# Patient Record
Sex: MALE | Race: WHITE | Employment: OTHER | URBAN - METROPOLITAN AREA
[De-identification: names, ages, dates, MRNs, and addresses within clinical notes are randomized per-mention and may not be internally consistent; named-entity substitution may affect disease eponyms.]

---

## 2017-02-02 ENCOUNTER — APPOINTMENT (OUTPATIENT)
Dept: CT IMAGING | Age: 82
DRG: 069 | End: 2017-02-02
Attending: EMERGENCY MEDICINE
Payer: MEDICARE

## 2017-02-02 ENCOUNTER — HOSPITAL ENCOUNTER (INPATIENT)
Age: 82
LOS: 2 days | Discharge: HOME | DRG: 069 | End: 2017-02-04
Attending: EMERGENCY MEDICINE | Admitting: INTERNAL MEDICINE
Payer: MEDICARE

## 2017-02-02 DIAGNOSIS — R53.1 WEAKNESS: ICD-10-CM

## 2017-02-02 DIAGNOSIS — R47.1 DYSARTHRIA: Primary | ICD-10-CM

## 2017-02-02 DIAGNOSIS — R41.0 CONFUSION: ICD-10-CM

## 2017-02-02 DIAGNOSIS — Z71.89 GOALS OF CARE, COUNSELING/DISCUSSION: ICD-10-CM

## 2017-02-02 LAB
ANION GAP BLD CALC-SCNC: 6 MMOL/L (ref 5–15)
APPEARANCE UR: CLEAR
APTT PPP: 27.4 SEC (ref 22.1–32.5)
BACTERIA URNS QL MICRO: NEGATIVE /HPF
BASOPHILS # BLD AUTO: 0 K/UL (ref 0–0.1)
BASOPHILS # BLD: 0 % (ref 0–1)
BILIRUB UR QL: NEGATIVE
BUN SERPL-MCNC: 19 MG/DL (ref 6–20)
BUN/CREAT SERPL: 11 (ref 12–20)
CALCIUM SERPL-MCNC: 9.4 MG/DL (ref 8.5–10.1)
CHLORIDE SERPL-SCNC: 104 MMOL/L (ref 97–108)
CK SERPL-CCNC: 69 U/L (ref 39–308)
CO2 SERPL-SCNC: 29 MMOL/L (ref 21–32)
COLOR UR: ABNORMAL
CREAT SERPL-MCNC: 1.75 MG/DL (ref 0.7–1.3)
EOSINOPHIL # BLD: 0.4 K/UL (ref 0–0.4)
EOSINOPHIL NFR BLD: 6 % (ref 0–7)
EPITH CASTS URNS QL MICRO: ABNORMAL /LPF
ERYTHROCYTE [DISTWIDTH] IN BLOOD BY AUTOMATED COUNT: 13.3 % (ref 11.5–14.5)
GLUCOSE BLD STRIP.AUTO-MCNC: 220 MG/DL (ref 65–100)
GLUCOSE SERPL-MCNC: 241 MG/DL (ref 65–100)
GLUCOSE UR STRIP.AUTO-MCNC: 250 MG/DL
HCT VFR BLD AUTO: 41.9 % (ref 36.6–50.3)
HGB BLD-MCNC: 14.4 G/DL (ref 12.1–17)
HGB UR QL STRIP: NEGATIVE
HYALINE CASTS URNS QL MICRO: ABNORMAL /LPF (ref 0–5)
INR PPP: 1.1 (ref 0.9–1.1)
KETONES UR QL STRIP.AUTO: NEGATIVE MG/DL
LEUKOCYTE ESTERASE UR QL STRIP.AUTO: NEGATIVE
LYMPHOCYTES # BLD AUTO: 22 % (ref 12–49)
LYMPHOCYTES # BLD: 1.2 K/UL (ref 0.8–3.5)
MCH RBC QN AUTO: 28.7 PG (ref 26–34)
MCHC RBC AUTO-ENTMCNC: 34.4 G/DL (ref 30–36.5)
MCV RBC AUTO: 83.5 FL (ref 80–99)
MONOCYTES # BLD: 0.4 K/UL (ref 0–1)
MONOCYTES NFR BLD AUTO: 7 % (ref 5–13)
NEUTS SEG # BLD: 3.7 K/UL (ref 1.8–8)
NEUTS SEG NFR BLD AUTO: 65 % (ref 32–75)
NITRITE UR QL STRIP.AUTO: NEGATIVE
PH UR STRIP: 6 [PH] (ref 5–8)
PLATELET # BLD AUTO: 142 K/UL (ref 150–400)
POTASSIUM SERPL-SCNC: 4.7 MMOL/L (ref 3.5–5.1)
PROT UR STRIP-MCNC: 30 MG/DL
PROTHROMBIN TIME: 10.9 SEC (ref 9–11.1)
RBC # BLD AUTO: 5.02 M/UL (ref 4.1–5.7)
RBC #/AREA URNS HPF: ABNORMAL /HPF (ref 0–5)
SERVICE CMNT-IMP: ABNORMAL
SODIUM SERPL-SCNC: 139 MMOL/L (ref 136–145)
SP GR UR REFRACTOMETRY: 1.02 (ref 1–1.03)
THERAPEUTIC RANGE,PTTT: NORMAL SECS (ref 58–77)
TROPONIN I SERPL-MCNC: <0.04 NG/ML
UA: UC IF INDICATED,UAUC: ABNORMAL
UROBILINOGEN UR QL STRIP.AUTO: 0.2 EU/DL (ref 0.2–1)
WBC # BLD AUTO: 5.7 K/UL (ref 4.1–11.1)
WBC URNS QL MICRO: ABNORMAL /HPF (ref 0–4)

## 2017-02-02 PROCEDURE — 93005 ELECTROCARDIOGRAM TRACING: CPT

## 2017-02-02 PROCEDURE — 70450 CT HEAD/BRAIN W/O DYE: CPT

## 2017-02-02 PROCEDURE — 65660000000 HC RM CCU STEPDOWN

## 2017-02-02 PROCEDURE — 93880 EXTRACRANIAL BILAT STUDY: CPT

## 2017-02-02 PROCEDURE — 36415 COLL VENOUS BLD VENIPUNCTURE: CPT | Performed by: EMERGENCY MEDICINE

## 2017-02-02 PROCEDURE — 81001 URINALYSIS AUTO W/SCOPE: CPT | Performed by: EMERGENCY MEDICINE

## 2017-02-02 PROCEDURE — 82962 GLUCOSE BLOOD TEST: CPT

## 2017-02-02 PROCEDURE — 82550 ASSAY OF CK (CPK): CPT | Performed by: EMERGENCY MEDICINE

## 2017-02-02 PROCEDURE — 85025 COMPLETE CBC W/AUTO DIFF WBC: CPT | Performed by: EMERGENCY MEDICINE

## 2017-02-02 PROCEDURE — 74011250636 HC RX REV CODE- 250/636: Performed by: EMERGENCY MEDICINE

## 2017-02-02 PROCEDURE — 80048 BASIC METABOLIC PNL TOTAL CA: CPT | Performed by: EMERGENCY MEDICINE

## 2017-02-02 PROCEDURE — 85730 THROMBOPLASTIN TIME PARTIAL: CPT | Performed by: EMERGENCY MEDICINE

## 2017-02-02 PROCEDURE — 83036 HEMOGLOBIN GLYCOSYLATED A1C: CPT | Performed by: INTERNAL MEDICINE

## 2017-02-02 PROCEDURE — 85610 PROTHROMBIN TIME: CPT | Performed by: EMERGENCY MEDICINE

## 2017-02-02 PROCEDURE — 84484 ASSAY OF TROPONIN QUANT: CPT | Performed by: EMERGENCY MEDICINE

## 2017-02-02 PROCEDURE — 99285 EMERGENCY DEPT VISIT HI MDM: CPT

## 2017-02-02 RX ORDER — SODIUM CHLORIDE 0.9 % (FLUSH) 0.9 %
5-10 SYRINGE (ML) INJECTION AS NEEDED
Status: DISCONTINUED | OUTPATIENT
Start: 2017-02-02 | End: 2017-02-04 | Stop reason: HOSPADM

## 2017-02-02 RX ORDER — FUROSEMIDE 40 MG/1
20 TABLET ORAL DAILY
COMMUNITY
End: 2017-02-20 | Stop reason: SDUPTHER

## 2017-02-02 RX ORDER — DEXTROSE 50 % IN WATER (D50W) INTRAVENOUS SYRINGE
12.5-25 AS NEEDED
Status: DISCONTINUED | OUTPATIENT
Start: 2017-02-02 | End: 2017-02-04 | Stop reason: HOSPADM

## 2017-02-02 RX ORDER — POLYETHYLENE GLYCOL 3350 17 G/17G
17 POWDER, FOR SOLUTION ORAL
COMMUNITY

## 2017-02-02 RX ORDER — GUAIFENESIN 100 MG/5ML
81 LIQUID (ML) ORAL DAILY
Status: DISCONTINUED | OUTPATIENT
Start: 2017-02-03 | End: 2017-02-03

## 2017-02-02 RX ORDER — ROSUVASTATIN CALCIUM 10 MG/1
10 TABLET, COATED ORAL
Status: DISCONTINUED | OUTPATIENT
Start: 2017-02-02 | End: 2017-02-02

## 2017-02-02 RX ORDER — INSULIN LISPRO 100 [IU]/ML
INJECTION, SOLUTION INTRAVENOUS; SUBCUTANEOUS
Status: DISCONTINUED | OUTPATIENT
Start: 2017-02-02 | End: 2017-02-04 | Stop reason: HOSPADM

## 2017-02-02 RX ORDER — PANTOPRAZOLE SODIUM 40 MG/1
40 TABLET, DELAYED RELEASE ORAL
Status: DISCONTINUED | OUTPATIENT
Start: 2017-02-03 | End: 2017-02-04 | Stop reason: HOSPADM

## 2017-02-02 RX ORDER — MAGNESIUM SULFATE 100 %
4 CRYSTALS MISCELLANEOUS AS NEEDED
Status: DISCONTINUED | OUTPATIENT
Start: 2017-02-02 | End: 2017-02-04 | Stop reason: HOSPADM

## 2017-02-02 RX ORDER — LANOLIN ALCOHOL/MO/W.PET/CERES
3 CREAM (GRAM) TOPICAL
COMMUNITY
End: 2019-02-27

## 2017-02-02 RX ORDER — SODIUM CHLORIDE 0.9 % (FLUSH) 0.9 %
5-10 SYRINGE (ML) INJECTION EVERY 8 HOURS
Status: DISCONTINUED | OUTPATIENT
Start: 2017-02-02 | End: 2017-02-04 | Stop reason: HOSPADM

## 2017-02-02 RX ORDER — MIRTAZAPINE 15 MG/1
7.5 TABLET, FILM COATED ORAL
COMMUNITY
End: 2019-02-27

## 2017-02-02 RX ORDER — ISOSORBIDE MONONITRATE 120 MG/1
60 TABLET, EXTENDED RELEASE ORAL
Status: ON HOLD | COMMUNITY
End: 2017-02-04

## 2017-02-02 RX ORDER — OMEPRAZOLE 20 MG/1
20 CAPSULE, DELAYED RELEASE ORAL
Status: DISCONTINUED | OUTPATIENT
Start: 2017-02-03 | End: 2017-02-02 | Stop reason: CLARIF

## 2017-02-02 RX ADMIN — Medication 10 ML: at 21:52

## 2017-02-02 RX ADMIN — SODIUM CHLORIDE 1000 ML: 900 INJECTION, SOLUTION INTRAVENOUS at 18:11

## 2017-02-02 NOTE — PROGRESS NOTES
[facility]: MED RECONCILIATION    Comments/Recommendations:    Verifies allergies   Reports compliance to prescribed regimen   Patient has a supportive spouse who keeps a detailed log of his wt, blood pressure, and blood sugar   Blood pressure  o 166/80 on average   Blood sugar   o  today which is higher than normal   Statin  o The patient is not taking a statin as he has been unable to tolerate this class of medications due to severe muscle aches. The patient was tried on multiple statins and none were tolerable. The patient has been taking [place] May herbal supplement daily since 2014 for his cholesterol.  Of note the spouse reports Coenzyme Q10 was not tried with statin therapy to attempt to help reduce myalgia   Nitroglycerin  o Counseled to check the expiration date and refill as needed  Medications added:     · Melatonin  · Miralax  · Mirtazapine    Medications removed:    · Aspirin stopped about 3 months ago  · Crestor 10 mg qhs  · Aspirin 81mg daily    Information obtained from: patient, spouse, [address] center called to confirm NPH insulin, medication list    Significant PMH/Disease States:   Patient Active Problem List   Diagnosis Code    Diabetes mellitus ([facility] E11.9    CAD (coronary artery disease) I25.10    HTN (hypertension) I10    CKD (chronic kidney disease) N18.9    Thrombocytopenia, unspecified (HCC) D69.6    Paroxysmal atrial fibrillation (HCC) I48.0    TIA (transient ischemic attack) G45.9    Leg weakness M62.81    CKD (chronic kidney disease) stage 3, GFR 30-59 ml/min N18.3    Left renal mass N28.89    LBBB (left bundle branch block) I44.7    Renal cell carcinoma (HCC) C64.9    Kidney cancer, primary, 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 ([facility] C64.9, C79.9    Renal cell cancer ([facility] C64.9     Past Medical History   Diagnosis Date    Adverse effect of anesthesia      BP unstable after GB suregry- in ICU 3 days    Arthritis     CAD (coronary artery disease) Angina 2011, PCI LAD with multilink stent 4.0x12mm;  also cath at [place]  -- no stents at that time    CKD (chronic kidney disease)     CVA [facility]cere[facility] vascular accident) [facility]      maybe in 11/15 after surgery    Diabetes mellitus ([facility]     Gallstone     GERD (gastroesophageal reflux disease)     HTN (hypertension)     Hypothyroidism     Ill-defined condition 2015     kidney stone    LBBB (left bundle branch block)      normal lexiscan MPI     SAMUEL (obstructive sleep apnea)      not using CPAP    PAF (paroxysmal atrial fibrillation) (HCC)      [id number] score = 7    Prostate cancer (HCC)      prostate    Renal cell cancer (HCC)      Stage 4 Renal Cell Cancer. Partial omentectomy 2015 with Dr. Malu Fink: omental mass-metastatic renal cell carcinoma    Renal mass, left      DAVINCI LEFT PARTIAL NEPHRECTOMY 13;     TIA (transient ischemic attack)      12/10/13     Chief Complaint for this Admission:   Chief Complaint   Patient presents with    Altered mental status    Aphagia     Allergies: Latex and Morphine    Prior to Admission Medications:     Prior to Admission Medications   Prescriptions Last Dose Informant Patient Reported? Taking? OTHER,NON-FORMULARY, 2017 at am Significant Other Yes Yes   Sig: Take 1 Cap by mouth daily.  May dietary supplement-red yeast rice, Crategi extract,etc.   apixaban (ELIQUIS) 2.5 mg tablet 2017 at am Significant Other Yes Yes   Sig: Take 2.5 mg by mouth two (2) times a day. desonide (TRIDESILON) 0.05 % cream  Significant Other Yes Yes   Sig: Apply  to affected area daily as needed for Skin Irritation. furosemide (LASIX) 40 mg tablet  Significant Other Yes Yes   Sig: Take 20 mg by mouth daily as needed (weight gain of 5 lbs or greater). insulin NPH (NOVOLIN N, HUMULIN N) 100 unit/mL injection 2017 at Unknown time Significant Other Yes Yes   Si Units by SubCUTAneous route daily.    isosorbide mononitrate ER (IMDUR) 120 mg CR tablet 2017 at Unknown time Significant Other Yes Yes   Sig: Take 60 mg by mouth every morning. losartan (COZAAR) 25 mg tablet 2017 at am Significant Other No Yes   Sig: Take 1 Tab by mouth daily. melatonin 3 mg tablet 2017 at Unknown time Significant Other Yes Yes   Sig: Take 3 mg by mouth nightly. mirtazapine (REMERON) 15 mg tablet 2017 at Unknown time Significant Other Yes Yes   Sig: Take 7.5 mg by mouth nightly. nitroglycerin (NITROSTAT) 0.4 mg SL tablet  Significant Other Yes Yes   Si.4 mg by SubLINGual route every five (5) minutes as needed. omeprazole (PRILOSEC) 20 mg capsule 2017 at am Significant Other Yes Yes   Sig: Take 20 mg by mouth Daily (before breakfast). polyethylene glycol (MIRALAX) 17 gram packet  Significant Other Yes Yes   Sig: Take 17 g by mouth daily as needed (constipation).       Facility-Administered Medications: None      Thank you,    Nidhi Cardoza, PharmD, BCPS

## 2017-02-02 NOTE — ED TRIAGE NOTES
Patient brought in for confusion around 11am, was not sure how to check his blood sugar, had been laying in bed, stated he did not feel well. Daughter called him around 2:45pm, and speech was not clear on the telephone.

## 2017-02-02 NOTE — PROGRESS NOTES
Spiritual Care Assessment/Progress Notes    Krish Finnegan [id number]  [id number]    1935  80 y.o.  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 [facility]) 11/02/2015    Renal cell carcinoma ([facility] 09/30/2015    Left renal mass 12/11/2013    LBBB (left bundle branch block) 12/11/2013    TIA (transient ischemic attack) 12/10/2013    Leg weakness 12/10/2013    CKD (chronic kidney disease) stage 3, GFR 30-59 ml/min 12/10/2013    Paroxysmal atrial fibrillation ([facility] 05/06/2013    Thrombocytopenia, unspecified ([facility] 07/14/2012    CAD (coronary artery disease)     HTN (hypertension)     CKD (chronic kidney disease)     Diabetes mellitus ([facility] 07/12/2012        Date: 2/2/2017       Level of Temple/Spiritual Activity:  []         Involved in mary tradition/spiritual practice    []         Not involved in mary tradition/spiritual practice  [x]         Spiritually oriented    []         Claims no spiritual orientation    []         seeking spiritual identity  []         Feels alienated from Religion practice/tradition  []         Feels angry about Religion practice/tradition  [x]         Spirituality/Religion tradition is a resource for coping at this time.   []         Not able to assess due to medical condition    Services Provided Today:  []         crisis intervention    []         reading Scriptures  [x]         spiritual assessment    []         prayer  [x]         empathic listening/emotional support  []         rites and rituals (cite in comments)  []         life review     []         Buddhist support  []         theological development   []         advocacy  []         ethical dialog     [x]         blessing  []         bereavement support    [x]         support to family  []         anticipatory grief support   []         help with AMD  []         spiritual guidance    []         meditation      Spiritual Care Needs  [x]         Emotional Support  []         Spiritual/Sabianist Care  []         Loss/Adjustment  []         Advocacy/Referral                /Ethics  []         No needs expressed at               this time  []         Other: (note in               comments)  [address]  []         Follow up visits with               pt/family  []         Provide materials  []         Schedule sacraments  []         Contact Community               Clergy  [x]         Follow up as needed  []         Other: (note in               comments)     Comments:  is responding to Code S in ER 16. Pt, wife and daughter are present at the time.  offered active listening allowing wife to process pt's medical concerns.  offered words of support and encouragement with pt by bedside. Pt and family expressed thanks for  support. Spiritual care will continue to follow as able and as needed. Rev.  [address] Mark Lux M.Div, M.S, [address] available at [phone])

## 2017-02-02 NOTE — H&P
Admission History and Physical      NAME:  Mehrdad Silveira. :   1935   MRN:  [id number]     PCP:  Not On File [facility]     Date/Time:  2017           Assessment/Plan:       Active Problems:    TIA (transient ischemic attack) / Dysarthria: LKN was 1445 today. Not a tPA candidate due to [address]. Admit to telemetry. CT Head without bleed. U/A pending. BG normal. Cannot do MRI due to metallic ear implant. Cannot do CTA due to CKD. Will get carotid dopplers, TTE. Neuro consult. Stopped ASA 81mg 3 mo ago, will re-start. Check lipid panel and hgba1c. Diabetes mellitus ([facility]): continue home NPH. SSI. FSBG. Check HgbA1c      CAD (coronary artery disease): Continue statin. Added ASA. Holding ARB or anti-hypertensives for now. HTN (hypertension): allow for permissive HTN for now given acute phase of possible CVA. Treat if SBP greater than 220      Paroxysmal atrial fibrillation ([facility]): appears to be in NSR right now. Has known LBBB. Rate controlled. Continue apixiban      CKD (chronic kidney disease) stage 3, GFR 30-59 ml/min: at baseline. Continue to monitor. Renally dose medication. 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 Has had slow cognitive decline as well. Palliative care consult for goals of care discussion. Subjective:   Barrier to HPI: Cognitive impairment. Hx obtained from wife and daughter and chart review. CHIEF COMPLAINT: Slurred Speech     HISTORY OF PRESENT ILLNESS:     Mr. Tommy Tapia is a 80 y.o.  male with a hx of TIA, PAF, HTN, Stage IV renal cell carcinoma who is admitted with slurred speech concerning for possible TIA. Mr. Tommy Tapia presented to the Emergency Department after sudden onset of slurred speech at 1445 that was noted by daughter.  Earlier that day, speech was normal, wife did not that patient stated \"I feel sick\" but was vague and seemed somewhat confused. Upon arrival to check on patient, daughter noticed worsening in speech. In ED, his dysarthria began to improve, tele-neuro was contacted and not a tPA candidate due to eliquis. We will admit for further management. Past Medical History   Diagnosis Date    Adverse effect of anesthesia      BP unstable after GB suregry- in ICU 3 days    Arthritis     CAD (coronary artery disease)      Angina Jan 2011, PCI LAD with multilink stent 4.0x12mm;  also cath at [place] 7/13 -- no stents at that time    CKD (chronic kidney disease)     CVA (cerebral vascular accident) ([address]      maybe in 11/15 after surgery    Diabetes mellitus ([address]     Gallstone     GERD (gastroesophageal reflux disease)     HTN (hypertension)     Hypothyroidism     Ill-defined condition 8/2015     kidney stone    LBBB (left bundle branch block)      normal lexiscan MPI 12/13    SAMUEL (obstructive sleep apnea)      not using CPAP    PAF (paroxysmal atrial fibrillation) (HCC)      [id number] score = 7    Prostate cancer ([address]      prostate    Renal cell cancer ([address]      Stage 4 Renal Cell Cancer.     Partial omentectomy 11/2/2015 with Dr. Gemini Kaba: omental mass-metastatic renal cell carcinoma    Renal mass, left      DAVINCI LEFT PARTIAL NEPHRECTOMY 12/19/13;     TIA (transient ischemic attack)      12/10/13        Past Surgical History   Procedure Laterality Date    Hx coronary stent placement  2011    Hx orthopaedic  1994     rotator cuff repair    Hx orthopaedic  1994     cervical disc removed    Hx tonsillectomy  1987    Hx heent  1989     stapedectomy    Hx prostatectomy  2000     surgery intervention    Pr cardiac surg procedure unlist  1/2011     stent to LAD    Hx heart catheterization  2011, 2013    Hx urological  2001     valvular implant prevent incontinence    Hx lithotripsy  8/2015    Hx urological  2003     insert gel to help with incontinence    Hx urological  12/2013 left partial nephrectomy    Hx urological  9/2015     CT guided biopsy of abdominal lymph nodes    Hx cholecystectomy  7/13/2012    Hx hernia repair  1968     left inguinal       Social History   Substance Use Topics    Smoking status: Former Smoker     Packs/day: 0.10     Years: 23.00     Quit date: 12/3/1975    Smokeless tobacco: Never Used    Alcohol use No        Family History   Problem Relation Age of Onset    Hypertension      Stroke Father     Cancer Father      prostate    Diabetes Sister      2 sisters    Hypertension Sister      2 sisters    Diabetes Brother     Stroke Brother     Diabetes Brother      all brothers    Stroke Brother     Kidney Disease Brother     Heart Attack Brother         Allergies   Allergen Reactions    Latex Rash, Swelling and Contact Dermatitis    Morphine Other (comments)     Hallucinations and Nausea        Prior to Admission medications    Medication Sig Start Date End Date Taking? Authorizing Provider   furosemide (LASIX) 40 mg tablet Take 20 mg as needed 2/17/16   Kaylin Boyd MD   losartan (COZAAR) 25 mg tablet Take 1 Tab by mouth daily. 2/17/16   Kaylin Boyd MD   rosuvastatin (CRESTOR) 10 mg tablet Take 1 Tab by mouth nightly. 2/17/16   Kaylin Boyd MD   isosorbide mononitrate ER (IMDUR) 60 mg CR tablet  10/23/15   Historical Provider   apixaban (ELIQUIS) 2.5 mg tablet Take 2.5 mg by mouth two (2) times a day. Historical Provider   OTHER,NON-FORMULARY, Take 1 Cap by mouth daily. [place] May dietary supplement-red yeast rice, Crategi extract,etc.    Historical Provider   insulin NPH (NOVOLIN N, HUMULIN N) 100 unit/mL injection 35 Units by SubCUTAneous route daily. Historical Provider   acetaminophen (TYLENOL EXTRA STRENGTH) 500 mg tablet Take 1,000 mg by mouth every eight (8) hours as needed for Pain. Historical Provider   omeprazole (PRILOSEC) 20 mg capsule Take 20 mg by mouth daily.     Historical Provider   desonide (TRIDESILON) 0.05 % cream Apply  to affected area daily as needed for Skin Irritation. Historical Provider   aspirin delayed-release 81 mg tablet Take 81 mg by mouth daily. Historical Provider   nitroglycerin (NITROSTAT) 0.4 mg SL tablet 0.4 mg by SubLINGual route every five (5) minutes as needed. Chance Ornelas MD         Review of Systems:  Unable to obtain due to current clinical condition. Objective:      VITALS:    Vital signs reviewed; most recent are: There were no vitals taken for this visit. SpO2 Readings from Last 6 Encounters:   03/24/16 97%   12/30/15 97%   11/24/15 97%   11/05/15 96%   10/26/15 97%   10/09/15 96%        No intake or output data in the 24 hours ending 02/02/17 9508         Exam:     Physical Exam:    Gen:  Elderly, frail, in no acute distress  HEENT:  Pink conjunctivae, PERRL, hearing intact to voice, moist mucous membranes  Neck:  Supple, without masses, thyroid non-tender  Resp:  No accessory muscle use, clear breath sounds without wheezes rales or rhonchi  Card:  No murmurs, normal S1, S2 without thrills, bruits or peripheral edema  Abd:  Soft, non-tender, non-distended, normoactive bowel sounds are present  Lymph:  No cervical adenopathy  Musc:  No cyanosis or clubbing  Skin:  No rashes or ulcers, skin turgor is good  Neuro:  Cranial nerves 3-12 are grossly intact,  strength is 5/5 bilaterally, dorsi / plantarflexion strength is 5/5 bilaterally, follows commands intermittently  Psych:  Alert with poor insight. Oriented to person only. Labs:    Recent Labs      02/02/17   1620   WBC  5.7   HGB  14.4   HCT  41.9   PLT  142*     Recent Labs      02/02/17   1620   NA  139   K  4.7   CL  104   CO2  29   GLU  241*   BUN  19   CREA  1.75*   CA  9.4     Lab Results   Component Value Date/Time    Glucose (POC) 220 02/02/2017 04:06 PM    Glucose (POC) 275 11/05/2015 11:34 AM     No results for input(s): PH, PCO2, PO2, HCO3, FIO2 in the last 72 hours.   Recent Labs      02/02/17   1620 INR  1.1       Medical records reviewed in preparation for this admission: Old medical records. Nursing notes.     Surrogate decision maker:  patient, spouse and daughter    Total time spent with patient: 79 [address] discussed with: Patient, Family, Nursing Staff and >50% of time spent in counseling and coordination of care    Discussed:  Care Plan    Prophylaxis:  Apixiban    Probable Disposition:  Home w/Family and HH PT, OT, RN           ___________________________________________________    Attending Physician: Jamil Horvath,

## 2017-02-02 NOTE — IP AVS SNAPSHOT
[address] 
[phone] Patient: Oziel Goss. MRN: [id number] EAV:1/61/8800 You are allergic to the following Allergen Reactions Latex Rash Swelling Contact Dermatitis Contrast Agent (Iodine) Other (comments) Patient has one kidney Morphine Other (comments) Hallucinations and Nausea Recent Documentation Height Weight BMI Smoking Status 1.753 m 89.1 kg 29 kg/m2 Former Smoker Emergency Contacts Name Discharge Info Relation Home Work Mobile Najma Hernandez  Spouse [3] [phone]    
 Najma BRUNNER  Spouse [3] [phone] About your hospitalization You were admitted on:  February 2, 2017 You last received care in the:  [facility] TELE 2 You were discharged on:  February 4, 2017 Unit phone number:  [phone] Why you were hospitalized Your primary diagnosis was:  Not on File Your diagnoses also included:  Diabetes Mellitus (Hcc), Ckd (Chronic Kidney Disease) Stage 3, Gfr 30-59 Ml/Min, Cad (Coronary Artery Disease), Htn (Hypertension), Kidney Cancer, Primary, With Metastasis From Kidney To Other Site (Hcc), Paroxysmal Atrial Fibrillation (Hcc), 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, Expressive Aphasia Providers Seen During Your Hospitalizations Provider Role Specialty Primary office phone Madiha Tineo MD Attending Provider Emergency Medicine [phone] Kriss Dean DO Attending Provider Internal Medicine [phone] Lay Rueda MD Attending Provider Internal Medicine [phone] Your Primary Care Physician (PCP) Primary Care Physician Office Phone Office Fax OTHER, PHYS ** None ** ** None ** Follow-up Information Follow up With Details Comments Contact Info  Rene Clark MD Schedule an appointment as soon as possible for a visit in 2 weeks Make Neurology Follow-up appt post TIA or CVA in 2-3 weeks with Dr. Aylin Saavedra [address] [facility] [address] 
[phone] [address] [id number] 
[phone] Follow up with your primary care doctor Gwen Hermosillo MD  can see Dr. Tere Jernigan or Dr. Aylin Saavedra for post TIA follow up [address] 
[phone] Chance Ornelas MD   Patient can only remember the practice name and not the physician Your Appointments Monday February 20, 2017  1:00 PM EST  
ESTABLISHED PATIENT with Beto Conde MD  
[facility] ([facility]) [id number] [place] [address]  
[phone] Current Discharge Medication List  
  
START taking these medications Dose & Instructions Dispensing Information Comments Morning Noon Evening Bedtime  
 aspirin 81 mg chewable tablet Your next dose is: Today, Tomorrow Other:  _________ Dose:  81 mg Take 1 Tab by mouth daily. Quantity:  30 Tab Refills:  0  
     
   
   
   
  
 co-enzyme Q-10 100 mg capsule Commonly known as:  CO Q-10 Your next dose is: Today, Tomorrow Other:  _________ Dose:  100 mg Take 1 Cap by mouth daily. Quantity:  30 Cap Refills:  0  
     
   
   
   
  
 pravastatin 20 mg tablet Commonly known as:  PRAVACHOL Your next dose is: Today, Tomorrow Other:  _________ Dose:  20 mg Take 1 Tab by mouth nightly. Quantity:  30 Tab Refills:  0 CONTINUE these medications which have CHANGED Dose & Instructions Dispensing Information Comments Morning Noon Evening Bedtime  
 insulin  unit/mL injection Commonly known as:  Kelvin [place] What changed:   
- how much to take - when to take this - additional instructions Your next dose is: Today, Tomorrow Other:  _________ Dose:  10 Units 10 Units by SubCUTAneous route Before breakfast and dinner. Note the decrease in dose from 35 units Quantity:  1 Vial  
Refills:  0  
     
   
   
   
  
 isosorbide mononitrate  mg CR tablet Commonly known as:  IMDUR What changed:  how much to take Your next dose is: Today, Tomorrow Other:  _________ Dose:  120 mg Take 1 Tab by mouth every morning. Quantity:  30 Tab Refills:  0 CONTINUE these medications which have NOT CHANGED Dose & Instructions Dispensing Information Comments Morning Noon Evening Bedtime  
 desonide 0.05 % cream  
Commonly known as:  Ean Cordia Your next dose is: Today, Tomorrow Other:  _________ Apply  to affected area daily as needed for Skin Irritation. Refills:  0  
     
   
   
   
  
 ELIQUIS 2.5 mg tablet Generic drug:  apixaban Your next dose is: Today, Tomorrow Other:  _________ Dose:  2.5 mg Take 2.5 mg by mouth two (2) times a day. Refills:  0  
     
   
   
   
  
 furosemide 40 mg tablet Commonly known as:  LASIX Your next dose is: Today, Tomorrow Other:  _________ Dose:  20 mg Take 20 mg by mouth daily as needed (weight gain of 5 lbs or greater). Refills:  0  
     
   
   
   
  
 losartan 25 mg tablet Commonly known as:  COZAAR Your next dose is: Today, Tomorrow Other:  _________ Dose:  25 mg Take 1 Tab by mouth daily. Quantity:  30 Tab Refills:  6  
     
   
   
   
  
 melatonin 3 mg tablet Your next dose is: Today, Tomorrow Other:  _________ Dose:  3 mg Take 3 mg by mouth nightly. Refills:  0 MIRALAX 17 gram packet Generic drug:  polyethylene glycol Your next dose is: Today, Tomorrow Other:  _________ Dose:  17 g Take 17 g by mouth daily as needed (constipation). Refills:  0  
     
   
   
   
  
 nitroglycerin 0.4 mg SL tablet Commonly known as:  NITROSTAT Your next dose is: Today, Tomorrow Other:  _________ Dose:  0.4 mg  
0.4 mg by SubLINGual route every five (5) minutes as needed. Refills:  0  
     
   
   
   
  
 OTHER(NON-FORMULARY) Your next dose is: Today, Tomorrow Other:  _________ Dose:  1 Cap Take 1 Cap by mouth daily. Zafar May dietary supplement-red yeast rice, Crategi extract,etc.  
 Refills:  0 PriLOSEC 20 mg capsule Generic drug:  omeprazole Your next dose is: Today, Tomorrow Other:  _________ Dose:  20 mg Take 20 mg by mouth Daily (before breakfast). Refills:  0 REMERON 15 mg tablet Generic drug:  mirtazapine Your next dose is: Today, Tomorrow Other:  _________ Dose:  7.5 mg Take 7.5 mg by mouth nightly. Refills:  0 Where to Get Your Medications Information on where to get these meds will be given to you by the nurse or doctor. ! Ask your nurse or doctor about these medications  
  aspirin 81 mg chewable tablet  
 co-enzyme Q-10 100 mg capsule  
 insulin  unit/mL injection  
 isosorbide mononitrate  mg CR tablet  
 pravastatin 20 mg tablet Discharge Instructions HOSPITALIST DISCHARGE INSTRUCTIONS 
NAME: Ramesh Chang. :  1935 MRN:  [id number] Date/Time:  2017 9:03 AM 
 
ADMIT DATE: 2017 DISCHARGE DATE: 2017 PRINCIPAL DISCHARGE DIAGNOSES: 
TIA (mini-stroke) MEDICATIONS: 
· It is important that you take the medication exactly as they are prescribed. Note the changes and additions to your medications. Be sure you understand these changes before you are discharged today. · Note the decrease in dose of insulin. · Note the increase in dose of isosorbide mononitrate · Keep your medication in the bottles provided by the pharmacist and keep a list of the medication names, dosages, and times to be taken in your wallet. · Do not take other medications without consulting your doctor. Pain Management: per above medications What to do at [facility] Recommended diet:  Cardiac Diet and Diabetic Diet Recommended activity: PT/OT Eval and Treat If you experience any of the following symptoms then please call your primary care physician or return to the emergency room if you cannot get hold of your doctor: 
 
Slurred speech, facial droop, weakness, numbness, falling, bleeding, or other severe concerning symptoms that brought you to the hospital in the first place Follow Up: Follow-up Information Follow up With Details Comments Contact Info Danilo Farmer MD Schedule an appointment as soon as possible for a visit in 2 weeks Make Neurology Follow-up appt post TIA or CVA in 2-3 weeks with Dr. Amna Fuentes [address] [address] 
[phone] [address] [address] 
[phone] Follow up with your primary care doctor Nelsy Early MD  can see Dr. Eddy Danielle or Dr. Amna Fuentes for post TIA follow up [address] [facility] [address] [place] [address] 
[phone] Information obtained by : 
I understand that if any problems occur once I am at home I am to contact my physician. I understand and acknowledge receipt of the instructions indicated above. Physician's or R.N.'s Signature                                                                  Date/Time Patient or Representative Signature                                                          Date/Time Discharge Orders None [employer] Announcement We are excited to announce that we are making your provider's discharge notes available to you in [employer]. You will see these notes when they are completed and signed by the physician that discharged you from your recent hospital stay. If you have any questions or concerns about any information you see in [employer], please call the Health Information Department where you were seen or reach out to your Primary Care Provider for more information about your plan of care. Introducing [facility]! Dear Cecy Cazares: 
Thank you for requesting a [employer] account. Our records indicate that you already have an active [employer] account. You can access your account anytime at [email or web address] Did you know that you can access your hospital and ER discharge instructions at any time in [employer]? You can also review all of your test results from your hospital stay or ER visit. Additional Information If you have questions, please visit the Frequently Asked Questions section of the [employer] website at [email or web address] Remember, [employer] is NOT to be used for urgent needs. For medical emergencies, dial 911. Now available from your iPhone and Android! General Information Please provide this summary of care documentation to your next provider. Patient Signature:  ____________________________________________________________ Date:  ____________________________________________________________  
  
Yany Kiran Provider Signature:  ____________________________________________________________ Date:  ____________________________________________________________

## 2017-02-02 NOTE — ED NOTES
Per Dr Colie Leyden, patient is taking eloquis and is not a TPA candidate. Primary RN updated on patient status.

## 2017-02-02 NOTE — IP AVS SNAPSHOT
Current Discharge Medication List  
  
Take these medications at their scheduled times Dose & Instructions Dispensing Information Comments Morning Noon Evening Bedtime  
 aspirin 81 mg chewable tablet Your next dose is: Today, Tomorrow Other:  ____________ Dose:  81 mg Take 1 Tab by mouth daily. Quantity:  30 Tab Refills:  0  
     
   
   
   
  
 co-enzyme Q-10 100 mg capsule Commonly known as:  CO Q-10 Your next dose is: Today, Tomorrow Other:  ____________ Dose:  100 mg Take 1 Cap by mouth daily. Quantity:  30 Cap Refills:  0  
     
   
   
   
  
 ELIQUIS 2.5 mg tablet Generic drug:  apixaban Your next dose is: Today, Tomorrow Other:  ____________ Dose:  2.5 mg Take 2.5 mg by mouth two (2) times a day. Refills:  0  
     
   
   
   
  
 insulin  unit/mL injection Commonly known as:  Melisa Opal Your next dose is: Today, Tomorrow Other:  ____________ Dose:  10 Units 10 Units by SubCUTAneous route Before breakfast and dinner. Note the decrease in dose from 35 units Quantity:  1 Vial  
Refills:  0  
     
   
   
   
  
 isosorbide mononitrate  mg CR tablet Commonly known as:  IMDUR Your next dose is: Today, Tomorrow Other:  ____________ Dose:  120 mg Take 1 Tab by mouth every morning. Quantity:  30 Tab Refills:  0  
     
   
   
   
  
 losartan 25 mg tablet Commonly known as:  COZAAR Your next dose is: Today, Tomorrow Other:  ____________ Dose:  25 mg Take 1 Tab by mouth daily. Quantity:  30 Tab Refills:  6  
     
   
   
   
  
 melatonin 3 mg tablet Your next dose is: Today, Tomorrow Other:  ____________ Dose:  3 mg Take 3 mg by mouth nightly. Refills:  0  
     
   
   
   
  
 OTHER(NON-FORMULARY) Your next dose is: Today, Tomorrow Other:  ____________ Dose:  1 Cap Take 1 Cap by mouth daily. Zafar May dietary supplement-red yeast rice, Crategi extract,etc.  
 Refills:  0  
     
   
   
   
  
 pravastatin 20 mg tablet Commonly known as:  PRAVACHOL Your next dose is: Today, Tomorrow Other:  ____________ Dose:  20 mg Take 1 Tab by mouth nightly. Quantity:  30 Tab Refills:  0 PriLOSEC 20 mg capsule Generic drug:  omeprazole Your next dose is: Today, Tomorrow Other:  ____________ Dose:  20 mg Take 20 mg by mouth Daily (before breakfast). Refills:  0 REMERON 15 mg tablet Generic drug:  mirtazapine Your next dose is: Today, Tomorrow Other:  ____________ Dose:  7.5 mg Take 7.5 mg by mouth nightly. Refills:  0 Take these medications as needed Dose & Instructions Dispensing Information Comments Morning Noon Evening Bedtime  
 desonide 0.05 % cream  
Commonly known as:  Le[place] Your next dose is: Today, Tomorrow Other:  ____________ Apply  to affected area daily as needed for Skin Irritation. Refills:  0  
     
   
   
   
  
 furosemide 40 mg tablet Commonly known as:  LASIX Your next dose is: Today, Tomorrow Other:  ____________ Dose:  20 mg Take 20 mg by mouth daily as needed (weight gain of 5 lbs or greater). Refills:  0 MIRALAX 17 gram packet Generic drug:  polyethylene glycol Your next dose is: Today, Tomorrow Other:  ____________ Dose:  17 g Take 17 g by mouth daily as needed (constipation). Refills:  0  
     
   
   
   
  
 nitroglycerin 0.4 mg SL tablet Commonly known as:  NITROSTAT Your next dose is: Today, Tomorrow Other:  ____________  Dose:  0.4 mg  
 0.4 mg by SubLINGual route every five (5) minutes as needed. Refills:  0 Where to Get Your Medications Information about where to get these medications is not yet available ! Ask your nurse or doctor about these medications  
  aspirin 81 mg chewable tablet  
 co-enzyme Q-10 100 mg capsule  
 insulin  unit/mL injection  
 isosorbide mononitrate  mg CR tablet  
 pravastatin 20 mg tablet

## 2017-02-02 NOTE — ED PROVIDER NOTES
HPI Comments: 80 y.o. male with past medical history significant for CVA, DM, HTN, TIA, PAF, CKD, renal cell cancer, SAMUEL, and prostate cancer who presents for evaluation of AMS. Earlier this morning (11:00), wife noticed that the patient had marked incoherence in speech (expressive aphasia), along with increased confusion. Wife is unsure of time of onset -- \"I couldn't tell if he had it earlier, since he didn't say enough for me to think anything of it. \"  Wife states that patient also seemed \"weaker and slower\" than usual, \"dragging his feet more. \"  Patient had also complained of HA. Patient chronically exhibits confusion, weakness, and aphasia at baseline due to h/o Alzheimer's dementia and stroke. Wife states that patient has \"never been like this. \"  Per wife, patient was at baseline yesterday. At this time, patient continues to \"not feel very well,\" but is unable to \"pintpoint why. \"  Patient complains of feeling \"queasy. \"  No focal weakness to extremities. There are no other acute medical concerns at this time. Social hx: +  PCP: Not On File Bsh[facility]    Full history, physical exam, and ROS unable to be obtained due to:  dementia. Note written by Ana Luisa Dangelo, as dictated by Carroll Paige MD 4:11 PM    The history is provided by a relative, the spouse and the patient.         Past Medical History:   Diagnosis Date    Adverse effect of anesthesia      BP unstable after GB suregry- in ICU 3 days    Arthritis     CAD (coronary artery disease)      Angina Jan 2011, PCI LAD with multilink stent 4.0x12mm;  also cath at [place] 7/13 -- no stents at that time    CKD (chronic kidney disease)     CVA [facility]cere[facility] vascular accident) ([facility]      maybe in 11/15 after surgery    Diabetes mellitus ([facility]     Gallstone     GERD (gastroesophageal reflux disease)     HTN (hypertension)     Hypothyroidism     Ill-defined condition 8/2015     kidney stone    LBBB (left bundle branch block)      normal lexiscan MPI 12/13    SAMUEL (obstructive sleep apnea)      not using CPAP    PAF (paroxysmal atrial fibrillation) (HCC)      AREOT2Pwle score = 7    Prostate cancer (HCC)      prostate    Renal cell cancer (HCC)      Stage 4 Renal Cell Cancer. Partial omentectomy 11/2/2015 with Dr. Kyle Azar: omental mass-metastatic renal cell carcinoma    Renal mass, left      DAVINCI LEFT PARTIAL NEPHRECTOMY 12/19/13;     TIA (transient ischemic attack)      12/10/13       Past Surgical History:   Procedure Laterality Date    Hx coronary stent placement  2011    Hx orthopaedic  1994     rotator cuff repair    Hx orthopaedic  1994     cervical disc removed    Hx tonsillectomy  1987    Hx heent  1989     stapedectomy    Hx prostatectomy  2000     surgery intervention    Pr cardiac surg procedure unlist  1/2011     stent to LAD    Hx heart catheterization  2011, 2013    Hx urological  2001     valvular implant prevent incontinence    Hx lithotripsy  8/2015    Hx urological  2003     insert gel to help with incontinence    Hx urological  12/2013     left partial nephrectomy    Hx urological  9/2015     CT guided biopsy of abdominal lymph nodes    Hx cholecystectomy  7/13/2012    Hx hernia repair  1968     left inguinal         Family History:   Problem Relation Age of Onset    Hypertension      Stroke Father     Cancer Father      prostate    Diabetes Sister      2 sisters    Hypertension Sister      2 sisters    Diabetes Brother     Stroke Brother     Diabetes Brother      all brothers    Stroke Brother     Kidney Disease Brother     Heart Attack Brother        Social History     Social History    Marital status:      Spouse name: N/A    Number of children: N/A    Years of education: N/A     Occupational History    Not on file.      Social History Main Topics    Smoking status: Former Smoker     Packs/day: 0.10     Years: 23.00     Quit date: 12/3/1975    Smokeless tobacco: Never Used   Ramsey Pedro Alcohol use No    Drug use: No    Sexual activity: No     Other Topics Concern    Not on file     Social History Narrative         ALLERGIES: Latex and Morphine    Review of Systems   Unable to perform ROS: Dementia       There were no vitals filed for this visit. Physical Exam   Constitutional: He is oriented to person, place, and time. He appears well-developed and well-nourished. No distress. HENT:   Head: Normocephalic and atraumatic. Right Ear: External ear normal.   Left Ear: External ear normal.   Eyes: EOM are normal. Pupils are equal, round, and reactive to light. Neck: Normal range of motion. Neck supple. No JVD present. No tracheal deviation present. Cardiovascular: Normal rate, regular rhythm and normal heart sounds. Exam reveals no gallop and no friction rub. No murmur heard. Pulmonary/Chest: Effort normal and breath sounds normal. No stridor. No respiratory distress. He has no wheezes. He has no rales. Abdominal: Soft. Bowel sounds are normal. He exhibits no distension. There is no tenderness. There is no rebound and no guarding. Musculoskeletal: Normal range of motion. He exhibits no edema or tenderness. Neurological: He is alert and oriented to person, place, and time. No focal neurological deficits. Oriented to person, place, and time. 5/5 biceps and triceps strength. 5/5 plantar and dorsiflexion. Normal sensation in all 4 extremities. Skin: Skin is warm and dry. No rash noted. He is not diaphoretic. No erythema. Nursing note and vitals reviewed. Note written by An aLuisa Sarabia, as dictated by Candis Pryor MD 4:12 PM    MDM  Number of Diagnoses or Management Options  Diagnosis management comments: 80-year-old white male presents to the emergency department with confusion. Patient has history of dementia. His mental status has been worse over the past 4 or 5 hours. Differential diagnosis includes delirium, dementia, UTI, pneumonia, stroke.  We'll check basic blood work. Patient is on blood thinners so as not a TPA candidate. We'll reassess when testing his back. Patient and family agree with this plan. Neurologic exam is currently nonfocal.       Amount and/or Complexity of Data Reviewed  Clinical lab tests: ordered and reviewed  Tests in the radiology section of CPT®: ordered and reviewed  Tests in the medicine section of CPT®: ordered and reviewed  Discussion of test results with the performing providers: yes  Decide to obtain previous medical records or to obtain history from someone other than the patient: yes  Obtain history from someone other than the patient: yes  Review and summarize past medical records: yes  Discuss the patient with other providers: yes  Independent visualization of images, tracings, or specimens: yes    Risk of Complications, Morbidity, and/or Mortality  Presenting problems: high  Diagnostic procedures: high  Management options: high    Patient Progress  Patient progress: improved    ED Course       Procedures    CONSULT NOTE:  4:20 PM Carroll Paige MD spoke with Dr. Chris Henriquez, Consult for Teleneurology. Discussed available diagnostic tests and clinical findings. Given that the patient is on anticoagulants and exhibits no focal neurological deficits, he is not a tPA candidate. 5:16 PM  CT head shows no acute process. Blood work is WNL. Will discuss with Hospitalist for admission due to confusoin and speech difficulty. CONSULT NOTE:  5:21 PM Carroll Paige MD spoke with Dr. Christopher Sanchez, Consult for Hospitalist.  Discussed available diagnostic tests and clinical findings. He will see the patient.     EKG: Unclear rhythm, HR 63, LAD, LBBB, no ST elevations or depressions  Carroll Paige MD

## 2017-02-03 ENCOUNTER — APPOINTMENT (OUTPATIENT)
Dept: CT IMAGING | Age: 82
DRG: 069 | End: 2017-02-03
Attending: NURSE PRACTITIONER
Payer: MEDICARE

## 2017-02-03 PROBLEM — R47.01 EXPRESSIVE APHASIA: Status: ACTIVE | Noted: 2017-02-03

## 2017-02-03 PROBLEM — Z96.21 COCHLEAR IMPLANT IN PLACE: Status: ACTIVE | Noted: 2017-02-03

## 2017-02-03 LAB
ANION GAP BLD CALC-SCNC: 9 MMOL/L (ref 5–15)
ATRIAL RATE: 60 BPM
BUN SERPL-MCNC: 14 MG/DL (ref 6–20)
BUN/CREAT SERPL: 9 (ref 12–20)
CALCIUM SERPL-MCNC: 9.4 MG/DL (ref 8.5–10.1)
CALCULATED R AXIS, ECG10: -39 DEGREES
CALCULATED T AXIS, ECG11: 109 DEGREES
CHLORIDE SERPL-SCNC: 104 MMOL/L (ref 97–108)
CHOLEST SERPL-MCNC: 170 MG/DL
CO2 SERPL-SCNC: 27 MMOL/L (ref 21–32)
CREAT SERPL-MCNC: 1.56 MG/DL (ref 0.7–1.3)
DIAGNOSIS, 93000: NORMAL
EST. AVERAGE GLUCOSE BLD GHB EST-MCNC: 148 MG/DL
EST. AVERAGE GLUCOSE BLD GHB EST-MCNC: 166 MG/DL
GLUCOSE BLD STRIP.AUTO-MCNC: 137 MG/DL (ref 65–100)
GLUCOSE BLD STRIP.AUTO-MCNC: 138 MG/DL (ref 65–100)
GLUCOSE BLD STRIP.AUTO-MCNC: 190 MG/DL (ref 65–100)
GLUCOSE BLD STRIP.AUTO-MCNC: 96 MG/DL (ref 65–100)
GLUCOSE SERPL-MCNC: 145 MG/DL (ref 65–100)
HBA1C MFR BLD: 6.8 % (ref 4.2–6.3)
HBA1C MFR BLD: 7.4 % (ref 4.2–6.3)
HBV SURFACE AG SER QL: <0.1 INDEX
HBV SURFACE AG SER QL: NEGATIVE
HCV AB SERPL QL IA: NONREACTIVE
HCV COMMENT,HCGAC: NORMAL
HDLC SERPL-MCNC: 41 MG/DL
HDLC SERPL: 4.1 {RATIO} (ref 0–5)
HIV1 P24 AG SERPL QL IA: NONREACTIVE
HIV1+2 AB SERPL QL IA: NONREACTIVE
LDLC SERPL CALC-MCNC: 99 MG/DL (ref 0–100)
LIPID PROFILE,FLP: ABNORMAL
POTASSIUM SERPL-SCNC: 3.6 MMOL/L (ref 3.5–5.1)
Q-T INTERVAL, ECG07: 446 MS
QRS DURATION, ECG06: 154 MS
QTC CALCULATION (BEZET), ECG08: 456 MS
SERVICE CMNT-IMP: ABNORMAL
SERVICE CMNT-IMP: NORMAL
SODIUM SERPL-SCNC: 140 MMOL/L (ref 136–145)
TRIGL SERPL-MCNC: 150 MG/DL (ref ?–150)
VENTRICULAR RATE, ECG03: 63 BPM
VLDLC SERPL CALC-MCNC: 30 MG/DL

## 2017-02-03 PROCEDURE — 83036 HEMOGLOBIN GLYCOSYLATED A1C: CPT | Performed by: INTERNAL MEDICINE

## 2017-02-03 PROCEDURE — 74011250637 HC RX REV CODE- 250/637: Performed by: INTERNAL MEDICINE

## 2017-02-03 PROCEDURE — 36415 COLL VENOUS BLD VENIPUNCTURE: CPT | Performed by: INTERNAL MEDICINE

## 2017-02-03 PROCEDURE — 70496 CT ANGIOGRAPHY HEAD: CPT

## 2017-02-03 PROCEDURE — 74011250637 HC RX REV CODE- 250/637: Performed by: NURSE PRACTITIONER

## 2017-02-03 PROCEDURE — 97116 GAIT TRAINING THERAPY: CPT

## 2017-02-03 PROCEDURE — 74011250636 HC RX REV CODE- 250/636: Performed by: NURSE PRACTITIONER

## 2017-02-03 PROCEDURE — 97162 PT EVAL MOD COMPLEX 30 MIN: CPT

## 2017-02-03 PROCEDURE — 76450000000

## 2017-02-03 PROCEDURE — 74011636320 HC RX REV CODE- 636/320: Performed by: RADIOLOGY

## 2017-02-03 PROCEDURE — 65660000000 HC RM CCU STEPDOWN

## 2017-02-03 PROCEDURE — 80048 BASIC METABOLIC PNL TOTAL CA: CPT | Performed by: NURSE PRACTITIONER

## 2017-02-03 PROCEDURE — 92610 EVALUATE SWALLOWING FUNCTION: CPT

## 2017-02-03 PROCEDURE — 97530 THERAPEUTIC ACTIVITIES: CPT

## 2017-02-03 PROCEDURE — 80061 LIPID PANEL: CPT | Performed by: INTERNAL MEDICINE

## 2017-02-03 PROCEDURE — 93306 TTE W/DOPPLER COMPLETE: CPT

## 2017-02-03 PROCEDURE — 82962 GLUCOSE BLOOD TEST: CPT

## 2017-02-03 RX ORDER — FACIAL-BODY WIPES
10 EACH TOPICAL DAILY PRN
Status: DISCONTINUED | OUTPATIENT
Start: 2017-02-03 | End: 2017-02-04 | Stop reason: HOSPADM

## 2017-02-03 RX ORDER — PRAVASTATIN SODIUM 20 MG/1
40 TABLET ORAL
Status: DISCONTINUED | OUTPATIENT
Start: 2017-02-03 | End: 2017-02-03

## 2017-02-03 RX ORDER — POLYETHYLENE GLYCOL 3350 17 G/17G
17 POWDER, FOR SOLUTION ORAL DAILY
Status: DISCONTINUED | OUTPATIENT
Start: 2017-02-03 | End: 2017-02-04 | Stop reason: HOSPADM

## 2017-02-03 RX ORDER — GUAIFENESIN 100 MG/5ML
81 LIQUID (ML) ORAL DAILY
Status: DISCONTINUED | OUTPATIENT
Start: 2017-02-04 | End: 2017-02-04 | Stop reason: HOSPADM

## 2017-02-03 RX ORDER — UBIDECARENONE 50 MG
100 CAPSULE ORAL DAILY
Status: DISCONTINUED | OUTPATIENT
Start: 2017-02-03 | End: 2017-02-04 | Stop reason: HOSPADM

## 2017-02-03 RX ORDER — LOSARTAN POTASSIUM 25 MG/1
25 TABLET ORAL DAILY
Status: DISCONTINUED | OUTPATIENT
Start: 2017-02-03 | End: 2017-02-03

## 2017-02-03 RX ORDER — PRAVASTATIN SODIUM 20 MG/1
20 TABLET ORAL
Status: DISCONTINUED | OUTPATIENT
Start: 2017-02-03 | End: 2017-02-03

## 2017-02-03 RX ORDER — ISOSORBIDE MONONITRATE 60 MG/1
60 TABLET, EXTENDED RELEASE ORAL
Status: DISCONTINUED | OUTPATIENT
Start: 2017-02-03 | End: 2017-02-04

## 2017-02-03 RX ADMIN — Medication 10 ML: at 21:36

## 2017-02-03 RX ADMIN — POLYETHYLENE GLYCOL 3350 17 G: 17 POWDER, FOR SOLUTION ORAL at 20:35

## 2017-02-03 RX ADMIN — ASPIRIN 81 MG CHEWABLE TABLET 81 MG: 81 TABLET CHEWABLE at 10:12

## 2017-02-03 RX ADMIN — IOPAMIDOL 95 ML: 755 INJECTION, SOLUTION INTRAVENOUS at 14:34

## 2017-02-03 RX ADMIN — APIXABAN 2.5 MG: 2.5 TABLET, FILM COATED ORAL at 17:35

## 2017-02-03 RX ADMIN — Medication 100 MG: at 10:12

## 2017-02-03 RX ADMIN — LOSARTAN POTASSIUM 25 MG: 25 TABLET, FILM COATED ORAL at 16:00

## 2017-02-03 RX ADMIN — APIXABAN 2.5 MG: 2.5 TABLET, FILM COATED ORAL at 10:12

## 2017-02-03 RX ADMIN — ISOSORBIDE MONONITRATE 60 MG: 60 TABLET, EXTENDED RELEASE ORAL at 10:11

## 2017-02-03 RX ADMIN — SODIUM CHLORIDE 500 ML: 900 INJECTION, SOLUTION INTRAVENOUS at 12:00

## 2017-02-03 RX ADMIN — PANTOPRAZOLE SODIUM 40 MG: 40 TABLET, DELAYED RELEASE ORAL at 10:12

## 2017-02-03 RX ADMIN — Medication 5 ML: at 14:00

## 2017-02-03 NOTE — PROCEDURES
Genoveva Evans  *** FINAL REPORT ***    Name: Emilia Loera  MRN: [id number]  : 1935  HIS Order #: [id number]  [id number] [facility] SpecifiedBy Visit #: [id number]  Date: 2017    TYPE OF TEST: Cerebrovascular Duplex    REASON FOR TEST  Transient ischemic attacks    Right Carotid:-             Proximal               Mid                 Distal  cm/s  Systolic  Diastolic  Systolic  Diastolic  Systolic  Diastolic  CCA:     95.0       9.0                            91.4      10.7  Bulb:  ECA:    127.3       7.1  ICA:     48.1      11.1       58.1      11.5       55.5      10.1  ICA/CCA:  0.5       1.0    ICA Stenosis: <50%    Right Vertebral:-  Finding: Antegrade  Sys:       37.4  Deepali:        8.3    Right Subclavian:    Left Carotid:-            Proximal                Mid                 Distal  cm/s  Systolic  Diastolic  Systolic  Diastolic  Systolic  Diastolic  CCA:    390.9      13.0                            87.5      11.1  Bulb:  ECA:     87.5       0.0  ICA:     36.7       7.5       35.9       8.4       70.9      15.1  ICA/CCA:  0.4       0.7    ICA Stenosis: <50%    Left Vertebral:-  Finding: Antegrade  Sys:       47.2  Deepali:        8.8    Left Subclavian:    INTERPRETATION/FINDINGS  PROCEDURE:  Evaluation of the extracranial cerebrovascular arteries  with ultrasound (B-mode imaging, pulsed Doppler, color Doppler). Includes the common carotid, internal carotid, external carotid, and  vertebral arteries. FINDINGS: In timal thickening was noted in the bilateral distal CCA. Heterogeneous plaque was seen in the left proximal CCA, Bulb and  proximal ICA with no hemodynamic changes. IMPRESSION: Findings are consistent with 0-49% stenosis of the right  internal carotid and 0-49% stenosis of the left internal carotid. Vertebrals are patent with antegrade flow. ADDITIONAL COMMENTS    I have personally reviewed the data relevant to the interpretation of  this  study. TECHNOLOGIST: Sherren Broker. Joni  Signed: 02/02/2017 10:42 PM    PHYSICIAN: Magnolia Anderson.  Antoine Medina MD  Signed: 02/03/2017 09:22 AM

## 2017-02-03 NOTE — ROUTINE PROCESS
TRANSFER - OUT REPORT:    Verbal report given to balbir on [address] [facility].  being transferred to [phone] for routine progression of care       Report consisted of patients Situation, Background, Assessment and   Recommendations(SBAR). Information from the following report(s) SBAR, ED Summary, [facility] and Recent Results was reviewed with the receiving nurse. Opportunity for questions and clarification was provided. Notified balbir on report that patient failed dysphasia screen and is now NPO and that he can not have an MRI due to metal ear implant.

## 2017-02-03 NOTE — CONSULTS
NAME:  Oziel Goss. :  1935  MRN:  [id number]  Date:  2/3/2017   PCP:    Not On File Bsi      Neurology:    · Consult received, chart reviewed. Patient to be seen this AM or early afternoon.     _______________  Katina Chamorro

## 2017-02-03 NOTE — PROGRESS NOTES
Aristeo Tanner [facility]  [address] [facility], [address] [place] [address]  [phone]      Medical Progress Note      NAME: Ramesh Chang. :  1935  MRM:  [id number]    Date/Time: 2/3/2017          Subjective:     Chief Complaint:  F/u suspected CVA    Chart/notes/labs/studies reviewed, patient examined at bedside. Feels better. Close to baseline per family. No further speech problems. No weakness or numbness    Denies CP, SOB, abd pain, n/v/d. Objective:       Vitals:          Last 24hrs VS reviewed since prior progress note. Most recent are:    Visit Vitals    /90 (BP 1 Location: Left arm, BP Patient Position: At rest)    Pulse (!) 57    Temp 96.9 °F (36.1 °C)    Resp 18    SpO2 96%     SpO2 Readings from Last 6 Encounters:   17 96%   16 97%   12/30/15 97%   11/24/15 97%   11/05/15 96%   10/26/15 97%        No intake or output data in the 24 hours ending 17 0930       Exam:     Physical Exam:    Gen:  Elderly, chronically ill-appearing. NAD  HEENT:  Sclerae nonicteric, hearing intact to voice, mucous membranes moist  Neck:  Supple, without masses. Resp:  No accessory muscle use, CTAB without wheezes, rales, or rhonchi  Card: RRR, without m/r/g. No LE edema. Abd:  +bowel sounds, soft, NTTP, nondistended. .   Neuro: Face symmetric, tongue midline, speech fluent, follows commands appropriately. CNs 3-12 in tact.  No focal deficits  Psych:  Alert, oriented to self and hospital. Limited insight     Medications Reviewed: (see below)    Lab Data Reviewed: (see below)    ______________________________________________________________________    Medications:     Current Facility-Administered Medications   Medication Dose Route Frequency    co-enzyme Q-10 (CO Q-10) capsule 100 mg  100 mg Oral DAILY    sodium chloride (NS) flush 5-10 mL  5-10 mL IntraVENous Q8H    sodium chloride (NS) flush 5-10 mL  5-10 mL IntraVENous PRN    aspirin chewable tablet 81 mg  81 mg Oral DAILY    glucose chewable tablet 16 g  4 Tab Oral PRN    dextrose (D50W) injection syrg 12.5-25 g  12.5-25 g IntraVENous PRN    glucagon (GLUCAGEN) injection 1 mg  1 mg IntraMUSCular PRN    insulin lispro (HUMALOG) injection   SubCUTAneous QID WITH MEALS    apixaban (ELIQUIS) tablet 2.5 mg  2.5 mg Oral BID    insulin NPH (NOVOLIN N, HUMULIN N) injection 35 Units  35 Units SubCUTAneous DAILY    pantoprazole (PROTONIX) tablet 40 mg  40 mg Oral ACB            Lab Review:     Recent Labs      02/02/17   1620   WBC  5.7   HGB  14.4   HCT  41.9   PLT  142*     Recent Labs      02/02/17   1620   NA  139   K  4.7   CL  104   CO2  29   GLU  241*   BUN  19   CREA  1.75*   CA  9.4   INR  1.1     No components found for: GLPOC  No results for input(s): PH, PCO2, PO2, HCO3, FIO2 in the last 72 hours. Recent Labs      02/02/17   1620   INR  1.1     Lab Results   Component Value Date/Time    Specimen Description: CLEAN [facility] 12/19/2013 11:52 AM    Specimen Description: NARES 07/13/2012 04:45 PM     Lab Results   Component Value Date/Time    Culture result: NO GROWTH 1 DAY 12/19/2013 11:52 AM    Culture result:  07/13/2012 04:45 PM     MRSA NOT PRESENT      Screening of patient nares for MRSA is for surveillance purposes and, if positive, to facilitate isolation considerations in high risk settings. It is not intended for automatic decolonization interventions per se as regimens are not sufficiently effective to warrant routine use. Assessment / Plan:     81 yo WM w/ hx of HTN, DM, pAFIB, CKD3, stage IV RCC presenting with dysarthria, confusion, admitted for TIA / CVA workup    TIA (transient ischemic attack) / Dysarthria: had trouble with word-finding, nonsensical speech. Had acute confusion which is not resolved  -- unfortunately no MRI due to metallic ear implant  -- agree with resumption of ASA and continuing of Eliquis.  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 A1c 6.8%  -- can hold NPH this morning (on it daily this morning) as his BG is <100, and currently NPO pending speech evaluation. Wife had expressed concerns about dysphagia, frequent choking episodes  -- SSI per protocol    CAD (coronary artery disease):   -- cont ASA  -- add statin  -- no BB due to bradycardia    HTN (hypertension): had allowed permissive HTN yesterday  -- resume ISMN   -- resume losartan later, with holding parameters     Paroxysmal atrial fibrillation ([facility]): appears to be in NSR right now. Has known LBBB.  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: Has decided to not seek treatment in this case  -- code status discussed, DNR    Dementia, likely vascular type / Debility: with cognitive decline  -- at risk for delirium in hospital, monitor, supportive care  -- discharge planning underway      Total time spent with patient:  35 minutes                  Care Plan discussed with: Patient, Nursing Staff and >50% of time spent in counseling and coordination of care    Discussed:  Code Status, Care Plan and D/C Planning    Prophylaxis:  Eliquis    Disposition:  TBD           ___________________________________________________    Attending Physician: Eun Chambers MD

## 2017-02-03 NOTE — PROGRESS NOTES
Problem: Mobility Impaired (Adult and Pediatric)  Goal: *Acute Goals and Plan of Care (Insert Text)  Physical Therapy Goals  Initiated 2/3/2017  1. Patient will move from supine to sit and sit to supine in bed with supervision/set-up within 7 day(s). 2. Patient will transfer from bed to chair and chair to bed with supervision/set-up using the least restrictive device within 7 day(s). 3. Patient will perform sit to stand with contact guard assist within 7 day(s). 4. Patient will ambulate with contact guard assist for 50 feet with the least restrictive device within 7 day(s). 5. Patient will improve Rader Balance score by 7 points within 7 days. PHYSICAL THERAPY EVALUATION- NEURO POPULATION     Patient: Coleen Doss (80 y.o. male)  Date: 2/3/2017  Primary Diagnosis: TIA (transient ischemic attack)        Precautions:   WBAT, Fall (swallow- currently NPO)      ASSESSMENT :  Based on the objective data described below, the patient presents with decreased sitting and standing balance, strength, ROM, and coordination following admission for TIA. Background history obtained from family due to patient with confusion. Per wife, patient has been with 2 TIA type episodes in the past 5 months (1 in October, 1 in November) where he fell and was confused and one where he \"slide\" of the toilet but remained down for 3 hours. Currently presentation he was more confused, unable to figure our how to dress himself, wife had to assist with dressing and noticed slurred speech and inability to stand. Patient has CVA in the past in 2013, wife reports not requiring an assistive device but at times left lateral lean. In the past 2-3 weeks he has been experiencing increased swallowing difficulty and decreased balance and ambulation requiring a rollator for mobility.   He tolerated today's session fair, he has elevated blood pressure due to NPO status and not having AM medications, activity were limited to in room only due to elevated pressure- see below. Today he is min-mod A for bed mobility and transfers, once in standing his ambulation presentation is with short shuffled steps, increased anterior displacement of RW, and left lateral lean. Patient has a left lateral lean in sitting that is more pronounced with dynamic activity, verbal cuing for correction and he is able to follow the command but unable to self-initiate. Patient CAMARENA at 7/56 placing him at a HIGH fall risk. He would require 24 hour hands on physical assistance with return to home due to his balance, cognitive and high fall risk status. Wife reports home health in the past but he \"does not fully participate\". Instructed wife for long term options she should look into caregiver assistance to assist if he is going home, she has back problems and s/p surgeries and will not be able to provide too much lifting assistance. If they are unable to arrange 24 hour hands on assistance he require SNF. Philippe Tesfaye Patient will benefit from skilled intervention to address the above impairments.   Patients rehabilitation potential is considered to be Good  Factors which may influence rehabilitation potential include:   [ ]           None noted  [X]           Mental ability/status  [X]           Medical condition  [ ]           Home/family situation and support systems  [ ]           Safety awareness  [ ]           Pain tolerance/management  [ ]           Other:        PLAN :  Recommendations and Planned Interventions:  [X]             Bed Mobility Training             [X]      Neuromuscular Re-Education  [X]             Transfer Training                   [ ]      Orthotic/Prosthetic Training  [X]             Gait Training                         [ ]      Modalities  [X]             Therapeutic Exercises           [ ]      Edema Management/Control  [X]             Therapeutic Activities            [X]      Patient and Family Training/Education  [ ]             Other (comment):  Frequency/Duration: Patient will be followed by physical therapy 5 times a week to address goals. Discharge Recommendations: Home Health only if 24 hour hands on physical assistance is able to be provided by Megan Dumont  Further Equipment Recommendations for Discharge: owns all needed DME       SUBJECTIVE:   Patient stated hi.      OBJECTIVE DATA SUMMARY:   HISTORY:    Past Medical History   Diagnosis Date    Adverse effect of anesthesia         BP unstable after GB suregry- in ICU 3 days    Arthritis      CAD (coronary artery disease)         Angina Jan 2011, PCI LAD with multilink stent 4.0x12mm;  also cath at [place] 7/13 -- no stents at that time    CKD (chronic kidney disease)      Cochlear implant in place 2/3/2017    CVA (cerebral vascular accident) ([facility] [address]         maybe in 11/15 after surgery    Diabetes mellitus ([address]      Gallstone      GERD (gastroesophageal reflux disease)      HTN (hypertension)      Hypothyroidism      Ill-defined condition 8/2015       kidney stone    LBBB (left bundle branch block)         normal lexiscan MPI 12/13    SAMUEL (obstructive sleep apnea)         not using CPAP    PAF (paroxysmal atrial fibrillation) (HCC)         [id number] score = 7    Prostate cancer ([address]         prostate    Renal cell cancer ([address]         Stage 4 Renal Cell Cancer.     Partial omentectomy 11/2/2015 with Dr. Benson Smoker: omental mass-metastatic renal cell carcinoma    Renal mass, left         DAVINCI LEFT PARTIAL NEPHRECTOMY 12/19/13;     TIA (transient ischemic attack)         12/10/13     Past Surgical History   Procedure Laterality Date    Hx coronary stent placement   2011    Hx orthopaedic   1994       rotator cuff repair    Hx orthopaedic   1994       cervical disc removed    Hx tonsillectomy   1987    Hx heent   1989       stapedectomy    Hx prostatectomy   2000       surgery intervention    Pr cardiac surg procedure unlist   1/2011 stent to LAD    Hx heart catheterization   2011, 2013    Hx urological   2001       valvular implant prevent incontinence    Hx lithotripsy   8/2015    Hx urological   2003       insert gel to help with incontinence    Hx urological   12/2013       left partial nephrectomy    Hx urological   9/2015       CT guided biopsy of abdominal lymph nodes    Hx cholecystectomy   7/13/2012    Hx hernia repair   1968       left inguinal     Prior Level of Function/Home Situation: see above  Personal factors and/or comorbidities impacting plan of care:      Home Situation  Home Environment: Private residence  # Steps to Enter: 0  Wheelchair Ramp: Yes  One/Two Story Residence: One story  Living Alone: No  Support Systems: Spouse/Significant Other/Partner  Patient Expects to be Discharged to[de-identified] Rehabilitation facility  Current DME Used/Available at Home: [employer]     EXAMINATION/PRESENTATION/DECISION MAKING:      Vitals:              Blood pressure  Heart rate           Supine              157/93              64  Post activity      210/94              58  With recovery    198/89              57  * nursing notified of above vitals     Critical Behavior:  Neurologic State: Alert, Confused  Orientation Level: Oriented to person  Cognition: Decreased command following, Memory loss, Impulsive, Impaired decision making     Skin:  All exposed intact  Strength:    Strength: Generally decreased, functional        RLE Strength  R Hip Flexion: 3  R Knee Flexion: 4-  R Knee Extension: 3-  R Ankle Dorsiflexion: 3  R Ankle Plantar Flexion: 3        LLE Strength  L Hip Flexion: 3  L Knee Flexion: 4-  L Knee Extension: 3-  L Ankle Dorsiflexion: 3+  L Ankle Plantar Flexion: 3+  Coordination:  Coordination: Generally decreased, functional  Tone & Sensation:   Tone: Normal              Sensation: Intact     RLE Sensation  Light Touch: No apparent deficit         Range Of Motion:  AROM: Generally decreased, functional LLE AROM  L Knee Flexion:  (hamstring tightness limites full extension with LAQ)  PROM: Generally decreased, functional  RLE AROM  R Knee Extension:  (hamstring tightness with LAQ)        Functional Mobility:  Bed Mobility:  Rolling: Contact guard assistance  Supine to Sit: Moderate assistance     Scooting: Minimum assistance  Transfers:  Sit to Stand: Moderate assistance  Stand to Sit: Moderate assistance        Bed to Chair: Minimum assistance              Balance:   Sitting: Impaired  Sitting - Static: Fair (occasional)  Sitting - Dynamic: Fair (occasional) (left lateral with dynamic)  Standing: Impaired  Standing - Static: Constant support  Standing - Dynamic : Poor  Ambulation/Gait Training:  Distance (ft): 10 Feet (ft)  Assistive Device: Walker, rolling;Gait belt  Ambulation - Level of Assistance: Minimal assistance     Gait Description (WDL): Exceptions to WDL  Gait Abnormalities: Decreased step clearance; Step to gait              Speed/Sabrina: Slow;Shuffled                                  Functional Measure  Rader Balance Test:      Sitting to Standin  Standing Unsupported: 0  Sitting with Back Unsupported: 3 (left lateral lean, but able to verbal cue for correction)  Standing to Sittin  Transfers: 0  Standing Unsupported with Eyes Closed: 0  Standing Unsupported with Feet Together: 0  Reach Forward with Outstretched Arm: 0   Object: 0  Turn to Look Over Shoulders: 0  Turn 360 Degrees: 0  Alternate Foot on Step/Stool: 0  Standing Unsupported One Foot in Front: 0  Stand on One Le  Total: 3             56=Maximum possible score;   0-20=High fall risk  21-40=Moderate fall risk   41-56=Low fall risk      Rader Balance Test and G-code impairment scale:  Percentage of Impairment CH     0%    CI     1-19% CJ     20-39% CK     40-59% CL     60-79% CM     80-99% CN      100%   Rader   Score 0-56 56 45-55 34-44 23-33 12-22 1-11 0         G codes:   In compliance with CMSs Claims Based Outcome Reporting, the following G-code set was chosen for this patient based on their primary functional limitation being treated: The outcome measure chosen to determine the severity of the functional limitation was the CAMARENA with a score of 3/56 which was correlated with the impairment scale. · Mobility - Walking and Moving Around:               - CURRENT STATUS:    CM - 80%-99% impaired, limited or restricted               - GOAL STATUS:           CL - 60%-79% impaired, limited or restricted               - D/C STATUS:                       ---------------To be determined---------------       Physical Therapy Evaluation Charge Determination   History Examination Presentation Decision-Making   HIGH Complexity :3+ comorbidities / personal factors will impact the outcome/ POC  MEDIUM Complexity : 3 Standardized tests and measures addressing body structure, function, activity limitation and / or participation in recreation  MEDIUM Complexity : Evolving with changing characteristics  Other outcome measures CAMARENA  HIGH       Based on the above components, the patient evaluation is determined to be of the following complexity level: MEDIUM     Therapeutic Exercises:      Pain:  Pain Scale 1: Numeric (0 - 10)  Pain Intensity 1: 0              Activity Tolerance:   Fair- vitals stable, confusion present   Please refer to the flowsheet for vital signs taken during this treatment. After treatment:   [X]     Patient left in no apparent distress sitting up in chair  [ ]     Patient left in no apparent distress in bed  [X]     Call bell left within reach  [X]     Nursing notified- request room closer to nursing station due to confusion and swallowing difficulty  [X]     Caregiver present  [X]     Chair alarm activated      COMMUNICATION/EDUCATION:   The patients plan of care was discussed with: Registered Nurse.   Patient was educated regarding His deficit(s) of balance, swallow and cognition as this relates to His diagnosis of TIA. He demonstrated Guarded understanding as evidenced by cognitive deficits  Patient was educated on all signs and symptoms of CVA including BE FAST and to call for EMS if symptoms present. Wife able to verbalized 50% of sings and symptoms- re-educated on and the need to call 911. .  [X]  Fall prevention education was provided and the patient/caregiver indicated understanding. [X]  Patient/family have participated as able in goal setting and plan of care. [X]  Patient/family agree to work toward stated goals and plan of care. [ ]  Patient understands intent and goals of therapy, but is neutral about his/her participation. [ ]  Patient is unable to participate in goal setting and plan of care.      Thank you for this referral.  Ernestina Davis, PT, DPT   Time Calculation: 36 mins

## 2017-02-03 NOTE — PROGRESS NOTES
Problem: Falls - Risk of  Goal: *Absence of falls  Outcome: Progressing Towards Goal  Bed wheels locked, bed in low position. Bed alarm on. Side rails x 3. Call bell and possessions within reach. Asked patient to call for assistance. Patient verbalized understanding. Will continue to monitor. 2027  TRANSFER - IN REPORT:    Verbal report received from Dina Aguirre RN(name) on [address] [facility].  being received from ED(unit) for routine progression of care      Report consisted of patients Situation, Background, Assessment and   Recommendations(SBAR). Information from the following report(s) SBAR, Kardex, ED Summary, Intake/Output, Recent Results and Cardiac Rhythm . was reviewed with the receiving nurse. Opportunity for questions and clarification was provided. Assessment completed upon patients arrival to unit and care assumed. Primary Nurse Manohar Terry and Dina Aguirre RN performed a dual skin assessment on this patient No impairment noted  Rogers score is 22    2152  Carotid study done. Patient tolerated well. Will continue to monitor. [address] call to Dr. Anila Fernandez. Patient has failed his swallow assessment, but medications are PO. New orders received:  NPO; Hold all PO medications; Consult to Speech. Will continue to monitor. 0321  Patient rounding. Sleeping. 5172  Bedside and Verbal shift change report given to Weston Donohue RN (oncoming nurse) by Maximino Essex, RN (offgoing nurse). Report included the following information SBAR, Kardex, Intake/Output, Recent Results and Cardiac Rhythm A fib. PATIENT IS UNABLE TO HAVE AN MRI DUE TO A METAL HEARING IMPLANT IN HIS HEAD per wife.

## 2017-02-03 NOTE — DIABETES MGMT
DTC Consult Note     POC Glucose last 24hrs:     Lab Results   Component Value Date/Time     (H) 02/03/2017 12:12 PM     (H) 02/02/2017 04:20 PM    GLUCPOC 138 (H) 02/03/2017 11:25 AM    GLUCPOC 96 02/03/2017 07:49 AM    GLUCPOC 220 (H) 02/02/2017 04:06 PM        Recommendations/ Comments: NPH 35 units dose not administered this morning. He takes it once a day. He has not yet required Humalog for coverage at this time. Discussed with Dr. Jackie Aguila MD. Dose reduced to 10 units. Will continue to follow. [facility]  []    Assessment of home management  []    Meal planning  []    Meter / Monitoring  []    New Diagnosis  []    Insulin education  []    Insulin pump notification  []    Medication recommendations  [x]    Hospital glucose management  []    Alyssa Neth  []    Outpatient Education - Information forwarded to [employer] who will follow-up with pt 1 week after discharge     Hospital (inpatient) medications:  1. Correction Scale: Lispro (Humalog) Normal Sensitivity scale to cover for glucose > 139 mg/dL before meals and for glucose >199 at bedtime      2. NPH 35 units daily    Assessment / Plan:     Chart reviewed and initial evaluation complete on [address] [facility]. Gustavo Garcia. is a 80year old  male with a past medical history relevant for  DM type 2, [facility] 2/2/2017. Per past medical records home medications are  1. NPH 35 units daily              A1C:   Lab Results   Component Value Date/Time    Hemoglobin A1c 7.4 02/03/2017 05:17 AM    Hemoglobin A1c 6.8 02/02/2017 04:20 PM         Thank you.     Melissa Steven, Certified Diabetes Educator    [phone]

## 2017-02-03 NOTE — PROGRESS NOTES
Nursing: if pt is discharged over the weekend, please fax AVS to [address]. Thank you.   Chuck Becerra LCSW

## 2017-02-03 NOTE — PROGRESS NOTES
Problem: Dysphagia (Adult)  Goal: *Acute Goals and Plan of Care (Insert Text)  Swallowing goals initiated 2-3-17:  1) Tolerate mech soft, thins with supervision without coughing by 2-6-17  [address] EVALUATION  Patient: Ramsey Beckford (80 y.o. male)  Date: 2/3/2017  Primary Diagnosis: TIA (transient ischemic attack)        Precautions: aspiration         ASSESSMENT :  Based on the objective data described below, the patient presents with mild-moderate oral dysphagia with impaired oral stripping, transit and clearance. He tended to take large, impulsive bites and sips. Coughing when he mixes drinks and food in mouth. Poor awareness of deficits, distractible. Aspiration risk. .     Patient will benefit from skilled intervention to address the above impairments. Patients rehabilitation potential is considered to be Fair  Factors which may influence rehabilitation potential include:   [ ]            None noted  [ ]            Mental ability/status  [ ]            Medical condition  [ ]            Home/family situation and support systems  [ ]            Safety awareness  [ ]            Pain tolerance/management  [ ]            Other:        PLAN :  Recommendations and Planned Interventions:  mech soft, thins. Supervise at meals. DO NOT DRINK WITH FOOD IN MOUTH  MEDS WIT PUREES. CHECK MOUTH FOR ORAL RESIDUE. Frequency/Duration: Patient will be followed by speech-language pathology 3 times a week to address goals. Discharge Recommendations: Home Health       SUBJECTIVE:   Patient stated what?  My daughter is a teacher.-talking with food in mouth and spitting wihtout awareness      OBJECTIVE:       Past Medical History   Diagnosis Date    Adverse effect of anesthesia         BP unstable after GB suregry- in ICU 3 days    Arthritis      CAD (coronary artery disease)         Angina Jan 2011, PCI LAD with multilink stent 4.0x12mm;  also cath at [place] 7/13 -- no stents at that time  CKD (chronic kidney disease)      Cochlear implant in place 2/3/2017    CVA (cerebral vascular accident) ([facility] [address]         maybe in 11/15 after surgery    Diabetes mellitus ([facility] [address]      Gallstone      GERD (gastroesophageal reflux disease)      HTN (hypertension)      Hypothyroidism      Ill-defined condition 8/2015       kidney stone    LBBB (left bundle branch block)         normal lexiscan MPI 12/13    SAMUEL (obstructive sleep apnea)         not using CPAP    PAF (paroxysmal atrial fibrillation) (HCC)         [id number] score = 7    Prostate cancer (HCC)         prostate    Renal cell cancer (HCC)         Stage 4 Renal Cell Cancer.     Partial omentectomy 11/2/2015 with Dr. Darline Villegas: omental mass-metastatic renal cell carcinoma    Renal mass, left         DAVINCI LEFT PARTIAL NEPHRECTOMY 12/19/13;     TIA (transient ischemic attack)         12/10/13     Past Surgical History   Procedure Laterality Date    Hx coronary stent placement   2011    Hx orthopaedic   1994       rotator cuff repair    Hx orthopaedic   1994       cervical disc removed    Hx tonsillectomy   1987    Hx heent   1989       stapedectomy    Hx prostatectomy   2000       surgery intervention    Pr cardiac surg procedure unlist   1/2011       stent to LAD    Hx heart catheterization   2011, 2013    Hx urological   2001       valvular implant prevent incontinence    Hx lithotripsy   8/2015    Hx urological   2003       insert gel to help with incontinence    Hx urological   12/2013       left partial nephrectomy    Hx urological   9/2015       CT guided biopsy of abdominal lymph nodes    Hx cholecystectomy   7/13/2012    Hx hernia repair   1968       left inguinal     Prior Level of Function/Home Situation:   Home Situation  Home Environment: Private residence  # Steps to Enter: 0  Wheelchair Ramp: Yes  One/Two Story Residence: One story  Living Alone: No  Support Systems: Spouse/Significant Other/Partner  Patient Expects to be Discharged to[de-identified] Rehabilitation facility  Current DME Used/Available at Home: Braden Canavan, rollator, Grab bars  Diet prior to admission: regular, thins  Current Diet:  NPO b/c he failed the STAND   Cognitive and Communication Status:  Neurologic State: Alert, Confused  Orientation Level: Oriented to person  Cognition: Decreased command following, Memory loss, Impulsive, Impaired decision making  Perception: Appears intact        Oral Assessment:  Oral Assessment  Labial: No impairment  Dentition: Natural  Oral Hygiene: teeth mildly dry and sticky  Lingual: No impairment  Mandible: No impairment  P.O. Trials:  Patient Position: upright in bed  Vocal quality prior to P.O.: No impairment (very minimal dysarthris, which appeared to be more rate and volume related)  Consistency Presented: Thin liquid; Solid;Puree;Pill/Tablet (with RN)  How Presented: Self-fed/presented;Cup/gulp;Cup/sip;Spoon;Straw;Successive swallows   ORAL PHASE:   Bolus Acceptance:  (impulsive large sips and bites)  Bolus Formation/Control: Impaired (impaired oral stripping, oral clearance)  Type of Impairment: Mastication (inadvertent spitting out of food)  Propulsion: Delayed (# of seconds); Discoordination  Oral Residue: 10-50% of bolus (caked on and around teeth; family repots this is premoribd )   PHARYNGEAL PHASE:   Initiation of Swallow: Delayed (# of seconds) (mild, inconsistent)  Laryngeal Elevation: Functional  Aspiration Signs/Symptoms:  (coughing noted only when he coughed after taking a large sip of watewr with food in the mouth)  Pharyngeal Phase Characteristics: Altered vocal quality  Effective Modifications:  (DO NOT DRINK with food in the mouth)        SPEECH:   Family reports some aphasia with jargon yesterday, but none noted today. Mild dysarthria, mostly with low volume and fast rate that impacted speech intelligilbity.             NOMS:   The NOMS functional outcome measure was used to quantify this patient's level of swallowing impairment. Based on the NOMS, the patient was determined to be at level 5 for swallow function      G Codes: In compliance with CMSs Claims Based Outcome Reporting, the following G-code set was chosen for this patient based the use of the NOMS functional outcome to quantify this patient's level of swallowing impairment. Using the NOMS, the patient was determined to be at level 5 for swallow function which correlates with the CJ= 20-39% level of severity. Based on the objective assessment provided within this note, the current, goal, and discharge g-codes are as follows:     Swallow  Swallowing:   Swallow Current Status CJ= 20-39%   Swallow Goal Status CJ= 20-39%         NOMS Swallowing Levels:  Level 1 (CN): NPO  Level 2 (CM): NPO but takes consistency in therapy  Level 3 (CL): Takes less than 50% of nutrition p.o. and continues with nonoral feedings; and/or safe with mod cues; and/or max diet restriction  Level 4 (CK): Safe swallow but needs mod cues; and/or mod diet restriction; and/or still requires some nonoral feeding/supplements  Level 5 (CJ): Safe swallow with min diet restriction; and/or needs min cues  Level 6 (CI): Independent with p.o.; rare cues; usually self cues; may need to avoid some foods or needs extra time  Level 7 ([address] [place] [address]): Independent for all p.o.  MATTHIEU. (2003). National Outcomes Measurement System (NOMS): Adult Speech-Language Pathology User's Guide.            Pain:  Pain Scale 1: Numeric (0 - 10)  Pain Intensity 1: 0     After treatment:   [ ]            Patient left in no apparent distress sitting up in chair  [ ]            Patient left in no apparent distress in bed  [ ]            Call bell left within reach  [ ]            Nursing notified  [ ]            Caregiver present  [ ]            Bed alarm activated      COMMUNICATION/EDUCATION:   The patients plan of care including recommendations, planned interventions, and recommended diet changes were discussed with: Physical Therapist and Registered Nurse. Patient was educated regarding His deficit(s) of dysphagia  as this relates to His diagnosis of TIA/CVA. He demonstrated Guarded understanding as evidenced by dementia. Family present and understood. .  [ ]            Posted safety precautions in patient's room. [X]           /family have participated as able in goal setting and plan of care. [X]           family agree to work toward stated goals and plan of care. [ ]            Patient understands intent and goals of therapy, but is neutral about his/her participation. [X]            Patient is unable to participate in goal setting and plan of care.      Thank you for this referral.  Amy Matson, SLP  Time Calculation: 20 mins

## 2017-02-03 NOTE — PROGRESS NOTES
5 - Verified with Rozella Fothergill, NP via phone re: ordered MRI and MRA. NP notified that per wife and daughter who are at bedside, pt has metal ear implants. NP acknowledged and canx'd both ordered. 3367 - Spoke with Dr. Gudelia Babcock in person re: holding Humulin 35 units scheduled this AM. Pt is NPO pending speech tx consult and BS of 96 this AM. Per MD, hold Humulin. 1400 - Stroke Education provided to patient, spouse/SO and relative(s) and the following topics were discussed    1. Patients personal risk factors for stroke are hypertension, atrial fibrillation, smoking, diabetes mellitus and prior stroke    2. Warning signs of Stroke:        * Sudden numbness or weakness of the face, arm or leg, especially on one side of          The body            * Sudden confusion, trouble speaking or understanding        * Sudden trouble seeing in one or both eyes        * Sudden trouble walking, dizziness, loss of balance or coordination        * Sudden severe headache with no known cause      3. Importance of activation Emergency Medical Services ( 9-1-1 ) immediately if experience any warning signs of stroke. 4. Be sure and schedule a follow-up appointment with your primary care doctor or any specialists as instructed. 5. You must take medicine every day to treat your risk factors for stroke. Be sure to take your medicines exactly as your doctor tells you: no more, no less. Know what your medicines are for , what they do. Anti-thrombotics /anticoagulants can help prevent strokes. You are taking the following medicine(s)  Eliquis     6. Smoking and second-hand smoke greatly increase your risk of stroke, cardiovascular disease and death. Smoking history smoked for 23 years and quit in 1975 but per daughter and wife, pt still smokes cigars occasionally. 7. Information provided was [facility] Stroke Education Binder, Stroke Handouts or Verbal Education    8.  Documentation of teaching completed in Patient Education Activity and on Care Plan with teaching response noted?   yes

## 2017-02-03 NOTE — CONSULTS
[facility]  [address] [facility] Rate  [phone]     Inpatient Neurology Consult  Becky Jang, [facility]    Name:   Poly Whaley. Medical record #: [id number]  Admission Date: 2/2/2017  Consult Date:  02/03/17    Referring Provider: Dr. Charlene Quinteros  Chief Complaint:  Dysarthria  Source of Hx:  Chart, pt did not explain HPI, wife and daughter provided HPI      Attending Addendum:   Reviewed NP Acevedo's consult as well as admission H&P  Discussed with patient's wife and daughter at bedside  Hx of CAD, CVA, PAF on Eliquis, DMT2, HTN, RCC, Hypothyroidism, CKD, Alzheimer's    Wife says sometime yesterday late morning/ early afternoon, patient seemed to have increased confusion, couldn't figure out how to use his blood sugar monitor. Speech didn't sound normal to her. Called daughter who spoke with patient and daughter says that patient's speech was definitely different than prior, had some normal speech mixed with slurred speech. He also told them he just wasn't feeling well. Neither of them noticed any facial droop or any focal muscle weakness. Symptoms have since cleared up and he's back to baseline. CT head done yesterday at time of admission: at least moderate chronic small vessel ischemic changes, no intracranial hemorrhage, no obvious areas of acute or subacute stroke. Carotid Dopplers: no significant stenosis on either side. CTA Head/ Neck (done, result pending). To my view, no significant stenosis of extracranial circulation, possible focal area of narrowing versus artifact in left MCA branch. Was instructed to stop taking ASA about 3 months ago by one of his physicians. Had statin intolerance in the past.    On exam, awake, alert but slightly confused (hard of hearing and slightly confused at baseline per family). EOMI, face symmetric, facial sensation intact, no dysarthria, normal fluency, comprehension reduced (due to hearing and dementia). 5/5 strength in all exts. Sensory exam: intact LT in all exts. DTRs: 1+ biceps, patellars, achilles. Toe: up-going on right, neutral on left. Gait; not tested    Impression:    Agree with diagnosis of TIA. Possible area of left MCA focal narrowing versus artifact (await radiology report). Continue Eliquis for hx of pAF and stroke risk reduction. No additional benefit of ASA on top of anti-coagulation from a stroke risk standpoint, but if needed for cardiac indications then I don't have objection to that. LDL is 99, above goal of < 70 but pt has statin in tolerance per family. Will d/c the pravastatin that was added during this admission. Advised wife/ daughter to discuss with PCP changes to other meds/ diet that will help him get LDL to goal.  Patient back to baseline, anticipate he can be discharged home soon (possibly tomorrow). PT suggested that patient may benefit from caregiver assistance at home due to her having back problems and surgeries and she cannot provide much lifting assistance that he seems to need. Wife reports that pt had 2 months of home PT but refused to continue doing those activities after PT was finished. Please ask Case Management to help look into patient's discharge needs (as I'm sure they're already doing)    Will s/o. Call back if needed. Trung Herron MD     _____________________________________________________________________    HISTORY OF PRESENT ILLNESS:   Subjective  Demetrius Catchlorraine. is a 80 y.o. male with PMH of CAD, CVA-- bilat BG infarcts, PAF on Eliquis, DMT2, HTN, renal cell carcinoma, hypothyroidism and CKD. The Neurology Service is asked to evaluate for potential TIA, after admission for dysarthria. Tried to obtain information from patient but he had a difficult time answering any questions and thinking about what event yesterday to explain it.   Daughter and wife describe symptoms of slurred speech with difficulty saying actual words or saying understandable words that started Thursday around [phone] when daughter was on phone with patient. When daughter came to patients home, pt was generally weak and confused with surroundings. He also was unable to figure out how to put pants on correctly or shirt on properly per daughter--difficulty processing. At times his speech would come out with few slurred words then unintelligible words that could not be understood, but the patient's speech is baseline this morning. Wife states patient's face/mouth appears normal this morning but daughter states he appears to have small facial droop. Past Medical History   Diagnosis Date    Adverse effect of anesthesia      BP unstable after GB suregry- in ICU 3 days    Arthritis     CAD (coronary artery disease)      Angina Jan 2011, PCI LAD with multilink stent 4.0x12mm;  also cath at [place] 7/13 -- no stents at that time    CKD (chronic kidney disease)     Cochlear implant in place 2/3/2017    CVA (cerebral vascular accident) ([address]      maybe in 11/15 after surgery    Diabetes mellitus ([address]     Gallstone     GERD (gastroesophageal reflux disease)     HTN (hypertension)     Hypothyroidism     Ill-defined condition 8/2015     kidney stone    LBBB (left bundle branch block)      normal lexiscan MPI 12/13    SAMUEL (obstructive sleep apnea)      not using CPAP    PAF (paroxysmal atrial fibrillation) (HCC)      [id number] score = 7    Prostate cancer ([address]      prostate    Renal cell cancer ([address]      Stage 4 Renal Cell Cancer.     Partial omentectomy 11/2/2015 with Dr. Malu Fink: omental mass-metastatic renal cell carcinoma    Renal mass, left      DAVINCI LEFT PARTIAL NEPHRECTOMY 12/19/13;     TIA (transient ischemic attack)      12/10/13     Past Surgical History   Procedure Laterality Date    Hx coronary stent placement  2011    Hx orthopaedic  1994     rotator cuff repair    Hx orthopaedic  1994     cervical disc removed    Hx tonsillectomy  1987    Hx heent  1989 stapedectomy    Hx prostatectomy  2000     surgery intervention    Pr cardiac surg procedure unlist  1/2011     stent to LAD    Hx heart catheterization  2011, 2013    Hx urological  2001     valvular implant prevent incontinence    Hx lithotripsy  8/2015    Hx urological  2003     insert gel to help with incontinence    Hx urological  12/2013     left partial nephrectomy    Hx urological  9/2015     CT guided biopsy of abdominal lymph nodes    Hx cholecystectomy  7/13/2012    Hx hernia repair  1968     left inguinal     Family History   Problem Relation Age of Onset    Stroke Father     Cancer Father      prostate    Diabetes Sister      2 sisters    Hypertension Sister      2 sisters    Diabetes Brother     Stroke Brother     Diabetes Brother      all brothers    Stroke Brother     Kidney Disease Brother     Heart Attack Brother     Hypertension Other      Social History     Social History    Marital status:      Spouse name: N/A    Number of children: N/A    Years of education: N/A     Occupational History    Not on file. Social History Main Topics    Smoking status: Former Smoker     Packs/day: 0.10     Years: 23.00     Quit date: 12/3/1975    Smokeless tobacco: Never Used    Alcohol use No    Drug use: No    Sexual activity: No     Other Topics Concern    Not on file     Social History Narrative       Objective  Allergies: Allergies   Allergen Reactions    Latex Rash, Swelling and Contact Dermatitis    Contrast Agent [Iodine] Other (comments)     Patient has one kidney    Morphine Other (comments)     Hallucinations and Nausea     Outpatient Meds  No current facility-administered medications on file prior to encounter. Current Outpatient Prescriptions on File Prior to Encounter   Medication Sig Dispense Refill    losartan (COZAAR) 25 mg tablet Take 1 Tab by mouth daily. 30 Tab 6    apixaban (ELIQUIS) 2.5 mg tablet Take 2.5 mg by mouth two (2) times a day.  OTHER,NON-FORMULARY, Take 1 Cap by mouth daily. Zafar May dietary supplement-red yeast rice, Crategi extract,etc.      insulin NPH (NOVOLIN N, HUMULIN N) 100 unit/mL injection 35 Units by SubCUTAneous route daily.  omeprazole (PRILOSEC) 20 mg capsule Take 20 mg by mouth Daily (before breakfast).  desonide (TRIDESILON) 0.05 % cream Apply  to affected area daily as needed for Skin Irritation.  nitroglycerin (NITROSTAT) 0.4 mg SL tablet 0.4 mg by SubLINGual route every five (5) minutes as needed.            Inpatient Meds    Current Facility-Administered Medications:     co-enzyme Q-10 (CO Q-10) capsule 100 mg, 100 mg, Oral, DAILY, Evy Ragsdale NP, 100 mg at 02/03/17 1012    isosorbide mononitrate ER (IMDUR) tablet 60 mg, 60 mg, Oral, 7am, Rebecca Abad MD, 60 mg at 02/03/17 1011    losartan (COZAAR) tablet 25 mg, 25 mg, Oral, DAILY, Rebecca Abad MD    sodium chloride 0.9 % bolus infusion 500 mL, 500 mL, IntraVENous, ONCE, Evy Ragsdale NP    pravastatin (PRAVACHOL) tablet 20 mg, 20 mg, Oral, QHS, Evy Ragsdale NP    sodium chloride (NS) flush 5-10 mL, 5-10 mL, IntraVENous, Q8H, Norman Sanchez V, DO, 10 mL at 02/02/17 2152    sodium chloride (NS) flush 5-10 mL, 5-10 mL, IntraVENous, PRN, Adiel Pauline, DO    aspirin chewable tablet 81 mg, 81 mg, Oral, DAILY, Norman Sanches Jr V, DO, 81 mg at 02/03/17 1012    glucose chewable tablet 16 g, 4 Tab, Oral, PRN, Norman Sanches Jr V, DO    dextrose (D50W) injection syrg 12.5-25 g, 12.5-25 g, IntraVENous, PRN, Inetta Karlstad, DO    glucagon [facility]) injection 1 mg, 1 mg, IntraMUSCular, PRN, Norman Sanches Jr V, DO    insulin lispro (HUMALOG) injection, , SubCUTAneous, QID WITH MEALS, Norman Sanchez V, DO, Stopped at 02/02/17 2200    apixaban (ELIQUIS) tablet 2.5 mg, 2.5 mg, Oral, BID, Norman SO DO, 2.5 mg at 02/03/17 1012    insulin NPH (NOVOLIN N, HUMULIN N) injection 35 Units, 35 Units, SubCUTAneous, DAILY, Joyce Kovacs Manas Jones DO, Stopped at 02/03/17 0900    pantoprazole (PROTONIX) tablet 40 mg, 40 mg, Oral, ACB, Campos SO DO, 40 mg at 02/03/17 1012    PHYSICAL EXAM  Patient Vitals for the past 12 hrs:   Temp Pulse Resp BP SpO2   02/03/17 1204 98.4 °F (36.9 °C) 65 18 152/83 94 %   02/03/17 0900 - (!) 54 - 188/80 -   02/03/17 0812 96.9 °F (36.1 °C) (!) 57 18 194/90 96 %   02/03/17 0700 - (!) 58 - - -   02/03/17 0411 97.9 °F (36.6 °C) 61 18 157/73 94 %   02/03/17 0030 98.2 °F (36.8 °C) 61 18 161/78 94 %      General: Elderly obese WM in NAD, providing minimal history    Psych: Affect is calm, cooperative, pleasant   Neck: supple, nontender,  No bruit   Heart: regular rate and rhythm   Lungs: clear BBS   Extremities: no LE edema   Skin: no rashes      Neurological Examination:    Mental Status:  Alert, oriented x 2, poor insight and judgement    Commands:  following    Language:     Comprehension: reduced     Dysarthria: none    Speech:   No aphasia     Cranial Nerves:            I: smell   Not tested    II: visual acuity    deferred    II: visual fields   Full to confrontation    II: pupils   Equal, round, reactive to light    II: optic disc   Not examined    III,VII:   no ptosis of either eyelid    III,IV,VI: extraocular muscles    Full EOM but difficulty focusing, no nystagmus, no intranuclear opthalmoplegia    V: mastication   symmetrical    V: facial sensation:    Equal V1, V2 and V3 bilaterally with LT    VII: facial muscle function     Symmetric, no facial droop    VIII: hearing   [place] bilaterally    IX: soft palate elevation    Uvula midline, elevates symetrically    XI: trapezius strength    5/5    XI: sternocleidomastoid strength   5/5    XI: neck flexion strength    5/5     XII: tongue    Protrudes midline, no fasciculations or atrophy      Strength/Motor   Drift:  R arm w/pronation      Bulk:  appears symmetric            Tone:  normal     Deltoid Biceps Triceps Wrist Extension Finger Abduction   L 5 5 5 5 5 R 4 4 4 4 4      Hip Flexion Hip Extension Knee Flexion Knee Extension Ankle Dorsiflexion Ankle Plantarflexion   L 5 5 5 5 5 5   R 5 5 5 5 5 5      Reflexes:    BR Brachial Patellar Achilles Babinski Startle Glabellar   L 1/4 1/4 2/4 NT  downgoing NT NT   R 1/4 1/4 2/4 NT downgoing NT NT      Sensory: intact on proximal & distal extremity w/ LT, pressure, temp, and proprioception bilaterally   Coordination: no resting tremors, no intention tremors   FNF: Intact bilaterally    Heel to shin:  Intact on R and Dysmetria noted on left    Gait:  deferred     *MICHAEL:  Unable to assess due to reduced comprehension, agitation or reduced LOC. Labs Reviewed  Recent Results (from the past 12 hour(s))   LIPID PANEL    Collection Time: 02/03/17  5:17 AM   Result Value Ref Range    LIPID PROFILE          Cholesterol, total 170 <200 MG/DL    Triglyceride 150 (H) <150 MG/DL    HDL Cholesterol 41 MG/DL    LDL, calculated 99 0 - 100 MG/DL    VLDL, calculated 30 MG/DL    CHOL/HDL Ratio 4.1 0 - 5.0     HEMOGLOBIN A1C WITH EAG    Collection Time: 02/03/17  5:17 AM   Result Value Ref Range    Hemoglobin A1c 7.4 (H) 4.2 - 6.3 %    Est. average glucose 166 mg/dL   POST EXPOSURE PROFILE    Collection Time: 02/03/17  6:15 AM   Result Value Ref Range    p24 Antigen NONREACTIVE NR      HIV-1,2 Ab NONREACTIVE NR      Hep C  virus Ab Interp. NONREACTIVE NR      Hep C  virus Ab comment Method used is Siemens Advia Centaur      Hepatitis B surface Ag <0.10 Index    Hep B surface Ag Interp.  NEGATIVE  NEG     GLUCOSE, POC    Collection Time: 02/03/17  7:49 AM   Result Value Ref Range    Glucose (POC) 96 65 - 100 mg/dL    Performed by [address] [place] [address], POC    Collection Time: 02/03/17 11:25 AM   Result Value Ref Range    Glucose (POC) 138 (H) 65 - 100 mg/dL    Performed by Erlinda Hale      Imaging  Reviewed:   CT Results (recent):    Results from Hospital Encounter encounter on 02/02/17   CT CODE NEURO HEAD WO CONTRAST   Narrative EXAM: CT CODE NEURO HEAD WO CONTRAST    INDICATION: Confusion this morning. Slurred speech this afternoon. Hyperglycemia. COMPARISON: CT head on 12/10/2013    TECHNIQUE: Noncontrast head CT. Coronal and sagittal reformats. CT dose  reduction was achieved through the use of a standardized protocol tailored for  this examination and automatic exposure control for dose modulation. FINDINGS: The ventricles and sulci are age-appropriate without hydrocephalus. There is no mass effect or midline shift. There is no intracranial hemorrhage or  extra-axial fluid collection. Chronic microvascular ischemic disease is increased 3 years ago, particularly in  the bilateral basal ganglia. No CT evidence of acute infarct. The calvarium is intact. The visualized paranasal sinuses and mastoid air cells  are clear. Impression IMPRESSION:     Increased microvascular ischemic disease. No CT evidence of acute infarct, mass  effect, or intracranial hemorrhage. MRI Results (recent):  No results found for this or any previous visit.    _____________________________________________________________________    Review of Systems: 10 point ROS was performed. Pertinent positives listed in HPI. Denies: balance difficulties, angina, palpitations, paresthesias, vision loss, fever, chills, falls, diplopia, back pain, neck pain, prior episodes of vertigo, hallucinations, new medications or dosage changes. _____________________________________________________________________  Impression  80 y.o. male with acute onset of dysarthria at 12 yesterday. Exam findings of elderly WM with R arm drift and R facial droop and some difficulty comprehending/following directions. Imaging reviewed: CT head without acute findings. Notable Labs include elevated Creatinine yesterday and elevated LDL today.      Feel there are moderate risk factors for acute cerebrovascular event to occur and he continues to have R arm drift with mild difficulty following directions other than being [place]. Unable to perform MRI with cochlear implant either. If no CVA is noted on CTA, can repeat CT head in 2-3 days to determine if CVA has occurred. Refer to plan below. Assessment:  1. TIA vs. CVA  2. Dysarthria- resolved  3. Expressive aphasia- resolved  4. A-fib, paroxysmal-- on Eliquis  5. HTN  6. DMT2  7. Hyperlipidemia - no statin PTA  8. CVA hx-- bilateral BG infarcts  9. TIA hx    Plan  Testing :  Pending < TTE and CTA>  · Medications now and post discharge:   Pravachol, ASA and Eliquis  · Check BMP as add on now to see if CTA can be performed  · Neurovascular checks and fall precautions  · Permissive high blood pressure x 24hr post CVA or TIA: GOAL:  BP < 220/120  Post CVA 24hr BP goal= < 140/90 or defined by IM/FP/Cardiology (hx DM/geriatric etc)  · ST / OT / PT consults 12 hours of TPA/CVA or TIA.:  Do not feel rehab is needed post discharge  · Case management consult:  discharge planning   · Daily stroke education by nursing documented in chart please. Educated on BEFAST and when to call 911. · Risk factor discussion: medication changes per Plan, individual TIA or CVA risk factors noted in Assessment and secondary risk factor reduction on OP basis with PCP  Will have OP Neurology appt in Clinic within 4w post discharge   ·   Continue great care by collaborating care team and nursing staff. ·  Testing results discussed with patient and family and any questions were answered. My collaborating care team physician may have further recommendations.     On DVT Prophylaxis yes no   Continue SCD's while inpatient x      Care Plan discussed with:  Patient x   Family- daughter Danae Hernandez & wife Corey Peña x   RN x   Care Manager    Consultant/Specialist:     Patient will be discussed with Dr. 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) ICD-10-CM: C64.9, C79.9  ICD-9-CM: 189.0, 199.1  11/2/2015 Yes        CKD (chronic kidney disease) stage 3, GFR 30-59 ml/min (Chronic) ICD-10-CM: N18.3  ICD-9-CM: 585.3  12/10/2013 Yes        Paroxysmal atrial fibrillation ([facility] ICD-10-CM: I48.0  ICD-9-CM: 427.31  5/6/2013 Yes        CAD (coronary artery disease) ICD-10-CM: I25.10  ICD-9-CM: 414.00  Unknown Yes    Overview Signed 7/13/2012  9:24 AM by Daisha Ramos MD     Angina Jan 2011, PCI unknown vessel at [address], no MI             HTN (hypertension) ICD-10-CM: I10  ICD-9-CM: 401.9  Unknown Yes        Diabetes mellitus ([facility] (Chronic) ICD-10-CM: E11.9  ICD-9-CM: 250.00  7/12/2012 Yes              *Thank you for allowing [facility], to participate in the care of your patient.    ================================================    ADDENDUM--> Collaborating Care Team Physician:

## 2017-02-03 NOTE — PROGRESS NOTES
Palliative Medicine Consult  [phone]    Patient Name: Ran Manley. YOB: 1935      Date of Initial Consult: 2-2-17  Reason for Consult: care decisions   Requesting Physician: Alfred Cantu  Primary Care Physician: Chance Ornelas MD          Summary:   Ran Chirinos is a 80y.o. year old with a past history of LBBB, SAMUEL, prostate cancer, CVA,  GERD, HTN, PAF, renal cancer carcinoma with mets, DM, CAD, cochlear implant, CKDwho was admitted on 2/2/2017 from home with a diagnosis of dysarthria, TIA, weakness. Current medical issues leading to Palliative Medicine involvement include: slurred speech, TIA, vague complaints of feeling sick, weakness. Palliative Diagnoses:   1. Weakness  2. Dysarthria  3. TIA  4. Goals of care   5. Confusion, improved       Plan:   1. Family meeting: spouse and daughter at bedside, pt sitting up in chair, we discussed goals of care and his code status. Daughter was tearful, spouse  And pt clear about GOC and DNR. Pt has routinely refused any type of therapies for long term. 2. Discussed: GOC, durable DNR needs to be completed, pt and spouse willing to complete  3. Decisions made: continue supportive care and treatments  4. Pain/symptom management: pt denies pain or nausea, no shortness of breath  5. Coordination of care: Palliative IDT, primary nurse, case management  6. Psychosocial and emotional aspects of care: provided emotional support,         active listening and non-abandonment  7.  Disposition:  Home       Goals of Care:          TREATMENT PREFERENCES:   Code Status: DNR    Advance Care Planning:  Advance Care Planning 11/2/2015   Patient's Healthcare Decision Maker is: Legal Next of Kin   Confirm Advance Directive Yes, not on file       Other:    The palliative care team has discussed with patient / health care proxy about goals of care / treatment preferences for patient.  [====Goals of Care====]           Resuscitation Status: DNR   Durable DNR Status: need to complete  Initial Consult Note routed to PCP? [x] Yes   [] No    [] No PCP listed          Thank you for including Palliative Medicine in this patient's care. Josefina Tello NP           HISTORY:     History obtained from: chart, physician, nursing       CHIEF COMPLAINT: Slurred Speech      HISTORY OF PRESENT ILLNESS:   Mr. Tommy Tapia is a 80 y.o.  male with a hx of TIA, PAF, HTN, Stage IV renal cell carcinoma who is admitted with slurred speech concerning for possible TIA. Mr. Tommy Tapia presented to the Emergency Department after sudden onset of slurred speech that was noted by daughter. The patient is: [x] Verbal / [] Nonverbal / [] Unresponsive                FUNCTIONAL ASSESSMENT:     Palliative Performance Scale (PPS):  PPS: 60         PSYCHOSOCIAL/SPIRITUAL SCREENING:     Advance Care Planning:  Advance Care Planning 11/2/2015   Patient's Healthcare Decision Maker is: Legal Next of Kin   Confirm Advance Directive Yes, not on file        Any spiritual / Baptist concerns:  [] Yes /  [x] No    Caregiver Burnout:  [] Yes /  [x] No /  [] No Caregiver Present      Anticipatory grief assessment:   [x] Normal  / [] Maladaptive       ESAS Anxiety: Anxiety: 0    ESAS Depression: Depression: 0                 REVIEW OF SYSTEMS:     The following systems were [x] reviewed / [] unable to be reviewed  Systems: constitutional, ears/nose/mouth/throat, respiratory, gastrointestinal, musculoskeletal, neurologic, psychiatric, endocrine. Positive findings noted below.   Modified ESAS Completed by: provider   Fatigue: 2 Drowsiness: 0   Depression: 0 Pain: 0   Anxiety: 0 Nausea: 0   Anorexia: 0 Dyspnea: 0     Constipation: No                    Functional Assessment Staging (FAST) for dementia:  na        Functional Status  ECOG Status : Limited self-care           Confusion Assessment Method Diagnostic Algorithm (CAM):    CAM Score: 0    [++++ Clinical Pain Assessment++++]  Location (including laterality): denies  Quality:   Severity:  Acuity (acute/chronic): Tolerable (yes/no):  Etiology, if known:  Impact (functional, psychological): Other:  [++++ Clinical Pain Assessment++++]    Clinical Pain Assessment (nonverbal scale for severity on nonverbal patients):   [++++ Clinical Pain Assessment++++]  [++++Pain Severity++++]: Pain: 0  [++++Pain Character++++]:   [++++Pain Duration++++]:   [++++Pain Effect++++]:   [++++Pain Factors++++]:   [++++Pain Frequency++++]:   [++++Pain Location++++]:   [++++ Clinical Pain Assessment++++]       PHYSICAL EXAM:     Wt Readings from Last 3 Encounters:   03/24/16 192 lb 4.8 oz (87.2 kg)   02/17/16 200 lb (90.7 kg)   12/30/15 196 lb 3.2 oz (89 kg)     Blood pressure (!) 188/8, pulse (!) 54, temperature 96.9 °F (36.1 °C), resp. rate 18, SpO2 96 %.   Pain:  Pain Scale 1: Numeric (0 - 10)  Pain Intensity 1: 0                          Last bowel movement: prior to admission    Constitutional: pt is awake alert  Eyes: pupils equal, anicteric  ENMT: no nasal discharge, moist mucous membranes  Cardiovascular: regular rhythm, distal pulses intact  Respiratory: breathing not labored, symmetric  Gastrointestinal: soft non-tender, +bowel sounds  Musculoskeletal: no deformity, no tenderness to palpation  Skin: warm, dry  Neurologic:pt is able to follow commands, pt is moving all extremities  Psychiatric: full affect         HISTORY:     Active Problems:    Diabetes mellitus ([facility] [address] (7/12/2012)      CAD (coronary artery disease) ()      Overview: Angina Jan 2011, PCI unknown vessel at [place], no MI      HTN (hypertension) ()      Paroxysmal atrial fibrillation ([facility] [address] (5/6/2013)      CKD (chronic kidney disease) stage 3, GFR 30-59 ml/min (12/10/2013)      Kidney cancer, primary, 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 [facility]) (11/2/2015)      Cochlear implant in place (2/3/2017)      Overview: -- Cannot have MRI      Past Medical History   Diagnosis Date    Adverse effect of anesthesia      BP unstable after GB suregry- in ICU 3 days    Arthritis     CAD (coronary artery disease)      Angina Jan 2011, PCI LAD with multilink stent 4.0x12mm;  also cath at [place] 7/13 -- no stents at that time    CKD (chronic kidney disease)     Cochlear implant in place 2/3/2017    CVA (cerebral vascular accident) ([address]      maybe in 11/15 after surgery    Diabetes mellitus ([address]     Gallstone     GERD (gastroesophageal reflux disease)     HTN (hypertension)     Hypothyroidism     Ill-defined condition 8/2015     kidney stone    LBBB (left bundle branch block)      normal lexiscan MPI 12/13    SAMUEL (obstructive sleep apnea)      not using CPAP    PAF (paroxysmal atrial fibrillation) (HCC)      [id number] score = 7    Prostate cancer ([address]      prostate    Renal cell cancer ([address]      Stage 4 Renal Cell Cancer.     Partial omentectomy 11/2/2015 with Dr. Theodora Velázquez: omental mass-metastatic renal cell carcinoma    Renal mass, left      DAVINCI LEFT PARTIAL NEPHRECTOMY 12/19/13;     TIA (transient ischemic attack)      12/10/13      Past Surgical History   Procedure Laterality Date    Hx coronary stent placement  2011    Hx orthopaedic  1994     rotator cuff repair    Hx orthopaedic  1994     cervical disc removed    Hx tonsillectomy  1987    Hx heent  1989     stapedectomy    Hx prostatectomy  2000     surgery intervention    Pr cardiac surg procedure unlist  1/2011     stent to LAD    Hx heart catheterization  2011, 2013    Hx urological  2001     valvular implant prevent incontinence    Hx lithotripsy  8/2015    Hx urological  2003     insert gel to help with incontinence    Hx urological  12/2013     left partial nephrectomy    Hx urological  9/2015     CT guided biopsy of abdominal lymph nodes    Hx cholecystectomy  7/13/2012    Hx hernia repair  1968     left inguinal      Family History   Problem Relation Age of Onset    Stroke Father     Cancer Father      prostate    Diabetes Sister      2 sisters    Hypertension Sister      2 sisters    Diabetes Brother     Stroke Brother     Diabetes Brother      all brothers    Stroke Brother     Kidney Disease Brother     Heart Attack Brother     Hypertension Other       Social History   Substance Use Topics    Smoking status: Former Smoker     Packs/day: 0.10     Years: 23.00     Quit date: 12/3/1975    Smokeless tobacco: Never Used    Alcohol use No     Allergies   Allergen Reactions    Latex Rash, Swelling and Contact Dermatitis    Contrast Agent [Iodine] Other (comments)     Patient has one kidney    Morphine Other (comments)     Hallucinations and Nausea      Current Facility-Administered Medications   Medication Dose Route Frequency    co-enzyme Q-10 (CO Q-10) capsule 100 mg  100 mg Oral DAILY    pravastatin (PRAVACHOL) tablet 40 mg  40 mg Oral QHS    isosorbide mononitrate ER (IMDUR) tablet 60 mg  60 mg Oral 7am    losartan (COZAAR) tablet 25 mg  25 mg Oral DAILY    sodium chloride (NS) flush 5-10 mL  5-10 mL IntraVENous Q8H    sodium chloride (NS) flush 5-10 mL  5-10 mL IntraVENous PRN    aspirin chewable tablet 81 mg  81 mg Oral DAILY    glucose chewable tablet 16 g  4 Tab Oral PRN    dextrose (D50W) injection syrg 12.5-25 g  12.5-25 g IntraVENous PRN    glucagon (GLUCAGEN) injection 1 mg  1 mg IntraMUSCular PRN    insulin lispro (HUMALOG) injection   SubCUTAneous QID WITH MEALS    apixaban (ELIQUIS) tablet 2.5 mg  2.5 mg Oral BID    insulin NPH (NOVOLIN N, HUMULIN N) injection 35 Units  35 Units SubCUTAneous DAILY    pantoprazole (PROTONIX) tablet 40 mg  40 mg Oral ACB          LAB AND IMAGING FINDINGS:     Lab Results   Component Value Date/Time    WBC 5.7 02/02/2017 04:20 PM    HGB 14.4 02/02/2017 04:20 PM    PLATELET 460 78/09/1478 04:20 PM     Lab Results   Component Value Date/Time    Sodium 139 02/02/2017 04:20 PM    Potassium 4.7 02/02/2017 04:20 PM    Chloride 104 02/02/2017 04:20 PM    CO2 29 02/02/2017 04:20 PM BUN 19 02/02/2017 04:20 PM    Creatinine 1.75 02/02/2017 04:20 PM    Calcium 9.4 02/02/2017 04:20 PM    Magnesium 2.0 12/11/2013 05:00 AM    Phosphorus 2.6 12/11/2013 05:00 AM      Lab Results   Component Value Date/Time    AST (SGOT) 13 10/26/2015 11:16 AM    Alk. phosphatase 88 10/26/2015 11:16 AM    Protein, total 6.5 10/26/2015 11:16 AM    Albumin 3.7 10/26/2015 11:16 AM    Globulin 2.8 10/26/2015 11:16 AM     Lab Results   Component Value Date/Time    INR 1.1 02/02/2017 04:20 PM    Prothrombin time 10.9 02/02/2017 04:20 PM    aPTT 27.4 02/02/2017 04:20 PM      No results found for: IRON, FE, TIBC, IBCT, PSAT, FERR   No results found for: PH, PCO2, PO2  No components found for: [place]   Lab Results   Component Value Date/Time     07/14/2012 03:00 AM    CK - MB 2.8 07/14/2012 03:00 AM                Total time: 55  Counseling / coordination time: 45  > 50% counseling / coordination?: y      Prolonged service was provided for  []30 min   []75 min in face to face time in the presence of the patient. Time Start:   Time End:   Note: this can only be billed with 45419 (initial) or 44580 (follow up). If multiple start / stop times, list each separately.

## 2017-02-03 NOTE — ROUTINE PROCESS
TRANSFER - OUT REPORT:    Verbal report given to balbir on [id number] [facility].  being transferred to [id number] for routine progression of care       Report consisted of patients Situation, Background, Assessment and   Recommendations(SBAR). Information from the following report(s) SBAR, ED Summary, [facility] and Recent Results was reviewed with the receiving nurse. Opportunity for questions and clarification was provided.

## 2017-02-03 NOTE — PROGRESS NOTES
Pt in room with his wife, America, and daughter, Quita Vilchis. Pt resides with his wife in a one story house with a ramp. His daughter lives approximately 20 minutes away. Pt recently had HH with [facility] but did not \"like doing the exercises. \"  Discussed PT's recommendation for SNF vs HH with 24 hour physical assistance. Both Mrs. Hernandez and pt's daughter are in agreement that pt would absolutely not do well in a SNF. I gave Mrs. Hernandez a list of private duty aides. Discussed need for someone to be with pt 24 hours to be able to physically assist pt as his wife is unable to do so. Referral sent to [facility]. I also sent a referral to [facility]. Family is working on devising in-home coverage for a safe discharge plan. Alice Crowell LCSW    Care Management Interventions  PCP Verified by CM:  Yes (Dr. Aissatou Cano)  Transition of Care Consult (CM Consult): Discharge Planning, [address]: No  Reason Outside [place]:  (Pt's choice)  Discharge Durable Medical Equipment: No  Physical Therapy Consult: Yes  Occupational Therapy Consult: Yes  Speech Therapy Consult: Yes  Current Support Network: Lives with Spouse  Plan discussed with Pt/Family/Caregiver: Yes  Freedom of Choice Offered: Yes ([facility])  Discharge Location  Discharge Placement: Home with home health

## 2017-02-03 NOTE — PROGRESS NOTES
Occupational Therapy Note:  Orders acknowledged, chart reviewed, and spoke with nursing and Anuj Castellanos, neurology NP. Attempted 2x this AM to see patient for OT evaluation however on both attempts patient is being seen/evaluated by staff members; Currently speaking with case management. Will continue to follow. 13:43 Attempted back this PM and patient is having testing done at bedside. Will continue to follow. 14:24 Attempted back again this PM and patient is preparing for transport off the floor to CT. Will continue to follow.       Shirley Hayden, OTR/L

## 2017-02-04 VITALS
OXYGEN SATURATION: 98 % | HEART RATE: 65 BPM | RESPIRATION RATE: 16 BRPM | WEIGHT: 196.4 LBS | SYSTOLIC BLOOD PRESSURE: 142 MMHG | BODY MASS INDEX: 29.09 KG/M2 | TEMPERATURE: 97.8 F | HEIGHT: 69 IN | DIASTOLIC BLOOD PRESSURE: 76 MMHG

## 2017-02-04 LAB
ANION GAP BLD CALC-SCNC: 9 MMOL/L (ref 5–15)
BASOPHILS # BLD AUTO: 0 K/UL (ref 0–0.1)
BASOPHILS # BLD: 1 % (ref 0–1)
BUN SERPL-MCNC: 12 MG/DL (ref 6–20)
BUN/CREAT SERPL: 8 (ref 12–20)
CALCIUM SERPL-MCNC: 9.5 MG/DL (ref 8.5–10.1)
CHLORIDE SERPL-SCNC: 104 MMOL/L (ref 97–108)
CO2 SERPL-SCNC: 25 MMOL/L (ref 21–32)
CREAT SERPL-MCNC: 1.57 MG/DL (ref 0.7–1.3)
EOSINOPHIL # BLD: 0.4 K/UL (ref 0–0.4)
EOSINOPHIL NFR BLD: 10 % (ref 0–7)
ERYTHROCYTE [DISTWIDTH] IN BLOOD BY AUTOMATED COUNT: 13.4 % (ref 11.5–14.5)
GLUCOSE BLD STRIP.AUTO-MCNC: 258 MG/DL (ref 65–100)
GLUCOSE SERPL-MCNC: 143 MG/DL (ref 65–100)
HCT VFR BLD AUTO: 43.7 % (ref 36.6–50.3)
HGB BLD-MCNC: 14.7 G/DL (ref 12.1–17)
LYMPHOCYTES # BLD AUTO: 27 % (ref 12–49)
LYMPHOCYTES # BLD: 1.2 K/UL (ref 0.8–3.5)
MAGNESIUM SERPL-MCNC: 1.8 MG/DL (ref 1.6–2.4)
MCH RBC QN AUTO: 28.1 PG (ref 26–34)
MCHC RBC AUTO-ENTMCNC: 33.6 G/DL (ref 30–36.5)
MCV RBC AUTO: 83.6 FL (ref 80–99)
MONOCYTES # BLD: 0.3 K/UL (ref 0–1)
MONOCYTES NFR BLD AUTO: 7 % (ref 5–13)
NEUTS SEG # BLD: 2.4 K/UL (ref 1.8–8)
NEUTS SEG NFR BLD AUTO: 55 % (ref 32–75)
PHOSPHATE SERPL-MCNC: 2.9 MG/DL (ref 2.6–4.7)
PLATELET # BLD AUTO: 141 K/UL (ref 150–400)
POTASSIUM SERPL-SCNC: 3.9 MMOL/L (ref 3.5–5.1)
RBC # BLD AUTO: 5.23 M/UL (ref 4.1–5.7)
SERVICE CMNT-IMP: ABNORMAL
SODIUM SERPL-SCNC: 138 MMOL/L (ref 136–145)
WBC # BLD AUTO: 4.4 K/UL (ref 4.1–11.1)

## 2017-02-04 PROCEDURE — 97535 SELF CARE MNGMENT TRAINING: CPT | Performed by: OCCUPATIONAL THERAPIST

## 2017-02-04 PROCEDURE — 80048 BASIC METABOLIC PNL TOTAL CA: CPT | Performed by: INTERNAL MEDICINE

## 2017-02-04 PROCEDURE — 36415 COLL VENOUS BLD VENIPUNCTURE: CPT | Performed by: INTERNAL MEDICINE

## 2017-02-04 PROCEDURE — 74011250637 HC RX REV CODE- 250/637: Performed by: PSYCHIATRY & NEUROLOGY

## 2017-02-04 PROCEDURE — 74011636637 HC RX REV CODE- 636/637: Performed by: INTERNAL MEDICINE

## 2017-02-04 PROCEDURE — 74011250637 HC RX REV CODE- 250/637: Performed by: NURSE PRACTITIONER

## 2017-02-04 PROCEDURE — 85025 COMPLETE CBC W/AUTO DIFF WBC: CPT | Performed by: INTERNAL MEDICINE

## 2017-02-04 PROCEDURE — 74011250637 HC RX REV CODE- 250/637: Performed by: INTERNAL MEDICINE

## 2017-02-04 PROCEDURE — 97165 OT EVAL LOW COMPLEX 30 MIN: CPT | Performed by: OCCUPATIONAL THERAPIST

## 2017-02-04 PROCEDURE — 83735 ASSAY OF MAGNESIUM: CPT | Performed by: INTERNAL MEDICINE

## 2017-02-04 PROCEDURE — 84100 ASSAY OF PHOSPHORUS: CPT | Performed by: INTERNAL MEDICINE

## 2017-02-04 PROCEDURE — 82962 GLUCOSE BLOOD TEST: CPT

## 2017-02-04 RX ORDER — GUAIFENESIN 100 MG/5ML
81 LIQUID (ML) ORAL DAILY
Qty: 30 TAB | Refills: 0 | Status: SHIPPED | OUTPATIENT
Start: 2017-02-04 | End: 2018-09-09

## 2017-02-04 RX ORDER — ISOSORBIDE MONONITRATE 60 MG/1
60 TABLET, EXTENDED RELEASE ORAL ONCE
Status: COMPLETED | OUTPATIENT
Start: 2017-02-04 | End: 2017-02-04

## 2017-02-04 RX ORDER — CHOLECALCIFEROL (VITAMIN D3) 125 MCG
100 CAPSULE ORAL DAILY
Qty: 30 CAP | Refills: 0 | Status: SHIPPED | OUTPATIENT
Start: 2017-02-04 | End: 2018-09-09

## 2017-02-04 RX ORDER — PRAVASTATIN SODIUM 20 MG/1
20 TABLET ORAL
Qty: 30 TAB | Refills: 0 | Status: SHIPPED | OUTPATIENT
Start: 2017-02-04 | End: 2018-09-09

## 2017-02-04 RX ORDER — LOSARTAN POTASSIUM 25 MG/1
25 TABLET ORAL DAILY
Status: DISCONTINUED | OUTPATIENT
Start: 2017-02-04 | End: 2017-02-04 | Stop reason: HOSPADM

## 2017-02-04 RX ORDER — ISOSORBIDE MONONITRATE 60 MG/1
120 TABLET, EXTENDED RELEASE ORAL DAILY
Status: DISCONTINUED | OUTPATIENT
Start: 2017-02-05 | End: 2017-02-04 | Stop reason: HOSPADM

## 2017-02-04 RX ORDER — ISOSORBIDE MONONITRATE 120 MG/1
120 TABLET, EXTENDED RELEASE ORAL
Qty: 30 TAB | Refills: 0 | Status: SHIPPED | OUTPATIENT
Start: 2017-02-04

## 2017-02-04 RX ADMIN — ISOSORBIDE MONONITRATE 60 MG: 60 TABLET, EXTENDED RELEASE ORAL at 08:48

## 2017-02-04 RX ADMIN — Medication 10 ML: at 05:38

## 2017-02-04 RX ADMIN — APIXABAN 2.5 MG: 2.5 TABLET, FILM COATED ORAL at 08:48

## 2017-02-04 RX ADMIN — HUMAN INSULIN 10 UNITS: 100 INJECTION, SUSPENSION SUBCUTANEOUS at 08:50

## 2017-02-04 RX ADMIN — BISACODYL 10 MG: 10 SUPPOSITORY RECTAL at 05:39

## 2017-02-04 RX ADMIN — Medication 100 MG: at 08:48

## 2017-02-04 RX ADMIN — ASPIRIN 81 MG CHEWABLE TABLET 81 MG: 81 TABLET CHEWABLE at 08:48

## 2017-02-04 RX ADMIN — LOSARTAN POTASSIUM 25 MG: 25 TABLET, FILM COATED ORAL at 08:48

## 2017-02-04 RX ADMIN — PANTOPRAZOLE SODIUM 40 MG: 40 TABLET, DELAYED RELEASE ORAL at 08:30

## 2017-02-04 RX ADMIN — ISOSORBIDE MONONITRATE 60 MG: 60 TABLET, EXTENDED RELEASE ORAL at 06:38

## 2017-02-04 NOTE — PROGRESS NOTES
1900: Bedside and Verbal shift change report given to Mago Coughlin (oncoming nurse) by Edison Radford (offgoing nurse). Report included the following information SBAR, Kardex, ED Summary, Procedure Summary, Intake/Output, MAR, Accordion, Recent Results, Med Rec Status and Cardiac Rhythm SR.     2007: Pt/family stated patient has stomach pain, pt feels like he needs to have a bowel movement, requested something to help him. P/C to Dr. Kanchan Nieto, new orders obtained. 2229: Pt HR decreased to 46-47. Went in to assess patient, he was sleeping. I woke him up and his HR increased back to baseline of 58. Pt asymptomatic and denies pain and/ shortness of breath. P/c to Dr. Kanchan Nieto, informed him. Will continue to monitor.

## 2017-02-04 NOTE — PROGRESS NOTES
Problem: Self Care Deficits Care Plan (Adult)  Goal: *Acute Goals and Plan of Care (Insert Text)  Occupational Therapy Goals  Initiated 2/4/2017  1. Patient will perform grooming with modified independence within 7 day(s). 2. Patient will perform upper body dressing with independence within 7 day(s). 3. Patient will perform lower body dressing with modified independence within 7 day(s). 4. Patient will perform toilet transfers with supervision/set-up within 7 day(s). 5. Patient will perform all aspects of toileting with supervision/set-up within 7 day(s). OCCUPATIONAL THERAPY EVALUATION  Patient: Demetrius Sen (80 y.o. male)  Date: 2/4/2017  Primary Diagnosis: TIA (transient ischemic attack)        Precautions:   Fall      ASSESSMENT :  Based on the objective data described below, the patient presents with improved confusion and increased ability to perform basic ADL's, improved balance and this date able to maintain midline seated and standing. LE ADL's with min assist, ADL mobility with min assist using rolling walker, patient's wife present throughout and educated on level of assist for discharge home as that is the plan this date if blood pressure continues to decrease. Recommend home health OT if agreeable. Patient will benefit from skilled intervention to address the above impairments.   Patients rehabilitation potential is considered to be Fair  Factors which may influence rehabilitation potential include:   [ ]             None noted  [X]             Mental ability/status  [ ]             Medical condition  [ ]             Home/family situation and support systems  [ ]             Safety awareness  [ ]             Pain tolerance/management  [ ]             Other:        PLAN :  Recommendations and Planned Interventions:  [X]               Self Care Training                  [X]        Therapeutic Activities  [X]               Functional Mobility Training    [ ]        Cognitive Retraining  [X] Therapeutic Exercises           [X]        Endurance Activities  [X]               Balance Training                   [ ]        Neuromuscular Re-Education  [ ]               Visual/Perceptual Training     [X]   Home Safety Training  [X]               Patient Education                 [X]        Family Training/Education  [ ]               Other (comment):     Frequency/Duration: Patient will be followed by occupational therapy 5 times a week to address goals. Discharge Recommendations: Home Health  Further Equipment Recommendations for Discharge: has all       SUBJECTIVE:   Patient stated I feel better today.       OBJECTIVE DATA SUMMARY:   HISTORY:   Past Medical History   Diagnosis Date    Adverse effect of anesthesia         BP unstable after GB suregry- in ICU 3 days    Arthritis      CAD (coronary artery disease)         Angina Jan 2011, PCI LAD with multilink stent 4.0x12mm;  also cath at [place] 7/13 -- no stents at that time    CKD (chronic kidney disease)      Cochlear implant in place 2/3/2017    CVA (cerebral vascular accident) ([address]         maybe in 11/15 after surgery    Diabetes mellitus ([address]      Expressive aphasia 2/3/2017    Gallstone      GERD (gastroesophageal reflux disease)      HTN (hypertension)      Hypothyroidism      Ill-defined condition 8/2015       kidney stone    LBBB (left bundle branch block)         normal lexiscan MPI 12/13    SAMUEL (obstructive sleep apnea)         not using CPAP    PAF (paroxysmal atrial fibrillation) (HCC)         [id number] score = 7    Prostate cancer ([address]         prostate    Renal cell cancer ([address]         Stage 4 Renal Cell Cancer.     Partial omentectomy 11/2/2015 with Dr. Diane Maria: omental mass-metastatic renal cell carcinoma    Renal mass, left         DAVINCI LEFT PARTIAL NEPHRECTOMY 12/19/13;     TIA (transient ischemic attack)         12/10/13     Past Surgical History   Procedure Laterality Date    Hx coronary stent placement   2011    Hx orthopaedic   1994       rotator cuff repair    Hx orthopaedic   1994       cervical disc removed    Hx tonsillectomy   1987    Hx heent   1989       stapedectomy    Hx prostatectomy   2000       surgery intervention    Pr cardiac surg procedure unlist   1/2011       stent to LAD    Hx heart catheterization   2011, 2013    Hx urological   2001       valvular implant prevent incontinence    Hx lithotripsy   8/2015    Hx urological   2003       insert gel to help with incontinence    Hx urological   12/2013       left partial nephrectomy    Hx urological   9/2015       CT guided biopsy of abdominal lymph nodes    Hx cholecystectomy   7/13/2012    Hx hernia repair   1968       left inguinal        Prior Level of Function/Home Situation: lives with his wife, was dressing and bathing self, has all adaptive equipment, using rollator at baseline last 2-3 weeks  Expanded or extensive additional review of patient history:      Home Situation  Home Environment: Private residence  # Steps to Enter: 0  Wheelchair Ramp: Yes  One/Two Story Residence: One story  Living Alone: No  Support Systems: Child(alee), Spouse/Significant Other/Partner  Patient Expects to be Discharged to[de-identified] Private residence  Current DME Used/Available at Home: Tub transfer bench, Grab bars, Raised toilet seat, CPAP, Cane, straight, Walker, rollator, Walker, rolling  Tub or Shower Type: Tub/Shower combination  [ ]  Right hand dominant   [ ]  Left hand dominant     EXAMINATION OF PERFORMANCE DEFICITS:  Cognitive/Behavioral Status:  Neurologic State: Alert     Cognition: Follows commands; Appropriate for age attention/concentration;Memory loss (hard of hearing limitation also)  Perception: Appears intact  Perseveration: No perseveration noted  Safety/Judgement: Awareness of environment; Fall prevention;Home safety;Decreased awareness of need for assistance;Decreased insight into deficits  Skin: intact  Edema: none noted  Vision/Perceptual:                                   Range of Motion:  AROM: Generally decreased, functional  PROM: Generally decreased, functional                    Strength:  Strength: Generally decreased, functional              Coordination:  Coordination: Generally decreased, functional       Gross Motor Skills-Upper: Left Intact; Right Intact  Tone & Sensation:  Tone: Normal  Sensation: Intact                       Balance:  Sitting: Impaired  Sitting - Static: Good (unsupported)  Sitting - Dynamic: Fair (occasional)  Standing: Impaired; Without support  Standing - Static: Constant support;Good  Standing - Dynamic : Fair     Functional Mobility and Transfers for ADLs:  Bed Mobility:  Supine to Sit: Minimum assistance     Transfers:  Sit to Stand: Contact guard assistance; Adaptive equipment;Assist x1;Additional time  Stand to Sit: Adaptive equipment; Additional time;Assist x1;Minimum assistance (decreased eccentric control)  Bed to Chair: Minimum assistance; Adaptive equipment; Additional time;Assist x1  Toilet Transfer : Minimum assistance; Adaptive equipment; Additional time;Assist x1     ADL Assessment:  Feeding: Setup     Oral Facial Hygiene/Grooming: Contact guard assistance;Setup     Bathing: Moderate assistance     Upper Body Dressing: Setup     Lower Body Dressing: Minimum assistance; Additional time;Assist x1     Toileting: Minimum assistance; Adaptive equipment; Additional time;Assist x1                 ADL Intervention and task modifications:   educated patient and his wife on role of OT, level of assist required at home and need for hands on assist with all mobility, issued gait belt for his wife to use and demonstrated understanding of safe use. Cognitive Retraining  Safety/Judgement: Awareness of environment; Fall prevention;Home safety;Decreased awareness of need for assistance;Decreased insight into deficits        Functional Measure:  Barthel Index:      Bathin  Bladder: 0  Bowels: 10  Groomin  Dressin  Feeding: 10  Mobility: 0  Stairs: 0  Toilet Use: 5  Transfer (Bed to Chair and Back): 10  Total: 45         Barthel and G-code impairment scale:  Percentage of impairment CH  0% CI  1-19% CJ  20-39% CK  40-59% CL  60-79% CM  80-99% CN  100%   Barthel Score 0-100 100 99-80 79-60 59-40 20-39 1-19    0   Barthel Score 0-20 20 17-19 13-16 9-12 5-8 1-4 0      The Barthel ADL Index: Guidelines  1. The index should be used as a record of what a patient does, not as a record of what a patient could do. 2. The main aim is to establish degree of independence from any help, physical or verbal, however minor and for whatever reason. 3. The need for supervision renders the patient not independent. 4. A patient's performance should be established using the best available evidence. Asking the patient, friends/relatives and nurses are the usual sources, but direct observation and common sense are also important. However direct testing is not needed. 5. Usually the patient's performance over the preceding 24-48 hours is important, but occasionally longer periods will be relevant. 6. Middle categories imply that the patient supplies over 50 per cent of the effort. 7. Use of aids to be independent is allowed. Anne Nieves., Barthel, DDALTON. (6069). Functional evaluation: the Barthel Index. [facility] (14)2. RONALD Flores, Lianet Delgado.Olegario., Seema, [address] [place] [address] (). Measuring the change indisability after inpatient rehabilitation; comparison of the responsiveness of the Barthel Index and Functional [place] Measure. Journal of Neurology, Neurosurgery, and Psychiatry, 66(4), 830-168. Praveen Dieog, N.J.A, Juice Cabral  WMahadJ.EMELY, & Avni Crane MCOLTON. (2004.) Assessment of post-stroke quality of life in cost-effectiveness studies: The usefulness of the Barthel Index and the EuroQoL-5D. Quality of Life Research, 13, 332-06            G codes:   In compliance with CMSs Claims Based Outcome Reporting, the following G-code set was chosen for this patient based on their primary functional limitation being treated: The outcome measure chosen to determine the severity of the functional limitation was the Barthel Index  with a score of 45/100 which was correlated with the impairment scale. · Self Care:               - CURRENT STATUS:    CK - 40%-59% impaired, limited or restricted               - GOAL STATUS:           CJ - 20%-39% impaired, limited or restricted               - D/C STATUS:                       ---------------To be determined---------------      Occupational Therapy Evaluation Charge Determination   History Examination Decision-Making   LOW Complexity : Brief history review  LOW Complexity : 1-3 performance deficits relating to physical, cognitive , or psychosocial skils that result in activity limitations and / or participation restrictions  LOW Complexity : No comorbidities that affect functional and no verbal or physical assistance needed to complete eval tasks       Based on the above components, the patient evaluation is determined to be of the following complexity level: LOW   Pain:  Pain Scale 1: Numeric (0 - 10)  Pain Intensity 1: 0      no complaint        Activity Tolerance:   Good   Please refer to the flowsheet for vital signs taken during this treatment. After treatment:   [X] Patient left in no apparent distress sitting up in chair  [ ] Patient left in no apparent distress in bed  [X] Call bell left within reach  [X] Nursing notified  [ ] Caregiver present  [X] Bed alarm activated      COMMUNICATION/EDUCATION:   The patients plan of care was discussed with: Registered Nurse.  [X] Home safety education was provided and the patient/caregiver indicated understanding. [X] Patient/family have participated as able in goal setting and plan of care. [ ] Patient/family agree to work toward stated goals and plan of care.   [ ] Patient understands intent and goals of therapy, but is neutral about his/her participation. [ ] Patient is unable to participate in goal setting and plan of care. This patients plan of care is appropriate for delegation to SIL[facility].      Thank you for this referral.  Yola Rodriguez OTR/L  Time Calculation: 26 mins

## 2017-02-04 NOTE — PROGRESS NOTES
Bedside and Verbal shift change report given to Debbi Soler RN (oncoming nurse) by Warren Rao RN (offgoing nurse). Report included the following information SBAR, Kardex, MAR, Recent Results and Cardiac Rhythm SB;1st degree AVB. Restarting BP meds this am and imdur increased. BP down from 180's to 150's. Discharge orders in. Discharge instructions reviewed with wife daughter and patient. IV discontinued. Tele monitor removed. Discharge medications handout given and side effects discussed. Discharge Medication List as of 2/4/2017 12:11 PM      START taking these medications    Details   aspirin 81 mg chewable tablet Take 1 Tab by mouth daily. , Print, Disp-30 Tab, R-0      pravastatin (PRAVACHOL) 20 mg tablet Take 1 Tab by mouth nightly. , Print, Disp-30 Tab, R-0      coenzyme Q-10 (CO Q-10) 100 mg capsule Take 1 Cap by mouth daily. , Print, Disp-30 Cap, R-0         CONTINUE these medications which have CHANGED    Details   insulin NPH (NOVOLIN N, HUMULIN N) 100 unit/mL injection 10 Units by SubCUTAneous route Before breakfast and dinner. Note the decrease in dose from 35 units, No Print, Disp-1 Vial, R-0      isosorbide mononitrate ER (IMDUR) 120 mg CR tablet Take 1 Tab by mouth every morning., Print, Disp-30 Tab, R-0         CONTINUE these medications which have NOT CHANGED    Details   furosemide (LASIX) 40 mg tablet Take 20 mg by mouth daily as needed (weight gain of 5 lbs or greater). , Historical Med      melatonin 3 mg tablet Take 3 mg by mouth nightly., Historical Med      polyethylene glycol (MIRALAX) 17 gram packet Take 17 g by mouth daily as needed (constipation). , Historical Med      mirtazapine (REMERON) 15 mg tablet Take 7.5 mg by mouth nightly., Historical Med      losartan (COZAAR) 25 mg tablet Take 1 Tab by mouth daily. , No Print, Disp-30 Tab, R-6      apixaban (ELIQUIS) 2.5 mg tablet Take 2.5 mg by mouth two (2) times a day., Historical Med      OTHER,NON-FORMULARY, Take 1 Cap by mouth daily. Zafar May dietary supplement-red yeast rice, Crategi extract,etc., Historical Med      omeprazole (PRILOSEC) 20 mg capsule Take 20 mg by mouth Daily (before breakfast). , Historical Med      desonide (TRIDESILON) 0.05 % cream Apply  to affected area daily as needed for Skin Irritation. , Historical Med      nitroglycerin (NITROSTAT) 0.4 mg SL tablet 0.4 mg by SubLINGual route every five (5) minutes as needed.  , Historical Med

## 2017-02-04 NOTE — DISCHARGE INSTRUCTIONS
HOSPITALIST DISCHARGE INSTRUCTIONS  NAME: David Mitchell. :  1935   MRN:  [id number]     Date/Time:  2017 9:03 AM    ADMIT DATE: 2017     DISCHARGE DATE: 2017     PRINCIPAL DISCHARGE DIAGNOSES:  TIA (mini-stroke)    MEDICATIONS:  · It is important that you take the medication exactly as they are prescribed. Note the changes and additions to your medications. Be sure you understand these changes before you are discharged today. · Note the decrease in dose of insulin. · Note the increase in dose of isosorbide mononitrate  · Keep your medication in the bottles provided by the pharmacist and keep a list of the medication names, dosages, and times to be taken in your wallet. · Do not take other medications without consulting your doctor. Pain Management: per above medications    What to do at Home    Recommended diet:  Cardiac Diet and Diabetic Diet    Recommended activity: PT/OT Eval and Treat    If you experience any of the following symptoms then please call your primary care physician or return to the emergency room if you cannot get hold of your doctor:    Slurred speech, facial droop, weakness, numbness, falling, bleeding, or other severe concerning symptoms that brought you to the hospital in the first place    Follow Up:   Follow-up Information     Follow up With Details Comments [address] [facility] MD Mirna Schedule an appointment as soon as possible for a visit in 2 weeks Make Neurology Follow-up appt post TIA or CVA in 2-3 weeks with Dr. Méndez [facility] [address] [address]  [phone]      [address]  [phone]    Follow up with your primary care doctor       Juan A Pope MD  can see Dr. Woody Horvath or Dr. Britta Donnelly for post TIA follow up   [facility] [address] [facility]  [phone]              Information obtained by :  I understand that if any problems occur once I am at home I am to contact my physician. I understand and acknowledge receipt of the instructions indicated above.                                                                                                                                            Physician's or R.N.'s Signature                                                                  Date/Time                                                                                                                                              Patient or Representative Signature                                                          Date/Time

## 2017-02-04 NOTE — DISCHARGE SUMMARY
Physician Discharge Summary     Patient ID:  Thaddeus Morales  [id number]  80 y.o.  1935    Admit date: 2/2/2017    Discharge date: 2/4/2017    Admission Diagnoses: TIA (transient ischemic attack)    Principal Discharge Diagnoses:    TIA    OTHER PROBLEMS ADDRESSEDS  Principal Diagnosis <principal problem not specified>                                            Active Problems:    Diabetes mellitus ([facility] (7/12/2012)      CAD (coronary artery disease) ()      Overview: Angina Jan 2011, PCI unknown vessel at [place], no MI      HTN (hypertension) ()      Paroxysmal atrial fibrillation ([facility] (5/6/2013)      CKD (chronic kidney disease) stage 3, GFR 30-59 ml/min (12/10/2013)      Kidney cancer, primary, 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 [facility]) (11/2/2015)      Cochlear implant in place (2/3/2017)      Overview: -- Cannot have MRI      Expressive aphasia (2/3/2017)       Patient Active Problem List   Diagnosis Code    Diabetes mellitus ([facility] E11.9    CAD (coronary artery disease) I25.10    HTN (hypertension) I10    CKD (chronic kidney disease) N18.9    Thrombocytopenia, unspecified (HCC) D69.6    Paroxysmal atrial fibrillation (HCC) I48.0    Leg weakness M62.81    CKD (chronic kidney disease) stage 3, GFR 30-59 ml/min N18.3    Left renal mass N28.89    LBBB (left bundle branch block) I44.7    Renal cell carcinoma (HCC) C64.9    Kidney cancer, primary, 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 Sharri Mcardle is a 81 yo WM w/ hx of HTN, DM, pAFIB, CKD3, stage IV RCC presenting with dysarthria, confusion, admitted for TIA / CVA workup     TIA (transient ischemic attack) / Dysarthria: had trouble with word-finding, nonsensical speech. Had acute confusion which is now resolved. Unfortunately no MRI due to metallic ear implant.  CTA H&N was ordered by neurology, with prelim report showing no evidence of acute thrombosis or significant intracranial stenosis. Agree with resumption of ASA and continuing Eliquis. Family understands increased risk of bleeding with ASA and Eliquis and agree to proceed. Tried to add statin for risk reduction but pt does not tolerate statin per family. D/C'd by neurology. Considering trying again with pravastatin, with addition of Co-Q10. I will provide Rxs at family's request. Evaluated by PT / OT, speech. Pt and family desire strongly to go home. From risk of development of agitation/delirium, agree that pt would do better at home. Strongly recommend 24-hour supervision at home, given high risk of falls. Family notes that they have this arranged.      Diabetes mellitus ([address]): type 2, with CKD3, controlled. A1c 6.8%. Perhaps too tightly controlled. Held NPH yesterday given NPO, and decreased dose to 10u from 35u. Written instructions provided     CAD (coronary artery disease): cont ASA, no statin as above. No BB due to bradycardia     HTN (hypertension): had allowed permissive HTN on admission. Per wife SBP usually 160. Probably a bit higher than desired. Increased ISMN, resumed losartan today. Wife diligent about recording BPs at home. Okay to d/c home later if BP improves.      Paroxysmal atrial fibrillation ([address]): appears to be in NSR right now. Has known LBBB. Rate controlled. Cont Eliquis     CKD (chronic kidney disease) stage 3, GFR 30-59 ml/min: at baseline. Received IV contrast with CTA. 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 Code status discussed, DNR     Dementia, likely vascular type / Debility: with cognitive decline. At risk for delirium in hospital, monitor, supportive care. Encourage follow up with neurology and perhaps neuropsych     Pt discharged in improved and stable condition.      Procedures performed: none    Imaging studies: CT head: Increased microvascular ischemic disease. No CT evidence of acute infarct, mass effect, or intracranial hemorrhage    CTA as above  TTE showed no intracardiac shunt. Carotid dopplers showed no sig stenosis      PCP: Chance Ornelas, MD    Consults: Neurology    Discharge Exam:  Patient Vitals for the past 12 hrs:   Temp Pulse Resp BP SpO2   02/04/17 0750 97.9 °F (36.6 °C) 67 16 180/85 98 %   02/04/17 0657 - 63 - - -   02/04/17 0638 - 64 - - -   02/04/17 0537 - (!) 55 - 196/85 -   02/04/17 0438 97.8 °F (36.6 °C) (!) 54 16 (!) 202/88 96 %   02/03/17 2358 - (!) 56 - - -   02/03/17 2304 97.6 °F (36.4 °C) 62 16 190/81 96 %       Gen: Elderly, chronically ill-appearing. NAD  HEENT: Sclerae nonicteric, hearing intact to voice, mucous membranes moist  Neck: Supple, without masses. Resp: No accessory muscle use, CTAB without wheezes, rales, or rhonchi  Card: irreg rhythm, rate 60s, without m/r/g. No LE edema. Abd: +bowel sounds, soft, NTTP, nondistended. .   Neuro: Face symmetric, tongue midline, speech fluent, follows commands appropriately. CNs 3-12 in tact. No focal deficits  Psych: Alert, oriented to self and hospital. Limited insight        Disposition: home    Patient Instructions:   Current Discharge Medication List      START taking these medications    Details   aspirin 81 mg chewable tablet Take 1 Tab by mouth daily. Qty: 30 Tab, Refills: 0      pravastatin (PRAVACHOL) 20 mg tablet Take 1 Tab by mouth nightly. Qty: 30 Tab, Refills: 0      coenzyme Q-10 (CO Q-10) 100 mg capsule Take 1 Cap by mouth daily. Qty: 30 Cap, Refills: 0         CONTINUE these medications which have CHANGED    Details   insulin NPH (NOVOLIN N, HUMULIN N) 100 unit/mL injection 10 Units by SubCUTAneous route Before breakfast and dinner. Note the decrease in dose from 35 units  Qty: 1 Vial, Refills: 0      isosorbide mononitrate ER (IMDUR) 120 mg CR tablet Take 1 Tab by mouth every morning.   Qty: 30 Tab, Refills: 0 CONTINUE these medications which have NOT CHANGED    Details   furosemide (LASIX) 40 mg tablet Take 20 mg by mouth daily as needed (weight gain of 5 lbs or greater). melatonin 3 mg tablet Take 3 mg by mouth nightly. polyethylene glycol (MIRALAX) 17 gram packet Take 17 g by mouth daily as needed (constipation). mirtazapine (REMERON) 15 mg tablet Take 7.5 mg by mouth nightly. losartan (COZAAR) 25 mg tablet Take 1 Tab by mouth daily. Qty: 30 Tab, Refills: 6      apixaban (ELIQUIS) 2.5 mg tablet Take 2.5 mg by mouth two (2) times a day. OTHER,NON-FORMULARY, Take 1 Cap by mouth daily. Zafar May dietary supplement-red yeast rice, Crategi extract,etc.      omeprazole (PRILOSEC) 20 mg capsule Take 20 mg by mouth Daily (before breakfast). desonide (TRIDESILON) 0.05 % cream Apply  to affected area daily as needed for Skin Irritation. nitroglycerin (NITROSTAT) 0.4 mg SL tablet 0.4 mg by SubLINGual route every five (5) minutes as needed. Activity: See discharge instructions  Diet: See discharge instructions  Wound Care: See discharge instructions    Follow-up Information     Follow up With Details Comments [address] [facility] MD [place] Schedule an appointment as soon as possible for a visit in 2 weeks Make Neurology Follow-up appt post TIA or CVA in 2-3 weeks with Dr. Gely Jimenez [address] [place] [address] [address] [id number]  [phone]      [address] [place] [address]  [phone]    Follow up with your primary care doctor       Trisha Denton MD  can see Dr. Saintclair Lemme or Dr. Gely Jimenez for post TIA follow up [address] [place] [address]  [phone]            I spent 35 minutes on this discharge.     Signed:  Jeffry Cervantes MD  2/4/2017  9:09 AM

## 2017-02-20 ENCOUNTER — OFFICE VISIT (OUTPATIENT)
Dept: CARDIOLOGY CLINIC | Age: 82
End: 2017-02-20

## 2017-02-20 VITALS
WEIGHT: 202 LBS | DIASTOLIC BLOOD PRESSURE: 80 MMHG | HEIGHT: 69 IN | HEART RATE: 62 BPM | SYSTOLIC BLOOD PRESSURE: 124 MMHG | BODY MASS INDEX: 29.92 KG/M2

## 2017-02-20 DIAGNOSIS — I48.0 PAROXYSMAL ATRIAL FIBRILLATION (HCC): ICD-10-CM

## 2017-02-20 DIAGNOSIS — N18.9 CKD (CHRONIC KIDNEY DISEASE), UNSPECIFIED STAGE: ICD-10-CM

## 2017-02-20 DIAGNOSIS — I10 ESSENTIAL HYPERTENSION: ICD-10-CM

## 2017-02-20 DIAGNOSIS — I25.10 CORONARY ARTERY DISEASE INVOLVING NATIVE CORONARY ARTERY OF NATIVE HEART WITHOUT ANGINA PECTORIS: Primary | ICD-10-CM

## 2017-02-20 RX ORDER — FUROSEMIDE 40 MG/1
20 TABLET ORAL AS NEEDED
Qty: 30 TAB | Refills: 0
Start: 2017-02-20 | End: 2018-09-09

## 2017-02-20 NOTE — PROGRESS NOTES
Rosalio Albright MD. [facility]              Patient: Venecia Luna. : 1935      Today's Date: 2017            HISTORY OF PRESENT ILLNESS:     History of Present Illness:  Mr. Fredrick Maciel is here for follow-up. He was in the hospital a couple of weeks ago with a TIA. He is doing OK since DC. Is at baseline. Denies any complaints. Gets around with a cane. He had PT but is not interested in keeping up with it at home. No CP or SOB. PAST MEDICAL HISTORY:     Past Medical History   Diagnosis Date    Adverse effect of anesthesia      BP unstable after GB suregry- in ICU 3 days    Arthritis     CAD (coronary artery disease)      Angina 2011, PCI LAD with multilink stent 4.0x12mm;  also cath at [place]  -- no stents at that time    CKD (chronic kidney disease)     Cochlear implant in place 2/3/2017    CVA (cerebral vascular accident) ([address]      maybe in 11/15 after surgery    Diabetes mellitus ([address]     Expressive aphasia 2/3/2017    Gallstone     GERD (gastroesophageal reflux disease)     HTN (hypertension)     Hypothyroidism     Ill-defined condition 2015     kidney stone    LBBB (left bundle branch block)      normal lexiscan MPI     SAMUEL (obstructive sleep apnea)      not using CPAP    PAF (paroxysmal atrial fibrillation) (HCC)      [id number] score = 7    Prostate cancer ([address]      prostate    Renal cell cancer ([address]      Stage 4 Renal Cell Cancer.     Partial omentectomy 2015 with Dr. Malu Fink: omental mass-metastatic renal cell carcinoma    Renal mass, left      DAVINCI LEFT PARTIAL NEPHRECTOMY 13;     TIA (transient ischemic attack)      12/10/13 and        Past Surgical History   Procedure Laterality Date    Hx coronary stent placement      Hx orthopaedic       rotator cuff repair    Hx orthopaedic       cervical disc removed    Hx tonsillectomy      Hx heent       stapedectomy    Hx prostatectomy   surgery intervention    Pr cardiac surg procedure unlist  1/2011     stent to LAD    Hx heart catheterization  2011, 2013    Hx urological  2001     valvular implant prevent incontinence    Hx lithotripsy  8/2015    Hx urological  2003     insert gel to help with incontinence    Hx urological  12/2013     left partial nephrectomy    Hx urological  9/2015     CT guided biopsy of abdominal lymph nodes    Hx cholecystectomy  7/13/2012    Hx hernia repair  1968     left inguinal           MEDICATIONS:     Current Outpatient Prescriptions   Medication Sig Dispense Refill    furosemide (LASIX) 40 mg tablet Take 0.5 Tabs by mouth as needed (weight gain of 5 lbs or greater). 30 Tab 0    insulin NPH (NOVOLIN N, HUMULIN N) 100 unit/mL injection 10 Units by SubCUTAneous route Before breakfast and dinner. Note the decrease in dose from 35 units 1 Vial 0    isosorbide mononitrate ER (IMDUR) 120 mg CR tablet Take 1 Tab by mouth every morning. 30 Tab 0    pravastatin (PRAVACHOL) 20 mg tablet Take 1 Tab by mouth nightly. (Patient taking differently: Take 10 mg by mouth nightly.) 30 Tab 0    melatonin 3 mg tablet Take 3 mg by mouth nightly.  polyethylene glycol (MIRALAX) 17 gram packet Take 17 g by mouth daily as needed (constipation).  mirtazapine (REMERON) 15 mg tablet Take 7.5 mg by mouth nightly.  losartan (COZAAR) 25 mg tablet Take 1 Tab by mouth daily. 30 Tab 6    apixaban (ELIQUIS) 2.5 mg tablet Take 2.5 mg by mouth two (2) times a day.  OTHER,NON-FORMULARY, Take 1 Cap by mouth daily. Zafar May dietary supplement-red yeast rice, Crategi extract,etc.      omeprazole (PRILOSEC) 20 mg capsule Take 20 mg by mouth Daily (before breakfast).  desonide (TRIDESILON) 0.05 % cream Apply  to affected area daily as needed for Skin Irritation.  nitroglycerin (NITROSTAT) 0.4 mg SL tablet 0.4 mg by SubLINGual route every five (5) minutes as needed.         aspirin 81 mg chewable tablet Take 1 Tab by mouth daily. 30 Tab 0    coenzyme Q-10 (CO Q-10) 100 mg capsule Take 1 Cap by mouth daily. 30 Cap 0       Allergies   Allergen Reactions    Latex Rash, Swelling and Contact Dermatitis    Contrast Agent [Iodine] Other (comments)     Patient has one kidney    Morphine Other (comments)     Hallucinations and Nausea             SOCIAL HISTORY:     Social History   Substance Use Topics    Smoking status: Former Smoker     Packs/day: 0.10     Years: 23.00     Quit date: 12/3/1975    Smokeless tobacco: Never Used    Alcohol use No           FAMILY HISTORY:     Family History   Problem Relation Age of Onset    Stroke Father     Cancer Father      prostate    Diabetes Sister      2 sisters    Hypertension Sister      2 sisters    Diabetes Brother     Stroke Brother     Diabetes Brother      all brothers    Stroke Brother     Kidney Disease Brother     Heart Attack Brother     Hypertension Other                REVIEW OF SYSTEMS:       Review of Systems:    Constitutional: Negative for fever, chills    HEENT: Negative for vision changes. + difficulty hearing    Respiratory: Negative for cough    Cardiovascular: Negative for orthopnea, syncope, and PND.    Gastrointestinal: Negative for abdominal pain, diarrhea, or melena    Genitourinary: Negative for dysuria    Musculoskeletal: Negative for myalgias. + arthritis    Skin: Negative for rash    Heme: No problems bleeding.    Neurological: + right sided weakness             PHYSICAL EXAM:       Physical Exam:   Visit Vitals    /80    Pulse 62    Ht 5' 9\" (1.753 m)    Wt 202 lb (91.6 kg)    BMI 29.83 kg/m2      Patient appears generally well, mood and affect are appropriate and pleasant.    Normocephalic, atraumatic.  Sclera anicteric. Hearing intact. Neck Exam: supple, no bruits   Lung Exam: few coarse sounds at bases bilat    Cardiac Exam: RRR rhythm with no murmur - distant    Abdomen: Non-tender, normal bowel sounds.    Extremities: No edema.  MAW  Vascular - 2+ dorsalis pedis pulses    Psych - appropriate affect                   LABS / OTHER STUDIES:       Lab Results   Component Value Date/Time    Sodium 138 02/04/2017 04:43 AM    Potassium 3.9 02/04/2017 04:43 AM    Chloride 104 02/04/2017 04:43 AM    CO2 25 02/04/2017 04:43 AM    Anion gap 9 02/04/2017 04:43 AM    Glucose 143 02/04/2017 04:43 AM    BUN 12 02/04/2017 04:43 AM    Creatinine 1.57 02/04/2017 04:43 AM    BUN/Creatinine ratio 8 02/04/2017 04:43 AM    GFR est AA 52 02/04/2017 04:43 AM    GFR est non-AA 43 02/04/2017 04:43 AM    Calcium 9.5 02/04/2017 04:43 AM    Bilirubin, total 0.5 10/26/2015 11:16 AM    AST (SGOT) 13 10/26/2015 11:16 AM    Alk.  phosphatase 88 10/26/2015 11:16 AM    Protein, total 6.5 10/26/2015 11:16 AM    Albumin 3.7 10/26/2015 11:16 AM    Globulin 2.8 10/26/2015 11:16 AM    A-G Ratio 1.3 10/26/2015 11:16 AM    ALT (SGPT) 21 10/26/2015 11:16 AM       Lab Results   Component Value Date/Time    WBC 4.4 02/04/2017 04:43 AM    HGB 14.7 02/04/2017 04:43 AM    HCT 43.7 02/04/2017 04:43 AM    PLATELET 220 43/55/5889 04:43 AM    MCV 83.6 02/04/2017 04:43 AM     Lab Results   Component Value Date/Time    Cholesterol, total 170 02/03/2017 05:17 AM    HDL Cholesterol 41 02/03/2017 05:17 AM    LDL, calculated 99 02/03/2017 05:17 AM    VLDL, calculated 30 02/03/2017 05:17 AM    Triglyceride 150 02/03/2017 05:17 AM    CHOL/HDL Ratio 4.1 02/03/2017 05:17 AM              CARDIAC DIAGNOSTICS:       Cardiac Evaluation Includes:    Echo July 2012 - LVH, EF 55%, mild LAE    Cath 7/13 - diffuse disease with patent stent and 100% distal stenosis (fills apically with L-L collaterals, LCX diffuse disease, OM2 80% stenosis, RCA 50-60%, mPDA 100% (fills with collaterals) ---> felt best to be medically managed  Echo 12/10/13 - LVEF 60%, mild LAE    Lexiscan MPI 12/12/13 - no ischemia, LVEF 67% (+ diaphragmatic attenuation)    Carotid duplex 12/13 - mild dz bilat  Echo 7/27/15 - TDS, LVEF 55%, RV normal, normal atrial sizes, no sig valve disease  Echo 2/3/17 -LVEF 55%. There was possible mild hypokinesis of the mid anteroseptal and apical septal wall(s). Negative bubble study      EKG 12/8/14 - NSR, 1st degree AVB, LBBB  EKG 8/12/15 - NSR, 1st degree AVB, LBBB            ASSESSMENT AND PLAN:       Assessment and Plan:    1) PAF (and history of TIA)  - Although Mr. Azra Dave is at risk of falls, he also has a very high risk of recurrent TIA/CVA ([id number] score = 7)    - Given his risk factors, I think the benefits of anticoagulation outweigh its risks  - Continue Eliquis (2.5 mg BID)  - He has taken measures to lower fall risk (uses a cane) (just 3 fall past 12 months)      2) Orthostatic complaints    - Mr. Azra Dave has orthostatic complaints of dizziness at times  - In past - his wife prefers that med changes be made by his [facility] cardiologist  - wife follows orthostatic vitals at home and BP has looked OK     3) CAD  - 3 vessel CAD which was felt best suited for medical management after last cath in 7/13  - Cont ASA, and Imdur  - He apparently could not tolerate a BB in past due to a junctional rhythm  - could not tolerate statin due to myalgia in past (although trying low dose pravastatin now)   - Cont ASA 81 along with Eliquis (due to prior stent placement)   - he sees cardiology at the [facility] hospital as well     4) Dyslipidemia    - He has not been able to tolerate several statins (muscle complaints, including crestor)  - On 2/17, pravachol added (along with CoQ10) at [facility] after he was admitted with a TIA. If he has more myalgia, then can stop the statin.      5) Renal Cell Cancer  - per urology      6) See me back in 1 year. Patient expressed understanding of the plan - questions were answered.         Levy Barker MD, [place] [id number] [facility], [address]  [address]  [address]  [facility] [place], [address] [place] [address]  Ph: [phone]   Ph [phone]

## 2017-03-02 ENCOUNTER — OFFICE VISIT (OUTPATIENT)
Dept: NEUROLOGY | Age: 82
End: 2017-03-02

## 2017-03-02 VITALS
DIASTOLIC BLOOD PRESSURE: 68 MMHG | HEIGHT: 69 IN | BODY MASS INDEX: 29.92 KG/M2 | RESPIRATION RATE: 20 BRPM | SYSTOLIC BLOOD PRESSURE: 150 MMHG | WEIGHT: 202 LBS

## 2017-03-02 DIAGNOSIS — G45.9 TRANSIENT CEREBRAL ISCHEMIA, UNSPECIFIED TYPE: Primary | ICD-10-CM

## 2017-03-02 NOTE — PATIENT INSTRUCTIONS
[facility]   Statement to Patients  April 1, 2014      In an effort to ensure the large volume of patient prescription refills is processed in the most efficient and expeditious manner, we are asking our patients to assist us by calling your Pharmacy for all prescription refills, this will include also your  Mail Order Pharmacy. The pharmacy will contact our office electronically to continue the refill process. Please do not wait until the last minute to call your pharmacy. We need at least 48 hours (2days) to fill prescriptions. We also encourage you to call your pharmacy before going to  your prescription to make sure it is ready. With regard to controlled substance prescription refill requests (narcotic refills) that need to be picked up at our office, we ask your cooperation by providing us with at least 72 hours (3days) notice that you will need a refill. We will not refill narcotic prescription refill requests after 4:00pm on any weekday, Monday through Thursday, or after 2:00pm on Fridays, or on the weekends. We encourage everyone to explore another way of getting your prescription refill request processed using Paymo, our patient web portal through our electronic medical record system. Paymo is an efficient and effective way to communicate your medication request directly to the office and  downloadable as an wilfrido on your smart phone . Paymo also features a review functionality that allows you to view your medication list as well as leave messages for your physician. Are you ready to get connected? If so please review the attatched instructions or speak to any of our staff to get you set up right away! Thank you so much for your cooperation. Should you have any questions please contact our Practice Administrator. The Physicians and Staff,  [facility] [id number] [address]  What is a living will?   A living will is a legal form you use to write down the kind of care you want at the end of your life. It is used by the health professionals who will treat you if you aren't able to decide for yourself. If you put your wishes in writing, your loved ones and others will know what kind of care you want. They won't need to guess. This can ease your mind and be helpful to others. A living will is not the same as an estate or property will. An estate will explains what you want to happen with your money and property after you die. Is a living will a legal document? A living will is a legal document. Each state has its own laws about living cle[place]. If you move to another state, make sure that your living will is legal in the state where you now live. Or you might use a universal form that has been approved by many states. This kind of form can sometimes be completed and stored online. Your electronic copy will then be available wherever you have a connection to the Internet. In most cases, doctors will respect your wishes even if you have a form from a different state. · You don't need an  to complete a living will. But legal advice can be helpful if your state's laws are unclear, your health history is complicated, or your family can't agree on what should be in your living will. · You can change your living will at any time. Some people find that their wishes about end-of-life care change as their health changes. · In addition to making a living will, think about completing a medical power of  form. This form lets you name the person you want to make end-of-life treatment decisions for you (your \"health care agent\") if you're not able to. Many hospitals and nursing homes will give you the forms you need to complete a living will and a medical power of . · Your living will is used only if you can't make or communicate decisions for yourself anymore.  If you become able to make decisions again, you can accept or refuse any treatment, no matter what you wrote in your living will. · Your state may offer an online registry. This is a place where you can store your living will online so the doctors and nurses who need to treat you can find it right away. What should you think about when creating a living will? Talk about your end-of-life wishes with your family members and your doctor. Let them know what you want. That way the people making decisions for you won't be surprised by your choices. Think about these questions as you make your living will:  · Do you know enough about life support methods that might be used? If not, talk to your doctor so you know what might be done if you can't breathe on your own, your heart stops, or you're unable to swallow. · What things would you still want to be able to do after you receive life-support methods? Would you want to be able to walk? To speak? To eat on your own? To live without the help of machines? · If you have a choice, where do you want to be cared for? In your home? At a hospital or nursing home? · Do you want certain Bahai practices performed if you become very ill? · If you have a choice at the end of your life, where would you prefer to die? At home? In a hospital or nursing home? Somewhere else? · Would you prefer to be buried or cremated? · Do you want your organs to be donated after you die? What should you do with your living will? · Make sure that your family members and your health care agent have copies of your living will. · Give your doctor a copy of your living will to keep in your medical record. If you have more than one doctor, make sure that each one has a copy. · You may want to put a copy of your living will where it can be easily found. Where can you learn more? Go to http://inez-bart.info/. Enter P396 in the search box to learn more about \"Learning About Living Chaparrita Gill. \"  Current as of: February 24, 2016  Content Version: 11.1  © 7467-0167 [employer]. Care instructions adapted under license by [employer] (which disclaims liability or warranty for this information). If you have questions about a medical condition or this instruction, always ask your healthcare professional. [facility] any warranty or liability for your use of this information. Advance Directives: Care Instructions  Your Care Instructions  An advance directive is a legal way to state your wishes at the end of your life. It tells your family and your doctor what to do if you can no longer say what you want. There are two main types of advance directives. You can change them any time that your wishes change. · A living will tells your family and your doctor your wishes about life support and other treatment. · A medical power of  lets you name a person to make treatment decisions for you when you can't speak for yourself. This person is called a health care agent. If you do not have an advance directive, decisions about your medical care may be made by a doctor or a  who doesn't know you. It may help to think of an advance directive as a gift to the people who care for you. If you have one, they won't have to make tough decisions by themselves. Follow-up care is a key part of your treatment and safety. Be sure to make and go to all appointments, and call your doctor if you are having problems. It's also a good idea to know your test results and keep a list of the medicines you take. How can you care for yourself at home? · Discuss your wishes with your loved ones and your doctor. This way, there are no surprises. · Many states have a unique form. Or you might use a universal form that has been approved by many states. This kind of form can sometimes be completed and stored online.  Your electronic copy will then be available wherever you have a connection to the Internet. In most cases, doctors will respect your wishes even if you have a form from a different state. · You don't need a  to do an advance directive. But you may want to get legal advice. · Think about these questions when you prepare an advance directive:  ¨ Who do you want to make decisions about your medical care if you are not able to? Many people choose a family member, close friend, or doctor. ¨ Do you know enough about life support methods that might be used? If not, talk to your doctor so you understand. ¨ What are you most afraid of that might happen? You might be afraid of having pain, losing your independence, or being kept alive by machines. ¨ Where would you prefer to die? Choices include your home, a hospital, or a nursing home. ¨ Would you like to have information about hospice care to support you and your family? ¨ Do you want to donate organs when you die? ¨ Do you want certain Mormonism practices performed before you die? If so, put your wishes in the advance directive. · Read your advance directive every year, and make changes as needed. When should you call for help? Be sure to contact your doctor if you have any questions. Where can you learn more? Go to http://inez-bart.info/. Enter R264 in the search box to learn more about \"Advance Directives: Care Instructions. \"  Current as of: February 24, 2016  Content Version: 11.1  © 5716-5981 [employer]. Care instructions adapted under license by [employer] (which disclaims liability or warranty for this information). If you have questions about a medical condition or this instruction, always ask your healthcare professional. [address] any warranty or liability for your use of this information.

## 2017-03-02 NOTE — PROGRESS NOTES
Poly Whaley. is a 80 y.o. male who presents with the following  Chief Complaint   Patient presents with    Aphasia       HPI Patient comes in for a follow up for TIA. He had some aphasia and was taken to the [address] [place] [address] ER. He since then has been doing well. Speech is better. Was getting PT and OT and stopped after about 2 weeks. Did not do anything after they left. He is feeling like things are getting better. Has noticed no trouble with his speech. He is very weak in his legs. Does not really walk. Does not really like to exercise or be active per wife. Taking Eliquis. Was on Pravastatin but PCP took off due to cholesterol low. Feeling better. Allergies   Allergen Reactions    Latex Rash, Swelling and Contact Dermatitis    Contrast Agent [Iodine] Other (comments)     Patient has one kidney    Morphine Other (comments)     Hallucinations and Nausea       Current Outpatient Prescriptions   Medication Sig    furosemide (LASIX) 40 mg tablet Take 0.5 Tabs by mouth as needed (weight gain of 5 lbs or greater).  insulin NPH (NOVOLIN N, HUMULIN N) 100 unit/mL injection 10 Units by SubCUTAneous route Before breakfast and dinner. Note the decrease in dose from 35 units    isosorbide mononitrate ER (IMDUR) 120 mg CR tablet Take 1 Tab by mouth every morning.  pravastatin (PRAVACHOL) 20 mg tablet Take 1 Tab by mouth nightly. (Patient taking differently: Take 10 mg by mouth nightly.)    melatonin 3 mg tablet Take 3 mg by mouth nightly.  polyethylene glycol (MIRALAX) 17 gram packet Take 17 g by mouth daily as needed (constipation).  mirtazapine (REMERON) 15 mg tablet Take 7.5 mg by mouth nightly.  losartan (COZAAR) 25 mg tablet Take 1 Tab by mouth daily.  apixaban (ELIQUIS) 2.5 mg tablet Take 2.5 mg by mouth two (2) times a day.  OTHER,NON-FORMULARY, Take 1 Cap by mouth daily.  Zafar May dietary supplement-red yeast rice, Crategi extract,etc.    omeprazole (PRILOSEC) 20 mg capsule Take 20 mg by mouth Daily (before breakfast).  nitroglycerin (NITROSTAT) 0.4 mg SL tablet 0.4 mg by SubLINGual route every five (5) minutes as needed.  aspirin 81 mg chewable tablet Take 1 Tab by mouth daily.  coenzyme Q-10 (CO Q-10) 100 mg capsule Take 1 Cap by mouth daily. No current facility-administered medications for this visit. History   Smoking Status    Former Smoker    Packs/day: 0.10    Years: 23.00    Quit date: 12/3/1975   Smokeless Tobacco    Never Used       Past Medical History:   Diagnosis Date    Adverse effect of anesthesia     BP unstable after GB suregry- in ICU 3 days    Arthritis     CAD (coronary artery disease)     Angina Jan 2011, PCI LAD with multilink stent 4.0x12mm;  also cath at [place] 7/13 -- no stents at that time    CKD (chronic kidney disease)     Cochlear implant in place 2/3/2017    CVA (cerebral vascular accident) ([address]     maybe in 11/15 after surgery    Diabetes mellitus ([address]     Expressive aphasia 2/3/2017    Gallstone     GERD (gastroesophageal reflux disease)     HTN (hypertension)     Hypothyroidism     Ill-defined condition 8/2015    kidney stone    LBBB (left bundle branch block)     normal lexiscan MPI 12/13    SAMUEL (obstructive sleep apnea)     not using CPAP    PAF (paroxysmal atrial fibrillation) (HCC)     OWAKW1Ofht score = 7    Prostate cancer ([address]     prostate    Renal cell cancer ([address]     Stage 4 Renal Cell Cancer.     Partial omentectomy 11/2/2015 with Dr. Tacho Hampton: omental mass-metastatic renal cell carcinoma    Renal mass, left     DAVINCI LEFT PARTIAL NEPHRECTOMY 12/19/13;     TIA (transient ischemic attack)     12/10/13 and 2/17       Past Surgical History:   Procedure Laterality Date    CARDIAC SURG PROCEDURE UNLIST  1/2011    stent to LAD    HX CHOLECYSTECTOMY  7/13/2012    HX CORONARY STENT PLACEMENT  2011   [facility] HEART CATHETERIZATION  2011, 2013    [address]    stapedectomy    [address]    left inguinal  HX LITHOTRIPSY  8/2015    HX ORTHOPAEDIC  1994    rotator cuff repair    HX ORTHOPAEDIC  1994    cervical disc removed    HX PROSTATECTOMY  2000    surgery intervention    HX TONSILLECTOMY  1987    HX UROLOGICAL  2001    valvular implant prevent incontinence    HX UROLOGICAL  2003    insert gel to help with incontinence    HX UROLOGICAL  12/2013    left partial nephrectomy    HX UROLOGICAL  9/2015    CT guided biopsy of abdominal lymph nodes       Family History   Problem Relation Age of Onset    Stroke Father     Cancer Father      prostate    Diabetes Sister      2 sisters    Hypertension Sister      2 sisters    Diabetes Brother     Stroke Brother     Diabetes Brother      all brothers    Stroke Brother     Kidney Disease Brother     Heart Attack Brother     Hypertension Other        Social History     Social History    Marital status:      Spouse name: N/A    Number of children: N/A    Years of education: N/A     Social History Main Topics    Smoking status: Former Smoker     Packs/day: 0.10     Years: 23.00     Quit date: 12/3/1975    Smokeless tobacco: Never Used    Alcohol use No    Drug use: No    Sexual activity: No     Other Topics Concern    Not on file     Social History Narrative       Review of Systems   HENT: Negative for hearing loss and tinnitus. Eyes: Negative for blurred vision, double vision and photophobia. Cardiovascular: Negative for chest pain and palpitations. Gastrointestinal: Negative for nausea and vomiting. Musculoskeletal: Negative for falls. Neurological: Positive for weakness. Negative for dizziness, tingling, tremors, speech change, loss of consciousness and headaches. Remainder of comprehensive review is negative.      Physical Exam :    Visit Vitals    /68    Resp 20    Ht 5' 9\" (1.753 m)    Wt 91.6 kg (202 lb)    BMI 29.83 kg/m2             Results for orders placed or performed during the hospital encounter of 02/02/17   CBC WITH AUTOMATED DIFF   Result Value Ref Range    WBC 5.7 4.1 - 11.1 K/uL    RBC 5.02 4.10 - 5.70 M/uL    HGB 14.4 12.1 - 17.0 g/dL    HCT 41.9 36.6 - 50.3 %    MCV 83.5 80.0 - 99.0 FL    MCH 28.7 26.0 - 34.0 PG    MCHC 34.4 30.0 - 36.5 g/dL    RDW 13.3 11.5 - 14.5 %    PLATELET 617 (L) 479 - 400 K/uL    NEUTROPHILS 65 32 - 75 %    LYMPHOCYTES 22 12 - 49 %    MONOCYTES 7 5 - 13 %    EOSINOPHILS 6 0 - 7 %    BASOPHILS 0 0 - 1 %    ABS. NEUTROPHILS 3.7 1.8 - 8.0 K/UL    ABS. LYMPHOCYTES 1.2 0.8 - 3.5 K/UL    ABS. MONOCYTES 0.4 0.0 - 1.0 K/UL    ABS. EOSINOPHILS 0.4 0.0 - 0.4 K/UL    ABS.  BASOPHILS 0.0 0.0 - 0.1 K/UL   METABOLIC PANEL, BASIC   Result Value Ref Range    Sodium 139 136 - 145 mmol/L    Potassium 4.7 3.5 - 5.1 mmol/L    Chloride 104 97 - 108 mmol/L    CO2 29 21 - 32 mmol/L    Anion gap 6 5 - 15 mmol/L    Glucose 241 (H) 65 - 100 mg/dL    BUN 19 6 - 20 MG/DL    Creatinine 1.75 (H) 0.70 - 1.30 MG/DL    BUN/Creatinine ratio 11 (L) 12 - 20      GFR est AA 46 (L) >60 ml/min/1.73m2    GFR est non-AA 38 (L) >60 ml/min/1.73m2    Calcium 9.4 8.5 - 10.1 MG/DL   PROTHROMBIN TIME + INR   Result Value Ref Range    INR 1.1 0.9 - 1.1      Prothrombin time 10.9 9.0 - 11.1 sec   PTT   Result Value Ref Range    aPTT 27.4 22.1 - 32.5 sec    aPTT, therapeutic range     58.0 - 77.0 SECS   CK W/ REFLX CKMB   Result Value Ref Range    CK 69 39 - 308 U/L   TROPONIN I   Result Value Ref Range    Troponin-I, Qt. <0.04 <0.05 ng/mL   URINALYSIS W/ REFLEX CULTURE   Result Value Ref Range    Color YELLOW/STRAW      Appearance CLEAR CLEAR      Specific gravity 1.017 1.003 - 1.030      pH (UA) 6.0 5.0 - 8.0      Protein 30 (A) NEG mg/dL    Glucose 250 (A) NEG mg/dL    Ketone NEGATIVE  NEG mg/dL    Bilirubin NEGATIVE  NEG      Blood NEGATIVE  NEG      Urobilinogen 0.2 0.2 - 1.0 EU/dL    Nitrites NEGATIVE  NEG      Leukocyte Esterase NEGATIVE  NEG      WBC 0-4 0 - 4 /hpf    RBC 0-5 0 - 5 /hpf    Epithelial cells FEW FEW /lpf Bacteria NEGATIVE  NEG /hpf    UA:UC IF INDICATED CULTURE NOT INDICATED BY UA RESULT CNI      Hyaline cast 0-2 0 - 5 /lpf   HEMOGLOBIN A1C WITH EAG   Result Value Ref Range    Hemoglobin A1c 6.8 (H) 4.2 - 6.3 %    Est. average glucose 148 mg/dL   LIPID PANEL   Result Value Ref Range    LIPID PROFILE          Cholesterol, total 170 <200 MG/DL    Triglyceride 150 (H) <150 MG/DL    HDL Cholesterol 41 MG/DL    LDL, calculated 99 0 - 100 MG/DL    VLDL, calculated 30 MG/DL    CHOL/HDL Ratio 4.1 0 - 5.0     HEMOGLOBIN A1C WITH EAG   Result Value Ref Range    Hemoglobin A1c 7.4 (H) 4.2 - 6.3 %    Est. average glucose 166 mg/dL   POST EXPOSURE PROFILE   Result Value Ref Range    p24 Antigen NONREACTIVE NR      HIV-1,2 Ab NONREACTIVE NR      Hep C  virus Ab Interp. NONREACTIVE NR      Hep C  virus Ab comment Method used is Siemens Advia Centaur      Hepatitis B surface Ag <0.10 Index    Hep B surface Ag Interp.  NEGATIVE  NEG     METABOLIC PANEL, BASIC   Result Value Ref Range    Sodium 140 136 - 145 mmol/L    Potassium 3.6 3.5 - 5.1 mmol/L    Chloride 104 97 - 108 mmol/L    CO2 27 21 - 32 mmol/L    Anion gap 9 5 - 15 mmol/L    Glucose 145 (H) 65 - 100 mg/dL    BUN 14 6 - 20 MG/DL    Creatinine 1.56 (H) 0.70 - 1.30 MG/DL    BUN/Creatinine ratio 9 (L) 12 - 20      GFR est AA 52 (L) >60 ml/min/1.73m2    GFR est non-AA 43 (L) >60 ml/min/1.73m2    Calcium 9.4 8.5 - 13.6 MG/DL   METABOLIC PANEL, BASIC   Result Value Ref Range    Sodium 138 136 - 145 mmol/L    Potassium 3.9 3.5 - 5.1 mmol/L    Chloride 104 97 - 108 mmol/L    CO2 25 21 - 32 mmol/L    Anion gap 9 5 - 15 mmol/L    Glucose 143 (H) 65 - 100 mg/dL    BUN 12 6 - 20 MG/DL    Creatinine 1.57 (H) 0.70 - 1.30 MG/DL    BUN/Creatinine ratio 8 (L) 12 - 20      GFR est AA 52 (L) >60 ml/min/1.73m2    GFR est non-AA 43 (L) >60 ml/min/1.73m2    Calcium 9.5 8.5 - 10.1 MG/DL   MAGNESIUM   Result Value Ref Range    Magnesium 1.8 1.6 - 2.4 mg/dL   PHOSPHORUS   Result Value Ref Range Phosphorus 2.9 2.6 - 4.7 MG/DL   CBC WITH AUTOMATED DIFF   Result Value Ref Range    WBC 4.4 4.1 - 11.1 K/uL    RBC 5.23 4. 10 - 5.70 M/uL    HGB 14.7 12.1 - 17.0 g/dL    HCT 43.7 36.6 - 50.3 %    MCV 83.6 80.0 - 99.0 FL    MCH 28.1 26.0 - 34.0 PG    MCHC 33.6 30.0 - 36.5 g/dL    RDW 13.4 11.5 - 14.5 %    PLATELET 366 (L) 113 - 400 K/uL    NEUTROPHILS 55 32 - 75 %    LYMPHOCYTES 27 12 - 49 %    MONOCYTES 7 5 - 13 %    EOSINOPHILS 10 (H) 0 - 7 %    BASOPHILS 1 0 - 1 %    ABS. NEUTROPHILS 2.4 1.8 - 8.0 K/UL    ABS. LYMPHOCYTES 1.2 0.8 - 3.5 K/UL    ABS. MONOCYTES 0.3 0.0 - 1.0 K/UL    ABS. EOSINOPHILS 0.4 0.0 - 0.4 K/UL    ABS. BASOPHILS 0.0 0.0 - 0.1 K/UL   GLUCOSE, POC   Result Value Ref Range    Glucose (POC) 220 (H) 65 - 100 mg/dL    Performed by SIERRA VALE (PCT)    GLUCOSE, POC   Result Value Ref Range    Glucose (POC) 96 65 - 100 mg/dL    Performed by [address] [place] [address], POC   Result Value Ref Range    Glucose (POC) 138 (H) 65 - 100 mg/dL    Performed by Mary Kay Looney    GLUCOSE, POC   Result Value Ref Range    Glucose (POC) 137 (H) 65 - 100 mg/dL    Performed by Dipti Laws    GLUCOSE, POC   Result Value Ref Range    Glucose (POC) 190 (H) 65 - 100 mg/dL    Performed by WOOD PATTERSON    GLUCOSE, POC   Result Value Ref Range    Glucose (POC) 258 (H) 65 - 100 mg/dL    Performed by Cynthia Rey (PCT)    EKG, 12 LEAD, INITIAL   Result Value Ref Range    Ventricular Rate 63 BPM    Atrial Rate 60 BPM    QRS Duration 154 ms    Q-T Interval 446 ms    QTC Calculation (Bezet) 456 ms    Calculated R Axis -39 degrees    Calculated T Axis 109 degrees    Diagnosis       Possible Atrial fibrillation with a controlled ventricular response  Possible Normal sinus rhythm with Mobitz I (Wenckebach) block or PAC.   Left axis deviation  Left bundle branch block  Abnormal ECG  Confirmed by Everlena Horn, M.D., Bg Abbasi ([id number]) on 2/3/2017 3:35:02 PM         No orders of the defined types were placed in this encounter. No diagnosis found. Follow-up Disposition:  Return if symptoms worsen or fail to improve. TIA resolved. Doing well. Continue Eliquis. Stopped Pravastatin and explained we would want them on this with a goal of LDL below 70 and they understands but will still stay off.talked to PCP and had leg issues so he will not use. Is on a chinese herbal supplement. Continue to stay active. Understands the s/s of a stroke and when to pattie 911.        Hellen Rasmussen NP        This note will not be viewable in [address]

## 2017-03-02 NOTE — MR AVS SNAPSHOT
Visit Information Date & Time Provider Department Dept. Phone Encounter #  
 3/2/2017  2:00 PM Radha Roland NP [address] [facility] [phone] [id number] Follow-up Instructions Return if symptoms worsen or fail to improve. Your Appointments 2/12/2018  1:00 PM  
ESTABLISHED PATIENT with Denise Coy MD  
[facility] ([address]) Appt Note: 1 yr fu appt sll  
 [address] [facility] [id number] [place] [address] [address] [address] [phone]  
  
   
 [address] [place] [address] [address] [address] Upcoming Health Maintenance Date Due  
 EYE EXAM RETINAL OR DILATED Q1 4/29/1945 DTaP/Tdap/Td series (1 - Tdap) 4/29/1956 ZOSTER VACCINE AGE 60> 4/29/1995 GLAUCOMA SCREENING Q2Y 4/29/2000 MEDICARE YEARLY EXAM 4/29/2000 INFLUENZA AGE 9 TO ADULT 8/1/2016 FOOT EXAM Q1 9/30/2016 MICROALBUMIN Q1 10/20/2016 HEMOGLOBIN A1C Q6M 8/3/2017 LIPID PANEL Q1 2/3/2018 Pneumococcal 65+ High/Highest Risk (2 of 2 - PPSV23) 9/10/2018 Allergies as of 3/2/2017  Review Complete On: 3/2/2017 By: Jessica Benz LPN Severity Noted Reaction Type Reactions Latex  07/13/2012    Rash, Swelling, Contact Dermatitis Contrast Agent [Iodine] High 02/02/2017   Intolerance Other (comments) Patient has one kidney Morphine  07/30/2012    Other (comments) Hallucinations and Nausea Current Immunizations  Reviewed on 12/10/2013 Name Date Influenza Vaccine 10/10/2013 Pneumococcal Vaccine (Unspecified Type) 9/10/2013 Not reviewed this visit Vitals BP  
  
  
  
  
  
 150/68 Vitals History BMI and BSA Data Body Mass Index Body Surface Area  
 29.83 kg/m 2 2.11 m 2 Preferred Pharmacy Pharmacy Name Phone [id number] [id number] [address] [employer] [phone] Your Updated Medication List  
  
   
 This list is accurate as of: 3/2/17  2:12 PM.  Always use your most recent med list.  
  
  
  
  
 aspirin 81 mg chewable tablet Take 1 Tab by mouth daily. co-enzyme Q-10 100 mg capsule Commonly known as:  CO Q-10 Take 1 Cap by mouth daily. ELIQUIS 2.5 mg tablet Generic drug:  apixaban Take 2.5 mg by mouth two (2) times a day. furosemide 40 mg tablet Commonly known as:  LASIX Take 0.5 Tabs by mouth as needed (weight gain of 5 lbs or greater). insulin  unit/mL injection Commonly known as:  NOVOLIN N, HUMULIN N  
10 Units by SubCUTAneous route Before breakfast and dinner. Note the decrease in dose from 35 units  
  
 isosorbide mononitrate  mg CR tablet Commonly known as:  IMDUR Take 1 Tab by mouth every morning. losartan 25 mg tablet Commonly known as:  COZAAR Take 1 Tab by mouth daily. melatonin 3 mg tablet Take 3 mg by mouth nightly. MIRALAX 17 gram packet Generic drug:  polyethylene glycol Take 17 g by mouth daily as needed (constipation). nitroglycerin 0.4 mg SL tablet Commonly known as:  NITROSTAT  
0.4 mg by SubLINGual route every five (5) minutes as needed. OTHER(NON-FORMULARY) Take 1 Cap by mouth daily. Zafar May dietary supplement-red yeast rice, Crategi extract,etc.  
  
 pravastatin 20 mg tablet Commonly known as:  PRAVACHOL Take 1 Tab by mouth nightly. PriLOSEC 20 mg capsule Generic drug:  omeprazole Take 20 mg by mouth Daily (before breakfast). REMERON 15 mg tablet Generic drug:  mirtazapine Take 7.5 mg by mouth nightly. Follow-up Instructions Return if symptoms worsen or fail to improve. Patient Instructions PRESCRIPTION REFILL POLICY [facility] Statement to Patients April 1, 2014 In an effort to ensure the large volume of patient prescription refills is processed in the most efficient and expeditious manner, we are asking our patients to assist us by calling your Pharmacy for all prescription refills, this will include also your  Mail Order Pharmacy. The pharmacy will contact our office electronically to continue the refill process. Please do not wait until the last minute to call your pharmacy. We need at least 48 hours (2days) to fill prescriptions. We also encourage you to call your pharmacy before going to  your prescription to make sure it is ready. With regard to controlled substance prescription refill requests (narcotic refills) that need to be picked up at our office, we ask your cooperation by providing us with at least 72 hours (3days) notice that you will need a refill. We will not refill narcotic prescription refill requests after 4:00pm on any weekday, Monday through Thursday, or after 2:00pm on Fridays, or on the weekends. We encourage everyone to explore another way of getting your prescription refill request processed using [employer], our patient web portal through our electronic medical record system. [employer] is an efficient and effective way to communicate your medication request directly to the office and  downloadable as an wilfrido on your smart phone . [employer] also features a review functionality that allows you to view your medication list as well as leave messages for your physician. Are you ready to get connected? If so please review the attatched instructions or speak to any of our staff to get you set up right away! Thank you so much for your cooperation. Should you have any questions please contact our Practice Administrator. The Physicians and Staff,  [facility] [address] What is a living will? A living will is a legal form you use to write down the kind of care you want at the end of your life.  It is used by the health professionals who will treat you if you aren't able to decide for yourself. If you put your wishes in writing, your loved ones and others will know what kind of care you want. They won't need to guess. This can ease your mind and be helpful to others. A living will is not the same as an estate or property will. An estate will explains what you want to happen with your money and property after you die. Is a living will a legal document? A living will is a legal document. Each state has its own laws about living cle[place]. If you move to another state, make sure that your living will is legal in the state where you now live. Or you might use a universal form that has been approved by many states. This kind of form can sometimes be completed and stored online. Your electronic copy will then be available wherever you have a connection to the Internet. In most cases, doctors will respect your wishes even if you have a form from a different state. · You don't need an  to complete a living will. But legal advice can be helpful if your state's laws are unclear, your health history is complicated, or your family can't agree on what should be in your living will. · You can change your living will at any time. Some people find that their wishes about end-of-life care change as their health changes. · In addition to making a living will, think about completing a medical power of  form. This form lets you name the person you want to make end-of-life treatment decisions for you (your \"health care agent\") if you're not able to. Many hospitals and nursing homes will give you the forms you need to complete a living will and a medical power of . · Your living will is used only if you can't make or communicate decisions for yourself anymore. If you become able to make decisions again, you can accept or refuse any treatment, no matter what you wrote in your living will. · Your state may offer an online registry. This is a place where you can store your living will online so the doctors and nurses who need to treat you can find it right away. What should you think about when creating a living will? Talk about your end-of-life wishes with your family members and your doctor. Let them know what you want. That way the people making decisions for you won't be surprised by your choices. Think about these questions as you make your living will: · Do you know enough about life support methods that might be used? If not, talk to your doctor so you know what might be done if you can't breathe on your own, your heart stops, or you're unable to swallow. · What things would you still want to be able to do after you receive life-support methods? Would you want to be able to walk? To speak? To eat on your own? To live without the help of machines? · If you have a choice, where do you want to be cared for? In your home? At a hospital or nursing home? · Do you want certain Sabianist practices performed if you become very ill? · If you have a choice at the end of your life, where would you prefer to die? At home? In a hospital or nursing home? Somewhere else? · Would you prefer to be buried or cremated? · Do you want your organs to be donated after you die? What should you do with your living will? · Make sure that your family members and your health care agent have copies of your living will. · Give your doctor a copy of your living will to keep in your medical record. If you have more than one doctor, make sure that each one has a copy. · You may want to put a copy of your living will where it can be easily found. Where can you learn more? Go to http://inez-bart.info/. Enter U954 in the search box to learn more about \"Learning About Living Stefany [place]. \" Current as of: February 24, 2016 Content Version: 11.1 © 2822-7023 [employer]. Care instructions adapted under license by [employer] (which disclaims liability or warranty for this information). If you have questions about a medical condition or this instruction, always ask your healthcare professional. [address] any warranty or liability for your use of this information. Advance Directives: Care Instructions Your Care Instructions An advance directive is a legal way to state your wishes at the end of your life. It tells your family and your doctor what to do if you can no longer say what you want. There are two main types of advance directives. You can change them any time that your wishes change. · A living will tells your family and your doctor your wishes about life support and other treatment. · A medical power of  lets you name a person to make treatment decisions for you when you can't speak for yourself. This person is called a health care agent. If you do not have an advance directive, decisions about your medical care may be made by a doctor or a  who doesn't know you. It may help to think of an advance directive as a gift to the people who care for you. If you have one, they won't have to make tough decisions by themselves. Follow-up care is a key part of your treatment and safety. Be sure to make and go to all appointments, and call your doctor if you are having problems. It's also a good idea to know your test results and keep a list of the medicines you take. How can you care for yourself at home? · Discuss your wishes with your loved ones and your doctor. This way, there are no surprises. · Many states have a unique form. Or you might use a universal form that has been approved by many states. This kind of form can sometimes be completed and stored online. Your electronic copy will then be available wherever you have a connection to the Internet.  In most cases, doctors will respect your wishes even if you have a form from a different state. · You don't need a  to do an advance directive. But you may want to get legal advice. · Think about these questions when you prepare an advance directive: ¨ Who do you want to make decisions about your medical care if you are not able to? Many people choose a family member, close friend, or doctor. ¨ Do you know enough about life support methods that might be used? If not, talk to your doctor so you understand. ¨ What are you most afraid of that might happen? You might be afraid of having pain, losing your independence, or being kept alive by machines. ¨ Where would you prefer to die? Choices include your home, a hospital, or a nursing home. ¨ Would you like to have information about hospice care to support you and your family? ¨ Do you want to donate organs when you die? ¨ Do you want certain Buddhism practices performed before you die? If so, put your wishes in the advance directive. · Read your advance directive every year, and make changes as needed. When should you call for help? Be sure to contact your doctor if you have any questions. Where can you learn more? Go to http://inez-bart.info/. Enter R264 in the search box to learn more about \"Advance Directives: Care Instructions. \" Current as of: February 24, 2016 Content Version: 11.1 © 6674-5525 [employer]. Care instructions adapted under license by [employer] (which disclaims liability or warranty for this information). If you have questions about a medical condition or this instruction, always ask your healthcare professional. [place] [address] any warranty or liability for your use of this information. Introducing [facility] & HE[facility]! Dear Nick Perry: 
Thank you for requesting a [employer] account. Our records indicate that you already have an active [employer] account.   You can access your account anytime at [email or web address]. [email or web address] Did you know that you can access your hospital and ER discharge instructions at any time in [employer].? You can also review all of your test results from your hospital stay or ER visit. Additional Information If you have questions, please visit the Frequently Asked Questions section of the [employer] website at [email or web address] Remember, [employer]. is NOT to be used for urgent needs. For medical emergencies, dial 911. Now available from your iPhone and Android! Please provide this summary of care documentation to your next provider. Your primary care clinician is listed as Phys Other. If you have any questions after today's visit, please call [phone].

## 2017-07-16 ENCOUNTER — APPOINTMENT (OUTPATIENT)
Dept: GENERAL RADIOLOGY | Age: 82
End: 2017-07-16
Attending: EMERGENCY MEDICINE
Payer: MEDICARE

## 2017-07-16 ENCOUNTER — HOSPITAL ENCOUNTER (EMERGENCY)
Age: 82
Discharge: HOME | End: 2017-07-16
Attending: EMERGENCY MEDICINE
Payer: MEDICARE

## 2017-07-16 VITALS
DIASTOLIC BLOOD PRESSURE: 92 MMHG | HEIGHT: 68 IN | SYSTOLIC BLOOD PRESSURE: 162 MMHG | OXYGEN SATURATION: 94 % | TEMPERATURE: 98.1 F | HEART RATE: 91 BPM | WEIGHT: 195 LBS | RESPIRATION RATE: 17 BRPM | BODY MASS INDEX: 29.55 KG/M2

## 2017-07-16 DIAGNOSIS — M25.562 ACUTE PAIN OF BOTH KNEES: Primary | ICD-10-CM

## 2017-07-16 DIAGNOSIS — M25.561 ACUTE PAIN OF BOTH KNEES: Primary | ICD-10-CM

## 2017-07-16 DIAGNOSIS — S80.219A KNEE ABRASION, UNSPECIFIED LATERALITY, INITIAL ENCOUNTER: ICD-10-CM

## 2017-07-16 PROCEDURE — 73562 X-RAY EXAM OF KNEE 3: CPT

## 2017-07-16 PROCEDURE — 99285 EMERGENCY DEPT VISIT HI MDM: CPT

## 2017-07-16 PROCEDURE — 74011250637 HC RX REV CODE- 250/637: Performed by: EMERGENCY MEDICINE

## 2017-07-16 RX ADMIN — BACITRACIN, NEOMYCIN, POLYMYXIN B 1 PACKET: 400; 3.5; 5 OINTMENT TOPICAL at 20:38

## 2017-07-16 NOTE — ED TRIAGE NOTES
Pt. States he was setting up a groundhog trap on the ground this evening and had trouble getting up. Bilateral knee abrasions noted from being in the gravel. Pt. Denies any other c/o at his time.

## 2017-07-16 NOTE — ED PROVIDER NOTES
HPI Comments: 80 y.o. male with past medical history significant for CVA, Aphasia, PAF, CKD, DM, HTN who presents from home via EMS for evaluation s/p GLF. Pt states he was out in his drive way attempted to set a groundhog trap when he bend over, lost balance and fell to the ground. Pt states he was on the ground, in gravel, for approximately 30 minutes due to being unable to get up. He c/o b/l knee pain to wound sites. Pt denies any other known injury or sites of pain. There are no other acute medical concerns at this time. Full history, physical exam, and ROS unable to be obtained due to: Poor historian. Social hx: Lives with wife. PCP: Chance Ornelas MD    Note written by Ana Luisa Veliz, as dictated by Angel Herrmann, DO 7:49 PM    The history is provided by the patient. No  was used. Past Medical History:   Diagnosis Date    Adverse effect of anesthesia     BP unstable after GB suregry- in ICU 3 days    Arthritis     CAD (coronary artery disease)     Angina Jan 2011, PCI LAD with multilink stent 4.0x12mm;  also cath at [facility] 7/13 -- no stents at that time    CKD (chronic kidney disease)     Cochlear implant in place 2/3/2017    CVA (cerebral vascular accident) ([address]     maybe in 11/15 after surgery    Diabetes mellitus ([address]     Expressive aphasia 2/3/2017    Gallstone     GERD (gastroesophageal reflux disease)     HTN (hypertension)     Hypothyroidism     Ill-defined condition 8/2015    kidney stone    LBBB (left bundle branch block)     normal lexiscan MPI 12/13    SAMUEL (obstructive sleep apnea)     not using CPAP    PAF (paroxysmal atrial fibrillation) (HCC)     [id number] score = 7    Prostate cancer ([address]     prostate    Renal cell cancer ([address]     Stage 4 Renal Cell Cancer.     Partial omentectomy 11/2/2015 with Dr. Tati Mayo: omental mass-metastatic renal cell carcinoma    Renal mass, left     DAVINCI LEFT PARTIAL NEPHRECTOMY 12/19/13;     TIA (transient ischemic attack)     12/10/13 and 2/17       Past Surgical History:   Procedure Laterality Date    CARDIAC SURG PROCEDURE UNLIST  1/2011    stent to LAD    HX CHOLECYSTECTOMY  7/13/2012    HX CORONARY STENT PLACEMENT  2011   [facility] HEART CATHETERIZATION  2011, 2013    HX [address]    stapedectomy    [facility]    left inguinal    HX LITHOTRIPSY  8/2015    HX ORTHOPAEDIC  1994    rotator cuff repair    HX ORTHOPAEDIC  1994    cervical disc removed    HX PROSTATECTOMY  2000    surgery intervention    HX TONSILLECTOMY  1987    HX UROLOGICAL  2001    valvular implant prevent incontinence    HX UROLOGICAL  2003    insert gel to help with incontinence    HX UROLOGICAL  12/2013    left partial nephrectomy    HX UROLOGICAL  9/2015    CT guided biopsy of abdominal lymph nodes         Family History:   Problem Relation Age of Onset    Stroke Father     Cancer Father      prostate    Diabetes Sister      2 sisters    Hypertension Sister      2 sisters    Diabetes Brother     Stroke Brother     Diabetes Brother      all brothers    Stroke Brother     Kidney Disease Brother     Heart Attack Brother     Hypertension Other        Social History     Social History    Marital status:      Spouse name: N/A    Number of children: N/A    Years of education: N/A     Occupational History    Not on file. Social History Main Topics    Smoking status: Former Smoker     Packs/day: 0.10     Years: 23.00     Quit date: 12/3/1975    Smokeless tobacco: Never Used    Alcohol use No    Drug use: No    Sexual activity: No     Other Topics Concern    Not on file     Social History Narrative     ALLERGIES: Latex; Contrast agent [iodine]; and Morphine    Review of Systems   Reason unable to perform ROS: Poor historian.        Vitals:    07/16/17 1953   BP: (!) 175/109   Pulse: 91   Resp: 17   Temp: 98.1 °F (36.7 °C)   SpO2: 95%   Weight: 88.5 kg (195 lb)   Height: 5' 8\" (1.727 m) Physical Exam   Constitutional: He appears well-developed and well-nourished. No distress. Pleasantly demented in no acute distress   HENT:   Head: Normocephalic and atraumatic. Right Ear: External ear normal.   Left Ear: External ear normal.   Nose: Nose normal.   Mouth/Throat: Oropharynx is clear and moist. No oropharyngeal exudate. Eyes: Conjunctivae and EOM are normal. Pupils are equal, round, and reactive to light. Right eye exhibits no discharge. Left eye exhibits no discharge. No scleral icterus. Neck: Normal range of motion. Neck supple. No JVD present. No tracheal deviation present. Cardiovascular: Normal rate, regular rhythm, normal heart sounds and intact distal pulses. Exam reveals no gallop and no friction rub. No murmur heard. Pulmonary/Chest: Effort normal and breath sounds normal. No stridor. No respiratory distress. He has no decreased breath sounds. He has no wheezes. He has no rhonchi. He has no rales. He exhibits no tenderness. Abdominal: Soft. Bowel sounds are normal. He exhibits no distension. There is no tenderness. There is no rebound and no guarding. Musculoskeletal: Normal range of motion. He exhibits edema (trace bilateral edema) and tenderness. Right knee: Tenderness found. Left knee: Tenderness found. Neurological: He is alert. He has normal strength and normal reflexes. No cranial nerve deficit or sensory deficit. He exhibits normal muscle tone. Coordination normal. GCS eye subscore is 4. GCS verbal subscore is 5. GCS motor subscore is 6. Skin: Skin is warm and dry. Abrasion noted. No rash noted. He is not diaphoretic. No pallor. Indention abrasion wounds to anterior-medial aspect of knees b/l   Psychiatric: He has a normal mood and affect. His behavior is normal. Judgment and thought content normal.   Nursing note and vitals reviewed.    Note written by Ana Luisa Pugh, as dictated by Kasi Benedict DO 7:56 PM    [facility]  Number of Diagnoses or Management Options  Acute pain of both knees:   Knee abrasion, unspecified laterality, initial encounter:      Amount and/or Complexity of Data Reviewed  Tests in the radiology section of CPT®: ordered and reviewed    Risk of Complications, Morbidity, and/or Mortality  Presenting problems: moderate  Diagnostic procedures: low  Management options: low    Patient Progress  Patient progress: stable    ED Course     PROGRESS NOTE:  9:00 PM  Spoke with wife, she states that the [place] keeps patients shots up to date. 9:09 PM  Pt ambulated will. He will be discharged. 9:12 PM  Patient's results have been reviewed with them. Patient and/or family have verbally conveyed their understanding and agreement of the patient's signs, symptoms, diagnosis, treatment and prognosis and additionally agree to follow up as recommended or return to the Emergency Room should their condition change prior to follow-up. Discharge instructions have also been provided to the patient with some educational information regarding their diagnosis as well a list of reasons why they would want to return to the ER prior to their follow-up appointment should their condition change. Procedures  Chief Complaint   Patient presents with    Knee Pain    Knee Injury       12:31 AM  The patients presenting problems have been discussed, and they are in agreement with the care plan formulated and outlined with them. I have encouraged them to ask questions as they arise throughout their visit. MEDICATIONS GIVEN:  Medications   neomycin-bacitracnZn-polymyxnB (NEOSPORIN) ointment 1 Packet (1 Packet Topical Given 7/16/17 2038)       LABS REVIEWED:  No results found for this or any previous visit (from the past 24 hour(s)).     VITAL SIGNS:  Patient Vitals for the past 12 hrs:   Temp Pulse Resp BP SpO2   07/16/17 2100 - - - (!) 162/92 94 %   07/16/17 2045 - - - 159/72 94 %   07/16/17 2030 - - - 174/64 96 %   07/16/17 2015 - - - 181/84 95 %   07/16/17 2000 - - - 164/82 94 %   07/16/17 1953 98.1 °F (36.7 °C) 91 17 (!) 175/109 95 %       RADIOLOGY RESULTS:  The following have been ordered and reviewed:  XR KNEE RT 3 V   Final Result      XR KNEE LT 3 V   Final Result        Study Result      EXAM:  XR KNEE LT 3 V, XR KNEE RT 3 V     INDICATION: Bilateral knee pain after setting a trap yesterday. Bilateral knee  abrasions.     COMPARISON: None.     FINDINGS:      Three views of the right knee demonstrate no fracture or other acute osseous or  articular abnormality. There is no effusion.     Three views of the left knee demonstrate no fracture or other acute osseous or  articular abnormality. There is no effusion.     IMPRESSION  IMPRESSION:  No acute abnormality. Study Result      EXAM:  XR KNEE LT 3 V, XR KNEE RT 3 V     INDICATION: Bilateral knee pain after setting a trap yesterday. Bilateral knee  abrasions.     COMPARISON: None.     FINDINGS:      Three views of the right knee demonstrate no fracture or other acute osseous or  articular abnormality. There is no effusion.     Three views of the left knee demonstrate no fracture or other acute osseous or  articular abnormality. There is no effusion.     IMPRESSION  IMPRESSION:  No acute abnormality. PROGRESS NOTES:  Discussed results and plan with patient. Patient will be discharged home with PCP followup. Patient instructed to return to the emergency room for any worsening symptoms or any other concerns. DIAGNOSIS:    1. Acute pain of both knees    2.  Knee abrasion, unspecified laterality, initial encounter        PLAN:  Follow-up Information     Follow up With Details Comments Contact Info    Chance Ornelas MD In 1 week  Patient can only remember the practice name and not the physician      [facility] EMERGENCY DEPT  If symptoms worsen [address] [facility]  [phone]        Discharge Medication List as of 7/16/2017  9:04 PM      CONTINUE these medications which have NOT CHANGED    Details   furosemide (LASIX) 40 mg tablet Take 0.5 Tabs by mouth as needed (weight gain of 5 lbs or greater). , No Print, Disp-30 Tab, R-0      insulin NPH (NOVOLIN N, HUMULIN N) 100 unit/mL injection 10 Units by SubCUTAneous route Before breakfast and dinner. Note the decrease in dose from 35 units, No Print, Disp-1 Vial, R-0      isosorbide mononitrate ER (IMDUR) 120 mg CR tablet Take 1 Tab by mouth every morning., Print, Disp-30 Tab, R-0      aspirin 81 mg chewable tablet Take 1 Tab by mouth daily. , Print, Disp-30 Tab, R-0      pravastatin (PRAVACHOL) 20 mg tablet Take 1 Tab by mouth nightly. , Print, Disp-30 Tab, R-0      coenzyme Q-10 (CO Q-10) 100 mg capsule Take 1 Cap by mouth daily. , Print, Disp-30 Cap, R-0      melatonin 3 mg tablet Take 3 mg by mouth nightly., Historical Med      polyethylene glycol (MIRALAX) 17 gram packet Take 17 g by mouth daily as needed (constipation). , Historical Med      mirtazapine (REMERON) 15 mg tablet Take 7.5 mg by mouth nightly., Historical Med      losartan (COZAAR) 25 mg tablet Take 1 Tab by mouth daily. , No Print, Disp-30 Tab, R-6      apixaban (ELIQUIS) 2.5 mg tablet Take 2.5 mg by mouth two (2) times a day., Historical Med      OTHER,NON-FORMULARY, Take 1 Cap by mouth daily. Zafar May dietary supplement-red yeast rice, Crategi extract,etc., Historical Med      omeprazole (PRILOSEC) 20 mg capsule Take 20 mg by mouth Daily (before breakfast). , Historical Med      nitroglycerin (NITROSTAT) 0.4 mg SL tablet 0.4 mg by SubLINGual route every five (5) minutes as needed.  , Historical Med               ED COURSE: The patients hospital course has been uncomplicated.

## 2017-07-17 NOTE — ED NOTES
Pt's bilateral knee abrasions cleansed with shurclens, abx ointment applied. 4x4 gauze in place and wrapped with gauze ayleen.

## 2017-07-17 NOTE — DISCHARGE INSTRUCTIONS
We hope that we have addressed all of your medical concerns. The examination and treatment you received in the Emergency Department were for an emergent problem and were not intended as complete care. It is important that you follow up with your healthcare provider(s) for ongoing care. If your symptoms worsen or do not improve as expected, and you are unable to reach your usual health care provider(s), you should return to the Emergency Department. Today's healthcare is undergoing tremendous change, and patient satisfaction surveys are one of the many tools to assess the quality of medical care. You may receive a survey from the [employer] organization regarding your experience in the Emergency Department. I hope that your experience has been completely positive, particularly the medical care that I provided. As such, please participate in the survey; anything less than excellent does not meet my expectations or intentions. [facility] and [facility] participate in nationally recognized quality of care measures. If your blood pressure is greater than 120/80, as reported below, we urge that you seek medical care to address the potential of high blood pressure, commonly known as hypertension. Hypertension can be hereditary or can be caused by certain medical conditions, pain, stress, or \"white coat syndrome. \"       Please make an appointment with your health care provider(s) for follow up of your Emergency Department visit. VITALS:   Patient Vitals for the past 8 hrs:   Temp Pulse Resp BP SpO2   07/16/17 1953 98.1 °F (36.7 °C) 91 17 (!) 175/109 95 %          Thank you for allowing us to provide you with medical care today. We realize that you have many choices for your emergency care needs. Please choose us in the future for any continued health care needs. Areli Zurita, [address] [place] [address] [address] Office: [phone]            No results found for this or any previous visit (from the past 24 hour(s)). Xr Knee Lt 3 V    Result Date: 7/16/2017  EXAM:  XR KNEE LT 3 V, XR KNEE RT 3 V INDICATION: Bilateral knee pain after setting a trap yesterday. Bilateral knee abrasions. COMPARISON: None. FINDINGS: Three views of the right knee demonstrate no fracture or other acute osseous or articular abnormality. There is no effusion. Three views of the left knee demonstrate no fracture or other acute osseous or articular abnormality. There is no effusion. IMPRESSION:  No acute abnormality. Xr Knee Rt 3 V    Result Date: 7/16/2017  EXAM:  XR KNEE LT 3 V, XR KNEE RT 3 V INDICATION: Bilateral knee pain after setting a trap yesterday. Bilateral knee abrasions. COMPARISON: None. FINDINGS: Three views of the right knee demonstrate no fracture or other acute osseous or articular abnormality. There is no effusion. Three views of the left knee demonstrate no fracture or other acute osseous or articular abnormality. There is no effusion. IMPRESSION:  No acute abnormality. Knee Pain or Injury: Care Instructions  Your Care Instructions    Injuries are a common cause of knee problems. Sudden (acute) injuries may be caused by a direct blow to the knee. They can also be caused by abnormal twisting, bending, or falling on the knee. Pain, bruising, or swelling may be severe, and may start within minutes of the injury. Overuse is another cause of knee pain. Other causes are climbing stairs, kneeling, and other activities that use the knee. Everyday wear and tear, especially as you get older, also can cause knee pain. Rest, along with home treatment, often relieves pain and allows your knee to heal. If you have a serious knee injury, you may need tests and treatment. Follow-up care is a key part of your treatment and safety.  Be sure to make and go to all appointments, and call your doctor if you are having problems. It's also a good idea to know your test results and keep a list of the medicines you take. How can you care for yourself at home? · Be safe with medicines. Read and follow all instructions on the label. ¨ If the doctor gave you a prescription medicine for pain, take it as prescribed. ¨ If you are not taking a prescription pain medicine, ask your doctor if you can take an over-the-counter medicine. · Rest and protect your knee. Take a break from any activity that may cause pain. · Put ice or a cold pack on your knee for 10 to 20 minutes at a time. Put a thin cloth between the ice and your skin. · Prop up a sore knee on a pillow when you ice it or anytime you sit or lie down for the next 3 days. Try to keep it above the level of your heart. This will help reduce swelling. · If your knee is not swollen, you can put moist heat, a heating pad, or a warm cloth on your knee. · If your doctor recommends an elastic bandage, sleeve, or other type of support for your knee, wear it as directed. · Follow your doctor's instructions about how much weight you can put on your leg. Use a cane, crutches, or a walker as instructed. · Follow your doctor's instructions about activity during your healing process. If you can do mild exercise, slowly increase your activity. · Reach and stay at a healthy weight. Extra weight can strain the joints, especially the knees and hips, and make the pain worse. Losing even a few pounds may help. When should you call for help? Call 911 anytime you think you may need emergency care. For example, call if:  · You have symptoms of a blood clot in your lung (called a pulmonary embolism). These may include:  ¨ Sudden chest pain. ¨ Trouble breathing. ¨ Coughing up blood. Call your doctor now or seek immediate medical care if:  · You have severe or increasing pain. · Your leg or foot turns cold or changes color. · You cannot stand or put weight on your knee.   · Your knee looks twisted or bent out of shape. · You cannot move your knee. · You have signs of infection, such as:  ¨ Increased pain, swelling, warmth, or redness. ¨ Red streaks leading from the knee. ¨ Pus draining from a place on your knee. ¨ A fever. · You have signs of a blood clot in your leg (called a deep vein thrombosis), such as:  ¨ Pain in your calf, back of the knee, thigh, or groin. ¨ Redness and swelling in your leg or groin. Watch closely for changes in your health, and be sure to contact your doctor if:  · You have tingling, weakness, or numbness in your knee. · You have any new symptoms, such as swelling. · You have bruises from a knee injury that last longer than 2 weeks. · You do not get better as expected. Where can you learn more? Go to http://inez-bart.info/. Enter K195 in the search box to learn more about \"Knee Pain or Injury: Care Instructions. \"  Current as of: March 20, 2017  Content Version: 11.3  © 4681-2487 [employer]. Care instructions adapted under license by [employer] (which disclaims liability or warranty for this information). If you have questions about a medical condition or this instruction, always ask your healthcare professional. [place] [address] any warranty or liability for your use of this information. Scrapes (Abrasions): Care Instructions  Your Care Instructions  Scrapes (abrasions) are wounds where your skin has been rubbed or torn off. Most scrapes do not go deep into the skin, but some may remove several layers of skin. Scrapes usually don't bleed much, but they may ooze pinkish fluid. Scrapes on the head or face may appear worse than they are. They may bleed a lot because of the good blood supply to this area. Most scrapes heal well and may not need a bandage. They usually heal within 3 to 7 days.  A large, deep scrape may take 1 to 2 weeks or longer to heal. A scab may form on some scrapes. Follow-up care is a key part of your treatment and safety. Be sure to make and go to all appointments, and call your doctor if you are having problems. It's also a good idea to know your test results and keep a list of the medicines you take. How can you care for yourself at home? · If your doctor told you how to care for your wound, follow your doctor's instructions. If you did not get instructions, follow this general advice:  ¨ Wash the scrape with clean water 2 times a day. Don't use hydrogen peroxide or alcohol, which can slow healing. ¨ You may cover the scrape with a thin layer of petroleum jelly, such as Vaseline, and a nonstick bandage. ¨ Apply more petroleum jelly and replace the bandage as needed. · Prop up the injured area on a pillow anytime you sit or lie down during the next 3 days. Try to keep it above the level of your heart. This will help reduce swelling. · Be safe with medicines. Take pain medicines exactly as directed. ¨ If the doctor gave you a prescription medicine for pain, take it as prescribed. ¨ If you are not taking a prescription pain medicine, ask your doctor if you can take an over-the-counter medicine. When should you call for help? Call your doctor now or seek immediate medical care if:  · You have signs of infection, such as:  ¨ Increased pain, swelling, warmth, or redness around the scrape. ¨ Red streaks leading from the scrape. ¨ Pus draining from the scrape. ¨ A fever. · The scrape starts to bleed, and blood soaks through the bandage. Oozing small amounts of blood is normal.  Watch closely for changes in your health, and be sure to contact your doctor if the scrape is not getting better each day. Where can you learn more? Go to http://inez-bart.info/. Enter A374 in the search box to learn more about \"Scrapes (Abrasions): Care Instructions. \"  Current as of: March 20, 2017  Content Version: 11.3  © 4447-5545 [employer]. Care instructions adapted under license by [employer] (which disclaims liability or warranty for this information). If you have questions about a medical condition or this instruction, always ask your healthcare professional. [address] any warranty or liability for your use of this information.

## 2017-08-25 ENCOUNTER — APPOINTMENT (OUTPATIENT)
Dept: GENERAL RADIOLOGY | Age: 82
End: 2017-08-25
Attending: EMERGENCY MEDICINE
Payer: MEDICARE

## 2017-08-25 ENCOUNTER — HOSPITAL ENCOUNTER (EMERGENCY)
Age: 82
Discharge: HOME | End: 2017-08-26
Attending: EMERGENCY MEDICINE
Payer: MEDICARE

## 2017-08-25 DIAGNOSIS — R31.9 URINARY TRACT INFECTION WITH HEMATURIA, SITE UNSPECIFIED: Primary | ICD-10-CM

## 2017-08-25 DIAGNOSIS — G93.41 METABOLIC ENCEPHALOPATHY: ICD-10-CM

## 2017-08-25 DIAGNOSIS — N39.0 URINARY TRACT INFECTION WITH HEMATURIA, SITE UNSPECIFIED: Primary | ICD-10-CM

## 2017-08-25 LAB
ALBUMIN SERPL-MCNC: 3.4 G/DL (ref 3.5–5)
ALBUMIN/GLOB SERPL: 1 {RATIO} (ref 1.1–2.2)
ALP SERPL-CCNC: 167 U/L (ref 45–117)
ALT SERPL-CCNC: 184 U/L (ref 12–78)
ANION GAP SERPL CALC-SCNC: 6 MMOL/L (ref 5–15)
AST SERPL-CCNC: 136 U/L (ref 15–37)
BASOPHILS # BLD: 0 K/UL (ref 0–0.1)
BASOPHILS NFR BLD: 0 % (ref 0–1)
BILIRUB SERPL-MCNC: 0.8 MG/DL (ref 0.2–1)
BUN SERPL-MCNC: 14 MG/DL (ref 6–20)
BUN/CREAT SERPL: 8 (ref 12–20)
CALCIUM SERPL-MCNC: 9.8 MG/DL (ref 8.5–10.1)
CHLORIDE SERPL-SCNC: 103 MMOL/L (ref 97–108)
CO2 SERPL-SCNC: 29 MMOL/L (ref 21–32)
CREAT SERPL-MCNC: 1.74 MG/DL (ref 0.7–1.3)
DIFFERENTIAL METHOD BLD: ABNORMAL
EOSINOPHIL # BLD: 0.2 K/UL (ref 0–0.4)
EOSINOPHIL NFR BLD: 2 % (ref 0–7)
ERYTHROCYTE [DISTWIDTH] IN BLOOD BY AUTOMATED COUNT: 13.4 % (ref 11.5–14.5)
GLOBULIN SER CALC-MCNC: 3.4 G/DL (ref 2–4)
GLUCOSE SERPL-MCNC: 180 MG/DL (ref 65–100)
HCT VFR BLD AUTO: 41.9 % (ref 36.6–50.3)
HGB BLD-MCNC: 14.1 G/DL (ref 12.1–17)
LACTATE SERPL-SCNC: 1.3 MMOL/L (ref 0.4–2)
LYMPHOCYTES # BLD: 0.5 K/UL (ref 0.8–3.5)
LYMPHOCYTES NFR BLD: 6 % (ref 12–49)
MCH RBC QN AUTO: 28.6 PG (ref 26–34)
MCHC RBC AUTO-ENTMCNC: 33.7 G/DL (ref 30–36.5)
MCV RBC AUTO: 85 FL (ref 80–99)
MONOCYTES # BLD: 0.7 K/UL (ref 0–1)
MONOCYTES NFR BLD: 8 % (ref 5–13)
NEUTS SEG # BLD: 7.3 K/UL (ref 1.8–8)
NEUTS SEG NFR BLD: 84 % (ref 32–75)
PLATELET # BLD AUTO: 133 K/UL (ref 150–400)
POTASSIUM SERPL-SCNC: 4.1 MMOL/L (ref 3.5–5.1)
PROT SERPL-MCNC: 6.8 G/DL (ref 6.4–8.2)
RBC # BLD AUTO: 4.93 M/UL (ref 4.1–5.7)
RBC MORPH BLD: ABNORMAL
SODIUM SERPL-SCNC: 138 MMOL/L (ref 136–145)
WBC # BLD AUTO: 8.7 K/UL (ref 4.1–11.1)

## 2017-08-25 PROCEDURE — 83605 ASSAY OF LACTIC ACID: CPT | Performed by: EMERGENCY MEDICINE

## 2017-08-25 PROCEDURE — 74011250636 HC RX REV CODE- 250/636: Performed by: EMERGENCY MEDICINE

## 2017-08-25 PROCEDURE — 93005 ELECTROCARDIOGRAM TRACING: CPT

## 2017-08-25 PROCEDURE — 74011250637 HC RX REV CODE- 250/637: Performed by: EMERGENCY MEDICINE

## 2017-08-25 PROCEDURE — 87086 URINE CULTURE/COLONY COUNT: CPT | Performed by: EMERGENCY MEDICINE

## 2017-08-25 PROCEDURE — 36415 COLL VENOUS BLD VENIPUNCTURE: CPT | Performed by: EMERGENCY MEDICINE

## 2017-08-25 PROCEDURE — 87077 CULTURE AEROBIC IDENTIFY: CPT | Performed by: EMERGENCY MEDICINE

## 2017-08-25 PROCEDURE — 87186 SC STD MICRODIL/AGAR DIL: CPT | Performed by: EMERGENCY MEDICINE

## 2017-08-25 PROCEDURE — 85025 COMPLETE CBC W/AUTO DIFF WBC: CPT | Performed by: EMERGENCY MEDICINE

## 2017-08-25 PROCEDURE — 87040 BLOOD CULTURE FOR BACTERIA: CPT | Performed by: EMERGENCY MEDICINE

## 2017-08-25 PROCEDURE — 99285 EMERGENCY DEPT VISIT HI MDM: CPT

## 2017-08-25 PROCEDURE — 96361 HYDRATE IV INFUSION ADD-ON: CPT

## 2017-08-25 PROCEDURE — 81001 URINALYSIS AUTO W/SCOPE: CPT | Performed by: EMERGENCY MEDICINE

## 2017-08-25 PROCEDURE — 96365 THER/PROPH/DIAG IV INF INIT: CPT

## 2017-08-25 PROCEDURE — 80053 COMPREHEN METABOLIC PANEL: CPT | Performed by: EMERGENCY MEDICINE

## 2017-08-25 PROCEDURE — 71010 XR CHEST PORT: CPT

## 2017-08-25 RX ORDER — ACETAMINOPHEN 500 MG
1000 TABLET ORAL
Status: COMPLETED | OUTPATIENT
Start: 2017-08-25 | End: 2017-08-25

## 2017-08-25 RX ADMIN — ACETAMINOPHEN 1000 MG: 500 TABLET ORAL at 22:48

## 2017-08-25 RX ADMIN — SODIUM CHLORIDE 1000 ML: 900 INJECTION, SOLUTION INTRAVENOUS at 22:22

## 2017-08-26 VITALS
BODY MASS INDEX: 29.55 KG/M2 | HEIGHT: 68 IN | TEMPERATURE: 100.3 F | OXYGEN SATURATION: 92 % | RESPIRATION RATE: 20 BRPM | WEIGHT: 195 LBS | SYSTOLIC BLOOD PRESSURE: 129 MMHG | DIASTOLIC BLOOD PRESSURE: 61 MMHG | HEART RATE: 88 BPM

## 2017-08-26 LAB
APPEARANCE UR: ABNORMAL
BACTERIA URNS QL MICRO: ABNORMAL /HPF
BILIRUB UR QL: NEGATIVE
COLOR UR: ABNORMAL
EPITH CASTS URNS QL MICRO: ABNORMAL /LPF
GLUCOSE UR STRIP.AUTO-MCNC: NEGATIVE MG/DL
HGB UR QL STRIP: ABNORMAL
HYALINE CASTS URNS QL MICRO: ABNORMAL /LPF (ref 0–5)
KETONES UR QL STRIP.AUTO: NEGATIVE MG/DL
LEUKOCYTE ESTERASE UR QL STRIP.AUTO: ABNORMAL
NITRITE UR QL STRIP.AUTO: NEGATIVE
PH UR STRIP: 7 [PH] (ref 5–8)
PROT UR STRIP-MCNC: ABNORMAL MG/DL
RBC #/AREA URNS HPF: ABNORMAL /HPF (ref 0–5)
SP GR UR REFRACTOMETRY: 1.01 (ref 1–1.03)
UA: UC IF INDICATED,UAUC: ABNORMAL
UROBILINOGEN UR QL STRIP.AUTO: 1 EU/DL (ref 0.2–1)
WBC URNS QL MICRO: >100 /HPF (ref 0–4)

## 2017-08-26 PROCEDURE — 74011250636 HC RX REV CODE- 250/636: Performed by: EMERGENCY MEDICINE

## 2017-08-26 PROCEDURE — 74011000258 HC RX REV CODE- 258: Performed by: EMERGENCY MEDICINE

## 2017-08-26 RX ORDER — CEFDINIR 300 MG/1
300 CAPSULE ORAL 2 TIMES DAILY
Qty: 14 CAP | Refills: 0 | Status: SHIPPED | OUTPATIENT
Start: 2017-08-26 | End: 2017-08-26

## 2017-08-26 RX ORDER — CEFDINIR 300 MG/1
300 CAPSULE ORAL 2 TIMES DAILY
Qty: 14 CAP | Refills: 0 | Status: SHIPPED | OUTPATIENT
Start: 2017-08-26 | End: 2017-09-02

## 2017-08-26 RX ADMIN — CEFTRIAXONE SODIUM 1 G: 1 INJECTION, POWDER, FOR SOLUTION INTRAMUSCULAR; INTRAVENOUS at 00:17

## 2017-08-26 NOTE — ED NOTES
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 Pt says he cannot urinate at this time. Pt is receiving a fluid bolus at this time, will attempt urination again later.

## 2017-08-26 NOTE — ED PROVIDER NOTES
HPI Comments: 81yo M with pmh sig for htn, dm, paf, tia, cad, ckd who p/w altered mental status. Per patient's wife, has dementia at baseline but was acting more confused today. History limited because patient has dementia and wife is not available at this time. Pt is oriented to self but expresses no complaints at this time. Patient is a 80 y.o. male presenting with altered mental status. The history is provided by the patient and the spouse. The history is limited by the condition of the patient. Altered mental status           Past Medical History:   Diagnosis Date    Adverse effect of anesthesia     BP unstable after GB suregry- in ICU 3 days    Arthritis     CAD (coronary artery disease)     Angina Jan 2011, PCI LAD with multilink stent 4.0x12mm;  also cath at [place] 7/13 -- no stents at that time    CKD (chronic kidney disease)     Cochlear implant in place 2/3/2017    CVA (cerebral vascular accident) ([address]     maybe in 11/15 after surgery    Diabetes mellitus ([address]     Expressive aphasia 2/3/2017    Gallstone     GERD (gastroesophageal reflux disease)     HTN (hypertension)     Hypothyroidism     Ill-defined condition 8/2015    kidney stone    LBBB (left bundle branch block)     normal lexiscan MPI 12/13    SAMUEL (obstructive sleep apnea)     not using CPAP    PAF (paroxysmal atrial fibrillation) (HCC)     [id number] score = 7    Prostate cancer ([address]     prostate    Renal cell cancer ([address]     Stage 4 Renal Cell Cancer.     Partial omentectomy 11/2/2015 with Dr. ENGEL [facility]: omental mass-metastatic renal cell carcinoma    Renal mass, left     DAVINCI LEFT PARTIAL NEPHRECTOMY 12/19/13;     TIA (transient ischemic attack)     12/10/13 and 2/17       Past Surgical History:   Procedure Laterality Date    CARDIAC SURG PROCEDURE UNLIST  1/2011    stent to LAD    HX CHOLECYSTECTOMY  7/13/2012    HX CORONARY STENT PLACEMENT  2011   [facility] HEART CATHETERIZATION  2011, 2013   [address] staped[address]    [address]    left inguinal    HX LITHOTRIPSY  8/2015    HX ORTHOPAEDIC  1994    rotator cuff repair    HX ORTHOPAEDIC  1994    cervical disc removed    HX PROSTATECTOMY  2000    surgery intervention    HX TONSILLECTOMY  1987    HX UROLOGICAL  2001    valvular implant prevent incontinence    HX UROLOGICAL  2003    insert gel to help with incontinence    HX UROLOGICAL  12/2013    left partial nephrectomy    HX UROLOGICAL  9/2015    CT guided biopsy of abdominal lymph nodes         Family History:   Problem Relation Age of Onset    Stroke Father     Cancer Father      prostate    Diabetes Sister      2 sisters    Hypertension Sister      2 sisters    Diabetes Brother     Stroke Brother     Diabetes Brother      all brothers    Stroke Brother     Kidney Disease Brother     Heart Attack Brother     Hypertension Other        Social History     Social History    Marital status:      Spouse name: N/A    Number of children: N/A    Years of education: N/A     Occupational History    Not on file. Social History Main Topics    Smoking status: Former Smoker     Packs/day: 0.10     Years: 23.00     Quit date: 12/3/1975    Smokeless tobacco: Never Used    Alcohol use No    Drug use: No    Sexual activity: No     Other Topics Concern    Not on file     Social History Narrative         ALLERGIES: Latex; Contrast agent [iodine]; and Morphine    Review of Systems   Constitutional: Negative for diaphoresis and fever. HENT: Negative for facial swelling. Eyes: Negative for visual disturbance. Respiratory: Negative for cough. Cardiovascular: Negative for chest pain. Gastrointestinal: Negative for abdominal pain. Genitourinary: Negative for dysuria. Musculoskeletal: Negative for joint swelling. Skin: Negative for rash. Neurological: Negative for headaches. Hematological: Negative for adenopathy.    Psychiatric/Behavioral: Negative for suicidal ideas.       Vitals:    08/25/17 2156   BP: 145/67   Pulse: 88   Resp: 20   Temp: 100.3 °F (37.9 °C)   SpO2: 96%   Weight: 88.5 kg (195 lb)   Height: 5' 8\" (1.727 m)            Physical Exam   Constitutional: He is oriented to person, place, and time. He appears well-developed. No distress. obese   HENT:   Head: Normocephalic and atraumatic. Mouth/Throat: Oropharynx is clear and moist.   Eyes: Pupils are equal, round, and reactive to light. Neck: Normal range of motion. Neck supple. Cardiovascular: Normal rate, regular rhythm, normal heart sounds and intact distal pulses. Pulmonary/Chest: Effort normal and breath sounds normal. No respiratory distress. Abdominal: Soft. Bowel sounds are normal. He exhibits no distension. There is no tenderness. Musculoskeletal: Normal range of motion. He exhibits no edema. Neurological: He is alert and oriented to person, place, and time. Skin: Skin is warm and dry. Nursing note and vitals reviewed. MDM  Number of Diagnoses or Management Options  Diagnosis management comments: A:  79yo M with increased confusion similar to previous UTI's. Other possible etiologies include dehydration, electrolyte abnormalities, other infection, CVA, ICH. Pt is on eliquis for paf. P:  ecg  cxr  Labs  Blood cultures/lactate  UA  Head CT  IVF    ED Course       Procedures    ED EKG interpretation:  Rhythm: normal sinus rhythm. Rate (approx.): 89. Axis: left. ST segment:  No concerning ST elevations or depressions. LBBB. Unchanged from prior. This EKG was interpreted by Severo Israel, MD,ED Provider. Labs unremarkable except for mildly elevated LFT's.  UA shows UTI    12:08 AM  Pt's wife now here. She describes increased confusion and difficulty walking throughout day today. Pt remains awake and alert with no complaints. VS stable. UA consistent with UTI. Will give ceftriaxone in ED and ambulate patient. Stable to go home if can ambulate.       12:57 AM  Ambulated steadily with nurse. Wife comfortable taking patient home.

## 2017-08-26 NOTE — DISCHARGE INSTRUCTIONS
Urinary Tract Infections in Men: Care Instructions  Your Care Instructions    A urinary tract infection, or UTI, is a general term for an infection anywhere between the kidneys and the tip of the penis. UTIs can also be a result of a prostate problem. Most cause pain or burning when you urinate. Most UTIs are caused by bacteria and can be cured with antibiotics. It is important to complete your treatment so that the infection does not get worse. Follow-up care is a key part of your treatment and safety. Be sure to make and go to all appointments, and call your doctor if you are having problems. It's also a good idea to know your test results and keep a list of the medicines you take. How can you care for yourself at home? · Take your antibiotics as prescribed. Do not stop taking them just because you feel better. You need to take the full course of antibiotics. · Take your medicines exactly as prescribed. Your doctor may have prescribed a medicine, such as phenazopyridine (Pyridium), to help relieve pain when you urinate. This turns your urine orange. You may stop taking it when your symptoms get better. But be sure to take all of your antibiotics, which treat the infection. · Drink extra water for the next day or two. This will help make the urine less concentrated and help wash out the bacteria causing the infection. (If you have kidney, heart, or liver disease and have to limit your fluids, talk with your doctor before you increase your fluid intake.)  · Avoid drinks that are carbonated or have caffeine. They can irritate the bladder. · Urinate often. Try to empty your bladder each time. · To relieve pain, take a hot bath or lay a heating pad (set on low) over your lower belly or genital area. Never go to sleep with a heating pad in place. To help prevent UTIs  · Drink plenty of fluids, enough so that your urine is light yellow or clear like water.  If you have kidney, heart, or liver disease and have to limit fluids, talk with your doctor before you increase the amount of fluids you drink. · Urinate when you have the urge. Do not hold your urine for a long time. Urinate before you go to sleep. · Keep your penis clean. Catheter care  If you have a drainage tube (catheter) in place, the following steps will help you care for it. · Always wash your hands before and after touching your catheter. · Check the area around the urethra for inflammation or signs of infection. Signs of infection include irritated, swollen, red, or tender skin, or pus around the catheter. · Clean the area around the catheter with soap and water two times a day. Dry with a clean towel afterward. · Do not apply powder or lotion to the skin around the catheter. To empty the urine collection bag  · Wash your hands with soap and water. · Without touching the drain spout, remove the spout from its sleeve at the bottom of the collection bag. Open the valve on the spout. · Let the urine flow out of the bag and into the toilet or a container. Do not let the tubing or drain spout touch anything. · After you empty the bag, clean the end of the drain spout with tissue and water. Close the valve and put the drain spout back into its sleeve at the bottom of the collection bag. · Wash your hands with soap and water. When should you call for help? Call your doctor now or seek immediate medical care if:  · Symptoms such as a fever, chills, nausea, or vomiting get worse or happen for the first time. · You have new pain in your back just below your rib cage. This is called flank pain. · There is new blood or pus in your urine. · You are not able to take or keep down your antibiotics. Watch closely for changes in your health, and be sure to contact your doctor if:  · You are not getting better after taking an antibiotic for 2 days. · Your symptoms go away but then come back. Where can you learn more?   Go to http://inez-bart.info/. Enter H925 in the search box to learn more about \"Urinary Tract Infections in Men: Care Instructions. \"  Current as of: November 28, 2016  Content Version: 11.3  © 9524-4893 [employer]. Care instructions adapted under license by [employer] (which disclaims liability or warranty for this information). If you have questions about a medical condition or this instruction, always ask your healthcare professional. [facility] [address] any warranty or liability for your use of this information. We hope that we have addressed all of your medical concerns. The examination and treatment you received in the Emergency Department were for an emergent problem and were not intended as complete care. It is important that you follow up with your healthcare provider(s) for ongoing care. If your symptoms worsen or do not improve as expected, and you are unable to reach your usual health care provider(s), you should return to the Emergency Department. Today's healthcare is undergoing tremendous change, and patient satisfaction surveys are one of the many tools to assess the quality of medical care. You may receive a survey from the [employer] regarding your experience in the Emergency Department. I hope that your experience has been completely positive, particularly the medical care that I provided. As such, please participate in the survey; anything less than excellent does not meet my expectations or intentions. [address] [facility] and [address] [place] [address] participate in nationally recognized quality of care measures. If your blood pressure is greater than 120/80, as reported below, we urge that you seek medical care to address the potential of high blood pressure, commonly known as hypertension.    Hypertension can be hereditary or can be caused by certain medical conditions, pain, stress, or \"white coat syndrome. \"       Please make an appointment with your health care provider(s) for follow up of your Emergency Department visit. VITALS:   Patient Vitals for the past 8 hrs:   Temp Pulse Resp BP SpO2   08/25/17 2330 - - - 139/72 94 %   08/25/17 2300 - - - 167/65 93 %   08/25/17 2230 - - - 160/73 -   08/25/17 2200 - - - 163/64 93 %   08/25/17 2156 100.3 °F (37.9 °C) 88 20 145/67 96 %          Thank you for allowing us to provide you with medical care today. We realize that you have many choices for your emergency care needs. Please choose us in the future for any continued health care needs. Daisy Foley MD    [facility], [facility].   Office: [phone]            Recent Results (from the past 24 hour(s))   LACTIC ACID    Collection Time: 08/25/17 10:05 PM   Result Value Ref Range    Lactic acid 1.3 0.4 - 2.0 MMOL/L   CBC WITH AUTOMATED DIFF    Collection Time: 08/25/17 10:05 PM   Result Value Ref Range    WBC 8.7 4.1 - 11.1 K/uL    RBC 4.93 4.10 - 5.70 M/uL    HGB 14.1 12.1 - 17.0 g/dL    HCT 41.9 36.6 - 50.3 %    MCV 85.0 80.0 - 99.0 FL    MCH 28.6 26.0 - 34.0 PG    MCHC 33.7 30.0 - 36.5 g/dL    RDW 13.4 11.5 - 14.5 %    PLATELET 977 (L) 603 - 400 K/uL    NEUTROPHILS 84 (H) 32 - 75 %    LYMPHOCYTES 6 (L) 12 - 49 %    MONOCYTES 8 5 - 13 %    EOSINOPHILS 2 0 - 7 %    BASOPHILS 0 0 - 1 %    ABS. NEUTROPHILS 7.3 1.8 - 8.0 K/UL    ABS. LYMPHOCYTES 0.5 (L) 0.8 - 3.5 K/UL    ABS. MONOCYTES 0.7 0.0 - 1.0 K/UL    ABS. EOSINOPHILS 0.2 0.0 - 0.4 K/UL    ABS.  BASOPHILS 0.0 0.0 - 0.1 K/UL    DF SMEAR SCANNED      RBC COMMENTS NORMOCYTIC, NORMOCHROMIC     METABOLIC PANEL, COMPREHENSIVE    Collection Time: 08/25/17 10:05 PM   Result Value Ref Range    Sodium 138 136 - 145 mmol/L    Potassium 4.1 3.5 - 5.1 mmol/L    Chloride 103 97 - 108 mmol/L    CO2 29 21 - 32 mmol/L    Anion gap 6 5 - 15 mmol/L    Glucose 180 (H) 65 - 100 mg/dL    BUN 14 6 - 20 MG/DL Creatinine 1.74 (H) 0.70 - 1.30 MG/DL    BUN/Creatinine ratio 8 (L) 12 - 20      GFR est AA 46 (L) >60 ml/min/1.73m2    GFR est non-AA 38 (L) >60 ml/min/1.73m2    Calcium 9.8 8.5 - 10.1 MG/DL    Bilirubin, total 0.8 0.2 - 1.0 MG/DL    ALT (SGPT) 184 (H) 12 - 78 U/L    AST (SGOT) 136 (H) 15 - 37 U/L    Alk. phosphatase 167 (H) 45 - 117 U/L    Protein, total 6.8 6.4 - 8.2 g/dL    Albumin 3.4 (L) 3.5 - 5.0 g/dL    Globulin 3.4 2.0 - 4.0 g/dL    A-G Ratio 1.0 (L) 1.1 - 2.2     EKG, 12 LEAD, INITIAL    Collection Time: 08/25/17 10:08 PM   Result Value Ref Range    Ventricular Rate 89 BPM    Atrial Rate 89 BPM    P-R Interval 248 ms    QRS Duration 148 ms    Q-T Interval 400 ms    QTC Calculation (Bezet) 486 ms    Calculated P Axis 56 degrees    Calculated R Axis -47 degrees    Calculated T Axis 110 degrees    Diagnosis       Sinus rhythm with 1st degree AV block  Left axis deviation  Left bundle branch block  Abnormal ECG  When compared with ECG of 02-FEB-2017 18:16,  [place] interval has increased     URINALYSIS W/ REFLEX CULTURE    Collection Time: 08/25/17 11:05 PM   Result Value Ref Range    Color YELLOW/STRAW      Appearance CLOUDY (A) CLEAR      Specific gravity 1.013 1.003 - 1.030      pH (UA) 7.0 5.0 - 8.0      Protein TRACE (A) NEG mg/dL    Glucose NEGATIVE  NEG mg/dL    Ketone NEGATIVE  NEG mg/dL    Bilirubin NEGATIVE  NEG      Blood TRACE (A) NEG      Urobilinogen 1.0 0.2 - 1.0 EU/dL    Nitrites NEGATIVE  NEG      Leukocyte Esterase MODERATE (A) NEG      WBC >100 (H) 0 - 4 /hpf    RBC 5-10 0 - 5 /hpf    Epithelial cells FEW FEW /lpf    Bacteria 3+ (A) NEG /hpf    UA:UC IF INDICATED PENDING     Hyaline cast 0-2 0 - 5 /lpf       Xr Chest Port    Result Date: 8/25/2017  INDICATION: . Altered Mental Status COMPARISON: Previous chest xray, 10/26/2015 and 1/21/2015. LIMITATIONS: Portable technique. Jennifer Peaks FINDINGS: Single frontal view of the chest. . Lines/tubes/surgical: None. Heart/mediastinum: Unremarkable.  Lungs/pleura: Low lung volumes. Chronic appearing lung change. Left, lateral base opacity. No visualized pleural effusion or pneumothorax. Additional Comments: None. Chrissie Dubon IMPRESSION: 1. Chronic appearing lung changes. The opacity in the lateral, left base may represent scarring/atelectasis as opposed to acute process.

## 2017-08-26 NOTE — ED NOTES
RN ambulated pt with walker. I have reviewed discharge instructions. Paperwork given by provider and reviewed with patient; opportunity for questions given. Pt confirmed understanding of discharge instructions and of need for follow up with primary care provider. Pt is taken in wheelchair by RN from ED . Pt left with all personal belongings. Pt family/friends are present. Pt states he/she is not driving. Pt is medically stable and is in no current distress. Chief complaint was addressed and improved.

## 2017-08-26 NOTE — ED TRIAGE NOTES
Pt comes from home by EMS. Has dementia at baseline. Pt has altered mental status today. Family suspects UTI.

## 2017-08-28 LAB
BACTERIA SPEC CULT: ABNORMAL
CC UR VC: ABNORMAL
SERVICE CMNT-IMP: ABNORMAL

## 2017-08-29 LAB
ATRIAL RATE: 89 BPM
CALCULATED P AXIS, ECG09: 56 DEGREES
CALCULATED R AXIS, ECG10: -47 DEGREES
CALCULATED T AXIS, ECG11: 110 DEGREES
DIAGNOSIS, 93000: NORMAL
P-R INTERVAL, ECG05: 248 MS
Q-T INTERVAL, ECG07: 400 MS
QRS DURATION, ECG06: 148 MS
QTC CALCULATION (BEZET), ECG08: 486 MS
VENTRICULAR RATE, ECG03: 89 BPM

## 2017-08-30 LAB
BACTERIA SPEC CULT: NORMAL
SERVICE CMNT-IMP: NORMAL

## 2018-09-09 ENCOUNTER — HOSPITAL ENCOUNTER (INPATIENT)
Age: 83
LOS: 4 days | Discharge: NURSING, REHAB OR LONG-TERM CARE | DRG: 689 | End: 2018-09-13
Attending: EMERGENCY MEDICINE | Admitting: INTERNAL MEDICINE
Payer: MEDICARE

## 2018-09-09 ENCOUNTER — APPOINTMENT (OUTPATIENT)
Dept: GENERAL RADIOLOGY | Age: 83
DRG: 689 | End: 2018-09-09
Attending: PHYSICIAN ASSISTANT
Payer: MEDICARE

## 2018-09-09 ENCOUNTER — APPOINTMENT (OUTPATIENT)
Dept: CT IMAGING | Age: 83
DRG: 689 | End: 2018-09-09
Attending: PHYSICIAN ASSISTANT
Payer: MEDICARE

## 2018-09-09 DIAGNOSIS — N39.0 ACUTE UTI: Primary | ICD-10-CM

## 2018-09-09 DIAGNOSIS — N28.89 RENAL MASS, LEFT: ICD-10-CM

## 2018-09-09 DIAGNOSIS — R50.9 ACUTE FEBRILE ILLNESS: ICD-10-CM

## 2018-09-09 DIAGNOSIS — G93.40 ENCEPHALOPATHY ACUTE: ICD-10-CM

## 2018-09-09 DIAGNOSIS — G47.33 OSA (OBSTRUCTIVE SLEEP APNEA): ICD-10-CM

## 2018-09-09 DIAGNOSIS — I48.0 PAF (PAROXYSMAL ATRIAL FIBRILLATION) (HCC): ICD-10-CM

## 2018-09-09 PROBLEM — R53.81 DEBILITATED PATIENT: Status: ACTIVE | Noted: 2018-09-09

## 2018-09-09 PROBLEM — R47.01 EXPRESSIVE APHASIA: Status: RESOLVED | Noted: 2017-02-03 | Resolved: 2018-09-09

## 2018-09-09 PROBLEM — R53.83 FATIGUE: Status: ACTIVE | Noted: 2018-09-09

## 2018-09-09 LAB
ALBUMIN SERPL-MCNC: 2.6 G/DL (ref 3.5–5)
ALBUMIN/GLOB SERPL: 0.8 {RATIO} (ref 1.1–2.2)
ALP SERPL-CCNC: 246 U/L (ref 45–117)
ALT SERPL-CCNC: 89 U/L (ref 12–78)
ANION GAP SERPL CALC-SCNC: 7 MMOL/L (ref 5–15)
APPEARANCE UR: CLEAR
AST SERPL-CCNC: 79 U/L (ref 15–37)
BACTERIA URNS QL MICRO: ABNORMAL /HPF
BASOPHILS # BLD: 0 K/UL (ref 0–0.1)
BASOPHILS NFR BLD: 0 % (ref 0–1)
BILIRUB SERPL-MCNC: 3.9 MG/DL (ref 0.2–1)
BILIRUB UR QL CFM: POSITIVE
BNP SERPL-MCNC: 1171 PG/ML (ref 0–450)
BUN SERPL-MCNC: 19 MG/DL (ref 6–20)
BUN/CREAT SERPL: 10 (ref 12–20)
CALCIUM SERPL-MCNC: 10.2 MG/DL (ref 8.5–10.1)
CHLORIDE SERPL-SCNC: 101 MMOL/L (ref 97–108)
CO2 SERPL-SCNC: 23 MMOL/L (ref 21–32)
COLOR UR: ABNORMAL
CREAT SERPL-MCNC: 1.86 MG/DL (ref 0.7–1.3)
DIFFERENTIAL METHOD BLD: ABNORMAL
EOSINOPHIL # BLD: 0 K/UL (ref 0–0.4)
EOSINOPHIL NFR BLD: 0 % (ref 0–7)
EPITH CASTS URNS QL MICRO: ABNORMAL /LPF
ERYTHROCYTE [DISTWIDTH] IN BLOOD BY AUTOMATED COUNT: 12.8 % (ref 11.5–14.5)
GLOBULIN SER CALC-MCNC: 3.3 G/DL (ref 2–4)
GLUCOSE BLD STRIP.AUTO-MCNC: 191 MG/DL (ref 65–100)
GLUCOSE SERPL-MCNC: 212 MG/DL (ref 65–100)
GLUCOSE UR STRIP.AUTO-MCNC: NEGATIVE MG/DL
HCT VFR BLD AUTO: 40.6 % (ref 36.6–50.3)
HGB BLD-MCNC: 13.1 G/DL (ref 12.1–17)
HGB UR QL STRIP: ABNORMAL
HYALINE CASTS URNS QL MICRO: ABNORMAL /LPF (ref 0–5)
IMM GRANULOCYTES # BLD: 0.1 K/UL (ref 0–0.04)
IMM GRANULOCYTES NFR BLD AUTO: 1 % (ref 0–0.5)
KETONES UR QL STRIP.AUTO: NEGATIVE MG/DL
LACTATE SERPL-SCNC: 1.4 MMOL/L (ref 0.4–2)
LEUKOCYTE ESTERASE UR QL STRIP.AUTO: ABNORMAL
LYMPHOCYTES # BLD: 0.4 K/UL (ref 0.8–3.5)
LYMPHOCYTES NFR BLD: 4 % (ref 12–49)
MAGNESIUM SERPL-MCNC: 1.5 MG/DL (ref 1.6–2.4)
MCH RBC QN AUTO: 28.7 PG (ref 26–34)
MCHC RBC AUTO-ENTMCNC: 32.3 G/DL (ref 30–36.5)
MCV RBC AUTO: 88.8 FL (ref 80–99)
MONOCYTES # BLD: 0.8 K/UL (ref 0–1)
MONOCYTES NFR BLD: 7 % (ref 5–13)
NEUTS SEG # BLD: 9.5 K/UL (ref 1.8–8)
NEUTS SEG NFR BLD: 88 % (ref 32–75)
NITRITE UR QL STRIP.AUTO: POSITIVE
NRBC # BLD: 0 K/UL (ref 0–0.01)
NRBC BLD-RTO: 0 PER 100 WBC
PH UR STRIP: 6 [PH] (ref 5–8)
PLATELET # BLD AUTO: 152 K/UL (ref 150–400)
PMV BLD AUTO: 9.6 FL (ref 8.9–12.9)
POTASSIUM SERPL-SCNC: 4.5 MMOL/L (ref 3.5–5.1)
PROT SERPL-MCNC: 5.9 G/DL (ref 6.4–8.2)
PROT UR STRIP-MCNC: NEGATIVE MG/DL
RBC # BLD AUTO: 4.57 M/UL (ref 4.1–5.7)
RBC #/AREA URNS HPF: ABNORMAL /HPF (ref 0–5)
RBC MORPH BLD: ABNORMAL
SERVICE CMNT-IMP: ABNORMAL
SODIUM SERPL-SCNC: 131 MMOL/L (ref 136–145)
SP GR UR REFRACTOMETRY: 1.01 (ref 1–1.03)
TROPONIN I SERPL-MCNC: <0.05 NG/ML
UR CULT HOLD, URHOLD: NORMAL
UROBILINOGEN UR QL STRIP.AUTO: 1 EU/DL (ref 0.2–1)
WBC # BLD AUTO: 10.8 K/UL (ref 4.1–11.1)
WBC URNS QL MICRO: ABNORMAL /HPF (ref 0–4)

## 2018-09-09 PROCEDURE — 74011250636 HC RX REV CODE- 250/636: Performed by: PHYSICIAN ASSISTANT

## 2018-09-09 PROCEDURE — 74011000250 HC RX REV CODE- 250: Performed by: PHYSICIAN ASSISTANT

## 2018-09-09 PROCEDURE — 87186 SC STD MICRODIL/AGAR DIL: CPT | Performed by: PHYSICIAN ASSISTANT

## 2018-09-09 PROCEDURE — 96365 THER/PROPH/DIAG IV INF INIT: CPT

## 2018-09-09 PROCEDURE — 77030010545

## 2018-09-09 PROCEDURE — 93005 ELECTROCARDIOGRAM TRACING: CPT

## 2018-09-09 PROCEDURE — 87040 BLOOD CULTURE FOR BACTERIA: CPT | Performed by: PHYSICIAN ASSISTANT

## 2018-09-09 PROCEDURE — 87077 CULTURE AEROBIC IDENTIFY: CPT | Performed by: INTERNAL MEDICINE

## 2018-09-09 PROCEDURE — 96375 TX/PRO/DX INJ NEW DRUG ADDON: CPT

## 2018-09-09 PROCEDURE — 99285 EMERGENCY DEPT VISIT HI MDM: CPT

## 2018-09-09 PROCEDURE — 84484 ASSAY OF TROPONIN QUANT: CPT | Performed by: PHYSICIAN ASSISTANT

## 2018-09-09 PROCEDURE — 74011250637 HC RX REV CODE- 250/637: Performed by: PHYSICIAN ASSISTANT

## 2018-09-09 PROCEDURE — 82962 GLUCOSE BLOOD TEST: CPT

## 2018-09-09 PROCEDURE — 71045 X-RAY EXAM CHEST 1 VIEW: CPT

## 2018-09-09 PROCEDURE — 74011250636 HC RX REV CODE- 250/636: Performed by: INTERNAL MEDICINE

## 2018-09-09 PROCEDURE — 36415 COLL VENOUS BLD VENIPUNCTURE: CPT | Performed by: PHYSICIAN ASSISTANT

## 2018-09-09 PROCEDURE — 87186 SC STD MICRODIL/AGAR DIL: CPT | Performed by: INTERNAL MEDICINE

## 2018-09-09 PROCEDURE — 85025 COMPLETE CBC W/AUTO DIFF WBC: CPT | Performed by: PHYSICIAN ASSISTANT

## 2018-09-09 PROCEDURE — 74176 CT ABD & PELVIS W/O CONTRAST: CPT

## 2018-09-09 PROCEDURE — 83605 ASSAY OF LACTIC ACID: CPT | Performed by: PHYSICIAN ASSISTANT

## 2018-09-09 PROCEDURE — 96361 HYDRATE IV INFUSION ADD-ON: CPT

## 2018-09-09 PROCEDURE — 87077 CULTURE AEROBIC IDENTIFY: CPT | Performed by: PHYSICIAN ASSISTANT

## 2018-09-09 PROCEDURE — 81001 URINALYSIS AUTO W/SCOPE: CPT | Performed by: PHYSICIAN ASSISTANT

## 2018-09-09 PROCEDURE — 80053 COMPREHEN METABOLIC PANEL: CPT | Performed by: PHYSICIAN ASSISTANT

## 2018-09-09 PROCEDURE — 87086 URINE CULTURE/COLONY COUNT: CPT | Performed by: INTERNAL MEDICINE

## 2018-09-09 PROCEDURE — 65270000029 HC RM PRIVATE

## 2018-09-09 PROCEDURE — 83735 ASSAY OF MAGNESIUM: CPT | Performed by: PHYSICIAN ASSISTANT

## 2018-09-09 PROCEDURE — 83880 ASSAY OF NATRIURETIC PEPTIDE: CPT | Performed by: PHYSICIAN ASSISTANT

## 2018-09-09 RX ORDER — DESONIDE 0.5 MG/G
CREAM TOPICAL
COMMUNITY
End: 2018-11-27

## 2018-09-09 RX ORDER — ACETAMINOPHEN 650 MG/1
975 SUPPOSITORY RECTAL
Status: DISCONTINUED | OUTPATIENT
Start: 2018-09-09 | End: 2018-09-09

## 2018-09-09 RX ORDER — SODIUM CHLORIDE 0.9 % (FLUSH) 0.9 %
5-10 SYRINGE (ML) INJECTION AS NEEDED
Status: DISCONTINUED | OUTPATIENT
Start: 2018-09-09 | End: 2018-09-13 | Stop reason: HOSPADM

## 2018-09-09 RX ORDER — ACETAMINOPHEN 325 MG/1
975 TABLET ORAL ONCE
Status: COMPLETED | OUTPATIENT
Start: 2018-09-09 | End: 2018-09-09

## 2018-09-09 RX ORDER — DOCUSATE SODIUM 100 MG/1
100 CAPSULE, LIQUID FILLED ORAL
COMMUNITY

## 2018-09-09 RX ORDER — NYSTATIN 100000 [USP'U]/G
POWDER TOPICAL
COMMUNITY

## 2018-09-09 RX ORDER — ACETAMINOPHEN 325 MG/1
650 TABLET ORAL
Status: DISCONTINUED | OUTPATIENT
Start: 2018-09-09 | End: 2018-09-13 | Stop reason: HOSPADM

## 2018-09-09 RX ORDER — AMLODIPINE BESYLATE 10 MG/1
5 TABLET ORAL DAILY
COMMUNITY

## 2018-09-09 RX ORDER — DIPHENHYDRAMINE HCL 25 MG
25 CAPSULE ORAL
Status: DISCONTINUED | OUTPATIENT
Start: 2018-09-09 | End: 2018-09-13 | Stop reason: HOSPADM

## 2018-09-09 RX ORDER — PRAVASTATIN SODIUM 20 MG/1
10 TABLET ORAL
Status: CANCELLED | OUTPATIENT
Start: 2018-09-09

## 2018-09-09 RX ORDER — ASPIRIN 81 MG/1
81 TABLET ORAL
COMMUNITY

## 2018-09-09 RX ORDER — SENNOSIDES 8.6 MG/1
1 TABLET ORAL
COMMUNITY

## 2018-09-09 RX ORDER — MAGNESIUM SULFATE 1 G/100ML
1 INJECTION INTRAVENOUS
Status: COMPLETED | OUTPATIENT
Start: 2018-09-09 | End: 2018-09-09

## 2018-09-09 RX ORDER — MAGNESIUM SULFATE 100 %
4 CRYSTALS MISCELLANEOUS AS NEEDED
Status: DISCONTINUED | OUTPATIENT
Start: 2018-09-09 | End: 2018-09-13 | Stop reason: HOSPADM

## 2018-09-09 RX ORDER — DEXTROSE 50 % IN WATER (D50W) INTRAVENOUS SYRINGE
25-50 AS NEEDED
Status: DISCONTINUED | OUTPATIENT
Start: 2018-09-09 | End: 2018-09-13 | Stop reason: HOSPADM

## 2018-09-09 RX ORDER — LOSARTAN POTASSIUM 25 MG/1
25 TABLET ORAL
COMMUNITY

## 2018-09-09 RX ORDER — SODIUM CHLORIDE 0.9 % (FLUSH) 0.9 %
5-10 SYRINGE (ML) INJECTION EVERY 8 HOURS
Status: DISCONTINUED | OUTPATIENT
Start: 2018-09-09 | End: 2018-09-13 | Stop reason: HOSPADM

## 2018-09-09 RX ORDER — MAGNESIUM SULFATE 1 G/100ML
1 INJECTION INTRAVENOUS
Status: COMPLETED | OUTPATIENT
Start: 2018-09-09 | End: 2018-09-10

## 2018-09-09 RX ORDER — ONDANSETRON 2 MG/ML
4 INJECTION INTRAMUSCULAR; INTRAVENOUS
Status: DISCONTINUED | OUTPATIENT
Start: 2018-09-09 | End: 2018-09-13 | Stop reason: HOSPADM

## 2018-09-09 RX ORDER — MELATONIN
2000
COMMUNITY
End: 2018-11-27

## 2018-09-09 RX ORDER — INSULIN LISPRO 100 [IU]/ML
INJECTION, SOLUTION INTRAVENOUS; SUBCUTANEOUS
Status: DISCONTINUED | OUTPATIENT
Start: 2018-09-10 | End: 2018-09-13 | Stop reason: HOSPADM

## 2018-09-09 RX ORDER — FUROSEMIDE 40 MG/1
20 TABLET ORAL
COMMUNITY
End: 2018-11-14

## 2018-09-09 RX ORDER — ONDANSETRON 2 MG/ML
4 INJECTION INTRAMUSCULAR; INTRAVENOUS
Status: COMPLETED | OUTPATIENT
Start: 2018-09-09 | End: 2018-09-09

## 2018-09-09 RX ADMIN — MAGNESIUM SULFATE HEPTAHYDRATE 1 G: 1 INJECTION, SOLUTION INTRAVENOUS at 23:20

## 2018-09-09 RX ADMIN — SODIUM CHLORIDE 1000 ML: 900 INJECTION, SOLUTION INTRAVENOUS at 18:26

## 2018-09-09 RX ADMIN — Medication 10 ML: at 23:25

## 2018-09-09 RX ADMIN — ACETAMINOPHEN 975 MG: 325 TABLET ORAL at 19:12

## 2018-09-09 RX ADMIN — MAGNESIUM SULFATE HEPTAHYDRATE 1 G: 1 INJECTION, SOLUTION INTRAVENOUS at 19:27

## 2018-09-09 RX ADMIN — ONDANSETRON 4 MG: 2 INJECTION INTRAMUSCULAR; INTRAVENOUS at 18:56

## 2018-09-09 RX ADMIN — WATER 1 G: 1 INJECTION INTRAMUSCULAR; INTRAVENOUS; SUBCUTANEOUS at 23:20

## 2018-09-09 RX ADMIN — SODIUM CHLORIDE 448 ML: 900 INJECTION, SOLUTION INTRAVENOUS at 19:07

## 2018-09-09 NOTE — IP AVS SNAPSHOT
Summary of Care Report The Summary of Care report has been created to help improve care coordination. Users with access to [employer] or [address] (Web-based application) may access additional patient information including the Discharge Summary. If you are not currently a [address] user and need more information, please call the number listed below in the [place] [address] section and ask to be connected with Medical Records. Facility Information Name Address Phone [address] [place] [phone] Patient Information Patient Name Sex MADIHA Reinoso. ([id number]) Male 1935 Discharge Information Admitting Provider Service Area Unit Charon Ormond, MD / [address] [place]  [phone] Discharge Provider Discharge Date/Time Discharge Disposition Destination (none) 2018 (Pending) SNF (none) Patient Language Language ENGLISH [13] Hospital Problems as of 2018  Reviewed: 2018  9:39 PM by Charon Ormond, MD  
  
  
  
 Class Noted - Resolved Last Modified POA Active Problems CAD (coronary artery disease)  Unknown - Present 2018 by Charon Ormond, MD Yes Entered by Que Villegas MD  
  Overview Signed 2012  9:24 AM by Que Villegas MD  
   Angina 2011, PCI unknown vessel at [place], no MI 
  
  
  HTN (hypertension)  Unknown - Present 2018 by Charon Ormond, MD Yes Entered by Que Villegas MD  
  Paroxysmal atrial fibrillation ([facility] [address]  2013 - Present 2018 by Charon Ormond, MD Yes Entered by Que Villegas MD  
  CKD (chronic kidney disease) stage 3, GFR 30-59 ml/min (Chronic)  12/10/2013 - Present 2018 by Charon Ormond, MD Yes   Entered by Katherine Bejarano MD  
 Kidney cancer, primary, 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 [facility])  11/2/2015 - Present 9/9/2018 by Emily Yates MD Yes Entered by Mary Rojas MD  
  Renal cell cancer [facility])  Unknown - Present 9/9/2018 by Emily Yates MD Yes Entered by Bess Rasmussen MD  
  Overview Signed 2/17/2016 11:50 AM by Bess Rasmussen MD  
   Stage 4 Renal Cell Cancer. Partial omentectomy 11/2/2015 with Dr. Ted Sosa: omental mass-metastatic renal cell carcinoma Cochlear implant in place  2/3/2017 - Present 9/9/2018 by Emily Yates MD Yes Entered by Lanelle Lennox Overview Signed 2/3/2017  9:25 AM by Lanelle Lennox  
   -- Cannot have MRI * (Principal)UTI (urinary tract infection)  9/9/2018 - Present 9/9/2018 by Emily Yates MD Yes Entered by Emily Yates MD  
  Arthritis  Unknown - Present 9/9/2018 by Emily Yates MD Yes Entered by Emily Yates MD  
  Dementia  Unknown - Present 9/9/2018 by Emily Yates MD Yes Entered by Emily Yates MD  
  DM type 2 causing renal disease [facility])  Unknown - Present 9/9/2018 by Emily Yates MD Yes Entered by Emily Yates MD  
  DM type 2 causing vascular disease [facility])  Unknown - Present 9/9/2018 by Emily Yates MD Yes Entered by Emily Yates MD  
  GERD (gastroesophageal reflux disease)  Unknown - Present 9/9/2018 by Emily Yates MD Yes Entered by Emily Yates MD  
  Hypothyroidism  Unknown - Present 9/9/2018 by Emily Yates MD Yes Entered by Emily Yates MD  
  SAMUEL (obstructive sleep apnea)  Unknown - Present 9/9/2018 by Emily Yates MD Yes Entered by Emily Yates MD  
  Overview Signed 9/9/2018  9:34 PM by Emily Yates MD  
   not using CPAP Prostate cancer [facility])  Unknown - Present 9/9/2018 by Emily Yates MD Yes   Entered by Emily Yates MD  
 Acute encephalopathy  9/9/2018 - Present 9/9/2018 by Aylin Mckeon MD Yes Entered by Aylin Mckeon MD  
  Fatigue  9/9/2018 - Present 9/9/2018 by Aylin Mckeon MD Yes Entered by Aylin Mckeon MD  
  Debilitated patient  9/9/2018 - Present 9/11/2018 by Ed Calvo MD Yes Entered by Aylin Mckeon MD  
  
Non-Hospital Problems as of 9/13/2018  Reviewed: 9/9/2018  9:39 PM by Aylin Mckeon MD  
 None You are allergic to the following Allergen Reactions Latex Rash Swelling Contact Dermatitis Morphine Other (comments) Hallucinations and Nausea Current Discharge Medication List  
  
START taking these medications Dose & Instructions Dispensing Information Comments  
 levoFLOXacin 750 mg tablet Commonly known as:  Ferguson Alva Start taking on:  9/15/2018 Notes to Patient:  Begin on 9/15/18. Dose:  750 mg Take 1 Tab by mouth every fourty-eight (48) hours for 5 doses. Quantity:  5 Tab Refills:  0 CONTINUE these medications which have CHANGED Dose & Instructions Dispensing Information Comments  
 insulin  unit/mL injection Commonly known as:  HumuLIN N NPH U-100 Insulin What changed:   
- how much to take - when to take this Dose:  10 Units 10 Units by SubCUTAneous route Before breakfast and dinner for 30 days. Quantity:  1 Vial  
Refills:  0 CONTINUE these medications which have NOT CHANGED Dose & Instructions Dispensing Information Comments  
 amLODIPine 10 mg tablet Commonly known as:  Dayan Aliya Dose:  5 mg Take 5 mg by mouth daily. Refills:  0  
   
 aspirin delayed-release 81 mg tablet Dose:  81 mg Take 81 mg by mouth nightly. Refills:  0  
   
 desonide 0.05 % cream  
Commonly known as:  Alis Quiñonez Apply  to affected area daily as needed for Skin Irritation. Refills:  0  
   
 docusate sodium 100 mg capsule Commonly known as:  Ky Roman  Dose:  200 mg  
 Take 200 mg by mouth daily. Refills:  0  
   
 ELIQUIS 2.5 mg tablet Generic drug:  apixaban Dose:  2.5 mg Take 2.5 mg by mouth two (2) times a day. Refills:  0  
   
 furosemide 40 mg tablet Commonly known as:  LASIX Dose:  20 mg Take 20 mg by mouth daily as needed (AS NEEDED for weight gain of 5 lbs or greater). Refills:  0  
   
 isosorbide mononitrate  mg CR tablet Commonly known as:  IMDUR Dose:  120 mg Take 1 Tab by mouth every morning. Quantity:  30 Tab Refills:  0  
   
 losartan 25 mg tablet Commonly known as:  COZAAR Dose:  25 mg Take 25 mg by mouth nightly. Refills:  0  
   
 melatonin 3 mg tablet Dose:  3 mg Take 3 mg by mouth nightly. Refills:  0 MIRALAX 17 gram packet Generic drug:  polyethylene glycol Dose:  8.5 g Take 8.5 g by mouth daily. Refills:  0  
   
 nitroglycerin 0.4 mg SL tablet Commonly known as:  NITROSTAT Dose:  0.4 mg  
0.4 mg by SubLINGual route every five (5) minutes as needed. Refills:  0  
   
 nystatin powder Commonly known as:  MYCOSTATIN Apply  to affected area daily. To groin after showering and drying Refills:  0  
   
 OTHER(NON-FORMULARY) Dose:  1 Cap Take 1 Cap by mouth nightly. Zafar May dietary supplement Refills:  0 PriLOSEC 20 mg capsule Generic drug:  omeprazole Dose:  20 mg Take 20 mg by mouth Daily (before breakfast). Refills:  0 REMERON 15 mg tablet Generic drug:  mirtazapine Dose:  7.5 mg Take 7.5 mg by mouth nightly. Refills:  0 Senna 8.6 mg tablet Generic drug:  senna Dose:  1 Tab Take 1 Tab by mouth daily as needed for Constipation. Refills:  0  
   
 VITAMIN D3 1,000 unit tablet Generic drug:  cholecalciferol Dose:  2000 Units Take 2,000 Units by mouth nightly. Refills:  0 Current Immunizations Name Date Influenza Vaccine 10/10/2013 Pneumococcal Vaccine (Unspecified Type) 9/10/2013 Follow-up Information Follow up With Details Comments Contact [address] Schedule an appointment as soon as possible for a visit to schedule a follow up review [place] [address] 
[phone] Chance Ornelas MD   Patient can only remember the practice name and not the physician [place] [address]   [address] [place] [address] [address] [place],[address] [address] [phone] Discharge Instructions HOSPITALIST DISCHARGE INSTRUCTIONS 
 
NAME:             Edi Miner. :  1935 MRN:  [id number] Date:     2018 ADMIT DATE: 2018 DISCHARGE DATE: 2018 PRINCIPAL ADMITTING DIAGNOSIS: 
UTI (urinary tract infection) DISCHARGE DIAGNOSES: 
Principal Problem: 
  UTI (urinary tract infection) (2018) CAD (coronary artery disease) HTN (hypertension) Paroxysmal atrial fibrillation ([address] (2013) CKD (chronic kidney disease) stage 3, GFR 30-59 ml/min (12/10/2013) Kidney cancer, primary, 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 [facility]) (2015) Renal cell cancer [facility]): Overview: Stage 4 Renal Cell Cancer. Partial omentectomy 2015 with Dr. Rl Wilson: omental mass-metastatic renal cell carcinoma Cochlear implant in place (2/3/2017): Overview: -- Cannot have MRI Arthritis Dementia DM type 2 causing renal disease ([address] DM type 2 causing vascular disease ([address] GERD (gastroesophageal reflux disease) Hypothyroidism SAMUEL (obstructive sleep apnea): Overview: not using CPAP Prostate cancer ([address] Fatigue (2018) Debilitated patient (2018) MEDICATIONS: 
 
· It is important that medications are taken exactly as they are prescribed on the discharge medication instructions and keep them your  in the bottles provided by the pharmacist.  
 · Keep a list of the medication names, dosages, and times to be taken at all times. · Do not take other medications without consulting your doctor. · Start taking the levofloxacin 9/14 Recommended diet:  Cardiac Diet and Diabetic Diet Recommended activity: Activity as tolerated Post discharge care: 
 
Notify follow up health care provider or return to the emergency department if you cannot get hold of your doctor if you feel worse or experience symptoms similar to those that brought you to hospital 
 
Follow-up Information Follow up With Details Comments Contact [address] Schedule an appointment as soon as possible for a visit to schedule a follow up review [place] [address] 
[phone] Information obtained by : 
I understand that if any problems occur once I am at home I am to contact my physician and I understand and acknowledge receipt of the instructions indicated above. Physician's or R.N.'s Signature                                                                  Date/Time Patient or Representative Signature                                                          Date/Time Chart Review Routing History Recipient Method Report Sent By Brain Bennie Acosta MD  
Phone: [phone] In Basket IP Auto Routed Hernandez Manning MD [[id number]] 7/17/2012 11:02 AM 07/17/2012 Nita Sher MD  
Fax: [phone] Phone: [phone] Fax Note Review Tavo Turcios MD [[id number]] 3/26/2014 10:38 PM 03/26/2014 Nita Sher MD  
Fax: [phone] Phone: [phone] Fax Note Review Marcin Tinajero MD [[id number]] 4/28/2014  2:10 PM 04/28/2014 Gopi Reagan MD  
Fax: [phone] Phone: [phone] Fax Note Review Marcin Tinajero MD [[id number]] 8/12/2015  9:28 PM 08/12/2015 Dr. Bishop Crump Fax: [phone] Fax Note Review Marcin Tinajero MD [[id number]] 8/12/2015  9:28 PM 08/12/2015 Gopi Reagan MD  
Fax: [phone] Phone: [phone] Fax Note Review Maximilian Kearns MD [[id number]] 9/30/2015  5:21 PM 09/30/2015 Evan Espinal LPN Phone: [phone] In Basket Note Review Maximilian Kearns MD [[id number]] 9/30/2015  5:21 PM 09/30/2015 Gopi Reagan MD  
Fax: [phone] Phone: [phone] Fax Note Review Maximilian Kearns MD [[id number]] 10/9/2015  6:33 PM 10/09/2015 Gopi Reagan MD  
Fax: [phone] Phone: [phone] Fax Note Review Marcin Tinajero MD [[id number]] 10/27/2015 10:17 PM 10/27/2015 Gopi Reagan MD  
Fax: [phone] Phone: [phone] Fax Note Review Maximilian Kearns MD [[id number]] 11/24/2015  5:13 PM 11/24/2015 Gopi Reagan MD  
Fax: [phone] Phone: [phone] Fax Note Review Maximilian Kearns MD [[id number]] 12/30/2015  5:16 PM 12/30/2015 Gopi Reagan MD  
Fax: [phone] Phone: [phone] Fax Note Review Maximilian Kearns MD [[id number]] 3/24/2016  3:57 PM 03/24/2016 Gopi Reagan MD  
Fax: [phone] Phone: [phone] Fax Note Review Maximilian Kearns MD [[id number]] 6/17/2016 10:29 AM 06/17/2016

## 2018-09-09 NOTE — ED TRIAGE NOTES
Pt arrives from home for confusion, weakness, and left sided back pain. Symptoms started this morning. Pt has decreased urine output & febrile at home PTA.

## 2018-09-09 NOTE — ED PROVIDER NOTES
HPI Comments: 80 y.o. male with past medical history significant for HTN, DM, Hypothyroidism, SAMUEL, PAF, TIA, CVA,  LBBB, CAD, CKD, Prostate CA, Arthritis, and GERD who presents from home via EMS with chief complaint of AMS. Pt's daughter reports pt's wife woke pt up at 0930 this morning for his meds, and pt appeared more fatigued, \"falling back into bed\". Pt's daughter reports that when she went to see pt at 1300 this afternoon, he was still in bed and when he attempted to get up, he \"fell back again\". Pt's daughter reports that pt was complaining of generalized myalgia and nausea. Pt's daughter reports pt's BGL was 189 at home, and that pt had fever of 100.8. Pt's daughter and wife gave pt insulin and fluids PTA. Pt's wife also notes pt has had cough. Wife notes decreased po intake. Pt's daughter denies vomiting. He has a hx outi in the past with a hx of renal cell ca. There are no other acute medical concerns at this time. Full history, physical exam, and ROS unable to be obtained due to:  Mental status change. Social hx: Former smoker (Quit:12/3/1975; 0.1 pcks/day for 23 years); No EtOH use Note written by Ana Luisa Hoover, as dictated by Becca Gonzalez PA-C 6:00 PM 
 
 
 
The history is provided by the spouse and a relative (daughter). Past Medical History:  
Diagnosis Date  Arthritis  CAD (coronary artery disease) Angina Jan 2011, PCI LAD with multilink stent 4.0x12mm;  also cath at [place] 7/13 -- no stents at that time  CKD (chronic kidney disease), stage III  Cochlear implant in place 2/3/2017  Dementia  DM type 2 causing renal disease ([facility] [address]  DM type 2 causing vascular disease ([address]  GERD (gastroesophageal reflux disease)  HTN (hypertension)  Hypothyroidism  SAMUEL (obstructive sleep apnea)   
 not using CPAP  
 PAF (paroxysmal atrial fibrillation) ([address] [id number] score = 7  
 Prostate cancer ([address]  Renal cell cancer ([address] Stage 4 Renal Cell Cancer. Partial omentectomy 11/2/2015 with Dr. Murray Esters: omental mass-metastatic renal cell carcinoma  Renal mass, left [address] LEFT PARTIAL NEPHRECTOMY 12/19/13;   
 
 
Past Surgical History:  
Procedure Laterality Date  CARDIAC SURG PROCEDURE UNLIST  1/2011  
 stent to LAD  HX CHOLECYSTECTOMY  7/13/2012  HX CORONARY STENT PLACEMENT  2011 [address]  2011, 2013 [address]  
 stapedectomy  HX HERNIA REPAIR  1968  
 left inguinal  
 HX LITHOTRIPSY  8/2015  HX ORTHOPAEDIC  1994  
 rotator cuff repair  HX ORTHOPAEDIC  1994  
 cervical disc removed  HX PROSTATECTOMY  2000  
 surgery intervention [address] [employer]  HX UROLOGICAL  2001  
 valvular implant prevent incontinence  HX UROLOGICAL  2003  
 insert gel to help with incontinence  HX UROLOGICAL  12/2013  
 left partial nephrectomy  HX UROLOGICAL  9/2015 CT guided biopsy of abdominal lymph nodes Family History:  
Problem Relation Age of Onset  Stroke Father  Cancer Father   
  prostate  Diabetes Sister 2 sisters  Hypertension Sister 2 sisters  Diabetes Brother  Stroke Brother  Diabetes Brother   
  all brothers  Stroke Brother  Kidney Disease Brother  Heart Attack Brother  Hypertension Other Social History Social History  Marital status:  Spouse name: N/A  
 Number of children: N/A  
 Years of education: N/A Occupational History  Not on file. Social History Main Topics  Smoking status: Former Smoker Packs/day: 0.10 Years: 23.00 Quit date: 12/3/1975  Smokeless tobacco: Never Used  Alcohol use No  
 Drug use: No  
 Sexual activity: No  
 
Other Topics Concern  Not on file Social History Narrative ALLERGIES: Latex and Morphine Review of Systems Unable to perform ROS: Mental status change Patient Vitals for the past 12 hrs: Temp Pulse Resp BP SpO2  
09/09/18 2229 97.8 °F (36.6 °C) 79 20 152/62 95 % 09/09/18 2200 - 75 19 125/56 94 % 09/09/18 2145 - 74 22 119/54 94 % 09/09/18 2130 - 71 18 116/51 93 % 09/09/18 2115 - 73 19 122/50 94 % 09/09/18 2045 - 78 18 112/57 95 % 09/09/18 2030 - - - 120/60 -  
09/09/18 2015 - 73 20 116/56 95 % 09/09/18 2000 - 74 18 130/55 97 % 09/09/18 1930 - 71 17 129/65 99 % 09/09/18 1927 - 72 - 137/60 -  
09/09/18 1900 - 73 17 130/58 99 % 09/09/18 1845 - 71 20 114/57 98 % 09/09/18 1832 100.4 °F (38 °C) - - - -  
09/09/18 1830 - 72 19 113/58 95 % 09/09/18 1815 - 74 19 121/59 95 % 09/09/18 1800 - 78 23 117/61 95 % 09/09/18 1757 98.2 °F (36.8 °C) 80 14 116/69 98 % Physical Exam  
Constitutional: He appears well-developed and well-nourished. No distress. Ill appearing male HENT:  
Head: Normocephalic and atraumatic. Right Ear: External ear normal.  
Left Ear: External ear normal.  
Nose: Nose normal.  
Face flushed, mm dry Eyes: EOM are normal. Pupils are equal, round, and reactive to light. Neck: Neck supple. Cardiovascular: Normal rate, regular rhythm, normal heart sounds and intact distal pulses. Exam reveals no gallop and no friction rub. No murmur heard. Pulmonary/Chest: Effort normal and breath sounds normal. No stridor. No respiratory distress. He has no wheezes. He has no rales. He exhibits no tenderness. Abdominal: Soft. Bowel sounds are normal. He exhibits no distension and no mass. There is tenderness. There is no rebound and no guarding. Mild diffuse abd ttp without rebounding or guarding. No CVAT Musculoskeletal: Normal range of motion. He exhibits no edema, tenderness or deformity. Neurological: No cranial nerve deficit. Coordination normal.  
Awake, oriented to person and place, not time Skin: No rash noted. No erythema. No pallor. Psychiatric: He has a normal mood and affect.  His behavior is normal.  
 Nursing note and vitals reviewed. MDM Number of Diagnoses or Management Options Acute febrile illness:  
Acute UTI:  
Encephalopathy acute:  
  
Amount and/or Complexity of Data Reviewed Clinical lab tests: ordered and reviewed Tests in the radiology section of CPT®: ordered and reviewed Tests in the medicine section of CPT®: reviewed and ordered Obtain history from someone other than the patient: yes (Daughter and wife) Review and summarize past medical records: yes Independent visualization of images, tracings, or specimens: yes Patient Progress Patient progress: stable ED Course Procedures 6:09 PM 
Discussed pt, sx, hx and current findings with Caitlyn Jackson MD. She is in agreement with plan and will see pt. Will get sepsis labs. [place]. FRANKIE Rand 
 
ED EKG interpretation: 6:23 PM 
Rhythm: normal sinus rhythm and 1st degree block. LBBB; and regular . Rate (approx.): 74; Axis: left axis deviation; P wave: normal; QRS interval: normal ; ST/T wave: normal; Other findings: abnormal ekg. This EKG was interpreted by Caitlyn Jackson MD,ED Provider. 7:55 PM  
 updated pt's family on current findings. Awaiting ct 
[place]. FRANKIE Rand 
 
;9:29 PM 
[place]. FRANKIE Rand spoke with Dr. Roberto Hodge, Consult for Hospitalist. Discussed available diagnostic tests and clinical findings. He is in agreement with care plans as outlined. He will admit MALDONADO Longo 
 
LABS COMPLETED AND REVIEWED: 
Recent Results (from the past 12 hour(s)) LACTIC ACID Collection Time: 09/09/18  6:16 PM  
Result Value Ref Range Lactic acid 1.4 0.4 - 2.0 MMOL/L  
METABOLIC PANEL, COMPREHENSIVE Collection Time: 09/09/18  6:16 PM  
Result Value Ref Range Sodium 131 (L) 136 - 145 mmol/L Potassium 4.5 3.5 - 5.1 mmol/L Chloride 101 97 - 108 mmol/L  
 CO2 23 21 - 32 mmol/L Anion gap 7 5 - 15 mmol/L Glucose 212 (H) 65 - 100 mg/dL  BUN 19 6 - 20 MG/DL  
 Creatinine 1.86 (H) 0.70 - 1.30 MG/DL  
 BUN/Creatinine ratio 10 (L) 12 - 20 GFR est AA 42 (L) >60 ml/min/1.73m2 GFR est non-AA 35 (L) >60 ml/min/1.73m2 Calcium 10.2 (H) 8.5 - 10.1 MG/DL Bilirubin, total 3.9 (H) 0.2 - 1.0 MG/DL  
 ALT (SGPT) 89 (H) 12 - 78 U/L  
 AST (SGOT) 79 (H) 15 - 37 U/L Alk. phosphatase 246 (H) 45 - 117 U/L Protein, total 5.9 (L) 6.4 - 8.2 g/dL Albumin 2.6 (L) 3.5 - 5.0 g/dL Globulin 3.3 2.0 - 4.0 g/dL A-G Ratio 0.8 (L) 1.1 - 2.2    
CBC WITH AUTOMATED DIFF Collection Time: 09/09/18  6:16 PM  
Result Value Ref Range WBC 10.8 4.1 - 11.1 K/uL  
 RBC 4.57 4.10 - 5.70 M/uL  
 HGB 13.1 12.1 - 17.0 g/dL HCT 40.6 36.6 - 50.3 % MCV 88.8 80.0 - 99.0 FL  
 MCH 28.7 26.0 - 34.0 PG  
 MCHC 32.3 30.0 - 36.5 g/dL  
 RDW 12.8 11.5 - 14.5 % PLATELET 361 080 - 866 K/uL MPV 9.6 8.9 - 12.9 FL  
 NRBC 0.0 0  WBC ABSOLUTE NRBC 0.00 0.00 - 0.01 K/uL NEUTROPHILS 88 (H) 32 - 75 % LYMPHOCYTES 4 (L) 12 - 49 % MONOCYTES 7 5 - 13 % EOSINOPHILS 0 0 - 7 % BASOPHILS 0 0 - 1 % IMMATURE GRANULOCYTES 1 (H) 0.0 - 0.5 % ABS. NEUTROPHILS 9.5 (H) 1.8 - 8.0 K/UL  
 ABS. LYMPHOCYTES 0.4 (L) 0.8 - 3.5 K/UL  
 ABS. MONOCYTES 0.8 0.0 - 1.0 K/UL  
 ABS. EOSINOPHILS 0.0 0.0 - 0.4 K/UL  
 ABS. BASOPHILS 0.0 0.0 - 0.1 K/UL  
 ABS. IMM. GRANS. 0.1 (H) 0.00 - 0.04 K/UL  
 DF SMEAR SCANNED    
 RBC COMMENTS NORMOCYTIC, NORMOCHROMIC    
TROPONIN I Collection Time: 09/09/18  6:16 PM  
Result Value Ref Range Troponin-I, Qt. <0.05 <0.05 ng/mL MAGNESIUM Collection Time: 09/09/18  6:16 PM  
Result Value Ref Range Magnesium 1.5 (L) 1.6 - 2.4 mg/dL NT-PRO BNP Collection Time: 09/09/18  6:16 PM  
Result Value Ref Range NT pro-BNP 1171 (H) 0 - 450 PG/ML  
EKG, 12 LEAD, INITIAL Collection Time: 09/09/18  6:23 PM  
Result Value Ref Range Ventricular Rate 74 BPM  
 Atrial Rate 74 BPM  
 P-R Interval 264 ms QRS Duration 152 ms Q-T Interval 442 ms QTC Calculation (Bezet) 490 ms Calculated P Axis 36 degrees Calculated R Axis -38 degrees Calculated T Axis 110 degrees Diagnosis Sinus rhythm with 1st degree AV block Left axis deviation Left bundle branch block Abnormal ECG When compared with ECG of 25-AUG-2017 22:08, No significant change was found GLUCOSE, POC Collection Time: 09/09/18  7:06 PM  
Result Value Ref Range Glucose (POC) 191 (H) 65 - 100 mg/dL Performed by Lulla Session W/MICROSCOPIC Collection Time: 09/09/18  8:25 PM  
Result Value Ref Range Color DARK YELLOW Appearance CLEAR CLEAR Specific gravity 1.010 1.003 - 1.030    
 pH (UA) 6.0 5.0 - 8.0 Protein NEGATIVE  NEG mg/dL Glucose NEGATIVE  NEG mg/dL Ketone NEGATIVE  NEG mg/dL Blood TRACE (A) NEG Urobilinogen 1.0 0.2 - 1.0 EU/dL Nitrites POSITIVE (A) NEG Leukocyte Esterase MODERATE (A) NEG    
 WBC 20-50 0 - 4 /hpf  
 RBC 0-5 0 - 5 /hpf Epithelial cells FEW FEW /lpf Bacteria 4+ (A) NEG /hpf Hyaline cast 0-2 0 - 5 /lpf URINE CULTURE HOLD SAMPLE Collection Time: 09/09/18  8:25 PM  
Result Value Ref Range Urine culture hold URINE ON HOLD IN MICROBIOLOGY DEPT FOR 3 DAYS. IF UNPRESERVED URINE IS SUBMITTED, IT CANNOT BE USED FOR ADDITIONAL TESTING AFTER 24 HRS, RECOLLECTION WILL BE REQUIRED. BILIRUBIN, CONFIRM Collection Time: 09/09/18  8:25 PM  
Result Value Ref Range Bilirubin UA, confirm POSITIVE (A) NEG    
 
 
IMAGING COMPLETED AND REVIEWED: 
The following have been ordered and reviewed: 
 
Ct Abd Pelv Wo Cont Result Date: 9/9/2018 EXAM:  CT ABD PELV WO CONT INDICATION: fever, nausea, elevated lft, nausea COMPARISON: Chest x-ray same day and August 6, 2015 CONTRAST:  None. TECHNIQUE: Thin axial images were obtained through the abdomen and pelvis. Coronal and sagittal reconstructions were generated.  Oral contrast was not administered. CT dose reduction was achieved through use of a standardized protocol tailored for this examination and automatic exposure control for dose modulation. The absence of intravenous contrast material reduces the sensitivity for evaluation of the solid parenchymal organs of the abdomen. FINDINGS: LUNG BASES: Interstitial changes are noted at the periphery at the lower lung bases. There is no honeycombing. INCIDENTALLY IMAGED HEART AND MEDIASTINUM: There are coronary artery calcifications. Heart size is normal LIVER: No mass or biliary dilatation. GALLBLADDER: Surgically absent SPLEEN: No mass. PANCREAS: No mass or ductal dilatation. ADRENALS: Unremarkable. KIDNEYS/URETERS: No hydronephrosis. Low-density lesion left kidney is indeterminate and measures 4.2 cm. It may represent a cyst. No hydronephrosis. Postsurgical changes are noted in the inferior margin of the left kidney STOMACH: Unremarkable. SMALL BOWEL: No dilatation or wall thickening. COLON: No dilatation or wall thickening. APPENDIX: Unremarkable. PERITONEUM: No ascites or pneumoperitoneum. Previously noted peritoneal implant lateral to the descending colon is not visualized. There is punctate calcification in this region RETROPERITONEUM: Aorta is atherosclerotic. No pathologic adenopathy REPRODUCTIVE ORGANS: Surgically absent. A reservoir from a penile implant is in position in the left abdomen URINARY BLADDER: No mass or calculus. BONES: No destructive bone lesion. ADDITIONAL COMMENTS: N/A IMPRESSION: No acute abnormality Xr Chest [facility] Result Date: 9/9/2018 INDICATION:  meets SIRS criteria Exam: Portable chest 1827. Comparison: 8/25/2017. Findings: Cardiomediastinal silhouette is within normal limits. Pulmonary vasculature is not engorged. Interstitial changes at the lung bases persist. Lung lines are decreased but no new airspace disease, pneumothorax or effusion Impression: 1. No change, no acute disease MEDICATIONS GIVEN: 
Medications  
sodium chloride (NS) flush 5-10 mL (not administered)  
cefTRIAXone (ROCEPHIN) 1 g in 0.9% sodium chloride (MBP/ADV) 50 mL (not administered)  
apixaban (ELIQUIS) tablet 2.5 mg (not administered) aspirin chewable tablet 81 mg (not administered)  
insulin NPH (NOVOLIN N, HUMULIN N) injection 10 Units (not administered)  
isosorbide mononitrate ER (IMDUR) tablet 120 mg (not administered)  
losartan (COZAAR) tablet 25 mg (not administered)  
mirtazapine (REMERON) tablet 7.5 mg (0 mg Oral Held 9/10/18 0103) omeprazole (PRILOSEC) capsule 20 mg (not administered)  
sodium chloride (NS) flush 5-10 mL (10 mL IntraVENous Given 9/9/18 2325)  
sodium chloride (NS) flush 5-10 mL (not administered) glucose chewable tablet 16 g (not administered) dextrose (D50W) injection syrg 12.5-25 g (not administered) glucagon (GLUCAGEN) injection 1 mg (not administered)  
acetaminophen (TYLENOL) tablet 650 mg (not administered) diphenhydrAMINE (BENADRYL) capsule 25 mg (not administered)  
ondansetron (ZOFRAN) injection 4 mg (not administered)  
insulin lispro (HUMALOG) injection (not administered) amLODIPine (NORVASC) tablet 5 mg (not administered)  
sodium chloride 0.9 % bolus infusion 1,000 mL (0 mL IntraVENous IV Completed 9/9/18 2151) Followed by  
sodium chloride 0.9 % bolus infusion 1,000 mL (0 mL IntraVENous IV Completed 9/9/18 2151) Followed by  
sodium chloride 0.9 % bolus infusion 448 mL (0 mL IntraVENous IV Completed 9/9/18 2151) ondansetron [facility]) injection 4 mg (4 mg IntraVENous Given 9/9/18 1856)  
acetaminophen (TYLENOL) tablet 975 mg (975 mg Oral Given 9/9/18 1912)  
magnesium sulfate 1 g/100 ml IVPB (premix or compounded) (0 g IntraVENous IV Completed 9/9/18 2105) cefTRIAXone (ROCEPHIN) 1 g in sterile water (preservative free) 10 mL IV syringe (1 g IntraVENous Given 9/9/18 7756)  
magnesium sulfate 1 g/100 ml IVPB (premix or compounded) (1 g IntraVENous New Bag 9/10/18 0029) CLINICAL IMPRESSION: 
1. Acute UTI 2. Acute febrile illness 3. Encephalopathy acute 4. SAMUEL (obstructive sleep apnea) 5. PAF (paroxysmal atrial fibrillation) ([facility] [address] 6. Renal mass, left Plan 1. Admission per Hospitalist 
 
 
9:29 PM 
The patient is being admitted to the hospital.  The results of their tests and reasons for their admission have been discussed with them and/or available family. The patient/family has conveyed agreement and understanding for the need to be admitted and for their admission diagnosis. Consultation has been made with the inpatient physician specialist for hospitalization.

## 2018-09-09 NOTE — IP AVS SNAPSHOT
[address] [facility] 
 
 
 [address] 
[phone] Patient: Toby Overton. MRN: [id number] [id number] About your hospitalization You were admitted on:  September 9, 2018 You last received care in the:  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 
[phone] Chance Ornelas MD   Patient can only remember the practice name and not the physician [place] [address]   [address] [address] [phone] Discharge Orders None A check dulce indicates which time of day the medication should be taken. My Medications START taking these medications Instructions Each Dose to Equal  
 Morning Noon Evening Bedtime  
 levoFLOXacin 750 mg tablet Commonly known as:  Gloria Rater Start taking on:  9/15/2018 Notes to Patient:  Begin on 9/15/18. Take 1 Tab by mouth every fourty-eight (48) hours for 5 doses.   
 750 mg  
    
   
   
   
  
  
 CHANGE how you take these medications Instructions Each Dose to Equal  
 Morning Noon Evening Bedtime  
 insulin  unit/mL injection Commonly known as:  HumuLIN N NPH U-100 Insulin What changed:   
- how much to take - when to take this 10 Units by SubCUTAneous route Before breakfast and dinner for 30 days. 10 Units CONTINUE taking these medications Instructions Each Dose to Equal  
 Morning Noon Evening Bedtime  
 amLODIPine 10 mg tablet Commonly known as:  Arlyss Ksenia Take 5 mg by mouth daily. 5 mg  
    
  
   
   
   
  
 aspirin delayed-release 81 mg tablet Take 81 mg by mouth nightly. 81 mg  
    
   
   
   
  
  
 desonide 0.05 % cream  
Commonly known as:  Corby Mulch Apply  to affected area daily as needed for Skin Irritation. docusate sodium 100 mg capsule Commonly known as:  Vermell Nones Take 200 mg by mouth daily. 200 mg  
    
  
   
   
   
  
 ELIQUIS 2.5 mg tablet Generic drug:  apixaban Take 2.5 mg by mouth two (2) times a day. 2.5 mg  
    
  
   
   
  
   
  
 furosemide 40 mg tablet Commonly known as:  LASIX Take 20 mg by mouth daily as needed (AS NEEDED for weight gain of 5 lbs or greater). 20 mg  
    
   
   
   
  
 isosorbide mononitrate  mg CR tablet Commonly known as:  IMDUR Take 1 Tab by mouth every morning. 120 mg  
    
  
   
   
   
  
 losartan 25 mg tablet Commonly known as:  COZAAR Take 25 mg by mouth nightly. 25 mg  
    
   
   
   
  
  
 melatonin 3 mg tablet Take 3 mg by mouth nightly. 3 mg MIRALAX 17 gram packet Generic drug:  polyethylene glycol Take 8.5 g by mouth daily. 8.5 g  
    
  
   
   
   
  
 nitroglycerin 0.4 mg SL tablet Commonly known as:  NITROSTAT  
   
 0.4 mg by SubLINGual route every five (5) minutes as needed.   
 0.4 mg  
    
   
 nystatin powder Commonly known as:  MYCOSTATIN Apply  to affected area daily. To groin after showering and drying OTHER(NON-FORMULARY) Take 1 Cap by mouth nightly. Zafar May dietary supplement 1 Cap PriLOSEC 20 mg capsule Generic drug:  omeprazole Take 20 mg by mouth Daily (before breakfast). 20 mg  
    
  
   
   
   
  
 REMERON 15 mg tablet Generic drug:  mirtazapine Take 7.5 mg by mouth nightly. 7.5 mg Senna 8.6 mg tablet Generic drug:  senna Take 1 Tab by mouth daily as needed for Constipation. 1 Tab VITAMIN D3 1,000 unit tablet Generic drug:  cholecalciferol Take 2,000 Units by mouth nightly. 2000 Units Where to Get Your Medications Information on where to get these meds will be given to you by the nurse or doctor. ! Ask your nurse or doctor about these medications  
  insulin  unit/mL injection  
 levoFLOXacin 750 mg tablet Discharge Instructions HOSPITALIST DISCHARGE INSTRUCTIONS 
 
NAME:             Ran Manley. :  1935 MRN:  [id number] Date:     2018 ADMIT DATE: 2018 DISCHARGE DATE: 2018 PRINCIPAL ADMITTING DIAGNOSIS: 
UTI (urinary tract infection) DISCHARGE DIAGNOSES: 
Principal Problem: 
  UTI (urinary tract infection) (2018) CAD (coronary artery disease) HTN (hypertension) Paroxysmal atrial fibrillation ([facility] [address] (2013) CKD (chronic kidney disease) stage 3, GFR 30-59 ml/min (12/10/2013) Kidney cancer, primary, 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 [facility]) (2015) Renal cell cancer [facility]): Overview: Stage 4 Renal Cell Cancer. Partial omentectomy 2015 with Dr. Tisha Viera: omental mass-metastatic renal cell carcinoma Cochlear implant in place (2/3/2017): Overview: -- Cannot have MRI Arthritis Dementia DM type 2 causing renal disease ([facility] DM type 2 causing vascular disease ([facility] GERD (gastroesophageal reflux disease) Hypothyroidism SAMUEL (obstructive sleep apnea): Overview: not using CPAP Prostate cancer ([facility] Fatigue (9/9/2018) Debilitated patient (9/9/2018) MEDICATIONS: 
 
· It is important that medications are taken exactly as they are prescribed on the discharge medication instructions and keep them your  in the bottles provided by the pharmacist.  
· Keep a list of the medication names, dosages, and times to be taken at all times. · Do not take other medications without consulting your doctor. · Start taking the levofloxacin 9/14 Recommended diet:  Cardiac Diet and Diabetic Diet Recommended activity: Activity as tolerated Post discharge care: 
 
Notify follow up health care provider or return to the emergency department if you cannot get hold of your doctor if you feel worse or experience symptoms similar to those that brought you to hospital 
 
Follow-up Information Follow up With Details Comments Contact [address] Schedule an appointment as soon as possible for a visit to schedule a follow up review [address] [place] [address] 
[phone] Information obtained by : 
I understand that if any problems occur once I am at home I am to contact my physician and I understand and acknowledge receipt of the instructions indicated above. Physician's or R.N.'s Signature                                                                  Date/Time Patient or Representative Signature                                                          Date/Time 
 
ACO Transitions of Care Introducing [facility] offers a voluntary care coordination program to provide high quality service and care to [facility] fee-for-service beneficiaries. Susan Doshi was designed to help you enhance your health and well-being through the following services: ? Transitions of Care  support for individuals who are transitioning from one care setting to another (example: Hospital to home). ? Chronic and Complex Care Coordination  support for individuals and caregivers of those with serious or chronic illnesses or with more than one chronic (ongoing) condition and those who take a number of different medications. If you meet specific medical criteria, a [address] [place] [address] Rd may call you directly to coordinate your care with your primary care physician and your other care providers. For questions about the [facility] programs, please, contact your physicians office. For general questions or additional information about Accountable Care Organizations: 
Please visit www.medicare.gov/acos. html or call 1-800-MEDICARE (2-689.733.7489) TTY users should call 4-487.137.8948. [employer] Announcement We are excited to announce that we are making your provider's discharge notes available to you in [employer]. You will see these notes when they are completed and signed by the physician that discharged you from your recent hospital stay. If you have any questions or concerns about any information you see in [employer], please call the Health Information Department where you were seen or reach out to your Primary Care Provider for more information about your plan of care. Introducing [facility]! Dear Virginia Anderson: Thank you for requesting a [employer] account. Our records indicate that you already have an active [employer] account. You can access your account anytime at [email or web address] Did you know that you can access your hospital and ER discharge instructions at any time in [employer]? You can also review all of your test results from your hospital stay or ER visit. Additional Information If you have questions, please visit the Frequently Asked Questions section of the [employer] website at [email or web address] Remember, [employer] is NOT to be used for urgent needs. For medical emergencies, dial 911. Now available from your iPhone and Android! Introducing William Brand As a Sasha Willams patient, I wanted to make you aware of our electronic visit tool called William [facility]. Sasha Willams 24/7 allows you to connect within minutes with a medical provider 24 hours a day, seven days a week via a mobile device or tablet or logging into a secure website from your computer. You can access William Brand from anywhere in the [place]. A virtual visit might be right for you when you have a simple condition and feel like you just dont want to get out of bed, or cant get away from work for an appointment, when your regular Sasha Willams provider is not available (evenings, weekends or holidays), or when youre out of town and need minor care. Electronic visits cost only $49 and if the Sasha Willams 24/7 provider determines a prescription is needed to treat your condition, one can be electronically transmitted to a nearby pharmacy*. Please take a moment to enroll today if you have not already done so. The enrollment process is free and takes just a few minutes. To enroll, please download the Sasha Willams 24/7 wilfrido to your tablet or phone, or visit [email or web address] to enroll on your computer. And, as an [address] [place] [address] patient with a [employer] account, the results of your visits will be scanned into your electronic medical record and your primary care provider will be able to view the scanned results. We urge you to continue to see your regular [facility] provider for your ongoing medical care. And while your primary care provider may not be the one available when you seek a William Noriegafin virtual visit, the peace of mind you get from getting a real diagnosis real time can be priceless. For more information on William LoopItnickfin, view our Frequently Asked Questions (FAQs) at [email or web address]. Sincerely, 
 
Gold Hansen MD 
Chief Medical Officer Jd Nails *:  certain medications cannot be prescribed via CRV Unresulted Labs-Please follow up with your PCP about these lab tests Order Current Status CULTURE, BLOOD Preliminary result CULTURE, BLOOD Preliminary result CULTURE, BLOOD Preliminary result CULTURE, URINE Preliminary result Providers Seen During Your Hospitalization Provider Specialty Primary office phone Lois Neil MD Emergency Medicine [phone] Itzel Wood MD Internal Medicine [phone] Placido Lesch, MD Internal Medicine [phone] Your Primary Care Physician (PCP) Primary Care Physician Office Phone Office Fax OTHER, PHYS ** None ** ** None ** You are allergic to the following Allergen Reactions Latex Rash Swelling Contact Dermatitis Morphine Other (comments) Hallucinations and Nausea Recent Documentation Height Weight BMI Smoking Status 1.702 m 81.6 kg 28.19 kg/m2 Former Smoker Emergency Contacts Name Discharge Info Relation Home Work Mobile Najma Hernandez DISCHARGE CAREGIVER [3] Spouse [3] [phone] Patient Belongings The following personal items are in your possession at time of discharge: 
  Dental Appliances: None         Home Medications: None   Jewelry: Ring  Clothing: None    Other Valuables: None Please provide this summary of care documentation to your next provider. Signatures-by signing, you are acknowledging that this After Visit Summary has been reviewed with you and you have received a copy. Patient Signature:  ____________________________________________________________ Date:  ____________________________________________________________  
  
Tez Sport Provider Signature:  ____________________________________________________________ Date:  ____________________________________________________________

## 2018-09-10 LAB
ANION GAP SERPL CALC-SCNC: 10 MMOL/L (ref 5–15)
ATRIAL RATE: 74 BPM
BUN SERPL-MCNC: 17 MG/DL (ref 6–20)
BUN/CREAT SERPL: 10 (ref 12–20)
CALCIUM SERPL-MCNC: 10.3 MG/DL (ref 8.5–10.1)
CALCULATED P AXIS, ECG09: 36 DEGREES
CALCULATED R AXIS, ECG10: -38 DEGREES
CALCULATED T AXIS, ECG11: 110 DEGREES
CHLORIDE SERPL-SCNC: 105 MMOL/L (ref 97–108)
CO2 SERPL-SCNC: 23 MMOL/L (ref 21–32)
CREAT SERPL-MCNC: 1.73 MG/DL (ref 0.7–1.3)
DIAGNOSIS, 93000: NORMAL
ERYTHROCYTE [DISTWIDTH] IN BLOOD BY AUTOMATED COUNT: 13.1 % (ref 11.5–14.5)
EST. AVERAGE GLUCOSE BLD GHB EST-MCNC: 151 MG/DL
GLUCOSE BLD STRIP.AUTO-MCNC: 116 MG/DL (ref 65–100)
GLUCOSE BLD STRIP.AUTO-MCNC: 128 MG/DL (ref 65–100)
GLUCOSE BLD STRIP.AUTO-MCNC: 137 MG/DL (ref 65–100)
GLUCOSE BLD STRIP.AUTO-MCNC: 158 MG/DL (ref 65–100)
GLUCOSE SERPL-MCNC: 145 MG/DL (ref 65–100)
HBA1C MFR BLD: 6.9 % (ref 4.2–6.3)
HCT VFR BLD AUTO: 40 % (ref 36.6–50.3)
HGB BLD-MCNC: 12.6 G/DL (ref 12.1–17)
MAGNESIUM SERPL-MCNC: 2.4 MG/DL (ref 1.6–2.4)
MCH RBC QN AUTO: 28.1 PG (ref 26–34)
MCHC RBC AUTO-ENTMCNC: 31.5 G/DL (ref 30–36.5)
MCV RBC AUTO: 89.3 FL (ref 80–99)
NRBC # BLD: 0 K/UL (ref 0–0.01)
NRBC BLD-RTO: 0 PER 100 WBC
P-R INTERVAL, ECG05: 264 MS
PLATELET # BLD AUTO: 103 K/UL (ref 150–400)
PMV BLD AUTO: 10 FL (ref 8.9–12.9)
POTASSIUM SERPL-SCNC: 4.4 MMOL/L (ref 3.5–5.1)
Q-T INTERVAL, ECG07: 442 MS
QRS DURATION, ECG06: 152 MS
QTC CALCULATION (BEZET), ECG08: 490 MS
RBC # BLD AUTO: 4.48 M/UL (ref 4.1–5.7)
SERVICE CMNT-IMP: ABNORMAL
SODIUM SERPL-SCNC: 138 MMOL/L (ref 136–145)
VENTRICULAR RATE, ECG03: 74 BPM
WBC # BLD AUTO: 6.3 K/UL (ref 4.1–11.1)

## 2018-09-10 PROCEDURE — 97165 OT EVAL LOW COMPLEX 30 MIN: CPT

## 2018-09-10 PROCEDURE — 74011000258 HC RX REV CODE- 258: Performed by: INTERNAL MEDICINE

## 2018-09-10 PROCEDURE — C1758 CATHETER, URETERAL: HCPCS

## 2018-09-10 PROCEDURE — 74011250637 HC RX REV CODE- 250/637: Performed by: INTERNAL MEDICINE

## 2018-09-10 PROCEDURE — 80048 BASIC METABOLIC PNL TOTAL CA: CPT | Performed by: INTERNAL MEDICINE

## 2018-09-10 PROCEDURE — 97116 GAIT TRAINING THERAPY: CPT

## 2018-09-10 PROCEDURE — 85027 COMPLETE CBC AUTOMATED: CPT | Performed by: INTERNAL MEDICINE

## 2018-09-10 PROCEDURE — 97162 PT EVAL MOD COMPLEX 30 MIN: CPT

## 2018-09-10 PROCEDURE — 97535 SELF CARE MNGMENT TRAINING: CPT

## 2018-09-10 PROCEDURE — 74011636637 HC RX REV CODE- 636/637: Performed by: INTERNAL MEDICINE

## 2018-09-10 PROCEDURE — 83036 HEMOGLOBIN GLYCOSYLATED A1C: CPT | Performed by: INTERNAL MEDICINE

## 2018-09-10 PROCEDURE — 36415 COLL VENOUS BLD VENIPUNCTURE: CPT | Performed by: INTERNAL MEDICINE

## 2018-09-10 PROCEDURE — 83735 ASSAY OF MAGNESIUM: CPT | Performed by: INTERNAL MEDICINE

## 2018-09-10 PROCEDURE — 65270000029 HC RM PRIVATE

## 2018-09-10 PROCEDURE — 82962 GLUCOSE BLOOD TEST: CPT

## 2018-09-10 PROCEDURE — 74011250636 HC RX REV CODE- 250/636: Performed by: INTERNAL MEDICINE

## 2018-09-10 RX ORDER — MIRTAZAPINE 15 MG/1
7.5 TABLET, FILM COATED ORAL
Status: DISCONTINUED | OUTPATIENT
Start: 2018-09-10 | End: 2018-09-13 | Stop reason: HOSPADM

## 2018-09-10 RX ORDER — LOSARTAN POTASSIUM 50 MG/1
25 TABLET ORAL
Status: DISCONTINUED | OUTPATIENT
Start: 2018-09-10 | End: 2018-09-13 | Stop reason: HOSPADM

## 2018-09-10 RX ORDER — ISOSORBIDE MONONITRATE 60 MG/1
120 TABLET, EXTENDED RELEASE ORAL
Status: DISCONTINUED | OUTPATIENT
Start: 2018-09-10 | End: 2018-09-13 | Stop reason: HOSPADM

## 2018-09-10 RX ORDER — OMEPRAZOLE 20 MG/1
20 CAPSULE, DELAYED RELEASE ORAL
Status: DISCONTINUED | OUTPATIENT
Start: 2018-09-10 | End: 2018-09-13 | Stop reason: HOSPADM

## 2018-09-10 RX ORDER — AMLODIPINE BESYLATE 5 MG/1
5 TABLET ORAL DAILY
Status: DISCONTINUED | OUTPATIENT
Start: 2018-09-10 | End: 2018-09-13 | Stop reason: HOSPADM

## 2018-09-10 RX ORDER — GUAIFENESIN 100 MG/5ML
81 LIQUID (ML) ORAL DAILY
Status: DISCONTINUED | OUTPATIENT
Start: 2018-09-10 | End: 2018-09-13 | Stop reason: HOSPADM

## 2018-09-10 RX ADMIN — ASPIRIN 81 MG 81 MG: 81 TABLET ORAL at 09:11

## 2018-09-10 RX ADMIN — INSULIN LISPRO 2 UNITS: 100 INJECTION, SOLUTION INTRAVENOUS; SUBCUTANEOUS at 12:49

## 2018-09-10 RX ADMIN — Medication 10 ML: at 14:00

## 2018-09-10 RX ADMIN — APIXABAN 2.5 MG: 2.5 TABLET, FILM COATED ORAL at 17:35

## 2018-09-10 RX ADMIN — ISOSORBIDE MONONITRATE 120 MG: 60 TABLET ORAL at 06:18

## 2018-09-10 RX ADMIN — Medication 25 MG: at 21:00

## 2018-09-10 RX ADMIN — INSULIN HUMAN 10 UNITS: 100 INJECTION, SUSPENSION SUBCUTANEOUS at 17:35

## 2018-09-10 RX ADMIN — APIXABAN 2.5 MG: 2.5 TABLET, FILM COATED ORAL at 09:11

## 2018-09-10 RX ADMIN — Medication 10 ML: at 22:00

## 2018-09-10 RX ADMIN — Medication 10 ML: at 06:18

## 2018-09-10 RX ADMIN — AMLODIPINE BESYLATE 5 MG: 5 TABLET ORAL at 09:11

## 2018-09-10 RX ADMIN — CEFTRIAXONE SODIUM 1 G: 1 INJECTION, POWDER, FOR SOLUTION INTRAMUSCULAR; INTRAVENOUS at 22:00

## 2018-09-10 RX ADMIN — MIRTAZAPINE 7.5 MG: 15 TABLET, FILM COATED ORAL at 22:00

## 2018-09-10 RX ADMIN — OMEPRAZOLE 20 MG: 20 CAPSULE, DELAYED RELEASE ORAL at 06:18

## 2018-09-10 RX ADMIN — INSULIN HUMAN 10 UNITS: 100 INJECTION, SUSPENSION SUBCUTANEOUS at 09:11

## 2018-09-10 RX ADMIN — MAGNESIUM SULFATE HEPTAHYDRATE 1 G: 1 INJECTION, SOLUTION INTRAVENOUS at 00:29

## 2018-09-10 NOTE — PROGRESS NOTES
9/10/2018 Reason for Admission:   UTI RRAT Score:     46 Resources/supports as identified by patient/family:    
             
Top Challenges facing patient (as identified by patient/family and CM): Pt with dementia Finances/Medication cost?     Pt has RX drug coverage thru [facility]? Wife transports Support system or lack thereof? Supportive family Living arrangements? Pt resides with his wife Self-care/ADLs/Cognition? Pt is ambulatory and indepn with ADLs Current Advanced Directive/Advance Care Plan:  Pt is DNR Plan for utilizing home health:    No 
                   
Likelihood of readmission:  High/red Transition of Care Plan:  Return home with his wife 9/10/2018   CARE MANAGEMENT NOTE:  CM is following pt for initial discharge planning. EMR reviewed. CM met with pt (dementia) and his wife [facility] (doesn't use a cell phone) at bedside to obtain hx for this needs assessment. Reportedly, pt resides with his wife in a one story home with four front steps and a ramp to the back entrance. PTA, pt was ambulatory with a cane, or rolling walker and at times he walks without an assistive device. Pt is indepn with ADLs but does not drive. He is a  having served in the [employer]. Pt obtains his medications from the [facility].  He does not have home health nor does he want any homecare. DME in the home includes a cane, rolling walker, transport chair, and shower chair. [facility] [address] [place] [address] Drive is to return home with his wife and he does not want any home health. [place]

## 2018-09-10 NOTE — DIABETES MGMT
DTC Consult/Progress Note Recommendations/ Comments: BG currently WNL. Consult received for assessment of home management, spoke with pt nurse and not appropriate for education at this time, pmhx of dementia and no family currently present. DTC to follow-up when family available. Addendum @ 1500: Met with pt and wife for consult. Wife reported that she manages pt DM however pt gives his own insulin injections. He gives his injections in his abdomen and rotates site. Reviewed current A1c value and pt and wife voiced no DM needs at this time. Education materials left on pt bedside table with DTC contact info for any future needs. Current hospital DM medication:  
-Humalog normal sensitivity correction 
-NPH 10 units bid Chart reviewed [place] [address] [facility]. Dipak Shearer Patient is a 80 y.o. male with known history of Type 2 Diabetes on NPH 28 units daily at home. A1c:  
Lab Results Component Value Date/Time Hemoglobin A1c 6.9 (H) 09/10/2018 03:38 AM  
 Hemoglobin A1c 7.4 (H) 02/03/2017 05:17 AM  
 
 
Recent Glucose Results:  
Lab Results Component Value Date/Time  (H) 09/10/2018 03:38 AM  
  (H) 09/09/2018 06:16 PM  
 GLUCPOC 158 (H) 09/10/2018 11:21 AM  
 GLUCPOC 116 (H) 09/10/2018 07:11 AM  
 GLUCPOC 191 (H) 09/09/2018 07:06 PM  
  
 
Lab Results Component Value Date/Time Creatinine 1.73 (H) 09/10/2018 03:38 AM  
 
Estimated Creatinine Clearance: 33.1 mL/min (based on Cr of 1.73). Active Orders Diet DIET CARDIAC Regular; Consistent Carb 1800kcal  
  
 
PO intake: No data found. Will continue to follow as needed. Thank you Madhu Arriaga RD, CDE [facility] Office: [phone] Pager: [phone]

## 2018-09-10 NOTE — PROGRESS NOTES
Bedside and Verbal shift change report given to Jodi Livingston rn  (oncoming nurse) by Arielle Randle  (offgoing nurse). Report included the following information SBAR and Kardex.

## 2018-09-10 NOTE — PROGRESS NOTES
Problem: Mobility Impaired (Adult and Pediatric) Goal: *Acute Goals and Plan of Care (Insert Text) Physical Therapy Goals Initiated 9/10/2018 1. Patient will move from supine to sit and sit to supine , scoot up and down and roll side to side in bed with independence within 7 day(s). 2.  Patient will transfer from bed to chair and chair to bed with supervision/set-up using the least restrictive device within 7 day(s). 3.  Patient will perform sit to stand with supervision/set-up within 7 day(s). 4.  Patient will ambulate with supervision/set-up for 200 feet with the least restrictive device within 7 day(s). 5.  Patient will ascend/descend 3 stairs with 1 handrail(s) with supervision/set-up within 7 day(s). physical Therapy EVALUATION Patient: Cortez Sanders (80 y.o. male) Date: 9/10/2018 Primary Diagnosis: UTI (urinary tract infection) Precautions:   Fall ASSESSMENT : 
Based on the objective data described below, the patient presents with decreased indepednence and tolerance to functional mobility due to generalized weakness, gait and balance dysfunction, impaired cognition/safety awareness s/p admission for UTI. Patient an unreliable historian given his dementia, oriented to place and self only but states he lives with his wife in a one level home with 3 CONTRERAS. He reports using RW or rollator for gait PTA, denies falls but does note sometimes losing his balance. Today he required CGA for transfers and gait with RW x 120' with no overt LOB but fluctuating rose, intermittent anterior advancement/leaning with RW, and starting/stopping often. Will benefit from skilled PT in the acute setting to improve endurance and safety with gait but should be safe for d/c home with wife/family assist 24/7 supervision pending continued progress. Patient will benefit from skilled intervention to address the above impairments. Patients rehabilitation potential is considered to be Excellent Factors which may influence rehabilitation potential include:  
[]         None noted 
[x]         Mental ability/status []         Medical condition 
[]         Home/family situation and support systems 
[x]         Safety awareness 
[]         Pain tolerance/management 
[]         Other: PLAN : 
Recommendations and Planned Interventions: 
[x]           Bed Mobility Training             [x]    Neuromuscular Re-Education 
[x]           Transfer Training                   []    Orthotic/Prosthetic Training 
[x]           Gait Training                         []    Modalities [x]           Therapeutic Exercises           []    Edema Management/Control 
[x]           Therapeutic Activities            [x]    Patient and Family Training/Education 
[]           Other (comment): Frequency/Duration: Patient will be followed by physical therapy  5 times a week to address goals. Discharge Recommendations: Home Health Further Equipment Recommendations for Discharge: None SUBJECTIVE:  
Patient stated They've got to find something for my pee, that thing doesn't work (condom catheter).  OBJECTIVE DATA SUMMARY:  
HISTORY:   
Past Medical History:  
Diagnosis Date  Arthritis  CAD (coronary artery disease) Angina Jan 2011, PCI LAD with multilink stent 4.0x12mm;  also cath at [place] 7/13 -- no stents at that time  CKD (chronic kidney disease), stage III  Cochlear implant in place 2/3/2017  Dementia  DM type 2 causing renal disease ([address]  DM type 2 causing vascular disease ([facility] [address]  GERD (gastroesophageal reflux disease)  HTN (hypertension)  Hypothyroidism  SAMUEL (obstructive sleep apnea)   
 not using CPAP  
 PAF (paroxysmal atrial fibrillation) ([facility] [address] [facility] [id number] score = 7  
 Prostate cancer ([facility] [address]  Renal cell cancer ([facility] [address] [facility] Stage 4 Renal Cell Cancer. Partial omentectomy 11/2/2015 with Dr. Zachariah Moe: omental mass-metastatic renal cell carcinoma  Renal mass, left [address] LEFT PARTIAL NEPHRECTOMY 12/19/13;   
 
Past Surgical History:  
Procedure Laterality Date  CARDIAC SURG PROCEDURE UNLIST  1/2011  
 stent to LAD  HX CHOLECYSTECTOMY  7/13/2012  HX CORONARY STENT PLACEMENT  2011 [address]  2011, 2013 [address]  
 stapedectomy  HX HERNIA REPAIR  1968  
 left inguinal  
 HX LITHOTRIPSY  8/2015  HX ORTHOPAEDIC  1994  
 rotator cuff repair  HX ORTHOPAEDIC  1994  
 cervical disc removed  HX PROSTATECTOMY  2000  
 surgery intervention [facility]  HX UROLOGICAL  2001  
 valvular implant prevent incontinence  HX UROLOGICAL  2003  
 insert gel to help with incontinence  HX UROLOGICAL  12/2013  
 left partial nephrectomy  HX UROLOGICAL  9/2015 CT guided biopsy of abdominal lymph nodes Prior Level of Function/Home Situation: Unsure of reliability of history but reports mod indep/sup level with rollator or RW 
Personal factors and/or comorbidities impacting plan of care: dementia Home Situation Home Environment: Private residence # Steps to Enter: 3 One/Two Story Residence: One story Living Alone: No 
Support Systems: Family member(s) Patient Expects to be Discharged to[de-identified] Unknown Current DME Used/Available at Home: [employer], straight, Walker, rolling, Walker, rollator, Shower chair Tub or Shower Type: Tub/Shower combination EXAMINATION/PRESENTATION/DECISION MAKING:  
Critical Behavior: 
Neurologic State: Confused Orientation Level: Oriented to place, Oriented to person, Disoriented to situation, Disoriented to time Cognition: Follows commands Hearing: Auditory Auditory Impairment: Hard of hearing, bilateral 
Skin:  See nursing notes Edema: none Range Of Motion: 
AROM: Generally decreased, functional 
  
  
  
PROM: Generally decreased, functional 
  
  
  
Strength:   
Strength: Generally decreased, functional 
  
  
  
  
  
  
Tone & Sensation:  
Tone: Normal 
 Sensation: Intact Coordination: 
Coordination: Within functional limits Vision:  
  
Functional Mobility: 
Bed Mobility: 
Rolling: Supervision Supine to Sit: Supervision Scooting: Supervision Transfers: 
Sit to Stand: Contact guard assistance Stand to Sit: Contact guard assistance Bed to Chair: Minimum assistance Balance:  
Sitting: Intact; Without support Standing: Impaired Standing - Static: Good;Constant support Standing - Dynamic : Fair Ambulation/Gait Training: 
Distance (ft): 120 Feet (ft) Assistive Device: Gait belt;Walker, rolling Ambulation - Level of Assistance: Contact guard assistance Gait Abnormalities: Decreased step clearance Base of Support: Narrowed Speed/Sabrina: Fluctuations Step Length: Right shortened;Left shortened Functional Measure: 
Tinetti test: 
 
Sitting Balance: 1 Arises: 1 Attempts to Rise: 2 Immediate Standing Balance: 1 Standing Balance: 1 Nudged: 2 Eyes Closed: 0 Turn 360 Degrees - Continuous/Discontinuous: 0 Turn 360 Degrees - Steady/Unsteady: 1 Sitting Down: 1 Balance Score: 10 Indication of Gait: 1 
R Step Length/Height: 0 
L Step Length/Height: 0 
R Foot Clearance: 1 L Foot Clearance: 1 Step Symmetry: 1 Step Continuity: 0 Path: 1 Trunk: 1 Walking Time: 0 Gait Score: 6 Total Score: 16 Tinetti Test and G-code impairment scale: 
Percentage of Impairment CH 
 
0% 
 CI 
 
1-19% CJ 
 
20-39% CK 
 
40-59% CL 
 
60-79% CM 
 
80-99% CN  
 
100% Tinetti Score 0-28 28 23-27 17-22 12-16 6-11 1-5 0 Tinetti Tool Score Risk of Falls 
<19 = High Fall Risk 19-24 = Moderate Fall Risk 25-28 = Low Fall Risk Tinetti ME. Performance-Oriented Assessment of Mobility Problems in Elderly Patients. [address]; [id number]. (Scoring Description: PT Bulletin Feb. 10, 1993) Older adults: Raissa Vanegas et al, 2009; n = [address] elderly evaluated with ABC, RODERICK, ADL, and IADL) · Mean RODERICK score for males aged 69-68 years = 26.21(3.40) · Mean RODERICK score for females age 69-68 years = 25.16(4.30) · Mean RODERICK score for males over 80 years = 23.29(6.02) · Mean RODERICK score for females over 80 years = 17.20(8.32) G codes: In compliance with CMSs Claims Based Outcome Reporting, the following G-code set was chosen for this patient based on their primary functional limitation being treated: The outcome measure chosen to determine the severity of the functional limitation was the Tinetti with a score of 16/28 which was correlated with the impairment scale. ? Mobility - Walking and Moving Around:  
  - CURRENT STATUS: CK - 40%-59% impaired, limited or restricted  - GOAL STATUS: CJ - 20%-39% impaired, limited or restricted  - D/C STATUS:  ---------------To be determined--------------- Physical Therapy Evaluation Charge Determination History Examination Presentation Decision-Making HIGH Complexity :3+ comorbidities / personal factors will impact the outcome/ POC  MEDIUM Complexity : 3 Standardized tests and measures addressing body structure, function, activity limitation and / or participation in recreation  MEDIUM Complexity : Evolving with changing characteristics  Other outcome measures Tinetti 16/28  MEDIUM Based on the above components, the patient evaluation is determined to be of the following complexity level: MEDIUM Pain: 
  
  
  
  
  
  
Activity Tolerance:  
Fair - 120' with RW and CGA Please refer to the flowsheet for vital signs taken during this treatment. After treatment:  
[x]         Patient left in no apparent distress sitting up in chair 
[]         Patient left in no apparent distress in bed 
[x]         Call bell left within reach [x]         Nursing notified 
[]         Caregiver present [x]         Chair alarm activated COMMUNICATION/EDUCATION:  
The patients plan of care was discussed with: Registered Nurse. [x]         Fall prevention education was provided and the patient/caregiver indicated understanding. []         Patient/family have participated as able in goal setting and plan of care. [x]         Patient/family agree to work toward stated goals and plan of care. []         Patient understands intent and goals of therapy, but is neutral about his/her participation. []         Patient is unable to participate in goal setting and plan of care. Thank you for this referral. 
Nhan Migule, PT Time Calculation: 35 mins

## 2018-09-10 NOTE — PROGRESS NOTES
Problem: Self Care Deficits Care Plan (Adult) Goal: *Acute Goals and Plan of Care (Insert Text) Occupational Therapy Goals Initiated 9/10/2018 1. Patient will perform lower body dressing with moderate assistance  within 7 day(s). 2.  Patient will perform upper body dressing with supervision/set-up within 7 day(s). 3.  Patient will perform toilet transfers with supervision/set-up within 7 day(s). 4.  Patient will perform all aspects of toileting with minimal assistance/contact guard assist within 7 day(s). 5.  Patient will participate in upper extremity therapeutic exercise/activities with supervision/set-up for 10 minutes within 7 day(s). Occupational Therapy EVALUATION Patient: Edi Mckeon (80 y.o. male) Date: 9/10/2018 Primary Diagnosis: UTI (urinary tract infection) Precautions:   Fall ASSESSMENT : 
Based on the objective data described below, the patient presents with hospital admission secondary to UTI. Per charting patient to hospital with increased confusion from baseline and weakness. Patient received bed, right LE over edge of bed, and bed alarm sounding. Patient pleasant, alert and oriented x 2, able to state name and \"hospital\". Patient moves to EOB with CGA x increased time. Once sitting, noted patient condom catheter no longer in place and patient reports need for toileting. Patient able to stand with CGA and transfers to commode in bathroom with min assist.  Prior to sitting patient unaware of episode of urine incontinence to floor. Max assist for clean up of LE and patient left in chair in NAD. Patient educated to stay in chair, call for assist, however due to cognitive impairment, placed on chair alarm. Patient will benefit from continued OT services for ADL tasks. Patient will benefit from skilled intervention to address the above impairments. Patients rehabilitation potential is considered to be Fair Factors which may influence rehabilitation potential include:  
[]             None noted [x]             Mental ability/status []             Medical condition []             Home/family situation and support systems []             Safety awareness []             Pain tolerance/management 
[]             Other: PLAN : 
Recommendations and Planned Interventions: 
[x]               Self Care Training                  [x]        Therapeutic Activities Functional Mobility Training    []        Cognitive Retraining 
[x]               Therapeutic Exercises           [x]        Endurance Activities [x]               Balance Training                   []        Neuromuscular Re-Education [x]               Visual/Perceptual Training     [x]   Home Safety Training 
[]               Patient Education                 [x]        Family Training/Education [x]               Other (comment): Frequency/Duration: Patient will be followed by occupational therapy 3 times a week to address goals. Discharge Recommendations: Home Health Further Equipment Recommendations for Discharge: TBD SUBJECTIVE:  
Patient stated I need to find the bathroom.  OBJECTIVE DATA SUMMARY:  
HISTORY:  
Past Medical History:  
Diagnosis Date  Arthritis  CAD (coronary artery disease) Angina Jan 2011, PCI LAD with multilink stent 4.0x12mm;  also cath at [place] 7/13 -- no stents at that time  CKD (chronic kidney disease), stage III  Cochlear implant in place 2/3/2017  Dementia  DM type 2 causing renal disease [facility]  DM type 2 causing vascular disease [facility]  GERD (gastroesophageal reflux disease)  HTN (hypertension)  Hypothyroidism  SAMUEL (obstructive sleep apnea)   
 not using CPAP  
 PAF (paroxysmal atrial fibrillation) ([facility] [id number] score = 7  
 Prostate cancer ([facility]  [facility] Stage 4 Renal Cell Cancer.     Partial omentectomy 11/2/2015 with  [facility]: omental mass-metastatic renal cell carcinoma  Renal mass, left [address] LEFT PARTIAL NEPHRECTOMY 12/19/13;   
 
Past Surgical History:  
Procedure Laterality Date  CARDIAC SURG PROCEDURE UNLIST  1/2011  
 stent to LAD  HX CHOLECYSTECTOMY  7/13/2012  HX CORONARY STENT PLACEMENT  2011 [address]  2011, 2013 [address]  
 stapedectomy  HX HERNIA REPAIR  1968  
 left inguinal  
 HX LITHOTRIPSY  8/2015  HX ORTHOPAEDIC  1994  
 rotator cuff repair  HX ORTHOPAEDIC  1994  
 cervical disc removed  HX PROSTATECTOMY  2000  
 surgery intervention [address] [place]  HX UROLOGICAL  2001  
 valvular implant prevent incontinence  HX UROLOGICAL  2003  
 insert gel to help with incontinence  HX UROLOGICAL  12/2013  
 left partial nephrectomy  HX UROLOGICAL  9/2015 CT guided biopsy of abdominal lymph nodes Prior Level of Function/Environment/Context: assist with ADLs Occupations in which the patient is/was successful, what are the barriers preventing that success:  
Performance Patterns (routines, roles, habits, and rituals):  
Personal Interests and/or values:  
Expanded or extensive additional review of patient history:  
 
Home Situation Home Environment: Private residence # Steps to Enter: 3 One/Two Story Residence: One story Living Alone: No 
Support Systems: Family member(s) Patient Expects to be Discharged to[de-identified] Unknown Current DME Used/Available at Home: [employer], straight, Walker, rolling, Walker, rollator, Shower chair Tub or Shower Type: Tub/Shower combination Hand dominance: Right EXAMINATION OF PERFORMANCE DEFICITS: 
Cognitive/Behavioral Status: 
Neurologic State: Confused Orientation Level: Oriented to place;Oriented to person;Disoriented to situation;Disoriented to time Skin: intact as seen Edema: none noted Hearing: Auditory Auditory Impairment: Hard of hearing, bilateral 
 
 Vision/Perceptual:   
    
    
    
  
    
    
  
 
Range of Motion: 
AROM: Generally decreased, functional 
PROM: Generally decreased, functional 
  
  
  
  
  
  
 
Strength: 
Strength: Generally decreased, functional 
  
  
  
  
 
Coordination: 
Coordination: Within functional limits Tone & Sensation: 
 
Tone: Normal 
Sensation: Intact Balance: 
Sitting: Intact Standing: Impaired Standing - Static: Constant support Standing - Dynamic : Fair Functional Mobility and Transfers for ADLs: 
Bed Mobility: 
Supine to Sit: Contact guard assistance; Additional time Scooting: Supervision Transfers: 
Sit to Stand: Contact guard assistance;Assist x1;Additional time Stand to Sit: Contact guard assistance; Additional time Bed to Chair: Minimum assistance Toilet Transfer : Minimum assistance ADL Assessment: 
Feeding: Setup Oral Facial Hygiene/Grooming: Minimum assistance (standing ) Bathing: Moderate assistance Upper Body Dressing: Minimum assistance Lower Body Dressing: Maximum assistance Toileting: Maximum assistance (incontinent of urine ) ADL Intervention and task modifications: 
  
 
  
 
  
 
  
 
  
 
  
 
  
 
  
 
Therapeutic Exercise: 
  
Functional Measure: 
Barthel Index: 
 
Bathin Bladder: 0 Bowels: 5 Groomin Dressin Feedin Mobility: 10 Stairs: 5 Toilet Use: 5 Transfer (Bed to Chair and Back): 10 Total: 45 Barthel and G-code impairment scale: 
Percentage of impairment CH 
0% CI 
1-19% CJ 
20-39% CK 
40-59% CL 
60-79% CM 
80-99% CN 
100% Barthel Score 0-100 100 99-80 79-60 59-40 20-39 1-19 
 0 Barthel Score 0-20 20 17-19 13-16 9-12 5-8 1-4 0 The Barthel ADL Index: Guidelines 1. The index should be used as a record of what a patient does, not as a record of what a patient could do.  
2. The main aim is to establish degree of independence from any help, physical or verbal, however minor and for whatever reason. 3. The need for supervision renders the patient not independent. 4. A patient's performance should be established using the best available evidence. Asking the patient, friends/relatives and nurses are the usual sources, but direct observation and common sense are also important. However direct testing is not needed. 5. Usually the patient's performance over the preceding 24-48 hours is important, but occasionally longer periods will be relevant. 6. Middle categories imply that the patient supplies over 50 per cent of the effort. 7. Use of aids to be independent is allowed. Teresa Davis., Barthel, D.W. ([id number]). Functional evaluation: the Barthel Index. [address] [facility] (14)2. Annabelle Noland valeria RONALD Stahl, Raji Juarez., Earl Bains., Seema, [facility] (1999). Measuring the change indisability after inpatient rehabilitation; comparison of the responsiveness of the Barthel Index and Functional Kimble Measure. Journal of Neurology, Neurosurgery, and Psychiatry, 66(4), 394-268. Nikolas Gonzalez, N.J.A, ELEAZAR Gibbons, & Bernardino Vargas MCOLTON. (2004.) Assessment of post-stroke quality of life in cost-effectiveness studies: The usefulness of the Barthel Index and the EuroQoL-5D. [facility], 13, 038-03 G codes: In compliance with CMSs Claims Based Outcome Reporting, the following G-code set was chosen for this patient based on their primary functional limitation being treated: The outcome measure chosen to determine the severity of the functional limitation was the Barthel Index with a score of 45/100 which was correlated with the impairment scale. ? Self Care:  
  - CURRENT STATUS: CK - 40%-59% impaired, limited or restricted  - GOAL STATUS: CJ - 20%-39% impaired, limited or restricted  - D/C STATUS:  ---------------To be determined--------------- Occupational Therapy Evaluation Charge Determination History Examination Decision-Making LOW Complexity : Brief history review  LOW Complexity : 1-3 performance deficits relating to physical, cognitive , or psychosocial skils that result in activity limitations and / or participation restrictions  LOW Complexity : No comorbidities that affect functional and no verbal or physical assistance needed to complete eval tasks Based on the above components, the patient evaluation is determined to be of the following complexity level: LOW Pain: 
  
  
  
  
  
  
Activity Tolerance: VSS Please refer to the flowsheet for vital signs taken during this treatment. After treatment:  
[x] Patient left in no apparent distress sitting up in chair 
[x] Patient left in no apparent distress in bed 
[x] Call bell left within reach [x] Nursing notified 
[] Caregiver present [x] Bed alarm activated COMMUNICATION/EDUCATION:  
The patients plan of care was discussed with: Physical Therapist and Registered Nurse. [x] Home safety education was provided and the patient/caregiver indicated understanding. [x] Patient/family have participated as able in goal setting and plan of care. [] Patient/family agree to work toward stated goals and plan of care. [] Patient understands intent and goals of therapy, but is neutral about his/her participation. [] Patient is unable to participate in goal setting and plan of care. This patients plan of care is appropriate for delegation to [facility]. Thank you for this referral. 
Marcus Burrell OTR/L Time Calculation: 25 mins

## 2018-09-10 NOTE — H&P
[facility] Hospitalist Admission Note NAME:  Luisa Winslow. :   1935 MRN:  [id number] PCP:  Waldo Tran MD  
 
Date/Time:  2018 9:41 PM 
 
  
  
Subjective: CHIEF COMPLAINT: fatigue and confusion HISTORY OF PRESENT ILLNESS:    
Mr. Michelet Martin is a 80 y.o.  male who presented to the Emergency Department complaining of fatigue and confusion. He provides no hx due to dementia. Wife and daughter provide hx. Family noted he was more lethargic and confused than baseline today. He was also this way 4-5 days ago. He had a fever of 100.4 tonight. ER workup found UA suggesting UTI. He is not septic. Sepsis protocol was initiated in ER. We will admit him for management. Past Medical History:  
Diagnosis Date  Arthritis  CAD (coronary artery disease) Angina 2011, PCI LAD with multilink stent 4.0x12mm;  also cath at [place]  -- no stents at that time  CKD (chronic kidney disease), stage III  Cochlear implant in place 2/3/2017  Dementia  DM type 2 causing renal disease ([address]  DM type 2 causing vascular disease ([address]  GERD (gastroesophageal reflux disease)  HTN (hypertension)  Hypothyroidism  SAMUEL (obstructive sleep apnea)   
 not using CPAP  
 PAF (paroxysmal atrial fibrillation) ([address] [id number] score = 7  
 Prostate cancer ([facility]  Renal cell cancer ([facility] Stage 4 Renal Cell Cancer. Partial omentectomy 2015 with Dr. Tita Mendez: omental mass-metastatic renal cell carcinoma  Renal mass, left [address] LEFT PARTIAL NEPHRECTOMY 13;   
  
 
Past Surgical History:  
Procedure Laterality Date  CARDIAC SURG PROCEDURE UNLIST  2011  
 stent to LAD  HX CHOLECYSTECTOMY  2012  HX CORONARY STENT PLACEMENT   [address]  ,  [address]  
 stapedectomy  HX HERNIA REPAIR  1968  
 left inguinal  
 HX LITHOTRIPSY  2015 [address] rotator cuff repair  HX ORTHOPAEDIC  1994  
 cervical disc removed  HX PROSTATECTOMY  2000  
 surgery intervention [facility]  HX UROLOGICAL  2001  
 valvular implant prevent incontinence  HX UROLOGICAL  2003  
 insert gel to help with incontinence  HX UROLOGICAL  12/2013  
 left partial nephrectomy  HX UROLOGICAL  9/2015 CT guided biopsy of abdominal lymph nodes Social History Substance Use Topics  Smoking status: Former Smoker Packs/day: 0.10 Years: 23.00 Quit date: 12/3/1975  Smokeless tobacco: Never Used  Alcohol use No  
  
 
Family History Problem Relation Age of Onset  Stroke Father  Cancer Father   
  prostate  Diabetes Sister 2 sisters  Hypertension Sister 2 sisters  Diabetes Brother  Stroke Brother  Diabetes Brother   
  all brothers  Stroke Brother  Kidney Disease Brother  Heart Attack Brother  Hypertension Other Allergies Allergen Reactions  Latex Rash, Swelling and Contact Dermatitis  Morphine Other (comments) Hallucinations and Nausea Prior to Admission medications Medication Sig Start Date End Date Taking? Authorizing Provider  
furosemide (LASIX) 40 mg tablet Take 0.5 Tabs by mouth as needed (weight gain of 5 lbs or greater). 2/20/17   Lazarus Dad, MD  
insulin NPH (NOVOLIN N, HUMULIN N) 100 unit/mL injection 10 Units by SubCUTAneous route Before breakfast and dinner. Note the decrease in dose from 35 units 2/4/17   Rafael Grove MD  
isosorbide mononitrate ER (IMDUR) 120 mg CR tablet Take 1 Tab by mouth every morning. 2/4/17   Rafael Grove MD  
aspirin 81 mg chewable tablet Take 1 Tab by mouth daily. 2/4/17   Rafael Grove MD  
pravastatin (PRAVACHOL) 20 mg tablet Take 1 Tab by mouth nightly. Patient taking differently: Take 10 mg by mouth nightly. 2/4/17   Rafael Grove MD  
coenzyme Q-10 (CO Q-10) 100 mg capsule Take 1 Cap by mouth daily.  2/4/17 Syd Dorman MD  
melatonin 3 mg tablet Take 3 mg by mouth nightly. Historical Provider  
polyethylene glycol (MIRALAX) 17 gram packet Take 17 g by mouth daily as needed (constipation). Historical Provider  
mirtazapine (REMERON) 15 mg tablet Take 7.5 mg by mouth nightly. Historical Provider  
losartan (COZAAR) 25 mg tablet Take 1 Tab by mouth daily. 2/17/16   Cj Diaz MD  
apixaban (ELIQUIS) 2.5 mg tablet Take 2.5 mg by mouth two (2) times a day. Historical Provider OTHER,NON-FORMULARY, Take 1 Cap by mouth daily. Zafar May dietary supplement-red yeast rice, Crategi extract,etc.    Historical Provider  
omeprazole (PRILOSEC) 20 mg capsule Take 20 mg by mouth Daily (before breakfast). Historical Provider  
nitroglycerin (NITROSTAT) 0.4 mg SL tablet 0.4 mg by SubLINGual route every five (5) minutes as needed. Chance Ornelas MD  
 
 
Review of Systems: 
(bold if positive, if negative) Gen:  feverfatigueEyes:  ENT:  CVS:  Pulm:  GI:  nausea :   
MS:  PainweaknessarthritisSkin:  Psych:  Endo:   
Hem:  Renal:   
Neuro:    
  
Objective: VITALS:   
Vital signs reviewed; most recent are: 
 
Visit Vitals  /57  Pulse 78  Temp 100.4 °F (38 °C)  Resp 18  Ht 5' 7\" (1.702 m)  Wt 81.6 kg (180 lb)  SpO2 95%  BMI 28.19 kg/m2 SpO2 Readings from Last 6 Encounters:  
09/09/18 95% 08/26/17 92% 07/16/17 94% 02/04/17 98% 03/24/16 97% 12/30/15 97% No intake or output data in the 24 hours ending 09/09/18 5161 Exam:  
 
Physical Exam: 
 
Gen:  Well-developed, well-nourished, in no acute distress HEENT:  Pink conjunctivae, PERRL, hearing intact to voice, moist mucous membranes Neck:  Supple, without masses, thyroid non-tender Resp:  No accessory muscle use, clear breath sounds without wheezes rales or rhonchi 
Card:  No murmurs, normal S1, S2 without thrills, bruits or peripheral edema Abd:  Soft, non-tender, non-distended, normoactive bowel sounds are present, no mass Lymph:  No cervical or inguinal adenopathy Musc:  No cyanosis or clubbing Skin:  No rashes or ulcers, skin turgor is good Neuro:  Cranial nerves are grossly intact, general motor weakness, does not follow commands appropriately Psych:  Poor insight, oriented to person Labs: 
 
Recent Labs  
   09/09/18 1816 WBC  10.8 HGB  13.1 HCT  40.6 PLT  152 Recent Labs  
   09/09/18 1816 NA  131* K  4.5  
CL  101 CO2  23 GLU  212* BUN  19  
CREA  1.86* CA  10.2* MG  1.5* ALB  2.6* TBILI  3.9*  
SGOT  79* ALT  89* Lab Results Component Value Date/Time Glucose (POC) 191 (H) 09/09/2018 07:06 PM  
 Glucose (POC) 258 (H) 02/04/2017 11:57 AM  
 
No results for input(s): PH, PCO2, PO2, HCO3, FIO2 in the last 72 hours. No results for input(s): INR in the last 72 hours. No lab exists for component: INREXT All Micro Results Procedure Component Value Units Date/Time CULTURE, URINE [[id number]] Order Status:  Sent Specimen:  Urine from Clean catch URINE CULTURE HOLD SAMPLE [[id number]] Collected:  09/09/18 2025 Order Status:  Completed Specimen:  Urine from Serum Updated:  09/09/18 2029 Urine culture hold URINE ON HOLD IN MICROBIOLOGY DEPT FOR 3 DAYS. IF UNPRESERVED URINE IS SUBMITTED, IT CANNOT BE USED FOR ADDITIONAL TESTING AFTER 24 HRS, RECOLLECTION WILL BE REQUIRED. CULTURE, BLOOD [[id number]] Collected:  09/09/18 1851 Order Status:  Completed Specimen:  Blood from Blood Updated:  09/09/18 1903 CULTURE, BLOOD [[id number]] Collected:  09/09/18 1816 Order Status:  Completed Specimen:  Blood from Blood Updated:  09/09/18 1903 I have reviewed previous records Assessment and Plan:  
  
UTI (urinary tract infection) / Fever - No other SIRS criteria. Not sepsis. Empiric ceftriaxone. Await cx.  
 
Acute encephalopathy / Dementia - POA, sundowning, acutely worse due to UTI.  Supportive care. Not on meds. No narcotics. Continue qhs mirtazapine. Sitter if needed. Fatigue / Debilitated patient / Arthritis - POA, chronic debility due to multiple medical conditions, acutely worse due to UTI. Fall precautions. PT/OT eval.  Poor potential due to dementia. Paroxysmal atrial fibrillation - POA, stable. Not on rate control. Continue apixaban. In setting of severe co morbidities, would consider stopping this. 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 SSI per protocol. Continue home NPH. Check A1c. In setting of severe co morbidities, would relax parameters and goals. Nausea / GERD (gastroesophageal reflux disease) - POA, worsened by UTI. Diet as tolerated. PPI. CAD (coronary artery disease) / HTN (hypertension) - No symptoms. Continue ASA, IMDUR, losartan, statin. Usually on prn lasix. ER gave >2L IVF. Monitor for overload and give lasix if needed. Hyponatremia / Hypomagnesemia / CKD (chronic kidney disease) stage 3 / Hx stage 4 Kidney cancer, primary, 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 / omental mass-metastatic renal cell carcinoma / Hx Prostate cancer - Follow lytes and replete as needed. Dr Nando Armstrong follows patient with CTs, though patient would refuse any offer of biopsy or treatment. I suggested to family that CTs are therefore likely unnecessary. Hypothyroidism - Synthroid SAMUEL (obstructive sleep apnea) - Oxygen as needed. Not using CPAP Hyperlipidemia - Taking pravastatin. In setting of severe co morbidities, would  Consider stopping this. Telemetry reviewed:   normal sinus rhythm Risk of deterioration: high Total time spent with patient: 70 Minutes Care Plan discussed with: Patient, Nursing Staff and >50% of time spent in counseling and coordination of care Discussed:  Code Status, Care Plan and D/C Planning      
___________________________________________________ Attending Physician: Aaliyah Samson MD

## 2018-09-10 NOTE — ROUTINE PROCESS
TRANSFER - OUT REPORT: 
 
Verbal report given to Rigoberto on [address] [facility] [place]  being transferred to [address] [place] [address] for routine progression of care Report consisted of patients Situation, Background, Assessment and  
Recommendations(SBAR). Information from the following report(s) SBAR, ED Summary, [facility] and Recent Results was reviewed with the receiving nurse. Opportunity for questions and clarification was provided.

## 2018-09-10 NOTE — PROGRESS NOTES
Advance Care Planning Note Name: David Mitchell. YOB: 1935 MRN: [id number] Admission Date: 9/9/2018  5:59 PM 
 
Date of discussion: 9/9/2018 Active Diagnoses: 
 
Hospital Problems  Date Reviewed: 9/9/2018 Codes Class Noted POA * (Principal)UTI (urinary tract infection) Arthritis Dementia DM type 2 causing renal disease ([facility] DM type 2 causing vascular disease ([facility] GERD (gastroesophageal reflux disease) Hypothyroidism SAMUEL (obstructive sleep apnea) Prostate cancer ([facility] Acute encephalopathy Fatigue Debilitated patient Cochlear implant in place Renal cell cancer ([facility] Kidney cancer, primary, 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 [facility]) CKD (chronic kidney disease) stage 3, GFR 30-59 ml/min (Chronic) Paroxysmal atrial fibrillation (HCC) CAD (coronary artery disease) HTN (hypertension) These active diagnoses are of sufficient risk that focused discussion on advance care planning is indicated in order to allow the patient to thoughtfully consider personal goals of care, and if situations arise that prevent the ability to personally give input, to ensure appropriate representation of their personal desires for different levels and aggressiveness of care. Discussion:  
 
Persons present and participating in discussion: David Mitchell., Flex Guzman MD, wife, daughter Discussion: discussed UTI diagnosis, treatment and prognosis in setting of end stage dementia and cancer. Wife is spokesperson and MPOA. Patient has AD and DNR, tough not on file. He is DNR. He would not want any invasive procedures. Time Spent:  
 
Total time spent face-to-face in education and discussion: 20 minutes. Flex Guzman MD 
9/9/2018 10:33 PM

## 2018-09-10 NOTE — PROGRESS NOTES
[facility]: MED RECONCILIATION Comments/Recommendations:  
Interview conducted with the patient's spouse at the bedside. A detailed medication list is provided from the [place]. Patient receives medication from the [address], [address] [facility] Verifies allergies Reports compliance to prescribed regimen Spouse keeps a detailed log of blood pressures and blood glucoses. The patient cannot tolerate statins so he takes a [place] May supplement. Medications added: · Amlodipine · Vitamin D · Nystatin · Docusate · Senna Medications removed: · Coenzyme Q10 
· Pravastatin Medications adjusted: 
 
? NPH changed to 28 units daily in the morning ? Miralax changed to a half scoop daily Allergies: Latex and Morphine Prior to Admission Medications:  
Prior to Admission Medications Prescriptions Last Dose Informant Patient Reported? Taking? OTHER,NON-FORMULARY, 9/8/2018 at pm Significant Other Yes Yes Sig: Take 1 Cap by mouth nightly. Zafar May dietary supplement  
amLODIPine (NORVASC) 10 mg tablet 9/9/2018 at 9am Significant Other Yes Yes Sig: Take 5 mg by mouth daily. apixaban (ELIQUIS) 2.5 mg tablet 9/9/2018 at 9am Significant Other Yes Yes Sig: Take 2.5 mg by mouth two (2) times a day. aspirin delayed-release 81 mg tablet 9/8/2018 at pm Significant Other Yes Yes Sig: Take 81 mg by mouth nightly. cholecalciferol (VITAMIN D3) 1,000 unit tablet 9/8/2018 at pm Significant Other Yes Yes Sig: Take 2,000 Units by mouth nightly. desonide (TRIDESILON) 0.05 % cream  Significant Other Yes Yes Sig: Apply  to affected area daily as needed for Skin Irritation. docusate sodium (COLACE) 100 mg capsule 9/9/2018 at 9am Significant Other Yes Yes Sig: Take 200 mg by mouth daily. furosemide (LASIX) 40 mg tablet  Significant Other Yes Yes Sig: Take 20 mg by mouth daily as needed (AS NEEDED for weight gain of 5 lbs or greater). insulin NPH (HUMULIN N NPH U-100 INSULIN) 100 unit/mL injection 2018 at 1430 Significant Other Yes Yes Si Units by SubCUTAneous route daily. isosorbide mononitrate ER (IMDUR) 120 mg CR tablet 2018 at 9am Significant Other No Yes Sig: Take 1 Tab by mouth every morning. losartan (COZAAR) 25 mg tablet 2018 at Unknown time Significant Other Yes Yes Sig: Take 25 mg by mouth nightly. melatonin 3 mg tablet 2018 at Unknown time Significant Other Yes Yes Sig: Take 3 mg by mouth nightly. mirtazapine (REMERON) 15 mg tablet 2018 at Unknown time Significant Other Yes Yes Sig: Take 7.5 mg by mouth nightly. nitroglycerin (NITROSTAT) 0.4 mg SL tablet  Significant Other Yes Yes Si.4 mg by SubLINGual route every five (5) minutes as needed. nystatin (MYCOSTATIN) powder  Significant Other Yes Yes Sig: Apply  to affected area daily. To groin after showering and drying  
omeprazole (PRILOSEC) 20 mg capsule 2018 at 9am Significant Other Yes Yes Sig: Take 20 mg by mouth Daily (before breakfast). polyethylene glycol (MIRALAX) 17 gram packet 2018 at 9am Significant Other Yes Yes Sig: Take 8.5 g by mouth daily. senna (SENNA) 8.6 mg tablet  Significant Other Yes Yes Sig: Take 1 Tab by mouth daily as needed for Constipation. Facility-Administered Medications: None Thank you, John Salas, PharmD, BCPS

## 2018-09-10 NOTE — PROGRESS NOTES
Aristeo Tanner [facility] [place] [facility] 
[address] [facility], [place], [address] [place] [address] 
[phone] Medical Progress Note NAME:         Diane Porras. :        1935 MRM:        [id number] Date:          9/10/2018 Subjective: Patient has been seen and examined as a follow up for UTI. Chart, labs, diagnostics reviewed. Patient has dementia and id not a good historian. I have discussed with his nurse and Dr Marie Walker for collaborative hx Objective: 
 
Vital Signs: 
 
Visit Vitals  /57 (BP 1 Location: Left arm, BP Patient Position: At rest)  Pulse 67  Temp 97.8 °F (36.6 °C)  Resp 20  
 Ht 5' 7\" (1.702 m)  Wt 81.6 kg (180 lb)  SpO2 95%  BMI 28.19 kg/m2 Intake/Output Summary (Last 24 hours) at 09/10/18 9321 Last data filed at 18 2232 Gross per 24 hour Intake                0 ml Output              350 ml Net             -350 ml Physical Examination: 
 
General:   Weak and ill looking male not in acute distress Eyes:   pink conjunctivae, PERRLA with no discharge. ENT:   no ottorrhea or rhinorrhea with moist mucous membranes Neck: no masses, thyroid non-tender and trachea central. 
Pulm:  clear breath sounds without crackles or wheezes Card:  no JVD or murmurs, has regular and normal S1, S2 without thrills, bruits or peripheral edema Abd:  Soft, non-tender, non-distended, normoactive bowel sounds with no palpable organomegaly Musc:  No cyanosis, clubbing, atrophy or deformities. Skin:  No rashes, bruising or ulcers. Neuro: Awake and alert. Generally a non focal exam.  
Psych:  Has a poor insight to illness Current Facility-Administered Medications Medication Dose Route Frequency  apixaban (ELIQUIS) tablet 2.5 mg  2.5 mg Oral BID  aspirin chewable tablet 81 mg  81 mg Oral DAILY  insulin NPH (NOVOLIN N, HUMULIN N) injection 10 Units  10 Units SubCUTAneous ACB&D  
 isosorbide mononitrate ER (IMDUR) tablet 120 mg  120 mg Oral 7am  
 losartan (COZAAR) tablet 25 mg  25 mg Oral QHS  mirtazapine (REMERON) tablet 7.5 mg  7.5 mg Oral QHS  omeprazole (PRILOSEC) capsule 20 mg  20 mg Oral ACB  amLODIPine (NORVASC) tablet 5 mg  5 mg Oral DAILY  sodium chloride (NS) flush 5-10 mL  5-10 mL IntraVENous PRN  
 cefTRIAXone (ROCEPHIN) 1 g in 0.9% sodium chloride (MBP/ADV) 50 mL  1 g IntraVENous Q24H  
 sodium chloride (NS) flush 5-10 mL  5-10 mL IntraVENous Q8H  
 sodium chloride (NS) flush 5-10 mL  5-10 mL IntraVENous PRN  
 glucose chewable tablet 16 g  4 Tab Oral PRN  
 dextrose (D50W) injection syrg 12.5-25 g  25-50 mL IntraVENous PRN  
 glucagon (GLUCAGEN) injection 1 mg  1 mg IntraMUSCular PRN  
 acetaminophen (TYLENOL) tablet 650 mg  650 mg Oral Q4H PRN  
 diphenhydrAMINE (BENADRYL) capsule 25 mg  25 mg Oral Q4H PRN  
 ondansetron (ZOFRAN) injection 4 mg  4 mg IntraVENous Q4H PRN  
 insulin lispro (HUMALOG) injection   SubCUTAneous AC&HS Laboratory data and review: 
 
Recent Labs  
   09/10/18 
 0338  09/09/18 
 1816 WBC  6.3  10.8 HGB  12.6  13.1 HCT  40.0  40.6 PLT  103*  152 Recent Labs  
   09/10/18 
 0338  09/09/18 
 1816 NA  138  131* K  4.4  4.5  
CL  105  101 CO2  23  23 GLU  145*  212* BUN  17  19 CREA  1.73*  1.86* CA  10.3*  10.2* MG  2.4  1.5* ALB   --   2.6* SGOT   --   79* ALT   --   89* No components found for: [place] Assessment and Plan: 
 
UTI (urinary tract infection) / Fever: No other SIRS criteria. Not sepsis. Urine and blood cx pending. Continue empiric IV Ceftriaxone.  
  
Acute encephalopathy / Dementia POA, sundowning, acutely worse due to UTI. Continue supportive care. Continue qhs mirtazapine. Sitter if needed. 
  
Fatigue / Debilitated patient / Arthritis POA, chronic debility due to multiple medical conditions, acutely worse due to UTI. Fall precautions. PT/OT eval and treat. Poor potential due to dementia. 
  
Paroxysmal atrial fibrillation POA, stable. No rate control medications. Continue apixaban. In setting of severe co morbidities, may consider stopping this.  
  
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 SSI per protocol. Continue home NPH. A1c pending. No need for tight glycemic control.  
  
Nausea / GERD (gastroesophageal reflux disease): POA, worsened by UTI. Diet as tolerated. Continue PPi. 
  
CAD (coronary artery disease) / HTN (hypertension): stable. Continue ASA, IMDUR, losartan, statin. Usually on prn lasix.  
  
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 Dr Ye West follows patient with CTs, though patient would refuse any offer of biopsy or treatment. May not need further CT scans. Hypothyroidism: Continue Levothyroxine  
  
SAMUEL (obstructive sleep apnea): Oxygen as needed. Not using CPAP 
  
Hyperlipidemia: discontinue Pravastatin in setting of severe co morbidities  
 
Total time spent for the patient's care: 35 Minutes Care Plan discussed with: Patient and Nursing Staff Discussed:  Care Plan Prophylaxis:  Eliquis Anticipated Disposition:   PT, OT, RN vs other 
        
___________________________________________________ Attending Physician:   Rio Cota MD

## 2018-09-10 NOTE — PROGRESS NOTES
TRANSFER - IN REPORT: 
 
Verbal report received from [facility], [address] [facility] (name) on Graham Ax.  being received from ED (unit) for routine progression of care Report consisted of patients Situation, Background, Assessment and  
Recommendations(SBAR). Information from the following report(s) SBAR, Kardex and ED Summary was reviewed with the receiving nurse. Opportunity for questions and clarification was provided. Assessment completed upon patients arrival to unit and care assumed. [facility] Primary Nurse Alecia Matias RN and Jarad Burger RN performed a dual skin assessment on this patient No impairment noted Rogers score is 18

## 2018-09-11 LAB
GLUCOSE BLD STRIP.AUTO-MCNC: 107 MG/DL (ref 65–100)
GLUCOSE BLD STRIP.AUTO-MCNC: 138 MG/DL (ref 65–100)
GLUCOSE BLD STRIP.AUTO-MCNC: 165 MG/DL (ref 65–100)
GLUCOSE BLD STRIP.AUTO-MCNC: 204 MG/DL (ref 65–100)
SERVICE CMNT-IMP: ABNORMAL

## 2018-09-11 PROCEDURE — 97116 GAIT TRAINING THERAPY: CPT

## 2018-09-11 PROCEDURE — 74011250637 HC RX REV CODE- 250/637: Performed by: INTERNAL MEDICINE

## 2018-09-11 PROCEDURE — 74011250636 HC RX REV CODE- 250/636: Performed by: INTERNAL MEDICINE

## 2018-09-11 PROCEDURE — C1758 CATHETER, URETERAL: HCPCS

## 2018-09-11 PROCEDURE — 97530 THERAPEUTIC ACTIVITIES: CPT

## 2018-09-11 PROCEDURE — 82962 GLUCOSE BLOOD TEST: CPT

## 2018-09-11 PROCEDURE — 74011636637 HC RX REV CODE- 636/637: Performed by: INTERNAL MEDICINE

## 2018-09-11 PROCEDURE — 74011000258 HC RX REV CODE- 258: Performed by: INTERNAL MEDICINE

## 2018-09-11 PROCEDURE — 65270000029 HC RM PRIVATE

## 2018-09-11 RX ADMIN — MIRTAZAPINE 7.5 MG: 15 TABLET, FILM COATED ORAL at 22:38

## 2018-09-11 RX ADMIN — APIXABAN 2.5 MG: 2.5 TABLET, FILM COATED ORAL at 10:41

## 2018-09-11 RX ADMIN — Medication 10 ML: at 13:47

## 2018-09-11 RX ADMIN — ASPIRIN 81 MG 81 MG: 81 TABLET ORAL at 10:41

## 2018-09-11 RX ADMIN — OMEPRAZOLE 20 MG: 20 CAPSULE, DELAYED RELEASE ORAL at 07:30

## 2018-09-11 RX ADMIN — INSULIN LISPRO 3 UNITS: 100 INJECTION, SOLUTION INTRAVENOUS; SUBCUTANEOUS at 13:46

## 2018-09-11 RX ADMIN — APIXABAN 2.5 MG: 2.5 TABLET, FILM COATED ORAL at 18:18

## 2018-09-11 RX ADMIN — CEFTRIAXONE SODIUM 2 G: 2 INJECTION, POWDER, FOR SOLUTION INTRAMUSCULAR; INTRAVENOUS at 12:59

## 2018-09-11 RX ADMIN — AMLODIPINE BESYLATE 5 MG: 5 TABLET ORAL at 10:41

## 2018-09-11 RX ADMIN — Medication 25 MG: at 22:39

## 2018-09-11 RX ADMIN — ISOSORBIDE MONONITRATE 120 MG: 60 TABLET ORAL at 06:44

## 2018-09-11 RX ADMIN — Medication 10 ML: at 22:40

## 2018-09-11 RX ADMIN — INSULIN LISPRO 2 UNITS: 100 INJECTION, SOLUTION INTRAVENOUS; SUBCUTANEOUS at 18:17

## 2018-09-11 RX ADMIN — INSULIN HUMAN 10 UNITS: 100 INJECTION, SUSPENSION SUBCUTANEOUS at 10:41

## 2018-09-11 RX ADMIN — INSULIN HUMAN 10 UNITS: 100 INJECTION, SUSPENSION SUBCUTANEOUS at 18:17

## 2018-09-11 NOTE — PROGRESS NOTES
Aristeo Tanner shaheed [place] 79 
1255 [facility], [address] [place] [address] 
[phone] Medical Progress Note NAME:         Jonas Scruggs. :        1935 MRM:        [id number] Date:          2018 Subjective: Patient has been seen and examined as a follow up for UTI. Chart, labs, diagnostics reviewed. Patient has dementia but says he feels better. Not in any acute distress. No fever or chills. Objective: 
 
Vital Signs: 
 
Visit Vitals  /73 (BP 1 Location: Right arm, BP Patient Position: At rest)  Pulse 72  Temp 97.7 °F (36.5 °C)  Resp 20  
 Ht 5' 7\" (1.702 m)  Wt 87.5 kg (193 lb)  SpO2 98%  BMI 30.23 kg/m2 Intake/Output Summary (Last 24 hours) at 18 1434 Last data filed at 18 0207 Gross per 24 hour Intake                0 ml Output              700 ml Net             -700 ml Physical Examination: 
 
General:   Weak and ill looking male not in acute distress Eyes:   pink conjunctivae, PERRLA with no discharge. ENT:   no ottorrhea or rhinorrhea with moist mucous membranes Pulm:  clear breath sounds without crackles or wheezes Card:  has regular and normal S1, S2 without thrills, bruits or peripheral edema Abd:  Soft, non-tender, non-distended, normoactive bowel sounds Musc:  No cyanosis, clubbing, atrophy or deformities. Skin:  No rashes, bruising or ulcers. Neuro: Awake and alert. Generally a non focal exam.  
Psych:  Has a poor insight to illness Current Facility-Administered Medications Medication Dose Route Frequency  cefTRIAXone (ROCEPHIN) 2 g in 0.9% sodium chloride (MBP/ADV) 50 mL  2 g IntraVENous Q24H  
 apixaban (ELIQUIS) tablet 2.5 mg  2.5 mg Oral BID  aspirin chewable tablet 81 mg  81 mg Oral DAILY  insulin NPH (NOVOLIN N, HUMULIN N) injection 10 Units  10 Units SubCUTAneous ACB&D  
  isosorbide mononitrate ER (IMDUR) tablet 120 mg  120 mg Oral 7am  
 losartan (COZAAR) tablet 25 mg  25 mg Oral QHS  mirtazapine (REMERON) tablet 7.5 mg  7.5 mg Oral QHS  omeprazole (PRILOSEC) capsule 20 mg  20 mg Oral ACB  amLODIPine (NORVASC) tablet 5 mg  5 mg Oral DAILY  sodium chloride (NS) flush 5-10 mL  5-10 mL IntraVENous PRN  
 sodium chloride (NS) flush 5-10 mL  5-10 mL IntraVENous Q8H  
 sodium chloride (NS) flush 5-10 mL  5-10 mL IntraVENous PRN  
 glucose chewable tablet 16 g  4 Tab Oral PRN  
 dextrose (D50W) injection syrg 12.5-25 g  25-50 mL IntraVENous PRN  
 glucagon (GLUCAGEN) injection 1 mg  1 mg IntraMUSCular PRN  
 acetaminophen (TYLENOL) tablet 650 mg  650 mg Oral Q4H PRN  
 diphenhydrAMINE (BENADRYL) capsule 25 mg  25 mg Oral Q4H PRN  
 ondansetron (ZOFRAN) injection 4 mg  4 mg IntraVENous Q4H PRN  
 insulin lispro (HUMALOG) injection   SubCUTAneous AC&HS Laboratory data and review: 
 
Recent Labs  
   09/10/18 
 0338  09/09/18 
 1816 WBC  6.3  10.8 HGB  12.6  13.1 HCT  40.0  40.6 PLT  103*  152 Recent Labs  
   09/10/18 
 0338  09/09/18 
 1816 NA  138  131* K  4.4  4.5  
CL  105  101 CO2  23  23 GLU  145*  212* BUN  17  19 CREA  1.73*  1.86* CA  10.3*  10.2* MG  2.4  1.5* ALB   --   2.6* SGOT   --   79* ALT   --   89* No components found for: [place] Assessment and Plan: 
 
UTI (urinary tract infection) / Fever: No other SIRS criteria. Not sepsis. Urine and blood cx positive but not finalized. Continue empiric IV Ceftriaxone.  
  
Acute encephalopathy / Dementia POA, sundowning, acutely worse due to UTI. Continue supportive care. Continue qhs mirtazapine. Stable.  
  
Fatigue / Debilitated patient / Arthritis POA, chronic debility due to multiple medical conditions, acutely worse due to UTI. Fall precautions.  PT/OT eval and treat and will likely need home health.  
  
 Paroxysmal atrial fibrillation POA, stable. No rate control medications. Continue apixaban. In setting of severe co morbidities, may consider stopping this. Stable for now 
  
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 SSI per protocol. Continue home NPH. A1c 6.9. No need for tight glycemic control.  
  
Nausea / GERD (gastroesophageal reflux disease): POA, worsened by UTI. Better. Diet as tolerated. Continue PPi. 
  
CAD (coronary artery disease) / HTN (hypertension): stable. Continue ASA, IMDUR, losartan, statin. Usually on prn lasix.  
  
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 Dr Zane Wong follows patient with CTs, though patient would refuse any offer of biopsy or treatment. May not need further CT scans. Hypothyroidism: Continue Levothyroxine  
  
SAMUEL (obstructive sleep apnea): Oxygen as needed. Not using CPAP 
  
Hyperlipidemia: discontinue Pravastatin in setting of severe co morbidities  
 
Total time spent for the patient's care: 30 Minutes Care Plan discussed with: Patient and Nursing Staff Discussed:  Care Plan Prophylaxis:  Eliquis Anticipated Disposition:   PT, OT, RN vs other 
        
___________________________________________________ Attending Physician:   Bella Mack MD

## 2018-09-11 NOTE — PROGRESS NOTES
Problem: Mobility Impaired (Adult and Pediatric) Goal: *Acute Goals and Plan of Care (Insert Text) Physical Therapy Goals Initiated 9/10/2018 1. Patient will move from supine to sit and sit to supine , scoot up and down and roll side to side in bed with independence within 7 day(s). 2.  Patient will transfer from bed to chair and chair to bed with supervision/set-up using the least restrictive device within 7 day(s). 3.  Patient will perform sit to stand with supervision/set-up within 7 day(s). 4.  Patient will ambulate with supervision/set-up for 200 feet with the least restrictive device within 7 day(s). 5.  Patient will ascend/descend 3 stairs with 1 handrail(s) with supervision/set-up within 7 day(s). physical Therapy TREATMENT Patient: David Bermudez (80 y.o. male) Date: 9/11/2018 Diagnosis: UTI (urinary tract infection) UTI (urinary tract infection) Precautions: Fall Chart, physical therapy assessment, plan of care and goals were reviewed. ASSESSMENT: 
Pt comes to sit with min to mod assist.Pt to stand with bed elevated min assist.Pt ambulated 50ft with RW CGA to min assist.Pt was assisted in restroom before sitting in chair. Pt progressing slowly. Continue goals. Progression toward goals: 
[]    Improving appropriately and progressing toward goals [x]    Improving slowly and progressing toward goals 
[]    Not making progress toward goals and plan of care will be adjusted PLAN: 
Patient continues to benefit from skilled intervention to address the above impairments. Continue treatment per established plan of care. Discharge Recommendations:  Home Health and vs SNF Further Equipment Recommendations for Discharge:  rolling walker SUBJECTIVE:  
 
 
OBJECTIVE DATA SUMMARY:  
Critical Behavior: 
Neurologic State: Confused Orientation Level: Disoriented to situation, Disoriented to time Cognition: Follows commands Functional Mobility Training: 
Bed Mobility: Rolling: Moderate assistance;Minimum assistance Transfers: 
Sit to Stand: Minimum assistance Stand to Sit: Minimum assistance Balance: 
Sitting: Intact Standing: With support Ambulation/Gait Training: 
Distance (ft): 50 Feet (ft) Assistive Device: Gait belt;Walker, rolling Ambulation - Level of Assistance: Contact guard assistance;Minimal assistance Gait Abnormalities: Path deviations;Decreased step clearance Base of Support: Narrowed Speed/Sabrina: Pace decreased (<100 feet/min) Step Length: Left shortened;Right shortened Pain: 
Pain Scale 1: Numeric (0 - 10) Pain Intensity 1: 0 Activity Tolerance:  
Pt tolerated treatment fairly well. Please refer to the flowsheet for vital signs taken during this treatment. After treatment:  
[x]    Patient left in no apparent distress sitting up in chair 
[]    Patient left in no apparent distress in bed 
[]    Call bell left within reach 
[]    Nursing notified 
[]    Caregiver present 
[]    Bed alarm activated COMMUNICATION/COLLABORATION:  
The patients plan of care was discussed with: Physical Therapist 
 
Shannan Saldaña PTA Time Calculation: 23 mins

## 2018-09-11 NOTE — DISCHARGE INSTRUCTIONS
HOSPITALIST DISCHARGE INSTRUCTIONS    NAME:             Ramesh Chang. :  1935   MRN:  [id number]     Date:     2018    ADMIT DATE: 2018     DISCHARGE DATE: 2018     PRINCIPAL ADMITTING DIAGNOSIS:  UTI (urinary tract infection)    DISCHARGE DIAGNOSES:  Principal Problem:    UTI (urinary tract infection) (2018)    CAD (coronary artery disease)    HTN (hypertension)     [facility]oxysmal [facility] fibrillation ([facility] (2013)    CKD (chronic kidney disease) stage 3, GFR 30-59 ml/min (12/10/2013)    Kidney cancer, primary, 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 [facility]) (2015)    Renal cell cancer ([facility]): Overview: Stage 4 Renal Cell Cancer. Partial omentectomy 2015 with Dr. Houston Done: omental mass-metastatic renal cell carcinoma      Cochlear implant in place (2/3/2017): Overview: -- Cannot have MRI    Arthritis     Dementia     DM type 2 causing renal disease ([facility]     DM type 2 causing vascular disease (HCC)     GERD (gastroesophageal reflux disease)     Hypothyroidism     SAMUEL (obstructive sleep apnea): Overview: not using CPAP    Prostate cancer (HCC)     Fatigue (2018)    Debilitated patient (2018)    MEDICATIONS:    · It is important that medications are taken exactly as they are prescribed on the discharge medication instructions and keep them your  in the bottles provided by the pharmacist.   · Keep a list of the medication names, dosages, and times to be taken at all times. · Do not take other medications without consulting your doctor.    · Start taking the levofloxacin     Recommended diet:  Cardiac Diet and Diabetic Diet    Recommended activity: Activity as tolerated    Post discharge care:    Notify follow up health care provider or return to the emergency department if you cannot get hold of your doctor if you feel worse or experience symptoms similar to those that brought you to hospital    Follow-up Information     Follow up With Details Comments [facility] Schedule an appointment as soon as possible for a visit to schedule a follow up review [address] [place] [address]  [phone]          Information obtained by :  I understand that if any problems occur once I am at home I am to contact my physician and I understand and acknowledge receipt of the instructions indicated above.                                                                                                                                            Physician's or R.N.'s Signature                                                                  Date/Time                                                                                                                                              Patient or Representative Signature                                                          Date/Time

## 2018-09-11 NOTE — PROGRESS NOTES
Bedside shift change report given to [facility] (oncoming nurse) by Mariana Huntley (offgoing nurse). Report included the following information SBAR, Kardex, MAR and Recent Results.

## 2018-09-12 LAB
GLUCOSE BLD STRIP.AUTO-MCNC: 126 MG/DL (ref 65–100)
GLUCOSE BLD STRIP.AUTO-MCNC: 135 MG/DL (ref 65–100)
GLUCOSE BLD STRIP.AUTO-MCNC: 159 MG/DL (ref 65–100)
GLUCOSE BLD STRIP.AUTO-MCNC: 95 MG/DL (ref 65–100)
SERVICE CMNT-IMP: ABNORMAL
SERVICE CMNT-IMP: NORMAL

## 2018-09-12 PROCEDURE — 74011250637 HC RX REV CODE- 250/637: Performed by: INTERNAL MEDICINE

## 2018-09-12 PROCEDURE — 36415 COLL VENOUS BLD VENIPUNCTURE: CPT | Performed by: INTERNAL MEDICINE

## 2018-09-12 PROCEDURE — 65270000029 HC RM PRIVATE

## 2018-09-12 PROCEDURE — 74011636637 HC RX REV CODE- 636/637: Performed by: INTERNAL MEDICINE

## 2018-09-12 PROCEDURE — 97530 THERAPEUTIC ACTIVITIES: CPT

## 2018-09-12 PROCEDURE — 87040 BLOOD CULTURE FOR BACTERIA: CPT | Performed by: INTERNAL MEDICINE

## 2018-09-12 PROCEDURE — 82962 GLUCOSE BLOOD TEST: CPT

## 2018-09-12 PROCEDURE — 74011000250 HC RX REV CODE- 250: Performed by: INTERNAL MEDICINE

## 2018-09-12 PROCEDURE — 97116 GAIT TRAINING THERAPY: CPT

## 2018-09-12 RX ORDER — POLYETHYLENE GLYCOL 3350 17 G/17G
17 POWDER, FOR SOLUTION ORAL
Status: DISCONTINUED | OUTPATIENT
Start: 2018-09-12 | End: 2018-09-13 | Stop reason: HOSPADM

## 2018-09-12 RX ORDER — LEVOFLOXACIN 750 MG/1
750 TABLET ORAL
Status: DISCONTINUED | OUTPATIENT
Start: 2018-09-12 | End: 2018-09-13 | Stop reason: HOSPADM

## 2018-09-12 RX ORDER — ADHESIVE BANDAGE
30 BANDAGE TOPICAL DAILY PRN
Status: DISCONTINUED | OUTPATIENT
Start: 2018-09-12 | End: 2018-09-13 | Stop reason: HOSPADM

## 2018-09-12 RX ADMIN — INSULIN HUMAN 10 UNITS: 100 INJECTION, SUSPENSION SUBCUTANEOUS at 10:17

## 2018-09-12 RX ADMIN — POLYETHYLENE GLYCOL 3350 17 G: 17 POWDER, FOR SOLUTION ORAL at 20:30

## 2018-09-12 RX ADMIN — INSULIN LISPRO 2 UNITS: 100 INJECTION, SOLUTION INTRAVENOUS; SUBCUTANEOUS at 12:53

## 2018-09-12 RX ADMIN — AMLODIPINE BESYLATE 5 MG: 5 TABLET ORAL at 10:16

## 2018-09-12 RX ADMIN — Medication 10 ML: at 10:21

## 2018-09-12 RX ADMIN — APIXABAN 2.5 MG: 2.5 TABLET, FILM COATED ORAL at 10:16

## 2018-09-12 RX ADMIN — ISOSORBIDE MONONITRATE 120 MG: 60 TABLET ORAL at 07:00

## 2018-09-12 RX ADMIN — LEVOFLOXACIN 750 MG: 750 TABLET, FILM COATED ORAL at 18:07

## 2018-09-12 RX ADMIN — INSULIN HUMAN 10 UNITS: 100 INJECTION, SUSPENSION SUBCUTANEOUS at 18:06

## 2018-09-12 RX ADMIN — Medication 10 ML: at 22:30

## 2018-09-12 RX ADMIN — ASPIRIN 81 MG 81 MG: 81 TABLET ORAL at 10:16

## 2018-09-12 RX ADMIN — Medication 25 MG: at 20:30

## 2018-09-12 RX ADMIN — OMEPRAZOLE 20 MG: 20 CAPSULE, DELAYED RELEASE ORAL at 07:30

## 2018-09-12 RX ADMIN — MIRTAZAPINE 7.5 MG: 15 TABLET, FILM COATED ORAL at 22:31

## 2018-09-12 RX ADMIN — APIXABAN 2.5 MG: 2.5 TABLET, FILM COATED ORAL at 18:07

## 2018-09-12 NOTE — PROGRESS NOTES
9/12/2018   CARE MANAGEMENT NOTE:   TONA received order from MD re: SNF. Per PT note, pt ambulated 50 feet with mod assistance. CM met with pt, his wife and dtr at bedside to discuss SNF option which is preferred by wife until pt increases his strength and level of functioning. A referral was made to [facility] per choice. Await backup choices if needed. [place]

## 2018-09-12 NOTE — PROGRESS NOTES
Aristeo Tanner shaheed [place] 79 
380 [facility], [address] [place] [address] 
[phone] Medical Progress Note NAME:         Olga Lidia Byers :        1935 MRM:        [id number] Date:          2018 Subjective: Patient has been seen and examined as a follow up for UTI. Chart, labs, diagnostics reviewed. Patient has dementia but says he feels better. Remains very weak and tired. No fever or chills. Not in any acute distress. Objective: 
 
Vital Signs: 
 
Visit Vitals  /62 (BP 1 Location: Left arm, BP Patient Position: Sitting)  Pulse 71  Temp 98.2 °F (36.8 °C)  Resp 16  
 Ht 5' 7\" (1.702 m)  Wt 81.6 kg (180 lb)  SpO2 95%  BMI 28.19 kg/m2 Intake/Output Summary (Last 24 hours) at 18 1121 Last data filed at 18 Gross per 24 hour Intake                0 ml Output              500 ml Net             -500 ml Physical Examination: 
 
General:   Weak and ill looking male not in acute distress Eyes:   pink conjunctivae, PERRLA with no discharge. Pulm:  clear breath sounds without crackles or wheezes Card:  has regular and normal S1, S2 without thrills, bruits or peripheral edema Abd:  Soft, non-tender, non-distended, normoactive bowel sounds Musc:  No cyanosis, clubbing, atrophy or deformities. Skin:  No rashes, bruising or ulcers. Neuro: Awake and alert. Generally a non focal exam.  
Psych:  Has a poor insight to illness Current Facility-Administered Medications Medication Dose Route Frequency  cefTRIAXone (ROCEPHIN) 2 g in 0.9% sodium chloride (MBP/ADV) 50 mL  2 g IntraVENous Q24H  
 apixaban (ELIQUIS) tablet 2.5 mg  2.5 mg Oral BID  aspirin chewable tablet 81 mg  81 mg Oral DAILY  insulin NPH (NOVOLIN N, HUMULIN N) injection 10 Units  10 Units SubCUTAneous ACB&D  
  isosorbide mononitrate ER (IMDUR) tablet 120 mg  120 mg Oral 7am  
 losartan (COZAAR) tablet 25 mg  25 mg Oral QHS  mirtazapine (REMERON) tablet 7.5 mg  7.5 mg Oral QHS  omeprazole (PRILOSEC) capsule 20 mg  20 mg Oral ACB  amLODIPine (NORVASC) tablet 5 mg  5 mg Oral DAILY  sodium chloride (NS) flush 5-10 mL  5-10 mL IntraVENous PRN  
 sodium chloride (NS) flush 5-10 mL  5-10 mL IntraVENous Q8H  
 sodium chloride (NS) flush 5-10 mL  5-10 mL IntraVENous PRN  
 glucose chewable tablet 16 g  4 Tab Oral PRN  
 dextrose (D50W) injection syrg 12.5-25 g  25-50 mL IntraVENous PRN  
 glucagon (GLUCAGEN) injection 1 mg  1 mg IntraMUSCular PRN  
 acetaminophen (TYLENOL) tablet 650 mg  650 mg Oral Q4H PRN  
 diphenhydrAMINE (BENADRYL) capsule 25 mg  25 mg Oral Q4H PRN  
 ondansetron (ZOFRAN) injection 4 mg  4 mg IntraVENous Q4H PRN  
 insulin lispro (HUMALOG) injection   SubCUTAneous AC&HS Laboratory data and review: 
 
Recent Labs  
   09/10/18 
 0338  09/09/18 
 1816 WBC  6.3  10.8 HGB  12.6  13.1 HCT  40.0  40.6 PLT  103*  152 Recent Labs  
   09/10/18 
 0338  09/09/18 
 1816 NA  138  131* K  4.4  4.5  
CL  105  101 CO2  23  23 GLU  145*  212* BUN  17  19 CREA  1.73*  1.86* CA  10.3*  10.2* MG  2.4  1.5* ALB   --   2.6* SGOT   --   79* ALT   --   89* No components found for: [place] Assessment and Plan: 
 
UTI (urinary tract infection) / Fever: No other SIRS criteria. Not sepsis. Blood Cx grew E coli. Urine Cx still pending. Continue IV Ceftriaxone. Too weak to go home. Discussed with spouse. CM working on SNF  
  
Acute encephalopathy / Dementia POA, sundowning, acutely worse due to UTI. Better this morning. Continue supportive care. Continue qhs mirtazapine. Stable.  
  
Fatigue / Debilitated patient / Arthritis POA, chronic debility due to multiple medical conditions, acutely worse due to UTI. Fall precautions. PT/OT .  CM working on dispo.   
  
 Paroxysmal atrial fibrillation POA, stable. No rate control medications. Continue apixaban. In setting of severe co morbidities, may consider stopping this. Stable for now 
  
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 SSI per protocol. Continue home NPH. A1c 6.9. No need for tight glycemic control.  
  
Nausea / GERD (gastroesophageal reflux disease): POA, worsened by UTI. Better. Diet as tolerated. Continue PPi. 
  
CAD (coronary artery disease) / HTN (hypertension): stable. Continue ASA, IMDUR, losartan, statin. Usually on prn lasix.  
  
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 Dr Zane Wong follows patient with CTs, though patient would refuse any offer of biopsy or treatment. May not need further CT scans. Hypothyroidism: Continue Levothyroxine  
  
SAMUEL (obstructive sleep apnea): Oxygen as needed. Not using CPAP 
  
Hyperlipidemia: discontinue Pravastatin in setting of severe co morbidities  
 
Total time spent for the patient's care: 30 Minutes Care Plan discussed with: Patient and Nursing Staff Discussed:  Care Plan Prophylaxis:  Eliquis Anticipated Disposition: SNF  
        
___________________________________________________ Attending Physician:   Bella Mack MD

## 2018-09-12 NOTE — PROGRESS NOTES
[facility]    [address] [place] [address]    Phone:  [phone]           Gabino Amaya   MRN: [id number]    Department:  [facility]   Date of Visit:  5/21/2017           Diagnosis     Herpes zoster without complication        You were seen by Milton Song MD.      Disclaimer     Follow-up Care:  It is your responsibility to arrange for follow-up care with your healthcare provider or as instructed. Call to get an appointment time.           Contact your doctor for follow-up appointment if not already scheduled.     Follow up with Heraclio Beck MD.    Specialty:  Internal Medicine    Comments:  As needed    Contact information    [address] [place] [id number] [phone]        Preventive care and screening     Your blood pressure was 177/79 today. If your blood pressure is higher than 120/80, we recommend follow up with your primary care provider to obtain basic health screening, including reassessment of your blood pressure, within three months.          Medications you received while in the ED through 05/22/2017 12:32 AM     None         What to Do with Your Medications      START taking these medications today unless otherwise stated        Details    HYDROcodone-acetaminophen 5-325 MG per tablet   Commonly known as:  NORCO        Take 1 tablet by mouth every 4 hours as needed for Pain.   Authorizing Provider:  Milton Song       Valacyclovir HCl 1000 MG Tab        Take 1 tablet by mouth 3 times daily for 7 days.   Authorizing Provider:  Milton Song                             Where to Get Your Medications      You can get these medications from any pharmacy     Bring a paper prescription for each of these medications     HYDROcodone-acetaminophen 5-325 MG per tablet    Valacyclovir HCl 1000 MG Tab               Your To Do List     Future Appointments Provider Department Dept Phone    5/22/2017 3:10 PM [facility]  Spiritual Care Partner Volunteer visited patient in 65 on 9.11.18. Documented by: 
 
Mandie Cannon M.Div, M.S, [facility] Spiritual Care available at [phone]) [facility] Pod A [phone]    8/22/2017 11:00 AM Wilbert Mitchell MD [facility] Pulmonary Medicine [phone]      Procedures     None      Imaging Results     None        Discharge Instructions           Shingles  Shingles is a viral infection caused by the same virus as chicken pox. Anyone who has had chicken pox may get shingles later in life. The virus stays in the body, but remains dormant (asleep). Shingles often occurs in older persons or persons with lowered immunity. But it can affect anyone at any age.  Shingles starts as a tingling patch of skin on one side of the body. Small, painful blisters may then appear. The rash does not spread to the rest of the body.  Exposure to shingles cannot cause shingles. However, it can cause chicken pox in anyone who has not had chicken pox or has not been vaccinated. The contagious period ends when all blisters have crusted over (generally about 2 weeks after the illness begins).  After the blisters heal, the affected skin may be sensitive or painful for months (neuralgia). This often gradually goes away.  A shingles vaccine is available. This can help prevent shingles or make it less painful. It is generally recommended for adults over the age of 60 who have had chicken pox in the past, but who have never had shingles. Adults over 60 who have had neither chicken pox nor shingles can prevent both diseases with the chicken pox vaccine. Ask your healthcare provider about these vaccines.  Home care  · Medicines may be prescribed to help relieve pain. Take these medicines as directed. Ask your healthcare provider or pharmacist before using over-the-counter medicines for helping treat pain and itching.   · In certain cases, antiviral medicines may be prescribed to reduce pain, shorten the illness, and prevent neuralgia. Take these medicines as directed.  · Compresses made from a solution of cool water mixed with  cornstarch or baking soda may help relieve pain and itching.   · Gently wash skin daily with soap and water to help prevent infection.  Be certain to rinse off all of the soap, which can be irritating.  · Trim fingernails and try not to scratch. Scratching the sores may leave scars.  · Stay home from work or school until all blisters have formed a crust and you are no longer contagious.  Follow-up care  Follow up with your healthcare provider or as directed by our staff.  When to seek medical advice  · Fever of 100.4°F (38°C) or higher, or as directed by your healthcare provider  · Affected skin is on the face or neck and any of the following occur:                          Headache                          Eye pain                          Changes in vision                          Sores near the eye                          Weakness of facial muscles  · Pain, redness, or swelling of a joint  · Signs of skin infection: colored drainage from the sores, warmth, increasing redness, or increasing pain  © 9785-7974 [employer]. [address] [place] [address], [place] [address]. All rights reserved. This information is not intended as a substitute for professional medical care. Always follow your healthcare professional's instructions.      Rx Valacyclovir for shingles    Rx Norco for pain    Recheck as needed    Discharge References/Attachments     None      Medication Safety: What you need to know     Maintain Security - It is important to keep all medications in a secure location:  Keep out of the reach of children and pets    Consider using a lock box or locked filing cabinet    Place pill bottles in private area such as bedroom or drawer    Don't Share - It is illegal to share your prescription medication, even with family:  The doctor prescribes medications specifically for you and your body    You cannot be sure how the drug may affect others physically or emotionally    It is a criminal offense to share  prescriptions    Proper Disposal - It is no longer acceptable to flush or throw away medications:  Recent studies show measurable amounts of medication have been found in drinking water and wildlife due to flushing or throwing away medications    Medication strength changes over time and is not typically safe after one year    Proper disposal removes the medication from your home in a safe way so that others don't have access to it. Use your local drug drop site.    Your local pharmacy can provide information on medication disposal options in your community. The Department of [employer] Drug Enforcement Administration website also has information on safe medication disposal:  www.deadiversion.usdoj.gov/drug_disposal/index.html

## 2018-09-12 NOTE — PROGRESS NOTES
[facility] Dosing Services:  
 
Pharmacist Renal Dosing Progress Note for levaquin Physician Dr. Zechariah Guevara The following medication: levaquin was automatically dose-adjusted per [facility] P&T Committee Protocol, with respect to renal function. Consult provided for this   80 y.o. , male , for the indication of UTI. Pt Weight:  
Wt Readings from Last 1 Encounters:  
09/12/18 81.6 kg (180 lb) Previous Regimen Levofloxacin 750 mg IV Serum Creatinine Lab Results Component Value Date/Time Creatinine 1.73 (H) 09/10/2018 03:38 AM  
 Creatinine (POC) 1.5 (H) 11/26/2013 09:55 AM  
   
Creatinine Clearance Estimated Creatinine Clearance: 33.1 mL/min (based on Cr of 1.73). BUN Lab Results Component Value Date/Time BUN 17 09/10/2018 03:38 AM  
   
Dosage changed to:  levaquin 750 mg po q 48 hours for crcl < 50 ml/min Additional notes: 
 
 
Pharmacy to continue to monitor patient daily. Will make dosage adjustments based upon changing renal function. Signed Román Evans information:  [phone]

## 2018-09-12 NOTE — PROGRESS NOTES
Problem: Mobility Impaired (Adult and Pediatric) Goal: *Acute Goals and Plan of Care (Insert Text) Physical Therapy Goals Initiated 9/10/2018 1. Patient will move from supine to sit and sit to supine , scoot up and down and roll side to side in bed with independence within 7 day(s). 2.  Patient will transfer from bed to chair and chair to bed with supervision/set-up using the least restrictive device within 7 day(s). 3.  Patient will perform sit to stand with supervision/set-up within 7 day(s). 4.  Patient will ambulate with supervision/set-up for 200 feet with the least restrictive device within 7 day(s). 5.  Patient will ascend/descend 3 stairs with 1 handrail(s) with supervision/set-up within 7 day(s). physical Therapy TREATMENT Patient: Adan Allen (80 y.o. male) Date: 9/12/2018 Diagnosis: UTI (urinary tract infection) UTI (urinary tract infection) Precautions: Fall Chart, physical therapy assessment, plan of care and goals were reviewed. ASSESSMENT: 
Pt sitting on arrival of PT. Pt to stand with mod assist.Pt ambulated 50ft with RW min to mod assist.Pt moves slowly tending to step outside of RW with left leg. Pt fatigues quickly and is at increased risk for falls. Pt left sitting in chair. Pt progressing slowly. Continue goals. Progression toward goals: 
[]    Improving appropriately and progressing toward goals [x]    Improving slowly and progressing toward goals 
[]    Not making progress toward goals and plan of care will be adjusted PLAN: 
Patient continues to benefit from skilled intervention to address the above impairments. Continue treatment per established plan of care. Discharge Recommendations:  Kevin Tim Further Equipment Recommendations for Discharge:  rolling walker SUBJECTIVE:  
 
 
OBJECTIVE DATA SUMMARY:  
Critical Behavior: 
Neurologic State: Alert, Confused Orientation Level: Oriented to person, Oriented to place Cognition: Decreased attention/concentration Functional Mobility Training: 
 
  
  
  
  
  
  
Transfers: 
Sit to Stand: Moderate assistance Stand to Sit: Minimum assistance Balance: 
Sitting: Intact Standing: With support Ambulation/Gait Training: 
Distance (ft): 50 Feet (ft) Assistive Device: Walker, rolling;Gait belt Ambulation - Level of Assistance: Minimal assistance; Moderate assistance Gait Abnormalities: Decreased step clearance; Path deviations Base of Support: Narrowed Speed/Sabrina: Pace decreased (<100 feet/min) Step Length: Left shortened;Right shortened Pain: 
Pain Scale 1: Numeric (0 - 10) Pain Intensity 1: 0 Activity Tolerance:  
Pt tolerated treatment fairly well. Please refer to the flowsheet for vital signs taken during this treatment. After treatment:  
[x]    Patient left in no apparent distress sitting up in chair 
[]    Patient left in no apparent distress in bed 
[]    Call bell left within reach 
[]    Nursing notified 
[]    Caregiver present 
[]    Bed alarm activated COMMUNICATION/COLLABORATION:  
The patients plan of care was discussed with: Physical Therapist 
 
Laurita Keith PTA Time Calculation: 23 mins

## 2018-09-12 NOTE — PROGRESS NOTES
Bedside shift change report given to KIMBERLY Nails (oncoming nurse) by Ursula Harrell (offgoing nurse). Report included the following information SBAR, Kardex and MAR.

## 2018-09-12 NOTE — PROGRESS NOTES
36 MD notified of pt's IV failure. MD will review chart and put in order for PO antibiotics. Holding IV rocephin pending MD decision. [address] Will put in order for PO antibiotics by 7pm. Canceling rocephin IV admin, per MD expected action. 1810  Placed order for levaquin 750mg daily, per request from MD. Also, MD ok with no IV if unable to place new IV. Passed info to night RNs.

## 2018-09-12 NOTE — PROGRESS NOTES
Bedside shift change report given to KIMBERLY Nails (oncoming nurse) by Barron Weiss (offgoing nurse). Report included the following information SBAR, Kardex and Intake/Output.

## 2018-09-13 VITALS
HEART RATE: 72 BPM | TEMPERATURE: 97.4 F | BODY MASS INDEX: 28.25 KG/M2 | RESPIRATION RATE: 16 BRPM | DIASTOLIC BLOOD PRESSURE: 56 MMHG | SYSTOLIC BLOOD PRESSURE: 119 MMHG | WEIGHT: 180 LBS | OXYGEN SATURATION: 97 % | HEIGHT: 67 IN

## 2018-09-13 LAB
GLUCOSE BLD STRIP.AUTO-MCNC: 108 MG/DL (ref 65–100)
GLUCOSE BLD STRIP.AUTO-MCNC: 162 MG/DL (ref 65–100)
SERVICE CMNT-IMP: ABNORMAL
SERVICE CMNT-IMP: ABNORMAL

## 2018-09-13 PROCEDURE — 74011250637 HC RX REV CODE- 250/637: Performed by: INTERNAL MEDICINE

## 2018-09-13 PROCEDURE — 82962 GLUCOSE BLOOD TEST: CPT

## 2018-09-13 PROCEDURE — 74011636637 HC RX REV CODE- 636/637: Performed by: INTERNAL MEDICINE

## 2018-09-13 RX ORDER — LEVOFLOXACIN 750 MG/1
750 TABLET ORAL
Qty: 5 TAB | Refills: 0 | Status: SHIPPED | OUTPATIENT
Start: 2018-09-15 | End: 2018-09-24

## 2018-09-13 RX ADMIN — ISOSORBIDE MONONITRATE 120 MG: 60 TABLET ORAL at 06:23

## 2018-09-13 RX ADMIN — INSULIN HUMAN 10 UNITS: 100 INJECTION, SUSPENSION SUBCUTANEOUS at 08:01

## 2018-09-13 RX ADMIN — Medication 10 ML: at 06:34

## 2018-09-13 RX ADMIN — INSULIN LISPRO 2 UNITS: 100 INJECTION, SOLUTION INTRAVENOUS; SUBCUTANEOUS at 12:08

## 2018-09-13 RX ADMIN — ASPIRIN 81 MG 81 MG: 81 TABLET ORAL at 09:18

## 2018-09-13 RX ADMIN — APIXABAN 2.5 MG: 2.5 TABLET, FILM COATED ORAL at 09:18

## 2018-09-13 RX ADMIN — AMLODIPINE BESYLATE 5 MG: 5 TABLET ORAL at 09:18

## 2018-09-13 RX ADMIN — OMEPRAZOLE 20 MG: 20 CAPSULE, DELAYED RELEASE ORAL at 06:26

## 2018-09-13 NOTE — PROGRESS NOTES
Patient's wife and daughter given discharge instructions and packet for [facility]. Patient's PIV removed. Patient dressed, assisted to toilet and back to chair. Patient's family verbalizes understanding of discharge instructions, follow up at plan to go to [facility]. Patient out of unit in wheelchair to facility.

## 2018-09-13 NOTE — PROGRESS NOTES
Problem: Pressure Injury - Risk of 
Goal: *Prevention of pressure injury Document Rogers Scale and appropriate interventions in the flowsheet. Outcome: Progressing Towards Goal 
Pressure Injury Interventions: 
  
 
Moisture Interventions: Absorbent underpads, Apply protective barrier, creams and emollients, Internal/External urinary devices, Limit adult briefs, Maintain skin hydration (lotion/cream), Minimize layers, Moisture barrier Activity Interventions: PT/OT evaluation, Pressure redistribution bed/mattress(bed type) Mobility Interventions: HOB 30 degrees or less, Pressure redistribution bed/mattress (bed type), PT/OT evaluation, Turn and reposition approx. every two hours(pillow and wedges) Nutrition Interventions: Document food/fluid/supplement intake, Offer support with meals,snacks and hydration Friction and Shear Interventions: HOB 30 degrees or less

## 2018-09-13 NOTE — PROGRESS NOTES
Bedside shift change report given to Paulette Bhatti and Cecille-RNs (oncoming nurses) by Brandon Quinn (offgoing nurse). Report included the following information SBAR, Kardex, Intake/Output, MAR and Recent Results.

## 2018-09-13 NOTE — PROGRESS NOTES
9/13/2018   CM ADDENDUM:  Skilled bed is available today at [facility]. CM faxed AVS, summary, today's MAR, and MI/MR to SNF and a hard copy of aforementioned clinicals will accompany pt. Pt's wife is agreeable and she will provide transportation via car. RN, please call report to [phone] Claire 9/13/2018   CARE MANAGEMENT NOTE:  Pt has been accepted to [facility] and await status on bed availability for today. CM left vm message with SNF admissions office and await return call. Dakotah

## 2018-09-13 NOTE — PROGRESS NOTES
Problem: Falls - Risk of 
Goal: *Absence of Falls Document Lucrecia Doherty Fall Risk and appropriate interventions in the flowsheet. Outcome: Progressing Towards Goal 
Fall Risk Interventions: 
Mobility Interventions: Bed/chair exit alarm, Patient to call before getting OOB, Utilize walker, cane, or other assistive device, Utilize gait belt for transfers/ambulation Mentation Interventions: Adequate sleep, hydration, pain control, Bed/chair exit alarm, Door open when patient unattended, Gait belt with transfers/ambulation, Increase mobility, Reorient patient, Room close to nurse's station, Toileting rounds Medication Interventions: Bed/chair exit alarm, Patient to call before getting OOB, Teach patient to arise slowly, Utilize gait belt for transfers/ambulation Elimination Interventions: Bed/chair exit alarm, Call light in reach, Patient to call for help with toileting needs, Urinal in reach History of Falls Interventions: Bed/chair exit alarm, Door open when patient unattended, Evaluate medications/consider consulting pharmacy, Room close to nurse's station, Utilize gait belt for transfer/ambulation

## 2018-09-13 NOTE — DISCHARGE SUMMARY
Aristeo T[facility] [place] [address]  [address]  Tel: [phone]    Physician Discharge Summary    Patient ID:    Ramesh Chang. Age:              80 y.o.    : 1935  MRN:             [id number]     PCP: Chance Ornelas MD     Admit date: 2018    Discharge date: 2018    Principal admission Diagnosis:   UTI (urinary tract infection)    Discharge Diagnoses:  Principal Problem:    UTI (urinary tract infection) (2018)    CAD (coronary artery disease): Overview: Angina 2011, PCI unknown vessel at [place], no MI    HTN (hypertension)     Paroxysmal atrial fibrillation ([address] (2013)    CKD (chronic kidney disease) stage 3, GFR 30-59 ml/min (12/10/2013)    Kidney cancer, primary, 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 [facility]) (2015)    Renal cell cancer [facility]): Overview: Stage 4 Renal Cell Cancer. Partial omentectomy 2015   with Dr. Houston Done: omental mass-metastatic renal cell carcinoma    Cochlear implant in place (2/3/2017): Overview: -- Cannot have MRI    DM type 2 causing renal disease ([address]     DM type 2 causing vascular disease (HCC)     GERD (gastroesophageal reflux disease)     Hypothyroidism     SAMUEL (obstructive sleep apnea): Overview: not using CPAP    Prostate cancer (HCC)     Fatigue (2018)    Debilitated patient (2018)    Arthritis     Dementia     Consults: None    Hospital Course:     Mr. Naomi Hector is a 80 y.o. admitted to [facility] and treated for the following:    UTI (urinary tract infection) / Fever: No other SIRS criteria. Not sepsis. Blood Cx grew E coli. Urine Cx grew Gram neg rods. ID still pending but sensitivities have been reviewed. Has responded well to IV Ceftriaxone and will continue with Levofloxacin to complete course. Too weak to go home. Being discharged to SNF        Acute encephalopathy / Dementia POA, sundowning, acutely worse due to UTI. This has resolved.  Continue supportive care. Continue qhs mirtazapine.        Fatigue / Debilitated patient / Arthritis POA, chronic debility due to multiple medical conditions, acutely worse due to UTI. Fall precautions. Seen by PT/OT and being discharged to SNF. .        Paroxysmal atrial fibrillation POA, stable. No rate control medications. Continue apixaban.        DM type 2 causing renal and vascular disease POA: Diabetic diet and counseling. Continue home NPH. A1c 6.9. No need for tight glycemic control.       Nausea / GERD (gastroesophageal reflux disease): POA, worsened by UTI. Resolved. Diet as tolerated. Continue PPi.     CAD (coronary artery disease) / HTN (hypertension): stable. Continue ASA, IMDUR, losartan, statin.  Usually on prn lasix.     CKD (chronic kidney disease) stage 3 / Hx stage 4 Kidney cancer, primary, 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 / omental mass-metastatic renal cell carcinoma / Hx Prostate cancer POA: Na now normal. Dr Theodora Velázquez follows patient with CTs, though patient would refuse any offer of biopsy or treatment. May not need further CT scans. Continue follow up      Hypothyroidism: Continue Levothyroxine       SAMUEL (obstructive sleep apnea): Oxygen as needed.  Not using CPAP      Hyperlipidemia: discontinue Pravastatin in setting of severe co morbidities     Discharge Exam:    Visit Vitals    /58 (BP 1 Location: Right arm, BP Patient Position: At rest)    Pulse 69    Temp 97.7 °F (36.5 °C)    Resp 16    Ht 5' 7\" (1.702 m)    Wt 81.6 kg (180 lb)    SpO2 96%    BMI 28.19 kg/m2        General: well looking and stable patient in no acute distress  Pulm: clear breath sounds without wheezes  Card: no murmurs, normal S1, S2 without thrills, bruits   Abd:    soft, non-tender, normoactive bowel sounds  Skin: no rashes or ulcers, skin turgor is good  Neuro: awake, alert and has a non focal     Activity: Activity as tolerated    Diet: Diabetic Diet    Current Discharge Medication List      START taking these medications    Details   levoFLOXacin (LEVAQUIN) 750 mg tablet Take 1 Tab by mouth every fourty-eight (48) hours for 5 doses. Qty: 5 Tab, Refills: 0         CONTINUE these medications which have CHANGED    Details   insulin NPH (HUMULIN N NPH U-100 INSULIN) 100 unit/mL injection 10 Units by SubCUTAneous route Before breakfast and dinner for 30 days. Qty: 1 Vial, Refills: 0         CONTINUE these medications which have NOT CHANGED    Details   aspirin delayed-release 81 mg tablet Take 81 mg by mouth nightly. furosemide (LASIX) 40 mg tablet Take 20 mg by mouth daily as needed (AS NEEDED for weight gain of 5 lbs or greater). amLODIPine (NORVASC) 10 mg tablet Take 5 mg by mouth daily. cholecalciferol (VITAMIN D3) 1,000 unit tablet Take 2,000 Units by mouth nightly. losartan (COZAAR) 25 mg tablet Take 25 mg by mouth nightly. nystatin (MYCOSTATIN) powder Apply  to affected area daily. To groin after showering and drying      docusate sodium (COLACE) 100 mg capsule Take 200 mg by mouth daily. senna (SENNA) 8.6 mg tablet Take 1 Tab by mouth daily as needed for Constipation. desonide (TRIDESILON) 0.05 % cream Apply  to affected area daily as needed for Skin Irritation. isosorbide mononitrate ER (IMDUR) 120 mg CR tablet Take 1 Tab by mouth every morning. Qty: 30 Tab, Refills: 0      melatonin 3 mg tablet Take 3 mg by mouth nightly. polyethylene glycol (MIRALAX) 17 gram packet Take 8.5 g by mouth daily. mirtazapine (REMERON) 15 mg tablet Take 7.5 mg by mouth nightly. apixaban (ELIQUIS) 2.5 mg tablet Take 2.5 mg by mouth two (2) times a day. OTHER,NON-FORMULARY, Take 1 Cap by mouth nightly. Zafar May dietary supplement      omeprazole (PRILOSEC) 20 mg capsule Take 20 mg by mouth Daily (before breakfast). nitroglycerin (NITROSTAT) 0.4 mg SL tablet 0.4 mg by SubLINGual route every five (5) minutes as needed.            STOP taking these medications aspirin 81 mg chewable tablet Comments:   Reason for Stopping: Follow-up Information     Follow up With Details Comments [address] [facility] Schedule an appointment as soon as possible for a visit to schedule a follow up review [address] [place], [place] [id number] [phone]          Follow-up tests or labs: None    Discharge Condition: Stable    Disposition: SNF    Time taken to arrange discharge:  35 minutes.     Signed:  Ed Calvo MD     [facility]  9/13/2018   10:56 AM

## 2018-09-13 NOTE — PROGRESS NOTES
Bedside and Verbal shift change report given to Tahira Welch RN (oncoming nurse) by Janice Perez RN (offgoing nurse). Report included the following information SBAR, Kardex, Intake/Output, MAR and Recent Results.

## 2018-09-14 LAB
BACTERIA SPEC CULT: ABNORMAL
BACTERIA SPEC CULT: ABNORMAL
CC UR VC: ABNORMAL
SERVICE CMNT-IMP: ABNORMAL

## 2018-09-15 LAB
BACTERIA SPEC CULT: ABNORMAL
SERVICE CMNT-IMP: ABNORMAL
SERVICE CMNT-IMP: ABNORMAL

## 2018-09-18 LAB
BACTERIA SPEC CULT: NORMAL
SERVICE CMNT-IMP: NORMAL

## 2018-11-14 ENCOUNTER — APPOINTMENT (OUTPATIENT)
Dept: GENERAL RADIOLOGY | Age: 83
DRG: 640 | End: 2018-11-14
Attending: EMERGENCY MEDICINE
Payer: MEDICARE

## 2018-11-14 ENCOUNTER — HOSPITAL ENCOUNTER (INPATIENT)
Age: 83
LOS: 11 days | Discharge: NURSING, REHAB OR LONG-TERM CARE | DRG: 640 | End: 2018-11-27
Attending: EMERGENCY MEDICINE | Admitting: INTERNAL MEDICINE
Payer: MEDICARE

## 2018-11-14 DIAGNOSIS — Z71.89 DNR (DO NOT RESUSCITATE) DISCUSSION: ICD-10-CM

## 2018-11-14 DIAGNOSIS — Z71.89 ADVANCED CARE PLANNING/COUNSELING DISCUSSION: ICD-10-CM

## 2018-11-14 DIAGNOSIS — J18.9 COMMUNITY ACQUIRED PNEUMONIA, UNSPECIFIED LATERALITY: Primary | ICD-10-CM

## 2018-11-14 DIAGNOSIS — R53.81 DEBILITY: ICD-10-CM

## 2018-11-14 DIAGNOSIS — D69.6 THROMBOCYTOPENIA (HCC): ICD-10-CM

## 2018-11-14 DIAGNOSIS — Z71.89 GOALS OF CARE, COUNSELING/DISCUSSION: ICD-10-CM

## 2018-11-14 DIAGNOSIS — R11.2 NAUSEA AND VOMITING, INTRACTABILITY OF VOMITING NOT SPECIFIED, UNSPECIFIED VOMITING TYPE: ICD-10-CM

## 2018-11-14 DIAGNOSIS — R53.1 WEAKNESS: ICD-10-CM

## 2018-11-14 PROBLEM — B34.9 VIRAL ILLNESS: Status: ACTIVE | Noted: 2018-11-14

## 2018-11-14 LAB
ANION GAP SERPL CALC-SCNC: 11 MMOL/L (ref 5–15)
APPEARANCE UR: CLEAR
B PERT DNA SPEC QL NAA+PROBE: NOT DETECTED
BACTERIA URNS QL MICRO: NEGATIVE /HPF
BASOPHILS # BLD: 0 K/UL (ref 0–0.1)
BASOPHILS NFR BLD: 0 % (ref 0–1)
BILIRUB UR QL CFM: POSITIVE
BUN SERPL-MCNC: 15 MG/DL (ref 6–20)
BUN/CREAT SERPL: 10 (ref 12–20)
C PNEUM DNA SPEC QL NAA+PROBE: NOT DETECTED
CALCIUM SERPL-MCNC: 10.7 MG/DL (ref 8.5–10.1)
CHLORIDE SERPL-SCNC: 105 MMOL/L (ref 97–108)
CO2 SERPL-SCNC: 23 MMOL/L (ref 21–32)
COLOR UR: ABNORMAL
CREAT SERPL-MCNC: 1.56 MG/DL (ref 0.7–1.3)
DIFFERENTIAL METHOD BLD: ABNORMAL
EOSINOPHIL # BLD: 0.1 K/UL (ref 0–0.4)
EOSINOPHIL NFR BLD: 3 % (ref 0–7)
EPITH CASTS URNS QL MICRO: ABNORMAL /LPF
ERYTHROCYTE [DISTWIDTH] IN BLOOD BY AUTOMATED COUNT: 13.9 % (ref 11.5–14.5)
EST. AVERAGE GLUCOSE BLD GHB EST-MCNC: 146 MG/DL
FLUAV H1 2009 PAND RNA SPEC QL NAA+PROBE: NOT DETECTED
FLUAV H1 RNA SPEC QL NAA+PROBE: NOT DETECTED
FLUAV H3 RNA SPEC QL NAA+PROBE: NOT DETECTED
FLUAV SUBTYP SPEC NAA+PROBE: NOT DETECTED
FLUBV RNA SPEC QL NAA+PROBE: NOT DETECTED
GLUCOSE BLD STRIP.AUTO-MCNC: 127 MG/DL (ref 65–100)
GLUCOSE SERPL-MCNC: 204 MG/DL (ref 65–100)
GLUCOSE UR STRIP.AUTO-MCNC: 100 MG/DL
HADV DNA SPEC QL NAA+PROBE: NOT DETECTED
HBA1C MFR BLD: 6.7 % (ref 4.2–6.3)
HCOV 229E RNA SPEC QL NAA+PROBE: NOT DETECTED
HCOV HKU1 RNA SPEC QL NAA+PROBE: NOT DETECTED
HCOV NL63 RNA SPEC QL NAA+PROBE: NOT DETECTED
HCOV OC43 RNA SPEC QL NAA+PROBE: NOT DETECTED
HCT VFR BLD AUTO: 40.7 % (ref 36.6–50.3)
HGB BLD-MCNC: 13.2 G/DL (ref 12.1–17)
HGB UR QL STRIP: NEGATIVE
HMPV RNA SPEC QL NAA+PROBE: NOT DETECTED
HPIV1 RNA SPEC QL NAA+PROBE: NOT DETECTED
HPIV2 RNA SPEC QL NAA+PROBE: NOT DETECTED
HPIV3 RNA SPEC QL NAA+PROBE: NOT DETECTED
HPIV4 RNA SPEC QL NAA+PROBE: NOT DETECTED
HYALINE CASTS URNS QL MICRO: ABNORMAL /LPF (ref 0–5)
IMM GRANULOCYTES # BLD: 0 K/UL (ref 0–0.04)
IMM GRANULOCYTES NFR BLD AUTO: 0 % (ref 0–0.5)
KETONES UR QL STRIP.AUTO: NEGATIVE MG/DL
LACTATE SERPL-SCNC: 2.9 MMOL/L (ref 0.4–2)
LEUKOCYTE ESTERASE UR QL STRIP.AUTO: ABNORMAL
LYMPHOCYTES # BLD: 0.3 K/UL (ref 0.8–3.5)
LYMPHOCYTES NFR BLD: 6 % (ref 12–49)
M PNEUMO DNA SPEC QL NAA+PROBE: NOT DETECTED
MCH RBC QN AUTO: 28.3 PG (ref 26–34)
MCHC RBC AUTO-ENTMCNC: 32.4 G/DL (ref 30–36.5)
MCV RBC AUTO: 87.3 FL (ref 80–99)
MONOCYTES # BLD: 0.2 K/UL (ref 0–1)
MONOCYTES NFR BLD: 5 % (ref 5–13)
NEUTS SEG # BLD: 3.6 K/UL (ref 1.8–8)
NEUTS SEG NFR BLD: 86 % (ref 32–75)
NITRITE UR QL STRIP.AUTO: NEGATIVE
NRBC # BLD: 0 K/UL (ref 0–0.01)
NRBC BLD-RTO: 0 PER 100 WBC
PH UR STRIP: 5.5 [PH] (ref 5–8)
PLATELET # BLD AUTO: 102 K/UL (ref 150–400)
PMV BLD AUTO: 10.1 FL (ref 8.9–12.9)
POTASSIUM SERPL-SCNC: 4.2 MMOL/L (ref 3.5–5.1)
PROT UR STRIP-MCNC: ABNORMAL MG/DL
RBC # BLD AUTO: 4.66 M/UL (ref 4.1–5.7)
RBC #/AREA URNS HPF: ABNORMAL /HPF (ref 0–5)
RBC MORPH BLD: ABNORMAL
RSV RNA SPEC QL NAA+PROBE: NOT DETECTED
RV+EV RNA SPEC QL NAA+PROBE: NOT DETECTED
SERVICE CMNT-IMP: ABNORMAL
SODIUM SERPL-SCNC: 139 MMOL/L (ref 136–145)
SP GR UR REFRACTOMETRY: 1.02 (ref 1–1.03)
UR CULT HOLD, URHOLD: NORMAL
UROBILINOGEN UR QL STRIP.AUTO: 2 EU/DL (ref 0.2–1)
WBC # BLD AUTO: 4.2 K/UL (ref 4.1–11.1)
WBC URNS QL MICRO: ABNORMAL /HPF (ref 0–4)

## 2018-11-14 PROCEDURE — 82962 GLUCOSE BLOOD TEST: CPT

## 2018-11-14 PROCEDURE — 85025 COMPLETE CBC W/AUTO DIFF WBC: CPT

## 2018-11-14 PROCEDURE — 36415 COLL VENOUS BLD VENIPUNCTURE: CPT

## 2018-11-14 PROCEDURE — 81001 URINALYSIS AUTO W/SCOPE: CPT

## 2018-11-14 PROCEDURE — 96360 HYDRATION IV INFUSION INIT: CPT

## 2018-11-14 PROCEDURE — 74011250636 HC RX REV CODE- 250/636: Performed by: EMERGENCY MEDICINE

## 2018-11-14 PROCEDURE — 99218 HC RM OBSERVATION: CPT

## 2018-11-14 PROCEDURE — 99282 EMERGENCY DEPT VISIT SF MDM: CPT

## 2018-11-14 PROCEDURE — 87633 RESP VIRUS 12-25 TARGETS: CPT

## 2018-11-14 PROCEDURE — 83036 HEMOGLOBIN GLYCOSYLATED A1C: CPT

## 2018-11-14 PROCEDURE — 71046 X-RAY EXAM CHEST 2 VIEWS: CPT

## 2018-11-14 PROCEDURE — 80048 BASIC METABOLIC PNL TOTAL CA: CPT

## 2018-11-14 PROCEDURE — 83605 ASSAY OF LACTIC ACID: CPT

## 2018-11-14 PROCEDURE — 96361 HYDRATE IV INFUSION ADD-ON: CPT

## 2018-11-14 PROCEDURE — 74011250636 HC RX REV CODE- 250/636: Performed by: INTERNAL MEDICINE

## 2018-11-14 RX ORDER — PROCHLORPERAZINE EDISYLATE 5 MG/ML
10 INJECTION INTRAMUSCULAR; INTRAVENOUS
Status: DISCONTINUED | OUTPATIENT
Start: 2018-11-14 | End: 2018-11-27 | Stop reason: HOSPADM

## 2018-11-14 RX ORDER — LEVOFLOXACIN 5 MG/ML
750 INJECTION, SOLUTION INTRAVENOUS
Status: DISCONTINUED | OUTPATIENT
Start: 2018-11-14 | End: 2018-11-14

## 2018-11-14 RX ORDER — INSULIN LISPRO 100 [IU]/ML
INJECTION, SOLUTION INTRAVENOUS; SUBCUTANEOUS
Status: DISCONTINUED | OUTPATIENT
Start: 2018-11-14 | End: 2018-11-27 | Stop reason: HOSPADM

## 2018-11-14 RX ORDER — SODIUM CHLORIDE 0.9 % (FLUSH) 0.9 %
5-10 SYRINGE (ML) INJECTION EVERY 8 HOURS
Status: DISCONTINUED | OUTPATIENT
Start: 2018-11-14 | End: 2018-11-27 | Stop reason: HOSPADM

## 2018-11-14 RX ORDER — SODIUM CHLORIDE 9 MG/ML
75 INJECTION, SOLUTION INTRAVENOUS CONTINUOUS
Status: DISCONTINUED | OUTPATIENT
Start: 2018-11-14 | End: 2018-11-19

## 2018-11-14 RX ORDER — SODIUM CHLORIDE 0.9 % (FLUSH) 0.9 %
5-10 SYRINGE (ML) INJECTION AS NEEDED
Status: DISCONTINUED | OUTPATIENT
Start: 2018-11-14 | End: 2018-11-27 | Stop reason: HOSPADM

## 2018-11-14 RX ORDER — MAGNESIUM SULFATE 100 %
4 CRYSTALS MISCELLANEOUS AS NEEDED
Status: DISCONTINUED | OUTPATIENT
Start: 2018-11-14 | End: 2018-11-27 | Stop reason: HOSPADM

## 2018-11-14 RX ORDER — ACETAMINOPHEN 325 MG/1
650 TABLET ORAL
Status: DISCONTINUED | OUTPATIENT
Start: 2018-11-14 | End: 2018-11-27 | Stop reason: HOSPADM

## 2018-11-14 RX ORDER — TRIMETHOPRIM 100 MG/1
50 TABLET ORAL DAILY
COMMUNITY
End: 2018-11-27

## 2018-11-14 RX ORDER — DEXTROSE 50 % IN WATER (D50W) INTRAVENOUS SYRINGE
25-50 AS NEEDED
Status: DISCONTINUED | OUTPATIENT
Start: 2018-11-14 | End: 2018-11-27 | Stop reason: HOSPADM

## 2018-11-14 RX ADMIN — SODIUM CHLORIDE 1000 ML: 900 INJECTION, SOLUTION INTRAVENOUS at 19:20

## 2018-11-14 RX ADMIN — SODIUM CHLORIDE 75 ML/HR: 900 INJECTION, SOLUTION INTRAVENOUS at 21:22

## 2018-11-14 NOTE — H&P
[address] 
[phone] Admission History and Physical 
 
 
NAME:  Holley Lea. :   1935 MRN:  [id number] PCP:  Helen Valladares MD  
 
Date/Time:  2018 Subjective: CHIEF COMPLAINT: chills HISTORY OF PRESENT ILLNESS:    
Mr. Benjamin Davis is a 80 y.o.  male who is admitted with probable viral illness. Mr. Benjamin Davis presented to the Emergency Department this PM complaining of chills: today, at home, associated with temperature of 98.2 (wife states normal for him is 97.8), as well as cough, weakness and tremors, improved some with ibuprofen PTA History obtained from spouse, chart review and the patient. Previous records reviewed, multiple admissions for UTI, TIAs, renal cell ca, overall showing a general decline in his health over the last several years Past Medical History:  
Diagnosis Date  Arthritis  CAD (coronary artery disease) Angina 2011, PCI LAD with multilink stent 4.0x12mm;  also cath at [place]  -- no stents at that time  CKD (chronic kidney disease), stage III  Cochlear implant in place 2/3/2017  Dementia  DM type 2 causing renal disease ([address]  DM type 2 causing vascular disease ([address]  GERD (gastroesophageal reflux disease)  HTN (hypertension)  Hypothyroidism  SAMUEL (obstructive sleep apnea)   
 not using CPAP  
 PAF (paroxysmal atrial fibrillation) ([address] [id number] score = 7  
 Prostate [facility]  Renal cell cancer ([facility] Stage 4 Renal Cell Cancer. Partial omentectomy 2015 with Dr. Jonathan Townsend: omental mass-metastatic renal cell carcinoma  Renal mass, left [address] LEFT PARTIAL NEPHRECTOMY 13;   
  
 
Past Surgical History:  
Procedure Laterality Date  CARDIAC SURG PROCEDURE UNLIST  2011  
 stent to LAD  HX CHOLECYSTECTOMY  2012  HX CORONARY STENT PLACEMENT   [address] 65  ,  [address] 68  
 stapedectomy  HX HERNIA REPAIR  1968  
 left inguinal  
 HX LITHOTRIPSY  2015  HX ORTHOPAEDIC    
 rotator cuff repair  HX ORTHOPAEDIC    
 cervical disc removed  HX PROSTATECTOMY    
 surgery intervention [address] [place]  HX UROLOGICAL    
 valvular implant prevent incontinence  HX UROLOGICAL    
 insert gel to help with incontinence  HX UROLOGICAL  2013  
 left partial nephrectomy  HX UROLOGICAL  2015 CT guided biopsy of abdominal lymph nodes Social History Tobacco Use  Smoking status: Former Smoker Packs/day: 0.10 Years: 23.00 Pack years: 2.30 Last attempt to quit: 12/3/1975 Years since quittin.9  Smokeless tobacco: Never Used Substance Use Topics  Alcohol use: No  
  Alcohol/week: 0.0 oz Family History Problem Relation Age of Onset  Stroke Father  Cancer Father   
     prostate  Diabetes Sister 2 sisters  Hypertension Sister 2 sisters  Diabetes Brother  Stroke Brother  Diabetes Brother   
     all brothers  Stroke Brother  Kidney Disease Brother  Heart Attack Brother  Hypertension Other Allergies Allergen Reactions  Latex Rash, Swelling and Contact Dermatitis  Morphine Other (comments) Hallucinations and Nausea Prior to Admission medications Medication Sig Start Date End Date Taking? Authorizing Provider  
aspirin delayed-release 81 mg tablet Take 81 mg by mouth nightly. Provider, Historical  
furosemide (LASIX) 40 mg tablet Take 20 mg by mouth daily as needed (AS NEEDED for weight gain of 5 lbs or greater). Provider, Historical  
amLODIPine (NORVASC) 10 mg tablet Take 5 mg by mouth daily. Provider, Historical  
cholecalciferol (VITAMIN D3) 1,000 unit tablet Take 2,000 Units by mouth nightly.     Provider, Historical  
 losartan (COZAAR) 25 mg tablet Take 25 mg by mouth nightly. Provider, Historical  
nystatin (MYCOSTATIN) powder Apply  to affected area daily. To groin after showering and drying    Provider, Historical  
docusate sodium (COLACE) 100 mg capsule Take 200 mg by mouth daily. Provider, Historical  
senna (SENNA) 8.6 mg tablet Take 1 Tab by mouth daily as needed for Constipation. Provider, Historical  
desonide (TRIDESILON) 0.05 % cream Apply  to affected area daily as needed for Skin Irritation. Provider, Historical  
isosorbide mononitrate ER (IMDUR) 120 mg CR tablet Take 1 Tab by mouth every morning. 2/4/17   Maurice Garcia MD  
melatonin 3 mg tablet Take 3 mg by mouth nightly. Provider, Historical  
polyethylene glycol (MIRALAX) 17 gram packet Take 8.5 g by mouth daily. Provider, Historical  
mirtazapine (REMERON) 15 mg tablet Take 7.5 mg by mouth nightly. Provider, Historical  
apixaban (ELIQUIS) 2.5 mg tablet Take 2.5 mg by mouth two (2) times a day. Provider, Historical  
OTHER,NON-FORMULARY, Take 1 Cap by mouth nightly. Zafar May dietary supplement    Provider, Historical  
omeprazole (PRILOSEC) 20 mg capsule Take 20 mg by mouth Daily (before breakfast). Provider, Historical  
nitroglycerin (NITROSTAT) 0.4 mg SL tablet 0.4 mg by SubLINGual route every five (5) minutes as needed. Chance Ornelas MD  
 
 
 
Review of Systems: 
(bold if positive, if negative) Gen:  chills, fatigueEyes:  ENT:  CVS:  Pulm:  CoughGI:  emesis :   
MS:  Skin:  Psych:  Endo:   
Hem:  Renal:   
Neuro:  weaknesstremors Objective: VITALS:   
Vital signs reviewed; most recent are: 
 
Visit Vitals /63 (BP 1 Location: Right arm, BP Patient Position: At rest) Pulse 96 Temp 97.6 °F (36.4 °C) Resp 18 Ht 5' 7\" (1.702 m) Wt 80.7 kg (178 lb) SpO2 97% BMI 27.88 kg/m² SpO2 Readings from Last 6 Encounters:  
11/14/18 97% 09/13/18 97% 08/26/17 92% 07/16/17 94% 02/04/17 98% 03/24/16 97% No intake or output data in the 24 hours ending 11/14/18 4265 Exam:  
 
Physical Exam: 
  
Gen: Well-developed, well-nourished, frail, elderly, chronically ill-appearing, in no acute distress HEENT:  Pink conjunctivae, PERRL, hearing intact to voice, moist mucous membranes Neck: Supple, without masses, thyroid non-tender Resp: No accessory muscle use, clear breath sounds without wheezes rales or rhonchi 
Card: No murmurs, normal S1, S2 without thrills, bruits or peripheral edema, 2+ LE peripheral pulses Abd:  Soft, non-tender, non-distended, normoactive bowel sounds are present, no palpable organomegaly and no detectable hernias Lymph:  No cervical or inguinal adenopathy Musc: No cyanosis or clubbing Skin: No rashes or ulcers, skin turgor is good, cap refill <2 sec Neuro:  Cranial nerves are grossly intact, no focal motor weakness, follows commands appropriately Psych:  Fair insight, oriented to person, place but not time, alert Labs: 
 
Recent Labs 11/14/18 
1624 WBC 4.2 HGB 13.2 HCT 40.7 * Recent Labs 11/14/18 
1624   
K 4.2  CO2 23 * BUN 15  
CREA 1.56* CA 10.7* Lab Results Component Value Date/Time Glucose (POC) 162 (H) 09/13/2018 11:59 AM  
 Glucose (POC) 108 (H) 09/13/2018 07:07 AM  
 
No results for input(s): PH, PCO2, PO2, HCO3, FIO2 in the last 72 hours. No results for input(s): INR in the last 72 hours. No lab exists for component: INREXT Additional testing:  Chest X-ray: unchanged LLL air space process which is unchanged from 9/2018, visualized by me. Results not reviewed with Radiologist. 
 
  
Assessment/Plan:   
  
Principal Problem: 
  Viral illness (11/14/2018) - I do not think this is pneumonia; patient has no SIRS criteria and a normal WBC 
 - I have discontinued the levofloxacin ordered in the ED because I do not think it is indicated - I suspect his symptoms are due to a viral illness, will send viral respiratory panel - low threshold to start antibiotics if he develops SIRS criteria Active Problems: 
  CAD (coronary artery disease) () 
 - stable, continue cardiac meds CKD (chronic kidney disease) stage 3, GFR 30-59 ml/min ([facility]) (12/10/2013) - due to DM 2 
 - creatinine \"better\" than previous baseline, however, I think he has lost muscle mass and so this may not acurately reflect renal function; wife confirms he has lost muscle mass in the last few months 
 - clinically he appears slightly dry and that is supported by his elevated lactic acid (which does not need to be rechecked as he is not septic) - will give gentle IV fluids and follow Dementia () 
 - mental status is at baseline per wife DM type 2 causing renal disease ([facility]) () 
 - follow BS on SSI 
 - suspect oral intake will be low, so will hold other DM meds Code status: 
 - patient is DO NOT RESUSCITATE, AMD on file, wife [facility] is surrogate 
 - if his wife is unable to care for him, CM may need to find alternate arrangements 
 - will have Palliative Care assist in long term planning 
 - wife admits he has been declining for some time and is aware he is in the end stages of life Total time spent on patient care: 70 Minutes Care Plan discussed with: Patient, Family, Nursing Staff and Dr. Nav Mendoza Discussed:  Code Status, Care Plan and D/C Planning Prophylaxis:  SCD's and Eliquis Probable Disposition:  TBD, may need placement 
        
___________________________________________________ Attending Physician: Jennifer Aguilar MD

## 2018-11-15 ENCOUNTER — APPOINTMENT (OUTPATIENT)
Dept: CT IMAGING | Age: 83
DRG: 640 | End: 2018-11-15
Attending: INTERNAL MEDICINE
Payer: MEDICARE

## 2018-11-15 LAB
ALBUMIN SERPL-MCNC: 2.6 G/DL (ref 3.5–5)
ALBUMIN/GLOB SERPL: 1.1 {RATIO} (ref 1.1–2.2)
ALP SERPL-CCNC: 204 U/L (ref 45–117)
ALT SERPL-CCNC: 71 U/L (ref 12–78)
ANION GAP SERPL CALC-SCNC: 8 MMOL/L (ref 5–15)
AST SERPL-CCNC: 52 U/L (ref 15–37)
BILIRUB DIRECT SERPL-MCNC: 2.2 MG/DL (ref 0–0.2)
BILIRUB SERPL-MCNC: 3.2 MG/DL (ref 0.2–1)
BUN SERPL-MCNC: 16 MG/DL (ref 6–20)
BUN/CREAT SERPL: 10 (ref 12–20)
CALCIUM SERPL-MCNC: 10 MG/DL (ref 8.5–10.1)
CALCIUM SERPL-MCNC: 9.8 MG/DL (ref 8.5–10.1)
CHLORIDE SERPL-SCNC: 107 MMOL/L (ref 97–108)
CO2 SERPL-SCNC: 26 MMOL/L (ref 21–32)
CREAT SERPL-MCNC: 1.6 MG/DL (ref 0.7–1.3)
ERYTHROCYTE [DISTWIDTH] IN BLOOD BY AUTOMATED COUNT: 13.9 % (ref 11.5–14.5)
GLOBULIN SER CALC-MCNC: 2.4 G/DL (ref 2–4)
GLUCOSE BLD STRIP.AUTO-MCNC: 111 MG/DL (ref 65–100)
GLUCOSE BLD STRIP.AUTO-MCNC: 123 MG/DL (ref 65–100)
GLUCOSE BLD STRIP.AUTO-MCNC: 125 MG/DL (ref 65–100)
GLUCOSE BLD STRIP.AUTO-MCNC: 141 MG/DL (ref 65–100)
GLUCOSE SERPL-MCNC: 127 MG/DL (ref 65–100)
HCT VFR BLD AUTO: 36.4 % (ref 36.6–50.3)
HGB BLD-MCNC: 11.9 G/DL (ref 12.1–17)
LACTATE SERPL-SCNC: 1.6 MMOL/L (ref 0.4–2)
MAGNESIUM SERPL-MCNC: 1.4 MG/DL (ref 1.6–2.4)
MCH RBC QN AUTO: 28.1 PG (ref 26–34)
MCHC RBC AUTO-ENTMCNC: 32.7 G/DL (ref 30–36.5)
MCV RBC AUTO: 86.1 FL (ref 80–99)
NRBC # BLD: 0 K/UL (ref 0–0.01)
NRBC BLD-RTO: 0 PER 100 WBC
PHOSPHATE SERPL-MCNC: 2.6 MG/DL (ref 2.6–4.7)
PLATELET # BLD AUTO: 96 K/UL (ref 150–400)
PMV BLD AUTO: 9.8 FL (ref 8.9–12.9)
POTASSIUM SERPL-SCNC: 4.7 MMOL/L (ref 3.5–5.1)
PROT SERPL-MCNC: 5 G/DL (ref 6.4–8.2)
PTH-INTACT SERPL-MCNC: 40.9 PG/ML (ref 18.4–88)
RBC # BLD AUTO: 4.23 M/UL (ref 4.1–5.7)
SERVICE CMNT-IMP: ABNORMAL
SODIUM SERPL-SCNC: 141 MMOL/L (ref 136–145)
WBC # BLD AUTO: 6.9 K/UL (ref 4.1–11.1)

## 2018-11-15 PROCEDURE — 77030010545

## 2018-11-15 PROCEDURE — 74011250637 HC RX REV CODE- 250/637: Performed by: INTERNAL MEDICINE

## 2018-11-15 PROCEDURE — 99218 HC RM OBSERVATION: CPT

## 2018-11-15 PROCEDURE — G8988 SELF CARE GOAL STATUS: HCPCS

## 2018-11-15 PROCEDURE — 76450000000

## 2018-11-15 PROCEDURE — 97116 GAIT TRAINING THERAPY: CPT

## 2018-11-15 PROCEDURE — 97161 PT EVAL LOW COMPLEX 20 MIN: CPT

## 2018-11-15 PROCEDURE — G8987 SELF CARE CURRENT STATUS: HCPCS

## 2018-11-15 PROCEDURE — 82962 GLUCOSE BLOOD TEST: CPT

## 2018-11-15 PROCEDURE — 80076 HEPATIC FUNCTION PANEL: CPT

## 2018-11-15 PROCEDURE — 82397 CHEMILUMINESCENT ASSAY: CPT

## 2018-11-15 PROCEDURE — 71250 CT THORAX DX C-: CPT

## 2018-11-15 PROCEDURE — 97165 OT EVAL LOW COMPLEX 30 MIN: CPT

## 2018-11-15 PROCEDURE — 96361 HYDRATE IV INFUSION ADD-ON: CPT

## 2018-11-15 PROCEDURE — 80048 BASIC METABOLIC PNL TOTAL CA: CPT

## 2018-11-15 PROCEDURE — 36415 COLL VENOUS BLD VENIPUNCTURE: CPT

## 2018-11-15 PROCEDURE — 97530 THERAPEUTIC ACTIVITIES: CPT

## 2018-11-15 PROCEDURE — 83605 ASSAY OF LACTIC ACID: CPT

## 2018-11-15 PROCEDURE — 83970 ASSAY OF PARATHORMONE: CPT

## 2018-11-15 PROCEDURE — 84100 ASSAY OF PHOSPHORUS: CPT

## 2018-11-15 PROCEDURE — G8989 SELF CARE D/C STATUS: HCPCS

## 2018-11-15 PROCEDURE — G8978 MOBILITY CURRENT STATUS: HCPCS

## 2018-11-15 PROCEDURE — 74176 CT ABD & PELVIS W/O CONTRAST: CPT

## 2018-11-15 PROCEDURE — G8979 MOBILITY GOAL STATUS: HCPCS

## 2018-11-15 PROCEDURE — 83735 ASSAY OF MAGNESIUM: CPT

## 2018-11-15 PROCEDURE — 85027 COMPLETE CBC AUTOMATED: CPT

## 2018-11-15 PROCEDURE — 74011250636 HC RX REV CODE- 250/636: Performed by: INTERNAL MEDICINE

## 2018-11-15 RX ORDER — ISOSORBIDE MONONITRATE 60 MG/1
120 TABLET, EXTENDED RELEASE ORAL
Status: DISCONTINUED | OUTPATIENT
Start: 2018-11-15 | End: 2018-11-27 | Stop reason: HOSPADM

## 2018-11-15 RX ORDER — PANTOPRAZOLE SODIUM 40 MG/1
40 TABLET, DELAYED RELEASE ORAL
Status: DISCONTINUED | OUTPATIENT
Start: 2018-11-16 | End: 2018-11-27 | Stop reason: HOSPADM

## 2018-11-15 RX ORDER — ASPIRIN 81 MG/1
81 TABLET ORAL
Status: DISCONTINUED | OUTPATIENT
Start: 2018-11-15 | End: 2018-11-27 | Stop reason: HOSPADM

## 2018-11-15 RX ORDER — SENNOSIDES 8.6 MG/1
1 TABLET ORAL DAILY
Status: DISCONTINUED | OUTPATIENT
Start: 2018-11-16 | End: 2018-11-27 | Stop reason: HOSPADM

## 2018-11-15 RX ORDER — MIRTAZAPINE 15 MG/1
7.5 TABLET, FILM COATED ORAL
Status: DISCONTINUED | OUTPATIENT
Start: 2018-11-15 | End: 2018-11-27 | Stop reason: HOSPADM

## 2018-11-15 RX ORDER — OMEPRAZOLE 20 MG/1
20 CAPSULE, DELAYED RELEASE ORAL
Status: DISCONTINUED | OUTPATIENT
Start: 2018-11-16 | End: 2018-11-15 | Stop reason: CLARIF

## 2018-11-15 RX ORDER — POLYETHYLENE GLYCOL 3350 17 G/17G
17 POWDER, FOR SOLUTION ORAL DAILY
Status: DISCONTINUED | OUTPATIENT
Start: 2018-11-16 | End: 2018-11-27 | Stop reason: HOSPADM

## 2018-11-15 RX ORDER — LOSARTAN POTASSIUM 50 MG/1
25 TABLET ORAL
Status: DISCONTINUED | OUTPATIENT
Start: 2018-11-15 | End: 2018-11-27 | Stop reason: HOSPADM

## 2018-11-15 RX ORDER — LANOLIN ALCOHOL/MO/W.PET/CERES
3 CREAM (GRAM) TOPICAL
Status: DISCONTINUED | OUTPATIENT
Start: 2018-11-15 | End: 2018-11-27 | Stop reason: HOSPADM

## 2018-11-15 RX ADMIN — Medication 10 ML: at 05:40

## 2018-11-15 RX ADMIN — ISOSORBIDE MONONITRATE 120 MG: 60 TABLET ORAL at 12:16

## 2018-11-15 RX ADMIN — Medication 10 ML: at 21:58

## 2018-11-15 RX ADMIN — Medication 10 ML: at 17:46

## 2018-11-15 RX ADMIN — SODIUM CHLORIDE 75 ML/HR: 900 INJECTION, SOLUTION INTRAVENOUS at 17:53

## 2018-11-15 RX ADMIN — MIRTAZAPINE 7.5 MG: 15 TABLET, FILM COATED ORAL at 21:55

## 2018-11-15 RX ADMIN — LOSARTAN POTASSIUM 25 MG: 50 TABLET, FILM COATED ORAL at 21:54

## 2018-11-15 RX ADMIN — APIXABAN 2.5 MG: 2.5 TABLET, FILM COATED ORAL at 17:45

## 2018-11-15 RX ADMIN — MELATONIN TAB 3 MG 3 MG: 3 TAB at 21:55

## 2018-11-15 RX ADMIN — APIXABAN 2.5 MG: 2.5 TABLET, FILM COATED ORAL at 12:16

## 2018-11-15 RX ADMIN — ASPIRIN 81 MG: 81 TABLET, COATED ORAL at 21:55

## 2018-11-15 NOTE — PROGRESS NOTES
Bedside shift change report given to Mikael Ritter RN (oncoming nurse) by Louie Spencer RN (offgoing nurse). Report included the following information SBAR, Kardex, Intake/Output, MAR and Recent Results.

## 2018-11-15 NOTE — PROGRESS NOTES
Problem: Falls - Risk of 
Goal: *Absence of Falls Document Susan Wood Fall Risk and appropriate interventions in the flowsheet. Outcome: Progressing Towards Goal 
Fall Risk Interventions: 
Mobility Interventions: Patient to call before getting OOB, Bed/chair exit alarm Medication Interventions: Teach patient to arise slowly, Patient to call before getting OOB Elimination Interventions: Call light in reach, Toileting schedule/hourly rounds

## 2018-11-15 NOTE — PROGRESS NOTES
Problem: Self Care Deficits Care Plan (Adult) Goal: *Acute Goals and Plan of Care (Insert Text) Occupational Therapy Goals Initiated 11/15/2018 1. Patient will perform grooming with supervision/set-up in unsupported sit within 7 day(s). 2.  Patient will perform upper body dressing with supervision/set-up within 7 day(s). 3.  Patient will perform lower body dressing with supervision/set-up within 7 day(s). 4.  Patient will perform toilet transfers with contact guard assist within 7 day(s). 5.  Patient will perform all aspects of toileting with minimal assistance/contact guard assist within 7 day(s). 6.  Patient will participate in upper extremity therapeutic exercise/activities with supervision/set-up for 5 minutes within 7 day(s). Occupational Therapy EVALUATION Patient: Anabel Giles (80 y.o. male) Date: 11/15/2018 Primary Diagnosis: Viral illness Precautions:   Fall ASSESSMENT : 
Cleared by RN to see pt for therapy session. Based on the objective data described below, the patient presents with decreased balance, endurance and strength, impaired functional mobility, baseline cognitive deficits from dementia and decreased safety awareness following admission for viral illness. At baseline pt lives with his wife, per daughter is usually I with ADLs and uses RW for functional mobility. PMH significant for dementia. Received supine in bed, alert and oriented to self and vaguely to time, agreeable to participate. Transferred to sitting EOB with min A and increased time. Fair sitting balance EOB with occasional L lateral lean. UE ROM and strength generally decreased but functional, able to perform grooming ADL sitting EOB with setup and min cues. Stood from EOB to RW with min A x2 and ambulated in hallway, pt with unsteady gait and needing tactile/verbal cueing for safe RW management and to avoid striking obstacles.  Transferred back to bed at end of session with min A to lift LEs, call bell in reach, daughter present and alarm set. VSS throughout session. Pt will benefit from skilled intervention to address the above impairments. He is well below his reported mod I baseline at this time, will need 24/7 assistance. Recommend SNF at discharge. Patients rehabilitation potential is considered to be Fair Factors which may influence rehabilitation potential include:  
[x]             None noted []             Mental ability/status []             Medical condition []             Home/family situation and support systems []             Safety awareness []             Pain tolerance/management 
[]             Other: PLAN : 
Recommendations and Planned Interventions: 
[x]               Self Care Training                  [x]        Therapeutic Activities [x]               Functional Mobility Training    []        Cognitive Retraining 
[x]               Therapeutic Exercises           [x]        Endurance Activities [x]               Balance Training                   []        Neuromuscular Re-Education []               Visual/Perceptual Training     [x]   Home Safety Training 
[x]               Patient Education                 [x]        Family Training/Education []               Other (comment): Frequency/Duration: Patient will be followed by occupational therapy 5 times a week to address goals. Discharge Recommendations: Kevin Dumont Further Equipment Recommendations for Discharge: TBD at SNF SUBJECTIVE:  
Patient stated Oh yes I'll get up.  OBJECTIVE DATA SUMMARY:  
HISTORY:  
Past Medical History:  
Diagnosis Date  Arthritis  CAD (coronary artery disease) Angina Jan 2011, PCI LAD with multilink stent 4.0x12mm;  also cath at [place] 7/13 -- no stents at that time  CKD (chronic kidney disease), stage III  Cochlear implant in place 2/3/2017  Dementia  DM type 2 causing renal disease ([facility]  DM type 2 causing vascular disease ([facility]  GERD (gastroesophageal reflux disease)  HTN (hypertension)  Hypothyroidism  SAMUEL (obstructive sleep apnea)   
 not using CPAP  
 PAF (paroxysmal atrial fibrillation) ([facility] [id number] score = 7  
 Prostate cancer ([facility]  [facility] Stage 4 Renal Cell Cancer. Partial omentectomy 11/2/2015 with Dr. Rome Medina: omental mass-metastatic renal cell carcinoma  Renal mass, left [address] LEFT PARTIAL NEPHRECTOMY 12/19/13;   
 
Past Surgical History:  
Procedure Laterality Date  CARDIAC SURG PROCEDURE UNLIST  1/2011  
 stent to LAD  HX CHOLECYSTECTOMY  7/13/2012  HX CORONARY STENT PLACEMENT  2011 [address]  2011, 2013 [address]  
 stapedectomy  HX HERNIA REPAIR  1968  
 left inguinal  
 HX LITHOTRIPSY  8/2015  HX ORTHOPAEDIC  1994  
 rotator cuff repair  HX ORTHOPAEDIC  1994  
 cervical disc removed  HX PROSTATECTOMY  2000  
 surgery intervention [facility]  HX UROLOGICAL  2001  
 valvular implant prevent incontinence  HX UROLOGICAL  2003  
 insert gel to help with incontinence  HX UROLOGICAL  12/2013  
 left partial nephrectomy  HX UROLOGICAL  9/2015 CT guided biopsy of abdominal lymph nodes Prior Level of Function/Environment/Context: At baseline pt lives with his wife, per daughter is usually I with ADLs and uses RW for functional mobility. PMH significant for dementia. Home Situation Home Environment: Private residence Wheelchair Ramp: Yes One/Two Story Residence: One story Living Alone: No(lives with wife) Support Systems: Child(alee), Family member(s), Spouse/Significant Other/Partner Current DME Used/Available at Home: Julio Push, straight, Walker, rolling, Shower chair Tub or Shower Type: Tub/Shower combination Hand dominance: RightEXAMINATION OF PERFORMANCE DEFICITS: 
Cognitive/Behavioral Status: 
Neurologic State: Alert Orientation Level: Oriented to person;Disoriented to time;Disoriented to situation;Disoriented to place Cognition: Follows commands;Decreased attention/concentration;Poor safety awareness Perception: Cues to maintain midline in sitting Perseveration: No perseveration noted Safety/Judgement: Fall prevention;Decreased insight into deficits; Decreased awareness of need for safety Hearing: Auditory Auditory Impairment: None Vision/Perceptual:   
 
Acuity: Within Defined Limits Range of Motion: 
AROM: Generally decreased, functional 
PROM: Within functional limits Strength: 
Strength: Generally decreased, functional 
  
  
Coordination: 
Coordination: Generally decreased, functional 
Fine Motor Skills-Upper: Left Intact; Right Intact Gross Motor Skills-Upper: Left Intact; Right Intact Tone & Sensation: 
Tone: Normal 
Sensation: Intact Balance: 
Sitting: Impaired; Without support(L lateral lean) Sitting - Static: Fair (occasional) Sitting - Dynamic: Fair (occasional) Standing: Impaired; With support(RW) 
Standing - Static: Fair Standing - Dynamic : Fair Functional Mobility and Transfers for ADLs:Bed Mobility: 
Supine to Sit: Minimum assistance; Additional time; Adaptive equipment Sit to Supine: Minimum assistance; Additional time Transfers: 
Sit to Stand: Minimum assistance;Assist x2; Additional time; Adaptive equipment(RW) 
Stand to Sit: Minimum assistance ADL Assessment: 
Feeding: Setup Oral Facial Hygiene/Grooming: Setup Bathing: Moderate assistance Upper Body Dressing: Moderate assistance Lower Body Dressing: Moderate assistance Toileting: Moderate assistance ADL Intervention and task modifications: 
  
 
Grooming Washing Hands: Supervision/set-up(seated EOB) Lower Body Dressing Assistance Socks: Moderate assistance(A to don, pt doffed) Position Performed: Seated edge of bed Cognitive Retraining Safety/Judgement: Fall prevention;Decreased insight into deficits; Decreased awareness of need for safety Functional Measure: 
Barthel Index: 
 
Bathin Bladder: 5 Bowels: 10 
Groomin Dressin Feedin Mobility: 5 Stairs: 0 Toilet Use: 5 Transfer (Bed to Chair and Back): 5 Total: 40 Barthel and G-code impairment scale: 
Percentage of impairment CH 
0% CI 
1-19% CJ 
20-39% CK 
40-59% CL 
60-79% CM 
80-99% CN 
100% Barthel Score 0-100 100 99-80 79-60 59-40 20-39 1-19 
 0 Barthel Score 0-20 20 17-19 13-16 9-12 5-8 1-4 0 The Barthel ADL Index: Guidelines 1. The index should be used as a record of what a patient does, not as a record of what a patient could do. 2. The main aim is to establish degree of independence from any help, physical or verbal, however minor and for whatever reason. 3. The need for supervision renders the patient not independent. 4. A patient's performance should be established using the best available evidence. Asking the patient, friends/relatives and nurses are the usual sources, but direct observation and common sense are also important. However direct testing is not needed. 5. Usually the patient's performance over the preceding 24-48 hours is important, but occasionally longer periods will be relevant. 6. Middle categories imply that the patient supplies over 50 per cent of the effort. 7. Use of aids to be independent is allowed. Magdalena Loera., Barthel, DMahadW. ([id number]). Functional evaluation: the Barthel Index. [facility] ([address] valeria RONALD Stahl Myles Ruths., Laure Javier., Se[place], [facility] (). Measuring the change indisability after inpatient rehabilitation; comparison of the responsiveness of the Barthel Index and Functional [place] Measure. Journal of Neurology, Neurosurgery, and Psychiatry, 66(4), 276-497.  
Jace Mace, [place].HOA, ELEAZAR Gibbons, & Kevin Cedeño M.A. (2004.) Assessment of post-stroke quality of life in cost-effectiveness studies: The usefulness of the Barthel Index and the EuroQoL-5D. [facility], 13, 484-15 G codes: In compliance with CMSs Claims Based Outcome Reporting, the following G-code set was chosen for this patient based on their primary functional limitation being treated: The outcome measure chosen to determine the severity of the functional limitation was the Barthel Index with a score of 40/100 which was correlated with the impairment scale. ? Self Care:  
  - CURRENT STATUS: CK - 40%-59% impaired, limited or restricted  - GOAL STATUS: CJ - 20%-39% impaired, limited or restricted  - D/C STATUS:  ---------------To be determined--------------- Occupational Therapy Evaluation Charge Determination History Examination Decision-Making LOW Complexity : Brief history review  MEDIUM Complexity : 3-5 performance deficits relating to physical, cognitive , or psychosocial skils that result in activity limitations and / or participation restrictions LOW Complexity : No comorbidities that affect functional and no verbal or physical assistance needed to complete eval tasks Based on the above components, the patient evaluation is determined to be of the following complexity level: LOW Pain: 
Pain Scale 1: Numeric (0 - 10) Pain Intensity 1: 0 Activity Tolerance:  
Good Please refer to the flowsheet for vital signs taken during this treatment. After treatment:  
[] Patient left in no apparent distress sitting up in chair 
[x] Patient left in no apparent distress in bed 
[x] Call bell left within reach [x] Nursing notified 
[x] Caregiver present [x] Bed alarm activated COMMUNICATION/EDUCATION:  
The patients plan of care was discussed with: Physical Therapist and Registered Nurse. [x] Home safety education was provided and the patient/caregiver indicated understanding. [x] Patient/family have participated as able in goal setting and plan of care. [x] Patient/family agree to work toward stated goals and plan of care. [] Patient understands intent and goals of therapy, but is neutral about his/her participation. [] Patient is unable to participate in goal setting and plan of care. This patients plan of care is appropriate for delegation to [facility]. Thank you for this referral. 
Arminda Oviedo, OT Time Calculation: 22 mins

## 2018-11-15 NOTE — CONSULTS
Palliative Medicine Consult    Patient Name: Noman Girard YOB: 1935    Date of Initial Consult: 11/15/18  Reason for Consult: Care decisions  Requesting Provider: Dr. Ling Garcia   Primary Care Physician: Chance Ornelas MD      SUMMARY:   Noman Girard is a 80 y.o. with a past history of TIA, RCC, CAD, CKD, DM, HTN, dementia(mild), who was admitted on 11/14/2018 from home with a diagnosis of probable viral illness/elevated bilirubin. Current medical issues leading to Palliative Medicine involvement include: care decisions. Chart reviewed/HPI-patient admitted to the hospital with suspected viral illness. Patient found to have bilirubin in urine and bilirubin of 3.2. CT scan of abdomen/pelvis without any acute process. Per his wife(via phone), he still ambulates with a walker, able to converse, feeds and dresses himself. Short term memory is the biggest problem. SH- for 51 years, 4 children. PALLIATIVE DIAGNOSES:   1. GOC discussion  2. DNR discussion  3. ACP discussion  4. Debility  5. Elevated bilirubin  6. Dementia-appears mild-moderate per family       PLAN:   1. Met with patient and his daughter(Sudha). Patient remains a little confused on medical issues but denies any pain. He states feeling better overall. He is ready to go home. He did walk with PT while I was in the ED and seemed to moving fairly well. 2. Talked with his spouse via phone and she just needs to be sure he can ambulate before discharge because she is unable to lift him. Discussed/reviewed the medical issues and testing so far  3. GOC-continue full restorative measures for now. Return home when feeling better  4. AMD-in the chart with spouse [place] as primary MPOA, [place](son) as secondary and [place] as tertiary  5. Code status-reviewed resuscitation with spouse via phone. She is clear on DNAR and DNI in pre-arrest situation. She was not aware of a DDNR so we will complete before discharge  6.  Symptom management-no acute symptoms for us to manage  7. Psychosocial-good support from family. Children live locally but [place] apparently provides the most support. No spiritual issues. 8. Discussed with bedside nurse  9. Initial consult note routed to primary continuity provider  10. Communicated plan of care with: Palliative IDT       GOALS OF CARE / TREATMENT PREFERENCES:   [====Goals of Care====]  GOALS OF CARE:  Patient/Health Care Proxy Stated Goals: Prolong life      TREATMENT PREFERENCES:   Code Status: DNR    Advance Care Planning:  Advance Care Planning 9/9/2018   Patient's Healthcare Decision Maker is: Legal Next of Kin   Primary Decision Maker Name -   Primary Decision Maker Phone Number -   Primary Decision Maker Relationship to Patient -   Confirm Advance Directive Yes, not on file   Does the patient have other document types -       Medical Interventions: Limited additional interventions  Other Instructions: The palliative care team has discussed with patient / health care proxy about goals of care / treatment preferences for patient.  [====Goals of Care====]         HISTORY:     History obtained from: chart, daughter, patient, spouse    CHIEF COMPLAINT: weakness    HPI/SUBJECTIVE:    The patient is:   [x] Verbal and participatory  [] Non-participatory due to:   Patient is awake. Alert to person, place. Denies any pain.  Ready to go home     Clinical Pain Assessment (nonverbal scale for severity on nonverbal patients):   Clinical Pain Assessment  Severity: 0            FUNCTIONAL ASSESSMENT:     Palliative Performance Scale (PPS):  PPS: 60       PSYCHOSOCIAL/SPIRITUAL SCREENING:     Advance Care Planning:  Advance Care Planning 9/9/2018   Patient's Healthcare Decision Maker is: Legal Next of Kin   Primary Decision Maker Name -   Primary Decision Maker Phone Number -   Primary Decision Maker Relationship to Patient -   Confirm Advance Directive Yes, not on file   Does the patient have other document types -        Any spiritual / Quaker concerns:  [] Yes /  [x] No    Caregiver Burnout:  [] Yes /  [x] No /  [] No Caregiver Present      Anticipatory grief assessment:   [x] Normal  / [] Maladaptive       ESAS Anxiety: Anxiety: 0    ESAS Depression: Depression: 0        REVIEW OF SYSTEMS:     Positive and pertinent negative findings in ROS are noted above in HPI. The following systems were [x] reviewed / [] unable to be reviewed as noted in HPI  Other findings are noted below. Systems: constitutional, ears/nose/mouth/throat, respiratory, gastrointestinal, genitourinary, musculoskeletal, integumentary, neurologic, psychiatric, endocrine. Positive findings noted below. Modified ESAS Completed by: provider   Fatigue: 1 Drowsiness: 0   Depression: 0 Pain: 0   Anxiety: 0 Nausea: 0   Anorexia: 0 Dyspnea: 0     Constipation: No              PHYSICAL EXAM:     From RN flowsheet:  Wt Readings from Last 3 Encounters:   11/14/18 178 lb (80.7 kg)   09/12/18 180 lb (81.6 kg)   08/25/17 195 lb (88.5 kg)     Blood pressure 128/58, pulse 90, temperature 98 °F (36.7 °C), resp. rate 18, height 5' 7\" (1.702 m), weight 178 lb (80.7 kg), SpO2 92 %.     Pain Scale 1: Numeric (0 - 10)  Pain Intensity 1: 0                 Last bowel movement, if known:     Constitutional: alert, NAD  Eyes: pupils equal, icteric  ENMT: no nasal discharge, moist mucous membranes  Cardiovascular: regular rhythm, distal pulses intact  Respiratory: breathing not labored, symmetric  Gastrointestinal: soft non-tender, +bowel sounds  Musculoskeletal: no deformity, no tenderness to palpation  Skin: warm, dry, jaundice  Neurologic: following commands, moving all extremities  Psychiatric: full affect, no hallucinations  Other:       HISTORY:     Principal Problem:    Viral illness (11/14/2018)    Active Problems:    CAD (coronary artery disease) ()      Overview: Angina Jan 2011, PCI unknown vessel at [place], no MI      CKD (chronic kidney disease) stage 3, GFR 30-59 ml/min ([address] (12/10/2013)      Dementia ()      DM type 2 causing renal disease ([address] ()      Past Medical History:   Diagnosis Date    Arthritis     CAD (coronary artery disease)     Angina Jan 2011, PCI LAD with multilink stent 4.0x12mm;  also cath at [place] 7/13 -- no stents at that time    CKD (chronic kidney disease), stage III     Cochlear implant in place 2/3/2017    Dementia     DM type 2 causing renal disease ([address]     DM type 2 causing vascular disease ([address]     GERD (gastroesophageal reflux disease)     HTN (hypertension)     Hypothyroidism     SAMUEL (obstructive sleep apnea)     not using CPAP    PAF (paroxysmal atrial fibrillation) (HCC)     [id number] score = 7    Prostate cancer (HCC)     Renal cell cancer ([facility]     Stage 4 Renal Cell Cancer.     Partial omentectomy 11/2/2015 with Dr. Jamie Vivar: omental mass-metastatic renal cell carcinoma    Renal mass, left     DAVINCI LEFT PARTIAL NEPHRECTOMY 12/19/13;       Past Surgical History:   Procedure Laterality Date    CARDIAC SURG PROCEDURE UNLIST  1/2011    stent to LAD    HX CHOLECYSTECTOMY  7/13/2012    HX CORONARY STENT PLACEMENT  2011    HX HEART CATHETERIZATION  2011, 2013    HX HEENT  1989    stapedectomy    [address]    left inguinal    HX LITHOTRIPSY  8/2015    HX ORTHOPAEDIC  1994    rotator cuff repair    HX ORTHOPAEDIC  1994    cervical disc removed    HX PROSTATECTOMY  2000    surgery intervention    HX TONSILLECTOMY  1987    HX UROLOGICAL  2001    valvular implant prevent incontinence    HX UROLOGICAL  2003    insert gel to help with incontinence    HX UROLOGICAL  12/2013    left partial nephrectomy    HX UROLOGICAL  9/2015    CT guided biopsy of abdominal lymph nodes      Family History   Problem Relation Age of Onset    Stroke Father     Cancer Father         prostate    Diabetes Sister         2 sisters    Hypertension Sister         2 sisters    Diabetes Brother     Stroke Brother     Diabetes Brother         all brothers    Stroke Brother     Kidney Disease Brother     Heart Attack Brother     Hypertension Other       History reviewed, no pertinent family history.   Social History     Tobacco Use    Smoking status: Former Smoker     Packs/day: 0.10     Years: 23.00     Pack years: 2.30     Last attempt to quit: 12/3/1975     Years since quittin.9    Smokeless tobacco: Never Used   Substance Use Topics    Alcohol use: No     Alcohol/week: 0.0 oz     Allergies   Allergen Reactions    Latex Rash, Swelling and Contact Dermatitis    Atorvastatin Unknown (comments)     Per patient's PCP allergy list- No reaction specified    Ezetimibe Myalgia    Lisinopril Cough    Metoprolol Other (comments)     Bradycardia    Morphine Other (comments)     Hallucinations and Nausea    Nitrofurantoin Rash     \"Blood blisters/rash\" per patient's family    Rosuvastatin Myalgia    Simvastatin Myalgia      Current Facility-Administered Medications   Medication Dose Route Frequency    apixaban (ELIQUIS) tablet 2.5 mg  2.5 mg Oral BID    aspirin delayed-release tablet 81 mg  81 mg Oral QHS    isosorbide mononitrate ER (IMDUR) tablet 120 mg  120 mg Oral 7am    losartan (COZAAR) tablet 25 mg  25 mg Oral QHS    melatonin tablet 3 mg  3 mg Oral QHS    mirtazapine (REMERON) tablet 7.5 mg  7.5 mg Oral QHS    [START ON 2018] polyethylene glycol (MIRALAX) packet 17 g  17 g Oral DAILY    [START ON 2018] senna (SENOKOT) tablet 8.6 mg  1 Tab Oral DAILY    [START ON 2018] pantoprazole (PROTONIX) tablet 40 mg  40 mg Oral ACB    0.9% sodium chloride infusion  75 mL/hr IntraVENous CONTINUOUS    sodium chloride (NS) flush 5-10 mL  5-10 mL IntraVENous Q8H    sodium chloride (NS) flush 5-10 mL  5-10 mL IntraVENous PRN    acetaminophen (TYLENOL) tablet 650 mg  650 mg Oral Q4H PRN    prochlorperazine (COMPAZINE) injection 10 mg  10 mg IntraVENous Q6H PRN    insulin lispro (HUMALOG) injection SubCUTAneous AC&HS    glucose chewable tablet 16 g  4 Tab Oral PRN    dextrose (D50W) injection syrg 12.5-25 g  25-50 mL IntraVENous PRN    glucagon (GLUCAGEN) injection 1 mg  1 mg IntraMUSCular PRN     Current Outpatient Medications   Medication Sig    trimethoprim (TRIMPEX) 100 mg tablet Take 50 mg by mouth daily.  insulin NPH (HUMULIN N NPH U-100 INSULIN) 100 unit/mL injection 25 Units by SubCUTAneous route Daily (before breakfast).  aspirin delayed-release 81 mg tablet Take 81 mg by mouth nightly.  amLODIPine (NORVASC) 10 mg tablet Take 5 mg by mouth daily.  cholecalciferol (VITAMIN D3) 1,000 unit tablet Take 2,000 Units by mouth nightly.  losartan (COZAAR) 25 mg tablet Take 25 mg by mouth nightly.  nystatin (MYCOSTATIN) powder Apply  to affected area daily as needed for Other (Itching/Irritation- Groin area). To groin after showering and drying     docusate sodium (COLACE) 100 mg capsule Take 100 mg by mouth daily as needed.  senna (SENNA) 8.6 mg tablet Take 1 Tab by mouth daily as needed for Constipation.  desonide (TRIDESILON) 0.05 % cream Apply  to affected area daily as needed for Skin Irritation.  isosorbide mononitrate ER (IMDUR) 120 mg CR tablet Take 1 Tab by mouth every morning.  melatonin 3 mg tablet Take 3 mg by mouth nightly.  polyethylene glycol (MIRALAX) 17 gram packet Take 17 g by mouth daily as needed (Constipation).  mirtazapine (REMERON) 15 mg tablet Take 7.5 mg by mouth nightly.  apixaban (ELIQUIS) 2.5 mg tablet Take 2.5 mg by mouth two (2) times a day.  OTHER,NON-FORMULARY, Take 1 Cap by mouth nightly. Zafar May dietary supplement    omeprazole (PRILOSEC) 20 mg capsule Take 20 mg by mouth Daily (before breakfast).  nitroglycerin (NITROSTAT) 0.4 mg SL tablet 0.4 mg by SubLINGual route every five (5) minutes as needed.             LAB AND IMAGING FINDINGS:     Lab Results   Component Value Date/Time    WBC 6.9 11/15/2018 05:38 AM    HGB 11.9 (L) 11/15/2018 05:38 AM    PLATELET 96 (L) 91/71/9381 05:38 AM     Lab Results   Component Value Date/Time    Sodium 141 11/15/2018 05:38 AM    Potassium 4.7 11/15/2018 05:38 AM    Chloride 107 11/15/2018 05:38 AM    CO2 26 11/15/2018 05:38 AM    BUN 16 11/15/2018 05:38 AM    Creatinine 1.60 (H) 11/15/2018 05:38 AM    Calcium 9.8 11/15/2018 12:02 PM    Magnesium 1.4 (L) 11/15/2018 05:38 AM    Phosphorus 2.6 11/15/2018 05:38 AM      Lab Results   Component Value Date/Time    AST (SGOT) 52 (H) 11/15/2018 12:02 PM    Alk. phosphatase 204 (H) 11/15/2018 12:02 PM    Protein, total 5.0 (L) 11/15/2018 12:02 PM    Albumin 2.6 (L) 11/15/2018 12:02 PM    Globulin 2.4 11/15/2018 12:02 PM     Lab Results   Component Value Date/Time    INR 1.1 02/02/2017 04:20 PM    Prothrombin time 10.9 02/02/2017 04:20 PM    aPTT 27.4 02/02/2017 04:20 PM      No results found for: IRON, FE, TIBC, IBCT, PSAT, FERR   No results found for: PH, PCO2, PO2  No components found for: [place]   Lab Results   Component Value Date/Time     07/14/2012 03:00 AM    CK - MB 2.8 07/14/2012 03:00 AM                Total time: 70  Counseling / coordination time, spent as noted above: 65  > 50% counseling / coordination?: yes    Prolonged service was provided for  []30 min   []75 min in face to face time in the presence of the patient, spent as noted above. Time Start:   Time End:   Note: this can only be billed with 93613 (initial) or 08584 (follow up). If multiple start / stop times, list each separately.

## 2018-11-15 NOTE — PROGRESS NOTES
Problem: Pressure Injury - Risk of 
Goal: *Prevention of pressure injury Document Rogers Scale and appropriate interventions in the flowsheet. Outcome: Progressing Towards Goal 
Pressure Injury Interventions: 
Sensory Interventions: Assess changes in LOC, Check visual cues for pain Activity Interventions: Increase time out of bed, Pressure redistribution bed/mattress(bed type), PT/OT evaluation Mobility Interventions: HOB 30 degrees or less, PT/OT evaluation, Pressure redistribution bed/mattress (bed type) Nutrition Interventions: Document food/fluid/supplement intake Friction and Shear Interventions: Apply protective barrier, creams and emollients, Lift sheet, Minimize layers

## 2018-11-15 NOTE — PROGRESS NOTES
Spiritual Care Assessment/Progress Note [address] 
 
 
NAME: Dara Chowdhury MRN: [id number] AGE: 80 y.o. SEX: male Moravian Affiliation: Yazidi  
Language: English  
 
11/15/2018     Total Time (in minutes): 14 Spiritual Assessment begun in [facility] EMERGENCY DEPT through conversation with: 
  
    [x]Patient        [x] Family    [] Friend(s) Reason for Consult: Initial/Spiritual assessment, patient floor Spiritual beliefs: (Please include comment if needed) [x] Identifies with a mary tradition: Yazidi   
   [] Supported by a mary community:        
   [] Claims no spiritual orientation:       
   [] Seeking spiritual identity:            
   [] Adheres to an individual form of spirituality:       
   [] Not able to assess:                   
 
    
Identified resources for coping:  
   [x] Prayer                           
   [] Music                  [] Guided Imagery [x] Family/friends                 [] Pet visits [] Devotional reading                         [] Unknown 
   [] Other:                                     
 
 
Interventions offered during this visit: (See comments for more details) Patient Interventions: Affirmation of emotions/emotional suffering, Affirmation of mary, Catharsis/review of pertinent events in supportive environment, Coping skills reviewed/reinforced, Iconic (affirming the presence of God/Higher Power) Family/Friend(s): Iconic (affirming the presence of God/Higher Power), Coping skills reviewed/reinforced(Daughter) Plan of Care: 
 
 [x] Support spiritual and/or cultural needs  
 [] Support AMD and/or advance care planning process    
 [] Support grieving process 
 [] Coordinate Rites and/or Rituals  
 [] Coordination with community clergy [] No spiritual needs identified at this time 
 [] Detailed Plan of Care below (See Comments)  [] Make referral to Music Therapy 
[] Make referral to Pet Therapy [] Make referral to Addiction services 
[] Make referral to [facility] 
[] Make referral to Spiritual Care Partner 
[] No future visits requested       
[] Follow up visits as needed Comments: Patient sitting up in bed eating lunch, daughter is at bedside assisting. Says he is feeling better. Good eye contact, some difficulty speaking, friendly, good-natured. Spoke briefly about present thoughts, feelings, and concerns. Spoke about his life and says that he has good family support. Did not identify any specific spiritual needs at this time. Spoke words of support, encouragement, and hope. Patient appeared encouraged as a result of this visit and both he and his daughter expressed gratitude for this visit. [facility]  paging service: [phone])

## 2018-11-15 NOTE — PROGRESS NOTES
Problem: Mobility Impaired (Adult and Pediatric) Goal: *Acute Goals and Plan of Care (Insert Text) Physical Therapy Goals Initiated 11/15/2018 1. Patient will move from supine to sit and sit to supine  in bed with supervision/set-up within 7 day(s). 2.  Patient will transfer from bed to chair and chair to bed with supervision/set-up using the least restrictive device within 7 day(s). 3.  Patient will perform sit to stand with supervision/set-up within 7 day(s). 4.  Patient will ambulate with supervision/set-up for 150 feet with the least restrictive device within 7 day(s). physical Therapy EVALUATION Patient: Diana Acosta (80 y.o. male) Date: 11/15/2018 Primary Diagnosis: Viral illness Precautions:   Fall ASSESSMENT : 
Based on the objective data described below, the patient presents with decreased functional mobility, impaired balance, decreased tolerance to activity, decreased safety awareness and insight into deficits s/p admission for a viral illness. Pt seen in the ED while awaiting bed placement. Pt received supine in bed and agreeable to therapy. Pt's daughter present and able to provide prior level of function and home set up as pt has a history of dementia and is an unreliable historian. Pt was living with his spouse in a 1 story home and was ambulating with a cane inside the home and RW when outside the home. Pt's daughter denied history of falls. Pt completed supine to sit with min A and additional time and effort. Pt performed sit<>stand with RW with min A x 2 and noted increased forward flexed trunk; pt able to correct to a more upright position with verbal and tactile cueing. Pt ambulated the hallway with RW with min A x 1-2 demonstrating decreased rose, decreased step clearance bilaterally (L>R), shuffle steps, verbal cues to avoid obstacles with RW. As patient fatigued pt placed RW too far forward.  Pt assisted back to supine position with min A. Pt will continue to benefit from PT to progress mobility as tolerated and reach highest level of independence. Pt is functioning below baseline status and requiring 1-2 person assist for mobility. Pt will benefit from SNF placement upon discharge. . 
 
Patient will benefit from skilled intervention to address the above impairments. Patients rehabilitation potential is considered to be Fair Factors which may influence rehabilitation potential include:  
[]         None noted 
[x]         Mental ability/status []         Medical condition 
[]         Home/family situation and support systems 
[x]         Safety awareness 
[]         Pain tolerance/management 
[]         Other: PLAN : 
Recommendations and Planned Interventions: 
[x]           Bed Mobility Training             [x]    Neuromuscular Re-Education 
[x]           Transfer Training                   []    Orthotic/Prosthetic Training 
[x]           Gait Training                         []    Modalities [x]           Therapeutic Exercises           []    Edema Management/Control 
[x]           Therapeutic Activities            [x]    Patient and Family Training/Education 
[]           Other (comment): Frequency/Duration: Patient will be followed by physical therapy  5 times a week to address goals. Discharge Recommendations: Kevin Dumont Further Equipment Recommendations for Discharge: none SUBJECTIVE:  
Patient stated It feels pretty good to get up.  OBJECTIVE DATA SUMMARY:  
HISTORY:   
Past Medical History:  
Diagnosis Date  Arthritis  CAD (coronary artery disease) Angina Jan 2011, PCI LAD with multilink stent 4.0x12mm;  also cath at [facility] 7/13 -- no stents at that time  CKD (chronic kidney disease), stage III  Cochlear implant in place 2/3/2017  Dementia  DM type 2 causing renal disease [facility] [facility]  DM type 2 causing vascular disease ([facility] [address] [facility]  GERD (gastroesophageal reflux disease)  HTN (hypertension)  Hypothyroidism  SAMUEL (obstructive sleep apnea)   
 not using CPAP  
 PAF (paroxysmal atrial fibrillation) ([facility] [address] [id number] score = 7  
 Prostate cancer ([facility] [address]  Renal cell cancer ([facility] [address] Stage 4 Renal Cell Cancer. Partial omentectomy 11/2/2015 with Dr. Hyacinth Duffy: omental mass-metastatic renal cell carcinoma  Renal mass, left [address] LEFT PARTIAL NEPHRECTOMY 12/19/13;   
 
Past Surgical History:  
Procedure Laterality Date  CARDIAC SURG PROCEDURE UNLIST  1/2011  
 stent to LAD  HX CHOLECYSTECTOMY  7/13/2012  HX CORONARY STENT PLACEMENT  2011 [address]  2011, 2013 [address]  
 stapedectomy  HX HERNIA REPAIR  1968  
 left inguinal  
 HX LITHOTRIPSY  8/2015  HX ORTHOPAEDIC  1994  
 rotator cuff repair  HX ORTHOPAEDIC  1994  
 cervical disc removed  HX PROSTATECTOMY  2000  
 surgery intervention  [address]  HX UROLOGICAL  2001  
 valvular implant prevent incontinence  HX UROLOGICAL  2003  
 insert gel to help with incontinence  HX UROLOGICAL  12/2013  
 left partial nephrectomy  HX UROLOGICAL  9/2015 CT guided biopsy of abdominal lymph nodes Prior Level of Function/Home Situation: see above Personal factors and/or comorbidities impacting plan of care:  
 
Home Situation Home Environment: Private residence Wheelchair Ramp: Yes One/Two Story Residence: One story Living Alone: No(lives with wife) Support Systems: Child(alee), Family member(s), Spouse/Significant Other/Partner Current DME Used/Available at Home: [employer], straight, Walker, rolling, Shower chair Tub or Shower Type: Tub/Shower combination EXAMINATION/PRESENTATION/DECISION MAKING: Critical Behavior: 
Neurologic State: Alert Orientation Level: Oriented to person, Disoriented to time, Disoriented to situation, Disoriented to place Cognition: Follows commands, Decreased attention/concentration, Poor safety awareness Safety/Judgement: Fall prevention, Decreased insight into deficits, Decreased awareness of need for safety Hearing: Auditory Auditory Impairment: NoneSkin:   
Edema:  
Range Of Motion: 
AROM: Generally decreased, functional 
  
  
  
PROM: Within functional limits Strength:   
Strength: Generally decreased, functional 
  
  
  
  
  
  
Tone & Sensation:  
Tone: Normal 
  
  
  
  
Sensation: Intact Coordination: 
Coordination: Generally decreased, functional 
Vision:  
Acuity: Within Defined Limits Functional Mobility: 
Bed Mobility: 
  
Supine to Sit: Minimum assistance; Additional time; Adaptive equipment Sit to Supine: Minimum assistance; Additional time Transfers: 
Sit to Stand: Minimum assistance;Assist x2; Additional time; Adaptive equipment(RW) 
Stand to Sit: Minimum assistance Balance:  
Sitting: Impaired; Without support(L lateral lean) Sitting - Static: Fair (occasional) Sitting - Dynamic: Fair (occasional) Standing: Impaired; With support(RW) 
Standing - Static: Fair Standing - Dynamic : FairAmbulation/Gait Training:Distance (ft): 80 Feet (ft) Assistive Device: Gait belt;Walker, rolling Ambulation - Level of Assistance: Minimal assistance;Assist x2 Gait Abnormalities: Decreased step clearance; Path deviations(forward flexed trunk) Speed/Sabrina: Pace decreased (<100 feet/min); Shuffled Step Length: Right shortened;Left shortened Stairs: Therapeutic Exercises:  
 
 
Functional Measure: 
Barthel Index: 
 
Bathin Bladder: 5 Bowels: 10 
Groomin Dressin Feedin Mobility: 5 Stairs: 0 Toilet Use: 5 Transfer (Bed to Chair and Back): 5 Total: 40 Barthel and G-code impairment scale: 
Percentage of impairment CH 
0% CI 
1-19% CJ 
20-39% CK 
40-59% CL 
60-79% CM 
80-99% CN 
100% Barthel Score 0-100 100 99-80 79-60 59-40 20-39 1-19 
 0 Barthel Score 0-20 20 17-19 13-16 9-12 5-8 1-4 0 The Barthel ADL Index: Guidelines 1. The index should be used as a record of what a patient does, not as a record of what a patient could do. 2. The main aim is to establish degree of independence from any help, physical or verbal, however minor and for whatever reason. 3. The need for supervision renders the patient not independent. 4. A patient's performance should be established using the best available evidence. Asking the patient, friends/relatives and nurses are the usual sources, but direct observation and common sense are also important. However direct testing is not needed. 5. Usually the patient's performance over the preceding 24-48 hours is important, but occasionally longer periods will be relevant. 6. Middle categories imply that the patient supplies over 50 per cent of the effort. 7. Use of aids to be independent is allowed. Reji Katz., Barthel, D.W. (7766). Functional evaluation: the Barthel Index. [address] [facility] (14)2. RONALD Frost, [facility]., [facility] [place], [address] [place] [address] (1999). Measuring the change indisability after inpatient rehabilitation; comparison of the responsiveness of the Barthel Index and Functional [place] Measure. Journal of Neurology, Neurosurgery, and Psychiatry, 66(4), 534-328. FRANCIE Pang, ELEAZAR Gibbons, & Pancho García, MMahadA. (2004.) Assessment of post-stroke quality of life in cost-effectiveness studies: The usefulness of the Barthel Index and the EuroQoL-5D. [facility], 20, 913-56 G codes: In compliance with CMSs Claims Based Outcome Reporting, the following G-code set was chosen for this patient based on their primary functional limitation being treated: The outcome measure chosen to determine the severity of the functional limitation was the Barthel index with a score of 40/100 which was correlated with the impairment scale. ? Mobility - Walking and Moving Around:  
  - CURRENT STATUS: CK - 40%-59% impaired, limited or restricted  - GOAL STATUS: CJ - 20%-39% impaired, limited or restricted  - D/C STATUS:  ---------------To be determined--------------- Based on the above components, the patient evaluation is determined to be of the following complexity level: LOW Pain: 
Pain Scale 1: Numeric (0 - 10) Pain Intensity 1: 0 Activity Tolerance:  
Good. VSS Please refer to the flowsheet for vital signs taken during this treatment. After treatment:  
[]         Patient left in no apparent distress sitting up in chair 
[x]         Patient left in no apparent distress in bed 
[x]         Call bell left within reach [x]         Nursing notified 
[x]         Caregiver present 
[]         Bed alarm activated COMMUNICATION/EDUCATION:  
The patients plan of care was discussed with: Occupational Therapist and Registered Nurse. [x]         Fall prevention education was provided and the patient/caregiver indicated understanding. [x]         Patient/family have participated as able in goal setting and plan of care. [x]         Patient/family agree to work toward stated goals and plan of care. []         Patient understands intent and goals of therapy, but is neutral about his/her participation. []         Patient is unable to participate in goal setting and plan of care. Thank you for this referral. 
Debbie Ghosh, PT, DPT Time Calculation: 18 mins

## 2018-11-15 NOTE — PROGRESS NOTES
Medical Progress Note NAME: Claudia Maldonado. :  1935 MRM:  [id number] Date/Time: 11/15/2018  11:20 AM 
  
Assessment / Plan:  
 
Viral illness (2018):  Possible. CXR findings appear chronic. Does not currently have respiratory symptoms. Had chills at home. Viral resp panel negative. -- supportive care -- continue IVF 
  
CAD (coronary artery disease) () / PAF:  Denies chest pain or sob. -- continue ASA, imdur, losartan, and amlodipine 
-- continue apixaban Jaundice:  Has dark urine with bili. Labs in 2018 with elevated LFT and hyperbili. -- check LFT 
-- check abd ct, start w/o contrast for now 
 
CKD (chronic kidney disease) stage 3, GFR 30-59 ml/min ([facility]) (12/10/2013):  Creatinine stable from prior. -- continue IVF Lactic acidemia:  No evidence of active infection. -- continue IVF 
-- repeat labs 
  
Dementia ():  Seems to be at baseline. Daughter is present. 
  
DM type 2 causing renal disease ([facility] [address] ():  Well controlled overnight. -- continue SSI 
-- hold NPH given decreased po intake Hypercalcemia:  Seems to be chronic. -- continue IVF 
-- check PTH, PTH rp Abnormal CXR:  Chronic appearing LLL asdz. -- check CT chest given hx of RCCa Thrombocytopenia:  Mild and appears to be chronic. Likely made worse by dilution overnight. -- monitor platelets Subjective: Chief Complaint:  I'm alright. Denies chills overnight. Had bout of nausea while repositioning in bed. No vomiting. Denies sob, cough. No chest pain, abd pain. No HA. Still feels very weak. No hx of diarrhea and usually has constipation. ROS: 
(bold if positive, if negative) Nausea Objective:  
 
 
Vitals:  
 
 
  
Last 24hrs VS reviewed since prior progress note. Most recent are: 
 
Visit Vitals /53 Pulse 96 Temp 97.9 °F (36.6 °C) Resp 16 Ht 5' 7\" (1.702 m) Wt 80.7 kg (178 lb) SpO2 94% BMI 27.88 kg/m² SpO2 Readings from Last 6 Encounters:  
11/15/18 94% 09/13/18 97% 08/26/17 92% 07/16/17 94% 02/04/17 98% 03/24/16 97% Intake/Output Summary (Last 24 hours) at 11/15/2018 1120 Last data filed at 11/15/2018 8359 Gross per 24 hour Intake 903.75 ml Output 425 ml Net 478.75 ml Exam:  
 
Physical Exam: 
 
Gen:  Well-developed, well-nourished, in no acute distress HEENT:  Pink conjunctivae, PERRL, hearing decreased to voice, moist mucous membranes Neck:  Supple Resp:  No accessory muscle use, clear breath sounds without wheezes rales or rhonchi 
Card:  No murmurs, normal S1, S2 without thrills or peripheral edema Abd:  Soft, non-tender, non-distended, normoactive bowel sounds are present Skin:  No rashes, jaundiced Neuro: Face symmetric, tongue midline, speech fluent,  strength is 5/5 bilaterally and dorsi / plantarflexion is 5/5 bilaterally, follows commands appropriately Psych:  oriented to person, place and time, alert Medications Reviewed: (see below) Lab Data Reviewed: (see below) 
 
______________________________________________________________________ Medications:  
 
Current Facility-Administered Medications Medication Dose Route Frequency  0.9% sodium chloride infusion  75 mL/hr IntraVENous CONTINUOUS  
 sodium chloride (NS) flush 5-10 mL  5-10 mL IntraVENous Q8H  
 sodium chloride (NS) flush 5-10 mL  5-10 mL IntraVENous PRN  
 acetaminophen (TYLENOL) tablet 650 mg  650 mg Oral Q4H PRN  prochlorperazine (COMPAZINE) injection 10 mg  10 mg IntraVENous Q6H PRN  
 insulin lispro (HUMALOG) injection   SubCUTAneous AC&HS  
 glucose chewable tablet 16 g  4 Tab Oral PRN  
 dextrose (D50W) injection syrg 12.5-25 g  25-50 mL IntraVENous PRN  
 glucagon (GLUCAGEN) injection 1 mg  1 mg IntraMUSCular PRN Current Outpatient Medications Medication Sig  
 trimethoprim (TRIMPEX) 100 mg tablet Take 50 mg by mouth daily.  insulin NPH (HUMULIN N NPH U-100 INSULIN) 100 unit/mL injection 25 Units by SubCUTAneous route Daily (before breakfast).  aspirin delayed-release 81 mg tablet Take 81 mg by mouth nightly.  amLODIPine (NORVASC) 10 mg tablet Take 5 mg by mouth daily.  cholecalciferol (VITAMIN D3) 1,000 unit tablet Take 2,000 Units by mouth nightly.  losartan (COZAAR) 25 mg tablet Take 25 mg by mouth nightly.  nystatin (MYCOSTATIN) powder Apply  to affected area daily as needed for Other (Itching/Irritation- Groin area). To groin after showering and drying  docusate sodium (COLACE) 100 mg capsule Take 100 mg by mouth daily as needed.  senna (SENNA) 8.6 mg tablet Take 1 Tab by mouth daily as needed for Constipation.  desonide (TRIDESILON) 0.05 % cream Apply  to affected area daily as needed for Skin Irritation.  isosorbide mononitrate ER (IMDUR) 120 mg CR tablet Take 1 Tab by mouth every morning.  melatonin 3 mg tablet Take 3 mg by mouth nightly.  polyethylene glycol (MIRALAX) 17 gram packet Take 17 g by mouth daily as needed (Constipation).  mirtazapine (REMERON) 15 mg tablet Take 7.5 mg by mouth nightly.  apixaban (ELIQUIS) 2.5 mg tablet Take 2.5 mg by mouth two (2) times a day.  OTHER,NON-FORMULARY, Take 1 Cap by mouth nightly. Zafar May dietary supplement  omeprazole (PRILOSEC) 20 mg capsule Take 20 mg by mouth Daily (before breakfast).  nitroglycerin (NITROSTAT) 0.4 mg SL tablet 0.4 mg by SubLINGual route every five (5) minutes as needed. Lab Review:  
 
Recent Labs 11/15/18 
0538 11/14/18 
1624 WBC 6.9 4.2 HGB 11.9* 13.2 HCT 36.4* 40.7 PLT 96* 102* Recent Labs 11/15/18 
0538 11/14/18 
1624  139  
K 4.7 4.2  105 CO2 26 23 * 204* BUN 16 15 CREA 1.60* 1.56* CA 10.0 10.7* MG 1.4*  --   
PHOS 2.6  --   
 
Lab Results Component Value Date/Time  Glucose (POC) 111 (H) 11/15/2018 08:01 AM  
 Glucose (POC) 127 (H) 11/14/2018 09:35 PM  
 Glucose (POC) 162 (H) 09/13/2018 11:59 AM  
 Glucose (POC) 108 (H) 09/13/2018 07:07 AM  
 Glucose (POC) 135 (H) 09/12/2018 09:11 PM  
 
 
 
Total time spent with patient: 40 Minutes Care Plan discussed with: Patient and Family Discussed:  Care Plan Prophylaxis:  apixaban Disposition:   PT, OT, RN 
        
___________________________________________________ Attending Physician: Mckenzie Gipson MD

## 2018-11-15 NOTE — PROGRESS NOTES
BSI: MED RECONCILIATION Comments/Recommendations: Discussed current PTA medication list with patient's spouse at bedside. Reviewed drug allergies and recent changes to PTA medications. The patient was questioned regarding recent use of other prescription and nonprescription medications not listed on their current medication list. 
? Patient's spouse brought in documentation with a complete list of the patient's allergies with multiple additions than what is currently listed. Pharmacist updated his allergy list to reflect the patient's list from his PCP. ? He was recently discharged from [facility] on 9/13/18 ? Patient took all of his medications this AM 
 
Medications added: · Trimethoprim 50 mg daily- For UTI prophylaxis per patient's spouse. Patient developed \"blood blisters and a rash\" when he was taking nitrofurantoin. Medications removed: · Furosemide 20 mg PRN weight gain- Not needed per patient's wife. He has not had this in >1 month. Medications adjusted: 
 
· Insulin NPH adjusted to 25 units daily before breakfast. Patient checks his blood glucose once daily in the AM. Information obtained from: Recent hospital discharge summary, [place], Patient's wife, provided home medication list 
 
Allergies: Latex and Morphine Prior to Admission Medications:  
 
Medication Documentation Review Audit Reviewed by KRIS LynneD (Pharmacist) on 11/14/18 at 2047 Medication Sig Documenting Provider Last Dose Status Taking? amLODIPine (NORVASC) 10 mg tablet Take 5 mg by mouth daily. Provider, Historical 11/14/2018 AM Active Yes  
apixaban (ELIQUIS) 2.5 mg tablet Take 2.5 mg by mouth two (2) times a day. Provider, Historical 11/14/2018 AM Active Yes Med Penelope Hernandez Feb 2, 2017  6:40 PM) . aspirin delayed-release 81 mg tablet Take 81 mg by mouth nightly. Provider, Historical 11/13/2018 PM Active Yes cholecalciferol (VITAMIN D3) 1,000 unit tablet Take 2,000 Units by mouth nightly. Provider, Historical 11/13/2018 PM Active Yes  
desonide (TRIDESILON) 0.05 % cream Apply  to affected area daily as needed for Skin Irritation. Provider, Historical 10/14/2018 Unknown time Active Yes  
docusate sodium (COLACE) 100 mg capsule Take 100 mg by mouth daily as needed. Provider, Historical 11/7/2018 Unknown time Active Yes  
insulin NPH (HUMULIN N NPH U-100 INSULIN) 100 unit/mL injection 25 Units by SubCUTAneous route Daily (before breakfast). Provider, Historical 11/14/2018 AM Active Yes  
isosorbide mononitrate ER (IMDUR) 120 mg CR tablet Take 1 Tab by mouth every morning. Kisha Pires MD 11/14/2018 AM Active Yes  
losartan (COZAAR) 25 mg tablet Take 25 mg by mouth nightly. Provider, Historical 11/13/2018 PM Active Yes  
melatonin 3 mg tablet Take 3 mg by mouth nightly. Provider, Historical 11/13/2018 PM Active Yes  
mirtazapine (REMERON) 15 mg tablet Take 7.5 mg by mouth nightly. Provider, Historical 11/13/2018 PM Active Yes  
nitroglycerin (NITROSTAT) 0.4 mg SL tablet 0.4 mg by SubLINGual route every five (5) minutes as needed. Other, MD Chance  Active Yes Med Note Rosa Solid Feb 2, 2017  6:42 PM) . nystatin (MYCOSTATIN) powder Apply  to affected area daily as needed for Other (Itching/Irritation- Groin area). To groin after showering and drying  Provider, Historical 10/14/2018 Unknown time Active Yes  
omeprazole (PRILOSEC) 20 mg capsule Take 20 mg by mouth Daily (before breakfast). Provider, Historical 11/14/2018 AM Active Yes Med Note Rosa Solid Feb 2, 2017  6:42 PM) . OTHER,NON-FORMULARY, Take 1 Cap by mouth nightly. Zafar May dietary supplement Provider, Historical 11/13/2018 PM Active Yes Med Note Rosa Solid Feb 2, 2017  6:43 PM) .   
polyethylene glycol (MIRALAX) 17 gram packet Take 17 g by mouth daily as needed (Constipation). Provider, Historical 11/7/2018 Unknown time Active Yes  
senna (SENNA) 8.6 mg tablet Take 1 Tab by mouth daily as needed for Constipation. Provider, Historical 11/7/2018 Unknown time Active Yes  
trimethoprim (TRIMPEX) 100 mg tablet Take 50 mg by mouth daily. Provider, Historical 11/14/2018 AM Active Yes Jr Aguilar, PHARMD   Contact: [phone]

## 2018-11-15 NOTE — PROGRESS NOTES
Shift Summary 0700 Bedside and Verbal shift change report given to Kristan Agarwal RN (oncoming nurse) by Melo Solano (offgoing nurse). Report included the following information SBAR, Kardex, MAR and Recent Results. Lactic acid redrawn Neg 1.6 Patient down to CT. Fluids reconnected. . No coverage needed. [address] Bedside and Verbal shift change report given to [employer] (oncoming nurse) by MEREDITH Gipson RN (offgoing nurse). Report included the following information SBAR, Kardex, MAR and Recent Results.

## 2018-11-15 NOTE — PROGRESS NOTES
11/15/2018 
2:04 PM 
Case management note Met with patient /daugther, patient is a poor historian. Patient lives with spouse in a single story home with ramp. He no longer drives, is a [facility] . [facility] with [facility] @ [place] [facility]. Advance Directive /on file Wife is not at bedside but spoke with her on phone and she is unable to care for him at home Patient does have some memory issues but remembered a lot about people who worked with 30 40 years ago. Daughter Rozina Fitzgerald is at bedside  will be assisting with dad post discharge if needed. OBS educated with wife and daughter, signed and placed on chart. Reason for Admission:   Viral illness RRAT Score:     46 Resources/supports as identified by patient/family:   Limited by wife as she has some health issues, daughter Janett Justin wants to help out Top Challenges facing patient (as identified by patient/family and CM): Finances/Medication cost?      None noted Transportation?  family Support system or lack thereof? Limited from wife Living arrangements? Currently lives with spouse who can not continue to provide care needed Self-care/ADLs/Cognition? Fair self care, poor health cognition Current Advanced Directive/Advance Care Plan: On file Plan for utilizing home health:    hh vs snf Likelihood of readmission: high/ red Care Management Interventions PCP Verified by CM: Yes([facility]) Mode of Transport at Discharge: Self Transition of Care Consult (CM Consult): Discharge Planning Current Support Network: Lives with Spouse Plan discussed with Pt/Family/Caregiver: Yes Discharge Location Discharge Placement: Unable to determine at this time Zehra Cifuentes Transition of Care Plan:

## 2018-11-15 NOTE — ACP (ADVANCE CARE PLANNING)
Primary Decision Maker [facility] Agent):  Jessica Casey  Relationship to patient:spouse  Phone number:[phone]  [x] Named in a scanned document   [] Legal Next of Kin  [] Guardian    Secondary Decision Maker ([address]): Crista Douglas III  Relationship to patient:son  Phone number:  [x] Named in a scanned document   [] Legal Next of Kin  [] Guardian    ACP documents you current have include:  [] Advance Directive or Living Will  [x] Durable Do Not Resuscitate-will complete before discharge  [] Physician Orders for Scope of Treatment (POST)  [] Medical Power of   [] Other

## 2018-11-16 ENCOUNTER — APPOINTMENT (OUTPATIENT)
Dept: MRI IMAGING | Age: 83
DRG: 640 | End: 2018-11-16
Attending: PHYSICIAN ASSISTANT
Payer: MEDICARE

## 2018-11-16 LAB
ALBUMIN SERPL-MCNC: 2.4 G/DL (ref 3.5–5)
ALBUMIN/GLOB SERPL: 0.8 {RATIO} (ref 1.1–2.2)
ALP SERPL-CCNC: 182 U/L (ref 45–117)
ALT SERPL-CCNC: 49 U/L (ref 12–78)
ANION GAP SERPL CALC-SCNC: 9 MMOL/L (ref 5–15)
AST SERPL-CCNC: 27 U/L (ref 15–37)
BILIRUB SERPL-MCNC: 1.8 MG/DL (ref 0.2–1)
BUN SERPL-MCNC: 20 MG/DL (ref 6–20)
BUN/CREAT SERPL: 13 (ref 12–20)
CALCIUM SERPL-MCNC: 10.6 MG/DL (ref 8.5–10.1)
CHLORIDE SERPL-SCNC: 106 MMOL/L (ref 97–108)
CO2 SERPL-SCNC: 23 MMOL/L (ref 21–32)
CREAT SERPL-MCNC: 1.53 MG/DL (ref 0.7–1.3)
ERYTHROCYTE [DISTWIDTH] IN BLOOD BY AUTOMATED COUNT: 14.2 % (ref 11.5–14.5)
GLOBULIN SER CALC-MCNC: 3 G/DL (ref 2–4)
GLUCOSE BLD STRIP.AUTO-MCNC: 103 MG/DL (ref 65–100)
GLUCOSE BLD STRIP.AUTO-MCNC: 120 MG/DL (ref 65–100)
GLUCOSE BLD STRIP.AUTO-MCNC: 137 MG/DL (ref 65–100)
GLUCOSE BLD STRIP.AUTO-MCNC: 178 MG/DL (ref 65–100)
GLUCOSE SERPL-MCNC: 117 MG/DL (ref 65–100)
HCT VFR BLD AUTO: 34 % (ref 36.6–50.3)
HGB BLD-MCNC: 11.1 G/DL (ref 12.1–17)
MAGNESIUM SERPL-MCNC: 1.6 MG/DL (ref 1.6–2.4)
MCH RBC QN AUTO: 28.3 PG (ref 26–34)
MCHC RBC AUTO-ENTMCNC: 32.6 G/DL (ref 30–36.5)
MCV RBC AUTO: 86.7 FL (ref 80–99)
NRBC # BLD: 0 K/UL (ref 0–0.01)
NRBC BLD-RTO: 0 PER 100 WBC
PLATELET # BLD AUTO: 86 K/UL (ref 150–400)
PMV BLD AUTO: 10.1 FL (ref 8.9–12.9)
POTASSIUM SERPL-SCNC: 4 MMOL/L (ref 3.5–5.1)
PROT SERPL-MCNC: 5.4 G/DL (ref 6.4–8.2)
RBC # BLD AUTO: 3.92 M/UL (ref 4.1–5.7)
SERVICE CMNT-IMP: ABNORMAL
SODIUM SERPL-SCNC: 138 MMOL/L (ref 136–145)
WBC # BLD AUTO: 3.9 K/UL (ref 4.1–11.1)

## 2018-11-16 PROCEDURE — 97530 THERAPEUTIC ACTIVITIES: CPT

## 2018-11-16 PROCEDURE — 36415 COLL VENOUS BLD VENIPUNCTURE: CPT

## 2018-11-16 PROCEDURE — 65270000029 HC RM PRIVATE

## 2018-11-16 PROCEDURE — 85027 COMPLETE CBC AUTOMATED: CPT

## 2018-11-16 PROCEDURE — 82962 GLUCOSE BLOOD TEST: CPT

## 2018-11-16 PROCEDURE — 74011250636 HC RX REV CODE- 250/636: Performed by: INTERNAL MEDICINE

## 2018-11-16 PROCEDURE — 77030010545

## 2018-11-16 PROCEDURE — 99218 HC RM OBSERVATION: CPT

## 2018-11-16 PROCEDURE — 83735 ASSAY OF MAGNESIUM: CPT

## 2018-11-16 PROCEDURE — 97116 GAIT TRAINING THERAPY: CPT

## 2018-11-16 PROCEDURE — 80053 COMPREHEN METABOLIC PANEL: CPT

## 2018-11-16 PROCEDURE — 74011250637 HC RX REV CODE- 250/637: Performed by: INTERNAL MEDICINE

## 2018-11-16 RX ORDER — MAGNESIUM SULFATE HEPTAHYDRATE 40 MG/ML
2 INJECTION, SOLUTION INTRAVENOUS ONCE
Status: COMPLETED | OUTPATIENT
Start: 2018-11-16 | End: 2018-11-16

## 2018-11-16 RX ADMIN — MELATONIN TAB 3 MG 3 MG: 3 TAB at 22:26

## 2018-11-16 RX ADMIN — MIRTAZAPINE 7.5 MG: 15 TABLET, FILM COATED ORAL at 22:25

## 2018-11-16 RX ADMIN — SENNOSIDES 8.6 MG: 8.6 TABLET, FILM COATED ORAL at 08:54

## 2018-11-16 RX ADMIN — MAGNESIUM SULFATE HEPTAHYDRATE 2 G: 40 INJECTION, SOLUTION INTRAVENOUS at 08:40

## 2018-11-16 RX ADMIN — ISOSORBIDE MONONITRATE 120 MG: 60 TABLET ORAL at 05:54

## 2018-11-16 RX ADMIN — Medication 10 ML: at 14:00

## 2018-11-16 RX ADMIN — ASPIRIN 81 MG: 81 TABLET, COATED ORAL at 22:25

## 2018-11-16 RX ADMIN — APIXABAN 2.5 MG: 2.5 TABLET, FILM COATED ORAL at 18:00

## 2018-11-16 RX ADMIN — Medication 10 ML: at 05:56

## 2018-11-16 RX ADMIN — LOSARTAN POTASSIUM 25 MG: 50 TABLET, FILM COATED ORAL at 22:25

## 2018-11-16 RX ADMIN — PANTOPRAZOLE SODIUM 40 MG: 40 TABLET, DELAYED RELEASE ORAL at 05:55

## 2018-11-16 RX ADMIN — APIXABAN 2.5 MG: 2.5 TABLET, FILM COATED ORAL at 08:54

## 2018-11-16 NOTE — ROUTINE PROCESS
Bedside and Verbal shift change report given to [employer] (oncoming nurse) by Francisco Monreal (offgoing nurse). Report included the following information SBAR, Kardex, ED Summary, MAR, Accordion and Recent Results.

## 2018-11-16 NOTE — PROGRESS NOTES
11/16/2018   CARE MANAGEMENT NOTE:  CM reviewed EMR and noted PT eval and recs for SNF. Pt is currently in OBS status and he has Medicare therefore a three night inpt qualifying stay is needed. CM will continue to follow for definitive discharge needs. [place]

## 2018-11-16 NOTE — CONSULTS
Gastroenterology Consultation Note      Admit Date: 11/14/2018  Consult Date: 11/16/2018   I greatly appreciate your asking me to see Matthew Russell, thank you very much for the opportunity to participate in his care. Narrative Assessment and Plan   · Hyperbilirubinemia  · Elevated ALP    Unclear etiology - his liver enzymes are now trending down (? Reactive from viral illness). CT scan did not show any liver abnormality. Will obtain MRCP (if able given history of stapedectomy) to further evaluate liver and biliary tree  Further recommendations pending results    Plan formulated with attending Dr. Akilah Avalos:     Chief Complaint: \"I didn't sleep good\"    History of Present Illness: Matthew Russell is a 80 y.o. male with PMH as listed who was admitted to the hospital for acute viral illness. He was noticed to have hyperbilirubinemia and elevated alk phos. We have been asked to further evaluate. Patient's daughter is at bedside and also has his wife on the phone. Per review of chart patient had abnormal liver enzymes in September 2017 and August 2017. His liver enzymes were normal in 2015. Patient does not drink alcohol. No known personal or family history of chronic liver disease. Wife reports that he had a spot on his liver during workup earlier this year they declined further testing. I reviewed CT scan from 9/2018 and no mass reported on liver. Denies CP, SOB, abdominal pain, nausea or vomiting, or change in bowel habits.        PCP:  Other, MD Chance    Past Medical History:   Diagnosis Date    Arthritis     CAD (coronary artery disease)     Angina Jan 2011, PCI LAD with multilink stent 4.0x12mm;  also cath at [place] 7/13 -- no stents at that time    CKD (chronic kidney disease), stage III     Cochlear implant in place 2/3/2017    Dementia     DM type [facility]     DM type 2 causing vascular disease ([facility]     GERD (gastroesophageal reflux disease)     HTN (hypertension)  Hypothyroidism     SAMUEL (obstructive sleep apnea)     not using CPAP    PAF (paroxysmal atrial fibrillation) (HCC)     [id number] score = 7    Prostate cancer (HCC)     Renal cell cancer (HCC)     Stage 4 Renal Cell Cancer.     Partial omentectomy 2015 with Dr. Alicia Ellington: omental mass-metastatic renal cell carcinoma    Renal mass, left     DAVINCI LEFT PARTIAL NEPHRECTOMY 13;         Past Surgical History:   Procedure Laterality Date    CARDIAC SURG PROCEDURE UNLIST  2011    stent to LAD    HX CHOLECYSTECTOMY  2012    HX CORONARY STENT PLACEMENT      HX HEART CATHETERIZATION  ,     HX [address]    [address]    [address]    left inguinal    HX LITHOTRIPSY  2015    HX ORTHOPAEDIC      rotator cuff repair    HX ORTHOPAEDIC      cervical disc removed    HX PROSTATECTOMY      surgery intervention    HX TONSILLECTOMY      HX UROLOGICAL      valvular implant prevent incontinence    HX UROLOGICAL      insert gel to help with incontinence    HX UROLOGICAL  2013    left partial nephrectomy    HX UROLOGICAL  2015    CT guided biopsy of abdominal lymph nodes       Social History     Tobacco Use    Smoking status: Former Smoker     Packs/day: 0.10     Years: 23.00     Pack years: 2.30     Last attempt to quit: 12/3/1975     Years since quittin.9    Smokeless tobacco: Never Used   Substance Use Topics    Alcohol use: No     Alcohol/week: 0.0 oz        Family History   Problem Relation Age of Onset    Stroke Father     Cancer Father         prostate    Diabetes Sister         2 sisters    Hypertension Sister         2 sisters    Diabetes Brother     Stroke Brother     Diabetes Brother         all brothers    Stroke Brother     Kidney Disease Brother     Heart Attack Brother     Hypertension Other         Allergies   Allergen Reactions    Latex Rash, Swelling and Contact Dermatitis    Atorvastatin Unknown (comments)     Per patient's PCP allergy list- No reaction specified    Ezetimibe Myalgia    Lisinopril Cough    Metoprolol Other (comments)     Bradycardia    Morphine Other (comments)     Hallucinations and Nausea    Nitrofurantoin Rash     \"Blood blisters/rash\" per patient's family    Rosuvastatin Myalgia    Simvastatin Myalgia            Home Medications:  Prior to Admission Medications   Prescriptions Last Dose Informant Patient Reported? Taking? OTHER,NON-FORMULARY, 2018 at PM Significant Other Yes Yes   Sig: Take 1 Cap by mouth nightly. Zafar May dietary supplement   amLODIPine (NORVASC) 10 mg tablet 2018 at AM Significant Other Yes Yes   Sig: Take 5 mg by mouth daily. apixaban (ELIQUIS) 2.5 mg tablet 2018 at AM Significant Other Yes Yes   Sig: Take 2.5 mg by mouth two (2) times a day. aspirin delayed-release 81 mg tablet 2018 at PM Significant Other Yes Yes   Sig: Take 81 mg by mouth nightly. cholecalciferol (VITAMIN D3) 1,000 unit tablet 2018 at PM Significant Other Yes Yes   Sig: Take 2,000 Units by mouth nightly. desonide (TRIDESILON) 0.05 % cream 10/14/2018 at Unknown time Significant Other Yes Yes   Sig: Apply  to affected area daily as needed for Skin Irritation. docusate sodium (COLACE) 100 mg capsule 2018 at Unknown time Significant Other Yes Yes   Sig: Take 100 mg by mouth daily as needed. insulin NPH (HUMULIN N NPH U-100 INSULIN) 100 unit/mL injection 2018 at AM  Yes Yes   Si Units by SubCUTAneous route Daily (before breakfast). isosorbide mononitrate ER (IMDUR) 120 mg CR tablet 2018 at AM Significant Other No Yes   Sig: Take 1 Tab by mouth every morning. losartan (COZAAR) 25 mg tablet 2018 at PM Significant Other Yes Yes   Sig: Take 25 mg by mouth nightly. melatonin 3 mg tablet 2018 at PM Significant Other Yes Yes   Sig: Take 3 mg by mouth nightly.    mirtazapine (REMERON) 15 mg tablet 2018 at PM Significant Other Yes Yes   Sig: Take 7.5 mg by mouth nightly. nitroglycerin (NITROSTAT) 0.4 mg SL tablet  Significant Other Yes Yes   Si.4 mg by SubLINGual route every five (5) minutes as needed. nystatin (MYCOSTATIN) powder 10/14/2018 at Unknown time Significant Other Yes Yes   Sig: Apply  to affected area daily as needed for Other (Itching/Irritation- Groin area). To groin after showering and drying    omeprazole (PRILOSEC) 20 mg capsule 2018 at AM Significant Other Yes Yes   Sig: Take 20 mg by mouth Daily (before breakfast). polyethylene glycol (MIRALAX) 17 gram packet 2018 at Unknown time Significant Other Yes Yes   Sig: Take 17 g by mouth daily as needed (Constipation). senna (SENNA) 8.6 mg tablet 2018 at Unknown time Significant Other Yes Yes   Sig: Take 1 Tab by mouth daily as needed for Constipation. trimethoprim (TRIMPEX) 100 mg tablet 2018 at AM  Yes Yes   Sig: Take 50 mg by mouth daily.       Facility-Administered Medications: None       Hospital Medications:  Current Facility-Administered Medications   Medication Dose Route Frequency    apixaban (ELIQUIS) tablet 2.5 mg  2.5 mg Oral BID    aspirin delayed-release tablet 81 mg  81 mg Oral QHS    isosorbide mononitrate ER (IMDUR) tablet 120 mg  120 mg Oral 7am    losartan (COZAAR) tablet 25 mg  25 mg Oral QHS    melatonin tablet 3 mg  3 mg Oral QHS    mirtazapine (REMERON) tablet 7.5 mg  7.5 mg Oral QHS    polyethylene glycol (MIRALAX) packet 17 g  17 g Oral DAILY    senna (SENOKOT) tablet 8.6 mg  1 Tab Oral DAILY    pantoprazole (PROTONIX) tablet 40 mg  40 mg Oral ACB    0.9% sodium chloride infusion  75 mL/hr IntraVENous CONTINUOUS    sodium chloride (NS) flush 5-10 mL  5-10 mL IntraVENous Q8H    sodium chloride (NS) flush 5-10 mL  5-10 mL IntraVENous PRN    acetaminophen (TYLENOL) tablet 650 mg  650 mg Oral Q4H PRN    prochlorperazine (COMPAZINE) injection 10 mg  10 mg IntraVENous Q6H PRN    insulin lispro (HUMALOG) injection   SubCUTAneous AC&HS    glucose chewable tablet 16 g  4 Tab Oral PRN    dextrose (D50W) injection syrg 12.5-25 g  25-50 mL IntraVENous PRN    glucagon (GLUCAGEN) injection 1 mg  1 mg IntraMUSCular PRN       Review of Systems: Admission ROS by Cooper Avila MD from 11/14/2018 were reviewed with the patient and changes (other than per HPI) include: none      Objective:     Physical Exam:  Visit Vitals  /65 (BP 1 Location: Right arm, BP Patient Position: At rest)   Pulse 65   Temp 98.3 °F (36.8 °C)   Resp 16   Ht 5' 7\" (1.702 m)   Wt 80.7 kg (178 lb)   SpO2 95%   BMI 27.88 kg/m²     SpO2 Readings from Last 6 Encounters:   11/16/18 95%   09/13/18 97%   08/26/17 92%   07/16/17 94%   02/04/17 98%   03/24/16 97%            Intake/Output Summary (Last 24 hours) at 11/16/2018 0940  Last data filed at 11/16/2018 2731  Gross per 24 hour   Intake 2632.5 ml   Output 3225 ml   Net -592.5 ml      General: no distress, comfortable  Skin:  No rash or palpable dermatologic mass lesions  HEENT: Pupils equal, sclera anicteric, oropharynx with no gross lesions  Cardiovascular: No abnormal audible heart sounds, well perfused, no edema  Respiratory:  No abnormal audible breath sounds, normal respiratory effort, no throacic deformity  GI:  Soft, nondistended, nontender  Musculoskeletal:  No skeletal deformity nor acute arthritis noted.   Neurological:  CN II-XII grossly intact, no focal deficits    Laboratory:    Recent Results (from the past 24 hour(s))   GLUCOSE, POC    Collection Time: 11/15/18 11:31 AM   Result Value Ref Range    Glucose (POC) 141 (H) 65 - 100 mg/dL    Performed by Shirley Rausch    LACTIC ACID    Collection Time: 11/15/18 11:35 AM   Result Value Ref Range    Lactic acid 1.6 0.4 - 2.0 MMOL/L   PTH INTACT    Collection Time: 11/15/18 12:02 PM   Result Value Ref Range    Calcium 9.8 8.5 - 10.1 MG/DL    PTH, Intact 40.9 18.4 - 88.0 pg/mL   HEPATIC FUNCTION PANEL    Collection Time: 11/15/18 12:02 PM   Result Value Ref Range    Protein, total 5.0 (L) 6.4 - 8.2 g/dL    Albumin 2.6 (L) 3.5 - 5.0 g/dL    Globulin 2.4 2.0 - 4.0 g/dL    A-G Ratio 1.1 1.1 - 2.2      Bilirubin, total 3.2 (H) 0.2 - 1.0 MG/DL    Bilirubin, direct 2.2 (H) 0.0 - 0.2 MG/DL    Alk. phosphatase 204 (H) 45 - 117 U/L    AST (SGOT) 52 (H) 15 - 37 U/L    ALT (SGPT) 71 12 - 78 U/L   GLUCOSE, POC    Collection Time: 11/15/18  5:49 PM   Result Value Ref Range    Glucose (POC) 123 (H) 65 - 100 mg/dL    Performed by [employer], POC    Collection Time: 11/15/18 10:31 PM   Result Value Ref Range    Glucose (POC) 125 (H) 65 - 100 mg/dL    Performed by Andi Dunham (PCT)    CBC W/O DIFF    Collection Time: 11/16/18  5:22 AM   Result Value Ref Range    WBC 3.9 (L) 4.1 - 11.1 K/uL    RBC 3.92 (L) 4.10 - 5.70 M/uL    HGB 11.1 (L) 12.1 - 17.0 g/dL    HCT 34.0 (L) 36.6 - 50.3 %    MCV 86.7 80.0 - 99.0 FL    MCH 28.3 26.0 - 34.0 PG    MCHC 32.6 30.0 - 36.5 g/dL    RDW 14.2 11.5 - 14.5 %    PLATELET 86 (L) 402 - 400 K/uL    MPV 10.1 8.9 - 12.9 FL    NRBC 0.0 0  WBC    ABSOLUTE NRBC 0.00 0.00 - 0.01 K/uL   MAGNESIUM    Collection Time: 11/16/18  5:22 AM   Result Value Ref Range    Magnesium 1.6 1.6 - 2.4 mg/dL   METABOLIC PANEL, COMPREHENSIVE    Collection Time: 11/16/18  5:22 AM   Result Value Ref Range    Sodium 138 136 - 145 mmol/L    Potassium 4.0 3.5 - 5.1 mmol/L    Chloride 106 97 - 108 mmol/L    CO2 23 21 - 32 mmol/L    Anion gap 9 5 - 15 mmol/L    Glucose 117 (H) 65 - 100 mg/dL    BUN 20 6 - 20 MG/DL    Creatinine 1.53 (H) 0.70 - 1.30 MG/DL    BUN/Creatinine ratio 13 12 - 20      GFR est AA 53 (L) >60 ml/min/1.73m2    GFR est non-AA 44 (L) >60 ml/min/1.73m2    Calcium 10.6 (H) 8.5 - 10.1 MG/DL    Bilirubin, total 1.8 (H) 0.2 - 1.0 MG/DL    ALT (SGPT) 49 12 - 78 U/L    AST (SGOT) 27 15 - 37 U/L    Alk.  phosphatase 182 (H) 45 - 117 U/L    Protein, total 5.4 (L) 6.4 - 8.2 g/dL    Albumin 2.4 (L) 3.5 - 5.0 g/dL Globulin 3.0 2.0 - 4.0 g/dL    A-G Ratio 0.8 (L) 1.1 - 2.2     GLUCOSE, POC    Collection Time: 11/16/18  7:43 AM   Result Value Ref Range    Glucose (POC) 103 (H) 65 - 100 mg/dL    Performed by Bernadette (PCT)          Assessment/Plan:     Principal Problem:    Viral illness (11/14/2018)    Active Problems:    CAD (coronary artery disease) ()      Overview: Angina Jan 2011, PCI unknown vessel at [place], no MI      CKD (chronic kidney disease) stage 3, GFR 30-59 ml/min ([facility]) (12/10/2013)      Dementia ()      DM type 2 causing renal disease ([facility] [address] ()         See above narrative for full detail.     Oliva Freeman PA-C  11/16/18  9:40 AM    I have interviewed and examined patient with addendum to note above and formulation care plan to reflect my evaluation    GI on call available as needed over weekend    Arabella Hsieh M.D.

## 2018-11-16 NOTE — PHYSICIAN ADVISORY
Letter of Status Determination:  
Recommend hospitalization status upgraded from OBSERVATION  to INPATIENT  Status Pt Name:  Mathieu Peguero. MR#  
MARCO # [id number] / 
[id number] Payor: Fredrick Dukes / Plan: [address] / Product Type: Medicare /   
LACEY#  [id number] Room and Hospital  514/01  @ [facility] Hospitalization date  11/14/2018  4:18 PM  
Current Attending Physician  Marjorie Oviedo MD  
Principal diagnosis  Hyperbilirubinemia Clinicals  80 y.o. male with past medical history significant for HTN, hypothyroidism, SAMUEL, left renal mass, PAF, CAD, prostate cancer, arthritis, GERD, renal cell cancer, DM, and CKD who presents from home with chief complaint of chills. Per relative, Pt c/o chills, vomiting, productive cough, weakness, and tremors at 1315 today. Pt states he felt fine prior. Per spouse, Pt's temperature at home was 98.2 F. Pt's spouse notes the Pt's temperature is normally 97.8 F. Per Spouse, Pt took ibuprofen PTA. Pt denies chest pain and generalized pain. Pt with Hyperbilirubinemia, hypercalcemia on admission, Diagnosed with Jaundice work up and GI consult pending, also with thrmobocyyotpenia, On IVF, [place] (MCG) criteria Does  NOT apply See above STATUS DETERMINATION  INPATIENT The final decision of the patient's hospitalization status depends on the attending physician's judgment Additional comments Payor: Fredrick Dukes / Plan: [address] / Product Type: Medicare /   
  
 
Sara Marc MD 
Cell: [phone] Physician Advisor

## 2018-11-16 NOTE — PROGRESS NOTES
Medical Progress Note NAME: Rebecca Calloway :  1935 MRM:  [id number] Date/Time: 2018  7:32 AM 
  
Assessment / Plan:  
 
Viral illness (2018):  Possible. CXR findings appear chronic. Does not currently have respiratory symptoms. Had chills at home. Viral resp panel negative. Chest CT with suggestion of pulmonary fibrosis. -- supportive care -- continue IVF 
  
CAD (coronary artery disease) () / PAF:  Denies chest pain or sob. -- continue ASA, imdur, losartan, and amlodipine 
-- continue apixaban 
  
Jaundice:  Has dark urine with bili. Labs in 2018 with elevated LFT and hyperbili. No evidence of mass or biliary obstruction by noncontrast CT. Bili better this AM. 
-- trend LFT 
-- GI consulted 
  
CKD (chronic kidney disease) stage 3, GFR 30-59 ml/min ([facility]) (12/10/2013):  Creatinine stable from prior. -- continue IVF 
  
Lactic acidemia:  No evidence of active infection. Resolved. -- continue IVF 
  
Dementia ():  Seems to be at baseline. 
  
DM type 2 causing renal disease ([facility] [address] ():  Well controlled overnight. -- continue SSI 
-- hold NPH given decreased po intake 
  
Hypercalcemia:  Seems to be chronic. Unclear etiology at this point. -- continue IVF 
-- await PTH, PTH rp 
   
Thrombocytopenia:  Mild and appears to be chronic. Likely made worse by dilution. -- monitor platelets, if worsens may need to hold apixaban 
  
Debility:  Seen by PT/OT, initial rec is for SNF level rehab. -- continue PT/OT Subjective: Chief Complaint:  Reports feeling well. Denies sob, chest pain, abd pains, n/v.  Has not been OOB yet this AM. 
 
ROS: 
(bold if positive, if negative) Objective:  
 
 
Vitals:  
 
 
  
Last 24hrs VS reviewed since prior progress note. Most recent are: 
 
Visit Vitals /64 (BP 1 Location: Right arm, BP Patient Position: At rest) Pulse 69 Temp 98.5 °F (36.9 °C) Resp 16 Ht 5' 7\" (1.702 m) Wt 80.7 kg (178 lb) SpO2 94% BMI 27.88 kg/m² SpO2 Readings from Last 6 Encounters:  
11/16/18 94% 09/13/18 97% 08/26/17 92% 07/16/17 94% 02/04/17 98% 03/24/16 97% Intake/Output Summary (Last 24 hours) at 11/16/2018 0732 Last data filed at 11/16/2018 5052 Gross per 24 hour Intake 2632.5 ml Output 3225 ml Net -592.5 ml Exam:  
 
Physical Exam: 
 
Gen:  Well-developed, well-nourished, in no acute distress HEENT:  Pink conjunctivae, hearing intact to voice, moist mucous membranes Resp:  No accessory muscle use, clear breath sounds without wheezes rales or rhonchi 
Card:  2/6 mandi rusb, normal S1, S2 without thrills or peripheral edema Abd:  Soft, non-tender, non-distended, normoactive bowel sounds are present Skin:  No rashes Neuro: Face symmetric, tongue midline, speech fluent,  strength is 5/5 bilaterally and dorsi / plantarflexion is 5/5 bilaterally, follows commands appropriately Psych:  oriented to person, alert Medications Reviewed: (see below) Lab Data Reviewed: (see below) 
 
______________________________________________________________________ Medications:  
 
Current Facility-Administered Medications Medication Dose Route Frequency  apixaban (ELIQUIS) tablet 2.5 mg  2.5 mg Oral BID  aspirin delayed-release tablet 81 mg  81 mg Oral QHS  isosorbide mononitrate ER (IMDUR) tablet 120 mg  120 mg Oral 7am  
 losartan (COZAAR) tablet 25 mg  25 mg Oral QHS  melatonin tablet 3 mg  3 mg Oral QHS  mirtazapine (REMERON) tablet 7.5 mg  7.5 mg Oral QHS  polyethylene glycol (MIRALAX) packet 17 g  17 g Oral DAILY  senna (SENOKOT) tablet 8.6 mg  1 Tab Oral DAILY  pantoprazole (PROTONIX) tablet 40 mg  40 mg Oral ACB  
 0.9% sodium chloride infusion  75 mL/hr IntraVENous CONTINUOUS  
 sodium chloride (NS) flush 5-10 mL  5-10 mL IntraVENous Q8H  
 sodium chloride (NS) flush 5-10 mL  5-10 mL IntraVENous PRN  
  acetaminophen (TYLENOL) tablet 650 mg  650 mg Oral Q4H PRN  prochlorperazine (COMPAZINE) injection 10 mg  10 mg IntraVENous Q6H PRN  
 insulin lispro (HUMALOG) injection   SubCUTAneous AC&HS  
 glucose chewable tablet 16 g  4 Tab Oral PRN  
 dextrose (D50W) injection syrg 12.5-25 g  25-50 mL IntraVENous PRN  
 glucagon (GLUCAGEN) injection 1 mg  1 mg IntraMUSCular PRN Lab Review:  
 
Recent Labs 11/16/18 
0522 11/15/18 
0538 11/14/18 
1624 WBC 3.9* 6.9 4.2 HGB 11.1* 11.9* 13.2 HCT 34.0* 36.4* 40.7 PLT 86* 96* 102* Recent Labs 11/16/18 
0522 11/15/18 
1202 11/15/18 
0538 11/14/18 
1624   --  141 139  
K 4.0  --  4.7 4.2   --  107 105 CO2 23  --  26 23 *  --  127* 204* BUN 20  --  16 15 CREA 1.53*  --  1.60* 1.56* CA 10.6* 9.8 10.0 10.7* MG 1.6  --  1.4*  --   
PHOS  --   --  2.6  --   
ALB 2.4* 2.6*  --   --   
TBILI 1.8* 3.2*  --   --   
SGOT 27 52*  --   --   
ALT 49 71  --   --   
 
Lab Results Component Value Date/Time Glucose (POC) 125 (H) 11/15/2018 10:31 PM  
 Glucose (POC) 123 (H) 11/15/2018 05:49 PM  
 Glucose (POC) 141 (H) 11/15/2018 11:31 AM  
 Glucose (POC) 111 (H) 11/15/2018 08:01 AM  
 Glucose (POC) 127 (H) 11/14/2018 09:35 PM  
 
 
 
Total time spent with patient: 30 Minutes Care Plan discussed with: Patient Discussed:  Care Plan Prophylaxis:  apixaban Disposition:   PT, OT, RN 
        
___________________________________________________ Attending Physician: Willy Bauman MD

## 2018-11-16 NOTE — PROGRESS NOTES
TRANSFER - IN REPORT: 
 
Verbal report received from Molly(name) on [address] [facility].  being received from ED(unit) for routine progression of care Report consisted of patients Situation, Background, Assessment and  
Recommendations(SBAR). Information from the following report(s) SBAR, Kardex, ED Summary, MAR, Accordion and Recent Results was reviewed with the receiving nurse. Opportunity for questions and clarification was provided. Assessment completed upon patients arrival to unit and care assumed. Primary Nurse Jcarlos Flores and Julio Shah RN performed a dual skin assessment on this patient Impairment noted- see wound doc flow sheet Rogers score is 16

## 2018-11-16 NOTE — PROGRESS NOTES
Nutrition Assessment: 
 
RECOMMENDATIONS/INTERVENTION(S):  
Continue Cardiac/CCD 2000 kcal diet Monitor PO intakes, weight, BG, LFTs, last BM? If intakes <50%, add Ensure HP for snacks. ASSESSMENT:  
11/16: 80 yr old male admitted for chills, n/v, weakness. PMHx: HTN, hypothyroidism, SAMUEL, left renal mass, PAF, CAD, prostate cancer, arthritis, GERD, renal cell cancer, DM, and CKD. MST noted for weight loss, poor appetite. Per chart pt has lost 2 lbs in the last 2 months. Pt down 17 lbs (8%) from 1 yr ago. Not significant for time frame. Intakes documented as >50%. Pt was to have ERCP for elevated LFTs but unsure of implant from previous surgery. Monitor testing/results. No n/v. Check last BM. Labs reviwed. Cr 1.53. BiliT 1.8. SUBJECTIVE/OBJECTIVE:  
Diet Order: Cardiac, Consistent carb 
% Eaten:   
Patient Vitals for the past 168 hrs: 
 % Diet Eaten 11/15/18 1415 75 % 11/15/18 1000 50 % Pertinent Medications: [x] Reviewed Labs reviewed:  [x] Anthropometrics: Height: 5' 7\" (170.2 cm) Weight: 80.7 kg (178 lb) IBW (%IBW):   ( ) UBW (%UBW):   (  %) BMI: Body mass index is 27.88 kg/m². This BMI is indicative of: 
 
 [] Underweight    [x] Normal for age   [] Overweight    []  Obesity    []  Extreme Obesity (BMI>40) Estimated Nutrition Needs (Based on): 6501 Kcals/day , 71 g(-85g/day(1.0-1.2g/kg)) Protein Carbohydrate: At Least 130 g/day  Fluids: 1800 mL/day (1mL/kg rounded to 50 mL) Last BM: ?   []Active     []Hyperactive  []Hypoactive       [] Absent   BS Skin:    [] Intact   [] Incision  [x] Breakdown - redness sacrum  [] DTI   [] Tears/Excoriation/Abrasion  []Edema [] Other: Wt Readings from Last 30 Encounters:  
11/14/18 80.7 kg (178 lb)  
09/12/18 81.6 kg (180 lb) 08/25/17 88.5 kg (195 lb)  
07/16/17 88.5 kg (195 lb) 03/02/17 91.6 kg (202 lb)  
02/20/17 91.6 kg (202 lb) 02/04/17 89.1 kg (196 lb 6.4 oz) 03/24/16 87.2 kg (192 lb 4.8 oz) 02/17/16 90.7 kg (200 lb) 12/30/15 89 kg (196 lb 3.2 oz)  
11/24/15 92.4 kg (203 lb 9.6 oz) 11/02/15 93.4 kg (206 lb) 10/26/15 93.4 kg (206 lb) 10/09/15 92.7 kg (204 lb 6.4 oz) 09/30/15 92.3 kg (203 lb 6.4 oz) 08/12/15 94.8 kg (209 lb) 04/13/15 93.4 kg (206 lb) 12/08/14 95.5 kg (210 lb 9.6 oz) 08/06/14 92.1 kg (203 lb) 04/28/14 88 kg (194 lb)  
03/26/14 88.5 kg (195 lb) 12/19/13 88.5 kg (195 lb) 12/10/13 90.7 kg (199 lb 15.3 oz) 05/06/13 92.5 kg (204 lb) 12/10/12 90.7 kg (200 lb) 11/05/12 84.4 kg (186 lb) 08/13/12 88.8 kg (195 lb 12.8 oz) 08/03/12 79.4 kg (175 lb)  
07/30/12 88 kg (194 lb)  
07/16/12 89.4 kg (197 lb) NUTRITION DIAGNOSES:  
Problem:  Unintended weight loss Etiology: related to decreased ability to maintain weight Signs/Symptoms: as evidenced by reported weight loss NUTRITION INTERVENTIONS: 
Meals/Snacks: General/healthful diet   Supplements: Commercial supplement GOAL:  
pt will consume >50% of meals and ONS w/i 3-5 days Cultural, Shinto, or Ethnic Dietary Needs: None LEARNING NEEDS (Diet, Food/Nutrient-Drug Interaction):  
 [x] None Identified 
 [] Identified and Education Provided/Documented 
 [] Identified and Pt declined/was not appropriate [x] Interdisciplinary Care Plan Reviewed/Documented  
 [x] Discharge Needs:    TBD [] No Nutrition Related Discharge Needs NUTRITION RISK:  
Pt Is At Nutrition Risk  [x] No Nutrition Risk Identified  [] PT SEEN FOR:  
 []  MD Consult: []Calorie Count []Diabetic Diet Education []Diet Education []Electrolyte Management []General Nutrition Management and Supplements []Management of Tube Feeding []TPN Recommendations [x]  RN Referral:  [x]MST score >=2 
   []Enteral/Parenteral Nutrition PTA    []Pregnant: Gestational DM or Multigestation  
              [] Pressure Ulcer 
   
[]  Low BMI      []  Length of Stay       [] Dysphagia Diet     [] Ventilator      [] Follow-Up Previous Recommendations: 
 [] Implemented          [] Not Implemented          [] Not Applicable Previous Goal: 
 [] Met              [] Progressing Towards Goal              [] Not Progressing Towards Goal   [x] Not Applicable Simba Lovett RD Pager: [phone] Office: [phone]

## 2018-11-16 NOTE — PROGRESS NOTES
Problem: Mobility Impaired (Adult and Pediatric) Goal: *Acute Goals and Plan of Care (Insert Text) Physical Therapy Goals Initiated 11/15/2018 1. Patient will move from supine to sit and sit to supine  in bed with supervision/set-up within 7 day(s). 2.  Patient will transfer from bed to chair and chair to bed with supervision/set-up using the least restrictive device within 7 day(s). 3.  Patient will perform sit to stand with supervision/set-up within 7 day(s). 4.  Patient will ambulate with supervision/set-up for 150 feet with the least restrictive device within 7 day(s). physical Therapy TREATMENT Patient: Johny Patterson (80 y.o. male) Date: 11/16/2018 Diagnosis: Viral illness Viral illness Viral illness Precautions: Fall Chart, physical therapy assessment, plan of care and goals were reviewed. ASSESSMENT: 
Based on the objective data described below, patient participated well with treatment today. Rolled on the edge of bed with min assist, supine to sit min assist, sit to stand min assist, ambulate with rolling walker in the room and towards the bathroom. Noted condom cath was leaking during ambulation. Assisted up on t he commode cleaned floor and cleaned up patient. Offered to be OOB to chair as tolerated performed some active range of motion exercise on both LE all planes. Family in the room and agreed with all goals set for the patient. Notified nurse condom cath fell off agreed to place a new one and monitor patient. Progression toward goals: 
[x]    Improving appropriately and progressing toward goals 
[]    Improving slowly and progressing toward goals 
[]    Not making progress toward goals and plan of care will be adjusted PLAN: 
Patient continues to benefit from skilled intervention to address the above impairments. Continue treatment per established plan of care. Discharge Recommendations:  Kevin Dumont Further Equipment Recommendations for Discharge:  TBD SUBJECTIVE:  
Patient stated ok.  OBJECTIVE DATA SUMMARY:  
Critical Behavior: 
Neurologic State: Alert Orientation Level: Disoriented to time, Disoriented to situation, Disoriented to place, Oriented to person Cognition: Impaired decision making, Follows commands Safety/Judgement: Fall prevention, Decreased insight into deficits, Decreased awareness of need for safety Functional Mobility Training: 
Bed Mobility: 
Rolling: Minimum assistance Supine to Sit: Minimum assistance Sit to Supine: Minimum assistance Scooting: Minimum assistance Transfers: 
Sit to Stand: Minimum assistance Stand to Sit: Minimum assistance Stand Pivot Transfers: Minimum assistance Bed to Chair: Minimum assistance Balance: 
Sitting: Intact Sitting - Static: Good (unsupported) Sitting - Dynamic: Good (unsupported) Standing: Impaired;Pull to stand; With support Standing - Static: Fair Standing - Dynamic : FairAmbulation/Gait Training: 
Distance (ft): 30 Feet (ft) Assistive Device: Walker, rolling;Gait belt Ambulation - Level of Assistance: Minimal assistance Gait Description (WDL): Exceptions to [facility] Gait Abnormalities: Path deviations; Step to gait Base of Support: Widened Speed/Sabrina: Slow;Fluctuations Step Length: Right shortened;Left shortened Therapeutic Exercises:  
 Instructed patient to continue active range of motion exercise on both legs while up on chair or on bed. Pain: 
Pain Scale 1: Numeric (0 - 10) Pain Intensity 1: 0 Activity Tolerance:  
Good. Please refer to the flowsheet for vital signs taken during this treatment. After treatment:  
[x]    Patient left in no apparent distress sitting up in chair 
[x]    Patient left in no apparent distress in bed 
[x]    Call bell left within reach [x]    Nursing notified 
[x]    Caregiver present [x]    Bed alarm activated COMMUNICATION/COLLABORATION:  
The patients plan of care was discussed with: Registered Nurse and patient Elias Sepulveda PT,WCC. Time Calculation: 25 mins

## 2018-11-16 NOTE — PROGRESS NOTES
TRANSFER - OUT REPORT: 
 
Verbal report given to KIMBERLY Dubon(name) on [id number] [facility].  being transferred to med/Surg(unit) for routine progression of care Report consisted of patients Situation, Background, Assessment and  
Recommendations(SBAR). Information from the following report(s) SBAR, Kardex, ED Summary, Intake/Output, MAR and Recent Results was reviewed with the receiving nurse. Lines:  
Peripheral IV 11/14/18 Left Antecubital (Active) Site Assessment Clean, dry, & intact 11/15/2018  7:45 PM  
Phlebitis Assessment 0 11/15/2018  7:45 PM  
Infiltration Assessment 0 11/15/2018  7:45 PM  
Dressing Status Clean, dry, & intact 11/15/2018  7:45 PM  
Dressing Type Transparent 11/15/2018  7:45 PM  
Hub Color/Line Status Pink; Infusing 11/15/2018  7:45 PM  
Action Taken Open ports on tubing capped 11/15/2018  4:55 PM  
Alcohol Cap Used Yes 11/15/2018  7:45 PM  
  
 
Opportunity for questions and clarification was provided. Patient transported with: 
 Monitor Registered Nurse

## 2018-11-16 NOTE — PROGRESS NOTES
Spoke with RN Luke Casey). Unsure if pt has stapes implant. Pt is not alert and family is not present at this time. If implant card not available, need to know Dr name to retrieve operative report/additional notes.

## 2018-11-16 NOTE — PROGRESS NOTES
Palliative Medicine Consult Patient Name: Matthew Riley. YOB: 1935 Date of Initial Consult: 11/15/18 Reason for Consult: Care decisions Requesting Provider: Dr. Kwasi Novak Primary Care Physician: Ceci, MD Chance 
  
 SUMMARY:  
Matthew Russell is a 80 y.o. with a past history of TIA, RCC, CAD, CKD, DM, HTN, dementia(mild), who was admitted on 11/14/2018 from home with a diagnosis of probable viral illness/elevated bilirubin. Current medical issues leading to Palliative Medicine involvement include: care decisions. Chart reviewed/HPI-patient admitted to the hospital with suspected viral illness. Patient found to have bilirubin in urine and bilirubin of 3.2. CT scan of abdomen/pelvis without any acute process. Per his wife(via phone), he still ambulates with a walker, able to converse, feeds and dresses himself. Short term memory is the biggest problem. SH- for 51 years, 4 children. PALLIATIVE DIAGNOSES:  
1. GOC discussion 2. DNR discussion 3. ACP discussion 4. Debility 5. Elevated bilirubin 6. Dementia-appears mild-moderate per family PLAN:  
1. Met with patient, Sudha(daughter) and spouse. Patient still fatigued at this time. Bilirubin is improved. Attempting to do MRCP but had surgery on ear in 1989 and unclear on what may be present. 2. Patient had surgery at [facility] with Dr. Fely Shine who in now retired. Health system - [facility] and office closes at 2 PM on Friday so can not obtain information on surgery from [facility]. If needs MRCP, may need to wait as outpatient or can be done Monday after talking with ENT 3. GOC-continue full restorative measures for now. Return home when feeling better. Ideally, family would rather him home than a SNF 4. AMD-in the chart with spouse [place] as primary MPOA, Vernon(son) as secondary and Eros Fowler as tertiary 5.  Code status-reviewed resuscitation again with spouse and confirmed DNAR/DNI. DDNR completed today-original and 3 copies given to spouse and copy placed in chart. 6. Symptom management-no acute symptoms for us to manage 7. Psychosocial-good support from family. Children live locally but [place] apparently provides the most support. No spiritual issues. 8. Discussed with bedside nurse(Duran) 9. Initial consult note routed to primary continuity provider 10. Communicated plan of care with: Palliative IDT 
 
 
 GOALS OF CARE / TREATMENT PREFERENCES:  
[====Goals of Care====] GOALS OF CARE: 
Patient/Health Care Proxy Stated Goals: Prolong life TREATMENT PREFERENCES:  
Code Status: DNR Advance Care Planning: 
Advance Care Planning 11/15/2018 Patient's Healthcare Decision Maker is: -  
Primary Decision Maker Name -  
Primary Decision Maker Phone Number -  
Primary Decision Maker Relationship to Patient -  
Confirm Advance Directive Yes, on file Does the patient have other document types - Medical Interventions: Limited additional interventions Other Instructions: The palliative care team has discussed with patient / health care proxy about goals of care / treatment preferences for patient. 
[====Goals of Care====] HISTORY:  
 
History obtained from: chart, daughter, patient, spouse CHIEF COMPLAINT: weakness HPI/SUBJECTIVE: The patient is:  
[x] Verbal and participatory [] Non-participatory due to:  
Patient is awake. Alert to person, place. Denies any pain. Ready to go home 11/16-patient appears tired, sleeping during a lot of the visit Clinical Pain Assessment (nonverbal scale for severity on nonverbal patients):  
Clinical Pain Assessment Severity: 0 FUNCTIONAL ASSESSMENT:  
 
Palliative Performance Scale (PPS): PPS: 60 PSYCHOSOCIAL/SPIRITUAL SCREENING:  
 
Advance Care Planning: 
Advance Care Planning 11/15/2018 Patient's Healthcare Decision Maker is: -  
Primary Decision Maker Name -  
 Primary Decision Maker Phone Number -  
Primary Decision Maker Relationship to Patient -  
Confirm Advance Directive Yes, on file Does the patient have other document types - Any spiritual / Yazidism concerns: 
[] Yes /  [x] No 
 
Caregiver Burnout: 
[] Yes /  [x] No /  [] No Caregiver Present Anticipatory grief assessment:  
[x] Normal  / [] Maladaptive ESAS Anxiety: Anxiety: 0 
 
ESAS Depression: Depression: 0 REVIEW OF SYSTEMS:  
 
Positive and pertinent negative findings in ROS are noted above in HPI. The following systems were [x] reviewed / [] unable to be reviewed as noted in HPI Other findings are noted below. Systems: constitutional, ears/nose/mouth/throat, respiratory, gastrointestinal, genitourinary, musculoskeletal, integumentary, neurologic, psychiatric, endocrine. Positive findings noted below. Modified ESAS Completed by: provider Fatigue: 1 Drowsiness: 0 Depression: 0 Pain: 0 Anxiety: 0 Nausea: 0 Anorexia: 0 Dyspnea: 0 Constipation: No  
     
 
 
 PHYSICAL EXAM:  
 
From RN flowsheet: 
Wt Readings from Last 3 Encounters:  
11/14/18 178 lb (80.7 kg) 09/12/18 180 lb (81.6 kg) 08/25/17 195 lb (88.5 kg) Blood pressure 152/72, pulse 66, temperature 97.4 °F (36.3 °C), resp. rate 16, height 5' 7\" (1.702 m), weight 178 lb (80.7 kg), SpO2 97 %. Pain Scale 1: Numeric (0 - 10) Pain Intensity 1: 0 Last bowel movement, if known:  
 
Constitutional: alert, NAD Eyes: pupils equal, minimal icterus today ENMT: no nasal discharge, moist mucous membranes Cardiovascular: regular rhythm, distal pulses intact Respiratory: breathing not labored, symmetric Gastrointestinal: soft non-tender, +bowel sounds Musculoskeletal: no deformity, no tenderness to palpation Skin: warm, dry, minimal jaundice Neurologic: following commands, moving all extremities Psychiatric: full affect, no hallucinations Other: 
 
 
 HISTORY:  
 
Principal Problem: Viral illness (11/14/2018) Active Problems: 
  CAD (coronary artery disease) () Overview: Angina Jan 2011, PCI unknown vessel at [place], no MI 
 
  CKD (chronic kidney disease) stage 3, GFR 30-59 ml/min ([facility]) (12/10/2013) Dementia () 
 
  DM type 2 causing renal disease ([facility]) () Past Medical History:  
Diagnosis Date  Arthritis  CAD (coronary artery disease) Angina Jan 2011, PCI LAD with multilink stent 4.0x12mm;  also cath at [place] 7/13 -- no stents at that time  CKD (chronic kidney disease), stage III  Cochlear implant in place 2/3/2017  Dementia  DM type 2 causing renal disease ([address]  DM type 2 causing vascular disease ([address]  GERD (gastroesophageal reflux disease)  HTN (hypertension)  Hypothyroidism  SAMUEL (obstructive sleep apnea)   
 not using CPAP  
 PAF (paroxysmal atrial fibrillation) ([address] [id number] score = 7  
 Prostate cancer ([address]  Renal cell cancer ([address] Stage 4 Renal Cell Cancer. Partial omentectomy 11/2/2015 with Dr. Donna Colunga: omental mass-metastatic renal cell carcinoma  Renal mass, left [address] LEFT PARTIAL NEPHRECTOMY 12/19/13;   
  
Past Surgical History:  
Procedure Laterality Date  CARDIAC SURG PROCEDURE UNLIST  1/2011  
 stent to LAD  HX CHOLECYSTECTOMY  7/13/2012  HX CORONARY STENT PLACEMENT  2011 [address]  2011, 2013 [address]  
 stapedectomy  HX HERNIA REPAIR  1968  
 left inguinal  
 HX LITHOTRIPSY  8/2015  HX ORTHOPAEDIC  1994  
 rotator cuff repair  HX ORTHOPAEDIC  1994  
 cervical disc removed  HX PROSTATECTOMY  2000  
 surgery intervention [facility]  HX UROLOGICAL  2001  
 valvular implant prevent incontinence  HX UROLOGICAL  2003  
 insert gel to help with incontinence  HX UROLOGICAL  12/2013  
 left partial nephrectomy  HX UROLOGICAL  9/2015 CT guided biopsy of abdominal lymph nodes Family History Problem Relation Age of Onset  Stroke Father  Cancer Father   
     prostate  Diabetes Sister 2 sisters  Hypertension Sister 2 sisters  Diabetes Brother  Stroke Brother  Diabetes Brother   
     all brothers  Stroke Brother  Kidney Disease Brother  Heart Attack Brother  Hypertension Other History reviewed, no pertinent family history. Social History Tobacco Use  Smoking status: Former Smoker Packs/day: 0.10 Years: 23.00 Pack years: 2.30 Last attempt to quit: 12/3/1975 Years since quittin.9  Smokeless tobacco: Never Used Substance Use Topics  Alcohol use: No  
  Alcohol/week: 0.0 oz Allergies Allergen Reactions  Latex Rash, Swelling and Contact Dermatitis  Atorvastatin Unknown (comments) Per patient's PCP allergy list- No reaction specified  Ezetimibe Myalgia  Lisinopril Cough  Metoprolol Other (comments) Bradycardia  Morphine Other (comments) Hallucinations and Nausea  Nitrofurantoin Rash \"Blood blisters/rash\" per patient's family  Rosuvastatin Myalgia  Simvastatin Myalgia Current Facility-Administered Medications Medication Dose Route Frequency  apixaban (ELIQUIS) tablet 2.5 mg  2.5 mg Oral BID  aspirin delayed-release tablet 81 mg  81 mg Oral QHS  isosorbide mononitrate ER (IMDUR) tablet 120 mg  120 mg Oral 7am  
 losartan (COZAAR) tablet 25 mg  25 mg Oral QHS  melatonin tablet 3 mg  3 mg Oral QHS  mirtazapine (REMERON) tablet 7.5 mg  7.5 mg Oral QHS  polyethylene glycol (MIRALAX) packet 17 g  17 g Oral DAILY  senna (SENOKOT) tablet 8.6 mg  1 Tab Oral DAILY  pantoprazole (PROTONIX) tablet 40 mg  40 mg Oral ACB  
 0.9% sodium chloride infusion  75 mL/hr IntraVENous CONTINUOUS  
 sodium chloride (NS) flush 5-10 mL  5-10 mL IntraVENous Q8H  
 sodium chloride (NS) flush 5-10 mL  5-10 mL IntraVENous PRN  
  acetaminophen (TYLENOL) tablet 650 mg  650 mg Oral Q4H PRN  prochlorperazine (COMPAZINE) injection 10 mg  10 mg IntraVENous Q6H PRN  
 insulin lispro (HUMALOG) injection   SubCUTAneous AC&HS  
 glucose chewable tablet 16 g  4 Tab Oral PRN  
 dextrose (D50W) injection syrg 12.5-25 g  25-50 mL IntraVENous PRN  
 glucagon (GLUCAGEN) injection 1 mg  1 mg IntraMUSCular PRN  
 
 
 
 LAB AND IMAGING FINDINGS:  
 
Lab Results Component Value Date/Time WBC 3.9 (L) 11/16/2018 05:22 AM  
 HGB 11.1 (L) 11/16/2018 05:22 AM  
 PLATELET 86 (L) 62/96/2240 05:22 AM  
 
Lab Results Component Value Date/Time Sodium 138 11/16/2018 05:22 AM  
 Potassium 4.0 11/16/2018 05:22 AM  
 Chloride 106 11/16/2018 05:22 AM  
 CO2 23 11/16/2018 05:22 AM  
 BUN 20 11/16/2018 05:22 AM  
 Creatinine 1.53 (H) 11/16/2018 05:22 AM  
 Calcium 10.6 (H) 11/16/2018 05:22 AM  
 Magnesium 1.6 11/16/2018 05:22 AM  
 Phosphorus 2.6 11/15/2018 05:38 AM  
  
Lab Results Component Value Date/Time AST (SGOT) 27 11/16/2018 05:22 AM  
 Alk. phosphatase 182 (H) 11/16/2018 05:22 AM  
 Protein, total 5.4 (L) 11/16/2018 05:22 AM  
 Albumin 2.4 (L) 11/16/2018 05:22 AM  
 Globulin 3.0 11/16/2018 05:22 AM  
 
Lab Results Component Value Date/Time INR 1.1 02/02/2017 04:20 PM  
 Prothrombin time 10.9 02/02/2017 04:20 PM  
 aPTT 27.4 02/02/2017 04:20 PM  
  
No results found for: IRON, FE, TIBC, IBCT, PSAT, FERR No results found for: PH, PCO2, PO2 No components found for: [place] Lab Results Component Value Date/Time  07/14/2012 03:00 AM  
 CK - MB 2.8 07/14/2012 03:00 AM  
  
 
 
   
 
Total time: 25 
Counseling / coordination time, spent as noted above: 20 
> 50% counseling / coordination?: yes Prolonged service was provided for  []30 min   []75 min in face to face time in the presence of the patient, spent as noted above. Time Start:  
Time End:  
Note: this can only be billed with 27274 (initial) or 89154 (follow up). If multiple start / stop times, list each separately.

## 2018-11-17 ENCOUNTER — APPOINTMENT (OUTPATIENT)
Dept: GENERAL RADIOLOGY | Age: 83
DRG: 640 | End: 2018-11-17
Attending: INTERNAL MEDICINE
Payer: MEDICARE

## 2018-11-17 ENCOUNTER — APPOINTMENT (OUTPATIENT)
Dept: ULTRASOUND IMAGING | Age: 83
DRG: 640 | End: 2018-11-17
Attending: INTERNAL MEDICINE
Payer: MEDICARE

## 2018-11-17 LAB
25(OH)D3 SERPL-MCNC: 27.8 NG/ML (ref 30–100)
ALBUMIN SERPL-MCNC: 2.5 G/DL (ref 3.5–5)
ALBUMIN/GLOB SERPL: 0.7 {RATIO} (ref 1.1–2.2)
ALP SERPL-CCNC: 226 U/L (ref 45–117)
ALT SERPL-CCNC: 36 U/L (ref 12–78)
ANION GAP SERPL CALC-SCNC: 9 MMOL/L (ref 5–15)
APPEARANCE UR: CLEAR
AST SERPL-CCNC: 16 U/L (ref 15–37)
BACTERIA URNS QL MICRO: NEGATIVE /HPF
BASOPHILS # BLD: 0 K/UL (ref 0–0.1)
BASOPHILS NFR BLD: 0 % (ref 0–1)
BILIRUB SERPL-MCNC: 2.4 MG/DL (ref 0.2–1)
BILIRUB UR QL: NEGATIVE
BUN SERPL-MCNC: 18 MG/DL (ref 6–20)
BUN/CREAT SERPL: 11 (ref 12–20)
CALCIUM SERPL-MCNC: 11.2 MG/DL (ref 8.5–10.1)
CHLORIDE SERPL-SCNC: 103 MMOL/L (ref 97–108)
CO2 SERPL-SCNC: 23 MMOL/L (ref 21–32)
COLOR UR: ABNORMAL
CREAT SERPL-MCNC: 1.58 MG/DL (ref 0.7–1.3)
DIFFERENTIAL METHOD BLD: ABNORMAL
EOSINOPHIL # BLD: 0 K/UL (ref 0–0.4)
EOSINOPHIL NFR BLD: 0 % (ref 0–7)
EPITH CASTS URNS QL MICRO: ABNORMAL /LPF
ERYTHROCYTE [DISTWIDTH] IN BLOOD BY AUTOMATED COUNT: 13.9 % (ref 11.5–14.5)
ERYTHROCYTE [DISTWIDTH] IN BLOOD BY AUTOMATED COUNT: 14.2 % (ref 11.5–14.5)
FOLATE SERPL-MCNC: 14 NG/ML (ref 5–21)
GLOBULIN SER CALC-MCNC: 3.5 G/DL (ref 2–4)
GLUCOSE BLD STRIP.AUTO-MCNC: 137 MG/DL (ref 65–100)
GLUCOSE BLD STRIP.AUTO-MCNC: 160 MG/DL (ref 65–100)
GLUCOSE BLD STRIP.AUTO-MCNC: 170 MG/DL (ref 65–100)
GLUCOSE BLD STRIP.AUTO-MCNC: 179 MG/DL (ref 65–100)
GLUCOSE SERPL-MCNC: 159 MG/DL (ref 65–100)
GLUCOSE UR STRIP.AUTO-MCNC: NEGATIVE MG/DL
HCT VFR BLD AUTO: 36.6 % (ref 36.6–50.3)
HCT VFR BLD AUTO: 36.9 % (ref 36.6–50.3)
HGB BLD-MCNC: 12 G/DL (ref 12.1–17)
HGB BLD-MCNC: 12.2 G/DL (ref 12.1–17)
HGB UR QL STRIP: ABNORMAL
HYALINE CASTS URNS QL MICRO: ABNORMAL /LPF (ref 0–5)
IMM GRANULOCYTES # BLD: 0 K/UL
IMM GRANULOCYTES NFR BLD AUTO: 0 %
KETONES UR QL STRIP.AUTO: NEGATIVE MG/DL
LACTATE SERPL-SCNC: 1.2 MMOL/L (ref 0.4–2)
LEUKOCYTE ESTERASE UR QL STRIP.AUTO: NEGATIVE
LYMPHOCYTES # BLD: 0.5 K/UL (ref 0.8–3.5)
LYMPHOCYTES NFR BLD: 7 % (ref 12–49)
MAGNESIUM SERPL-MCNC: 1.6 MG/DL (ref 1.6–2.4)
MCH RBC QN AUTO: 28 PG (ref 26–34)
MCH RBC QN AUTO: 28.2 PG (ref 26–34)
MCHC RBC AUTO-ENTMCNC: 32.8 G/DL (ref 30–36.5)
MCHC RBC AUTO-ENTMCNC: 33.1 G/DL (ref 30–36.5)
MCV RBC AUTO: 85.3 FL (ref 80–99)
MCV RBC AUTO: 85.4 FL (ref 80–99)
MONOCYTES # BLD: 0.6 K/UL (ref 0–1)
MONOCYTES NFR BLD: 8 % (ref 5–13)
NEUTS BAND NFR BLD MANUAL: 4 % (ref 0–6)
NEUTS SEG # BLD: 6.3 K/UL (ref 1.8–8)
NEUTS SEG NFR BLD: 81 % (ref 32–75)
NITRITE UR QL STRIP.AUTO: NEGATIVE
NRBC # BLD: 0 K/UL (ref 0–0.01)
NRBC # BLD: 0 K/UL (ref 0–0.01)
NRBC BLD-RTO: 0 PER 100 WBC
NRBC BLD-RTO: 0 PER 100 WBC
PERIPHERAL SMEAR,PSM: NORMAL
PH UR STRIP: 7 [PH] (ref 5–8)
PLATELET # BLD AUTO: 112 K/UL (ref 150–400)
PLATELET # BLD AUTO: 99 K/UL (ref 150–400)
PMV BLD AUTO: 10 FL (ref 8.9–12.9)
PMV BLD AUTO: 9.8 FL (ref 8.9–12.9)
POTASSIUM SERPL-SCNC: 4 MMOL/L (ref 3.5–5.1)
PROT SERPL-MCNC: 6 G/DL (ref 6.4–8.2)
PROT UR STRIP-MCNC: ABNORMAL MG/DL
PSA SERPL-MCNC: 0 NG/ML (ref 0.01–4)
RBC # BLD AUTO: 4.29 M/UL (ref 4.1–5.7)
RBC # BLD AUTO: 4.32 M/UL (ref 4.1–5.7)
RBC #/AREA URNS HPF: ABNORMAL /HPF (ref 0–5)
RBC MORPH BLD: ABNORMAL
SERVICE CMNT-IMP: ABNORMAL
SODIUM SERPL-SCNC: 135 MMOL/L (ref 136–145)
SP GR UR REFRACTOMETRY: 1.01 (ref 1–1.03)
UA: UC IF INDICATED,UAUC: ABNORMAL
UROBILINOGEN UR QL STRIP.AUTO: 1 EU/DL (ref 0.2–1)
VIT B12 SERPL-MCNC: 287 PG/ML (ref 193–986)
WBC # BLD AUTO: 1.7 K/UL (ref 4.1–11.1)
WBC # BLD AUTO: 7.4 K/UL (ref 4.1–11.1)
WBC URNS QL MICRO: ABNORMAL /HPF (ref 0–4)

## 2018-11-17 PROCEDURE — 82784 ASSAY IGA/IGD/IGG/IGM EACH: CPT

## 2018-11-17 PROCEDURE — 84153 ASSAY OF PSA TOTAL: CPT

## 2018-11-17 PROCEDURE — 74011250637 HC RX REV CODE- 250/637: Performed by: INTERNAL MEDICINE

## 2018-11-17 PROCEDURE — 51798 US URINE CAPACITY MEASURE: CPT

## 2018-11-17 PROCEDURE — 84166 PROTEIN E-PHORESIS/URINE/CSF: CPT

## 2018-11-17 PROCEDURE — 82652 VIT D 1 25-DIHYDROXY: CPT

## 2018-11-17 PROCEDURE — 82962 GLUCOSE BLOOD TEST: CPT

## 2018-11-17 PROCEDURE — 86301 IMMUNOASSAY TUMOR CA 19-9: CPT

## 2018-11-17 PROCEDURE — 74011000250 HC RX REV CODE- 250: Performed by: INTERNAL MEDICINE

## 2018-11-17 PROCEDURE — 82746 ASSAY OF FOLIC ACID SERUM: CPT

## 2018-11-17 PROCEDURE — 83605 ASSAY OF LACTIC ACID: CPT

## 2018-11-17 PROCEDURE — 65270000029 HC RM PRIVATE

## 2018-11-17 PROCEDURE — 80053 COMPREHEN METABOLIC PANEL: CPT

## 2018-11-17 PROCEDURE — 82607 VITAMIN B-12: CPT

## 2018-11-17 PROCEDURE — 84156 ASSAY OF PROTEIN URINE: CPT

## 2018-11-17 PROCEDURE — 36415 COLL VENOUS BLD VENIPUNCTURE: CPT

## 2018-11-17 PROCEDURE — 85025 COMPLETE CBC W/AUTO DIFF WBC: CPT

## 2018-11-17 PROCEDURE — 71045 X-RAY EXAM CHEST 1 VIEW: CPT

## 2018-11-17 PROCEDURE — 74011250636 HC RX REV CODE- 250/636: Performed by: INTERNAL MEDICINE

## 2018-11-17 PROCEDURE — 76705 ECHO EXAM OF ABDOMEN: CPT

## 2018-11-17 PROCEDURE — 83735 ASSAY OF MAGNESIUM: CPT

## 2018-11-17 PROCEDURE — 84165 PROTEIN E-PHORESIS SERUM: CPT

## 2018-11-17 PROCEDURE — 81001 URINALYSIS AUTO W/SCOPE: CPT

## 2018-11-17 PROCEDURE — C1758 CATHETER, URETERAL: HCPCS

## 2018-11-17 PROCEDURE — 74011000258 HC RX REV CODE- 258: Performed by: INTERNAL MEDICINE

## 2018-11-17 PROCEDURE — 82306 VITAMIN D 25 HYDROXY: CPT

## 2018-11-17 PROCEDURE — 87040 BLOOD CULTURE FOR BACTERIA: CPT

## 2018-11-17 PROCEDURE — 85027 COMPLETE CBC AUTOMATED: CPT

## 2018-11-17 RX ORDER — LANOLIN ALCOHOL/MO/W.PET/CERES
500 CREAM (GRAM) TOPICAL DAILY
Status: DISCONTINUED | OUTPATIENT
Start: 2018-11-17 | End: 2018-11-19

## 2018-11-17 RX ADMIN — PIPERACILLIN SODIUM,TAZOBACTAM SODIUM 3.38 G: 3; .375 INJECTION, POWDER, FOR SOLUTION INTRAVENOUS at 20:32

## 2018-11-17 RX ADMIN — MIRTAZAPINE 7.5 MG: 15 TABLET, FILM COATED ORAL at 22:00

## 2018-11-17 RX ADMIN — CYANOCOBALAMIN TAB 500 MCG 500 MCG: 500 TAB at 21:00

## 2018-11-17 RX ADMIN — PIPERACILLIN SODIUM,TAZOBACTAM SODIUM 3.38 G: 3; .375 INJECTION, POWDER, FOR SOLUTION INTRAVENOUS at 13:05

## 2018-11-17 RX ADMIN — Medication 5 ML: at 06:00

## 2018-11-17 RX ADMIN — MELATONIN TAB 3 MG 3 MG: 3 TAB at 22:00

## 2018-11-17 RX ADMIN — APIXABAN 2.5 MG: 2.5 TABLET, FILM COATED ORAL at 18:16

## 2018-11-17 RX ADMIN — PANTOPRAZOLE SODIUM 40 MG: 40 TABLET, DELAYED RELEASE ORAL at 07:24

## 2018-11-17 RX ADMIN — LOSARTAN POTASSIUM 25 MG: 50 TABLET, FILM COATED ORAL at 22:00

## 2018-11-17 RX ADMIN — SENNOSIDES 8.6 MG: 8.6 TABLET, FILM COATED ORAL at 09:22

## 2018-11-17 RX ADMIN — Medication 10 ML: at 13:06

## 2018-11-17 RX ADMIN — ASPIRIN 81 MG: 81 TABLET, COATED ORAL at 20:32

## 2018-11-17 RX ADMIN — POLYETHYLENE GLYCOL 3350 17 G: 17 POWDER, FOR SOLUTION ORAL at 09:00

## 2018-11-17 RX ADMIN — ISOSORBIDE MONONITRATE 120 MG: 60 TABLET ORAL at 07:24

## 2018-11-17 RX ADMIN — APIXABAN 2.5 MG: 2.5 TABLET, FILM COATED ORAL at 09:22

## 2018-11-17 RX ADMIN — ACETAMINOPHEN 650 MG: 325 TABLET, FILM COATED ORAL at 09:22

## 2018-11-17 NOTE — PROGRESS NOTES
Md notified of urine output for the shift and bladder scan. Consults to continue to monitor at this time. Will offer po fluids/pureed consistency meals if patient is awake enough to tolerate.

## 2018-11-17 NOTE — CONSULTS
[facility] [place] at [facility]  [address] [facility], [address] [place] [address]  W: [phone]  F: [phone]      Reason for Visit:   Brigida Covarrubias is a 80 y.o. male who is seen in consultation at the request of Dr. Vernell Palafox for evaluation of leukopenia and thrombocytopenia. Hematology / Oncology Treatment History:   History of prostate cancer in 2010 (s/p surgery) and RCC (s/p partial L nephrectomy)    History of Present Illness:   Presented to ED on 11/14 with chills, cough, weakness, starting that day. Dark urine present. Has dementia at baseline    Past Medical History:   Diagnosis Date    Arthritis     CAD (coronary artery disease)     Angina Jan 2011, PCI LAD with multilink stent 4.0x12mm;  also cath at [place] 7/13 -- no stents at that time    CKD (chronic kidney disease), stage III     Cochlear implant in place 2/3/2017    Dementia     DM type 2 causing renal disease ([address]     DM type 2 causing vascular disease ([address]     GERD (gastroesophageal reflux disease)     HTN (hypertension)     Hypothyroidism     SAMUEL (obstructive sleep apnea)     not using CPAP    PAF (paroxysmal atrial fibrillation) (HCC)     [id number] score = 7    Prostate cancer ([address]     Renal cell cancer ([address]     Stage 4 Renal Cell Cancer.     Partial omentectomy 11/2/2015 with Dr. Margaretmary Shone: omental mass-metastatic renal cell carcinoma    Renal mass, left     DAVINCI LEFT PARTIAL NEPHRECTOMY 12/19/13;       Past Surgical History:   Procedure Laterality Date    CARDIAC SURG PROCEDURE UNLIST  1/2011    stent to LAD    HX CHOLECYSTECTOMY  7/13/2012    HX CORONARY STENT PLACEMENT  2011    HX HEART CATHETERIZATION  2011, 2013    HX HEENT  1989    stapedectomy    [address]X [address]    left inguinal    HX LITHOTRIPSY  8/2015    HX ORTHOPAEDIC  1994    rotator cuff repair    HX ORTHOPAEDIC  1994    cervical disc removed    HX PROSTATECTOMY  2000    surgery intervention    HX TONSILLECTOMY     HX UROLOGICAL      valvular implant prevent incontinence    HX UROLOGICAL      insert gel to help with incontinence    HX UROLOGICAL  2013    left partial nephrectomy    HX UROLOGICAL  2015    CT guided biopsy of abdominal lymph nodes      Social History     Tobacco Use    Smoking status: Former Smoker     Packs/day: 0.10     Years: 23.00     Pack years: 2.30     Last attempt to quit: 12/3/1975     Years since quittin.9    Smokeless tobacco: Never Used   Substance Use Topics    Alcohol use: No     Alcohol/week: 0.0 oz      Family History   Problem Relation Age of Onset    Stroke Father     Cancer Father         prostate    Diabetes Sister         2 sisters    Hypertension Sister         2 sisters    Diabetes Brother     Stroke Brother     Diabetes Brother         all brothers    Stroke Brother     Kidney Disease Brother     Heart Attack Brother     Hypertension Other      Current Facility-Administered Medications   Medication Dose Route Frequency    piperacillin-tazobactam (ZOSYN) 3.375 g in 0.9% sodium chloride (MBP/ADV) 100 mL  3.375 g IntraVENous Q8H    apixaban (ELIQUIS) tablet 2.5 mg  2.5 mg Oral BID    aspirin delayed-release tablet 81 mg  81 mg Oral QHS    isosorbide mononitrate ER (IMDUR) tablet 120 mg  120 mg Oral 7am    losartan (COZAAR) tablet 25 mg  25 mg Oral QHS    melatonin tablet 3 mg  3 mg Oral QHS    mirtazapine (REMERON) tablet 7.5 mg  7.5 mg Oral QHS    polyethylene glycol (MIRALAX) packet 17 g  17 g Oral DAILY    senna (SENOKOT) tablet 8.6 mg  1 Tab Oral DAILY    pantoprazole (PROTONIX) tablet 40 mg  40 mg Oral ACB    0.9% sodium chloride infusion  75 mL/hr IntraVENous CONTINUOUS    sodium chloride (NS) flush 5-10 mL  5-10 mL IntraVENous Q8H    sodium chloride (NS) flush 5-10 mL  5-10 mL IntraVENous PRN    acetaminophen (TYLENOL) tablet 650 mg  650 mg Oral Q4H PRN    prochlorperazine (COMPAZINE) injection 10 mg  10 mg IntraVENous Q6H PRN  insulin lispro (HUMALOG) injection   SubCUTAneous AC&HS    glucose chewable tablet 16 g  4 Tab Oral PRN    dextrose (D50W) injection syrg 12.5-25 g  25-50 mL IntraVENous PRN    glucagon (GLUCAGEN) injection 1 mg  1 mg IntraMUSCular PRN      Allergies   Allergen Reactions    Latex Rash, Swelling and Contact Dermatitis    Atorvastatin Unknown (comments)     Per patient's PCP allergy list- No reaction specified    Ezetimibe Myalgia    Lisinopril Cough    Metoprolol Other (comments)     Bradycardia    Morphine Other (comments)     Hallucinations and Nausea    Nitrofurantoin Rash     \"Blood blisters/rash\" per patient's family    Rosuvastatin Myalgia    Simvastatin Myalgia        Review of Systems: A complete review of systems was obtained, negative except as described above. Physical Exam:     Visit Vitals  /59 (BP 1 Location: Left arm, BP Patient Position: At rest)   Pulse 73   Temp 98.1 °F (36.7 °C)   Resp 16   Ht 5' 7\" (1.702 m)   Wt 178 lb (80.7 kg)   SpO2 96%   BMI 27.88 kg/m²     ECOG PS: 3-4  General: No distress  Eyes: PERRLA, anicteric sclerae  HENT: Atraumatic with normal appearance of ears and nose; OP clear  Neck: Supple; no thyromegaly   Lymphatic: No cervical, supraclavicular, or axillary adenopathy  Respiratory: CTAB, normal respiratory effort  CV: Normal rate, regular rhythm, no murmurs, no peripheral edema  GI: Soft, nontender, nondistended, no masses, no hepatomegaly, no splenomegaly  MS: Digits without clubbing or cyanosis. Skin: No rashes, ecchymoses, or petechiae. Normal temperature, turgor, and texture. Results:     Lab Results   Component Value Date/Time    WBC 7.4 11/17/2018 11:02 AM    HGB 12.0 (L) 11/17/2018 11:02 AM    HCT 36.6 11/17/2018 11:02 AM    PLATELET 274 (L) 42/80/3092 11:02 AM    MCV 85.3 11/17/2018 11:02 AM    ABS.  NEUTROPHILS 6.3 11/17/2018 11:02 AM     Lab Results   Component Value Date/Time    Sodium 135 (L) 11/17/2018 06:50 AM    Potassium 4.0 11/17/2018 06:50 AM    Chloride 103 11/17/2018 06:50 AM    CO2 23 11/17/2018 06:50 AM    Glucose 159 (H) 11/17/2018 06:50 AM    BUN 18 11/17/2018 06:50 AM    Creatinine 1.58 (H) 11/17/2018 06:50 AM    GFR est AA 51 (L) 11/17/2018 06:50 AM    GFR est non-AA 42 (L) 11/17/2018 06:50 AM    Calcium 11.2 (H) 11/17/2018 06:50 AM    Glucose (POC) 179 (H) 11/17/2018 12:12 PM    Creatinine (POC) 1.5 (H) 11/26/2013 09:55 AM     Lab Results   Component Value Date/Time    Bilirubin, total 2.4 (H) 11/17/2018 06:50 AM    ALT (SGPT) 36 11/17/2018 06:50 AM    AST (SGOT) 16 11/17/2018 06:50 AM    Alk. phosphatase 226 (H) 11/17/2018 06:50 AM    Protein, total 6.0 (L) 11/17/2018 06:50 AM    Albumin 2.5 (L) 11/17/2018 06:50 AM    Globulin 3.5 11/17/2018 06:50 AM     11/15/18 CT c/a/p wo contrast  FINDINGS:  LOWER NECK: The visualized portions of the thyroid and structures of the lower  neck are within normal limits. CHEST:  The absence of intravenous contrast material reduces the sensitivity for  evaluation of the mediastinal structures. Lungs: There is interstitial disease with pulmonary fibrosis in the periphery of  the lungs which is unchanged. The lungs are clear of mass, nodule, airspace  disease or acute edema. Pleura: There is no pleural effusion or pneumothorax. Aorta: The aorta is atherosclerotic and has a normal contour without evidence of  aneurysm. There is extensive coronary artery calcification which is unchanged. Mediastinum: There is no mediastinal or hilar adenopathy or mass. Bones and soft tissues: The bones and soft tissues of the chest wall are normal.     ABDOMEN:  The absence of intravenous contrast material reduces the sensitivity for  evaluation of the solid parenchymal organs of the abdomen. Liver: The liver is normal in size and contour with no focal abnormality. Gallbladder and bile ducts: There are clips in the gallbladder fossa and the  gallbladder is absent. Spleen: No abnormality.   Pancreas: No abnormality. Adrenal glands: No abnormality. Kidneys: There is a 4.2 x 3.3 x 3.7 cm left renal cyst which is unchanged and  there is some radiopaque material in the left kidney from prior surgery. PELVIS:  Reproductive organs: The prostate gland is absent. There are surgical clips in  the pelvis. The seminal vesicles are symmetrical.  Bladder: No abnormality. BOWEL AND MESENTERY: The small bowel is normal. There is no mesenteric mass or  adenopathy. The appendix is not identified. There are diverticula of the colon. PERITONEUM: There is no ascites or free intraperitoneal air. RETROPERITONEUM: The aorta is atherosclerotic and tapers without aneurysm There  is no retroperitoneal adenopathy or mass. There is no pelvic mass or adenopathy. BONES AND SOFT TISSUES: There are degenerative changes of the spine. IMPRESSION  IMPRESSION:   1. No acute abnormality and no change. 2. Pulmonary fibrosis. 3. Atherosclerosis with coronary artery calcification. 4. Atherosclerotic abdominal aorta without aneurysm. 5. Status post cholecystectomy. 6. Status post prostatectomy. 7. Status post left renal surgery with left renal cyst.  8. Diverticulosis. 9. Thoracolumbar spondylosis. Assessment and Recommendations:   1. Thrombocytopenia:  Mild, improved from yesterday. Appears chronic. B12 and folate pending as well as spep. Drop may have been due to viral illness. Will monitor    2. Leukopenia:  Resolved on recheck. Will monitor    3. Hypercalcemia:  Present for at least 2 months; IVF; PTH and PTHrp pending; will add PSA. No obvious malignancy on scans    Thank you for this consult. All of the patient's questions were answered today.       Signed By: Chela Garber MD

## 2018-11-17 NOTE — PROGRESS NOTES
Patient more alert and verbal at this time, family in to visit. Patient talking and laughing with family. Consumed 1 cup of applesauce and 16oz of water with assist of writer and tolerated well. Denies pain.

## 2018-11-17 NOTE — ROUTINE PROCESS
Bedside shift change report given to [address] [place] [address] (oncoming nurse) by Raven Marmolejo (offgoing nurse). Report included the following information SBAR, Kardex, Intake/Output, MAR, Accordion, Recent Results and Med Rec Status.

## 2018-11-17 NOTE — PROGRESS NOTES
Aristeo T[facility] 
[address] [facility], [place], [address] [place] [address] 
[phone] Medical Progress Note NAME: Diana Heredia. :  1935 MRM:  [id number] Date/Time: 2018 Assessment / Plan:  
 
Viral illness:  Possible but working up bacterial infections, malignancy, MM, etc. CXR repeated given borderline elevated temp, not quite a fever. Temp (24hrs), Av °F (36.7 °C), Min:96.4 °F (35.8 °C), Max:100 °F (37.8 °C) Had chills at home. Viral resp panel negative. Chest CT with suggestion of pulmonary fibrosis. Supportive care. Send blood cultures, empiric Zosyn for now. De-escalate ASAP 
  
CAD (coronary artery disease) () / PAF:  Denies chest pain or sob. Continue ASA, imdur, losartan, and amlodipine. Continue apixaban for [facility] 
  
Jaundice / hyperbilirubinemia: No evidence of mass or biliary obstruction by noncontrast CT. GI consulted, unable to get MRCP until we ascertain MRI-compatibility of stapes hardware. Send CA 19-9 Hypercalcemia: chronic, worse today. PTH WNR, PTHrP pending. Sending SPEP, UPEP, ESTEPHANIA. PSA sent by hem/onc. Thrombocytopenia / leukopenia: leukopenia resolved on repeat CBC. Send peripheral smear, B12, folate. Appreciate hematology evaluation 
  
CKD (chronic kidney disease) stage 3, GFR 30-59 ml/min ([facility]) (12/10/2013):  Creatinine stable. Sent workup as above. Follow BMP 
  
Lactic acidemia:  No evidence of active infection. Resolved.  
  
Dementia ():  Seems to be at baseline. Monitor  
  
DM type 2 causing renal disease (HCC) ():  Controlled. Continue SSI Debility:  Seen by PT/OT, initial rec is for SNF level rehab.  
  
  
Total time spent: 35 mins Time spent in the care of this patient including reviewing records, discussing with nursing and/or other providers on the treatment team, obtaining history and examining the patient, and discussing treatment plans. Care Plan discussed with: Patient, Nursing Staff and >50% of time spent in counseling and coordination of care Discussed:  Care Plan Prophylaxis:  Eliquis Disposition:  SNF/LTC Subjective: Chief Complaint:  Follow up weakness Chart/notes/labs/studies reviewed, patient examined at bedside. Feels okay, denies abdominal pain, CP, SOB. No chills. Tmax 100. Objective:  
 
 
Vitals:  
 
  
Last 24hrs VS reviewed since prior progress note. Most recent are: 
 
Visit Vitals /67 (BP 1 Location: Left arm, BP Patient Position: At rest) Pulse 60 Temp 96.4 °F (35.8 °C) Resp 16 Ht 5' 7\" (1.702 m) Wt 80.7 kg (178 lb) SpO2 96% BMI 27.88 kg/m² SpO2 Readings from Last 6 Encounters:  
11/17/18 96% 09/13/18 97% 08/26/17 92% 07/16/17 94% 02/04/17 98% 03/24/16 97% Intake/Output Summary (Last 24 hours) at 11/17/2018 1529 Last data filed at 11/17/2018 0100 Gross per 24 hour Intake 2066.25 ml Output 300 ml Net 1766.25 ml Exam:  
 
Physical Exam: 
 
Gen:  Well-developed, well-nourished, in no acute distress HEENT:  Pink conjunctivae, hearing intact to voice, moist mucous membranes Resp:  No accessory muscle use, clear breath sounds without wheezes rales or rhonchi 
Card:  normal S1, S2 without murmurs, thrills or peripheral edema Abd:  Soft, non-tender, non-distended, normoactive bowel sounds are present Skin:  No rashes Neuro: Face symmetric, tongue midline, speech fluent, follows commands appropriately Psych:  oriented to person, alert Medications Reviewed: (see below) Lab Data Reviewed: (see below) 
 
______________________________________________________________________ Medications:  
 
Current Facility-Administered Medications Medication Dose Route Frequency  piperacillin-tazobactam (ZOSYN) 3.375 g in 0.9% sodium chloride (MBP/ADV) 100 mL  3.375 g IntraVENous Q8H  
  apixaban (ELIQUIS) tablet 2.5 mg  2.5 mg Oral BID  aspirin delayed-release tablet 81 mg  81 mg Oral QHS  isosorbide mononitrate ER (IMDUR) tablet 120 mg  120 mg Oral 7am  
 losartan (COZAAR) tablet 25 mg  25 mg Oral QHS  melatonin tablet 3 mg  3 mg Oral QHS  mirtazapine (REMERON) tablet 7.5 mg  7.5 mg Oral QHS  polyethylene glycol (MIRALAX) packet 17 g  17 g Oral DAILY  senna (SENOKOT) tablet 8.6 mg  1 Tab Oral DAILY  pantoprazole (PROTONIX) tablet 40 mg  40 mg Oral ACB  
 0.9% sodium chloride infusion  75 mL/hr IntraVENous CONTINUOUS  
 sodium chloride (NS) flush 5-10 mL  5-10 mL IntraVENous Q8H  
 sodium chloride (NS) flush 5-10 mL  5-10 mL IntraVENous PRN  
 acetaminophen (TYLENOL) tablet 650 mg  650 mg Oral Q4H PRN  prochlorperazine (COMPAZINE) injection 10 mg  10 mg IntraVENous Q6H PRN  
 insulin lispro (HUMALOG) injection   SubCUTAneous AC&HS  
 glucose chewable tablet 16 g  4 Tab Oral PRN  
 dextrose (D50W) injection syrg 12.5-25 g  25-50 mL IntraVENous PRN  
 glucagon (GLUCAGEN) injection 1 mg  1 mg IntraMUSCular PRN Lab Review:  
 
Recent Labs 11/17/18 
1102 11/17/18 
0650 11/16/18 
0522 WBC 7.4 1.7* 3.9* HGB 12.0* 12.2 11.1*  
HCT 36.6 36.9 34.0*  
* 99* 86* Recent Labs 11/17/18 
9327 11/16/18 
0522 11/15/18 
1202 11/15/18 
4966 * 138  --  141  
K 4.0 4.0  --  4.7  106  --  107 CO2 23 23  --  26 * 117*  --  127* BUN 18 20  --  16  
CREA 1.58* 1.53*  --  1.60* CA 11.2* 10.6* 9.8 10.0 MG 1.6 1.6  --  1.4* PHOS  --   --   --  2.6 ALB 2.5* 2.4* 2.6*  --   
SGOT 16 27 52*  --   
ALT 36 49 71  -- No components found for: [place] No results for input(s): PH, PCO2, PO2, HCO3, FIO2 in the last 72 hours. No results for input(s): INR in the last 72 hours. No lab exists for component: INREXT Lab Results Component Value Date/Time  Specimen Description: CLEAN [facility] 12/19/2013 11:52 AM  
 Specimen Description: DM 07/13/2012 04:45 PM  
 
Lab Results Component Value Date/Time Culture result: NO GROWTH 6 DAYS 09/12/2018 04:30 AM  
 Culture result: CITROBACTER FREUNDII (A) 09/09/2018 08:25 PM  
 Culture result:  09/09/2018 08:25 PM  
  (PLEASE DISREGARD THE SUSCEPTIBILITIES ON AMPICILLIN, AMP/SULBACTAM, CEFAZOLIN AND CEFUROXIME REPORTED ON 9/13 AS  SPECIES PER   
AS THESE ARE NOT DRUGS OF CHOICE FOR CITROBACTER) 
  
 
        
___________________________________________________ Attending Physician: Ana Roldan MD

## 2018-11-18 LAB
ALBUMIN SERPL-MCNC: 2.1 G/DL (ref 3.5–5)
ALBUMIN/GLOB SERPL: 0.7 {RATIO} (ref 1.1–2.2)
ALP SERPL-CCNC: 154 U/L (ref 45–117)
ALT SERPL-CCNC: 23 U/L (ref 12–78)
ANION GAP SERPL CALC-SCNC: 12 MMOL/L (ref 5–15)
AST SERPL-CCNC: 11 U/L (ref 15–37)
BASOPHILS # BLD: 0 K/UL (ref 0–0.1)
BASOPHILS NFR BLD: 0 % (ref 0–1)
BILIRUB SERPL-MCNC: 0.8 MG/DL (ref 0.2–1)
BUN SERPL-MCNC: 19 MG/DL (ref 6–20)
BUN/CREAT SERPL: 12 (ref 12–20)
CALCIUM SERPL-MCNC: 9.8 MG/DL (ref 8.5–10.1)
CHLORIDE SERPL-SCNC: 106 MMOL/L (ref 97–108)
CO2 SERPL-SCNC: 26 MMOL/L (ref 21–32)
CREAT SERPL-MCNC: 1.64 MG/DL (ref 0.7–1.3)
DIFFERENTIAL METHOD BLD: ABNORMAL
EOSINOPHIL # BLD: 0.2 K/UL (ref 0–0.4)
EOSINOPHIL NFR BLD: 4 % (ref 0–7)
ERYTHROCYTE [DISTWIDTH] IN BLOOD BY AUTOMATED COUNT: 14.2 % (ref 11.5–14.5)
GLOBULIN SER CALC-MCNC: 2.9 G/DL (ref 2–4)
GLUCOSE BLD STRIP.AUTO-MCNC: 127 MG/DL (ref 65–100)
GLUCOSE BLD STRIP.AUTO-MCNC: 134 MG/DL (ref 65–100)
GLUCOSE BLD STRIP.AUTO-MCNC: 161 MG/DL (ref 65–100)
GLUCOSE BLD STRIP.AUTO-MCNC: 161 MG/DL (ref 65–100)
GLUCOSE BLD STRIP.AUTO-MCNC: 174 MG/DL (ref 65–100)
GLUCOSE SERPL-MCNC: 128 MG/DL (ref 65–100)
HCT VFR BLD AUTO: 36.9 % (ref 36.6–50.3)
HGB BLD-MCNC: 12 G/DL (ref 12.1–17)
IMM GRANULOCYTES # BLD: 0 K/UL
IMM GRANULOCYTES NFR BLD AUTO: 0 %
LYMPHOCYTES # BLD: 0.7 K/UL (ref 0.8–3.5)
LYMPHOCYTES NFR BLD: 14 % (ref 12–49)
MAGNESIUM SERPL-MCNC: 1.8 MG/DL (ref 1.6–2.4)
MCH RBC QN AUTO: 28.5 PG (ref 26–34)
MCHC RBC AUTO-ENTMCNC: 32.5 G/DL (ref 30–36.5)
MCV RBC AUTO: 87.6 FL (ref 80–99)
MONOCYTES # BLD: 0.4 K/UL (ref 0–1)
MONOCYTES NFR BLD: 8 % (ref 5–13)
NEUTS BAND NFR BLD MANUAL: 1 % (ref 0–6)
NEUTS SEG # BLD: 3.7 K/UL (ref 1.8–8)
NEUTS SEG NFR BLD: 73 % (ref 32–75)
NRBC # BLD: 0 K/UL (ref 0–0.01)
NRBC BLD-RTO: 0 PER 100 WBC
PHOSPHATE SERPL-MCNC: 2 MG/DL (ref 2.6–4.7)
PLATELET # BLD AUTO: 99 K/UL (ref 150–400)
PMV BLD AUTO: 10.2 FL (ref 8.9–12.9)
POTASSIUM SERPL-SCNC: 3.8 MMOL/L (ref 3.5–5.1)
PROT SERPL-MCNC: 5 G/DL (ref 6.4–8.2)
RBC # BLD AUTO: 4.21 M/UL (ref 4.1–5.7)
RBC MORPH BLD: ABNORMAL
SERVICE CMNT-IMP: ABNORMAL
SODIUM SERPL-SCNC: 144 MMOL/L (ref 136–145)
WBC # BLD AUTO: 5 K/UL (ref 4.1–11.1)

## 2018-11-18 PROCEDURE — 85025 COMPLETE CBC W/AUTO DIFF WBC: CPT

## 2018-11-18 PROCEDURE — 74011000250 HC RX REV CODE- 250: Performed by: INTERNAL MEDICINE

## 2018-11-18 PROCEDURE — 77030033269 HC SLV COMPR SCD KNE2 CARD -B

## 2018-11-18 PROCEDURE — 65270000029 HC RM PRIVATE

## 2018-11-18 PROCEDURE — 82962 GLUCOSE BLOOD TEST: CPT

## 2018-11-18 PROCEDURE — 74011000258 HC RX REV CODE- 258: Performed by: INTERNAL MEDICINE

## 2018-11-18 PROCEDURE — 74011250637 HC RX REV CODE- 250/637: Performed by: INTERNAL MEDICINE

## 2018-11-18 PROCEDURE — 84100 ASSAY OF PHOSPHORUS: CPT

## 2018-11-18 PROCEDURE — 80053 COMPREHEN METABOLIC PANEL: CPT

## 2018-11-18 PROCEDURE — 74011250636 HC RX REV CODE- 250/636: Performed by: INTERNAL MEDICINE

## 2018-11-18 PROCEDURE — 83735 ASSAY OF MAGNESIUM: CPT

## 2018-11-18 PROCEDURE — 36415 COLL VENOUS BLD VENIPUNCTURE: CPT

## 2018-11-18 RX ADMIN — PIPERACILLIN SODIUM,TAZOBACTAM SODIUM 3.38 G: 3; .375 INJECTION, POWDER, FOR SOLUTION INTRAVENOUS at 12:42

## 2018-11-18 RX ADMIN — PIPERACILLIN SODIUM,TAZOBACTAM SODIUM 3.38 G: 3; .375 INJECTION, POWDER, FOR SOLUTION INTRAVENOUS at 04:30

## 2018-11-18 RX ADMIN — MELATONIN TAB 3 MG 3 MG: 3 TAB at 21:32

## 2018-11-18 RX ADMIN — ASPIRIN 81 MG: 81 TABLET, COATED ORAL at 20:30

## 2018-11-18 RX ADMIN — APIXABAN 2.5 MG: 2.5 TABLET, FILM COATED ORAL at 18:30

## 2018-11-18 RX ADMIN — SENNOSIDES 8.6 MG: 8.6 TABLET, FILM COATED ORAL at 09:45

## 2018-11-18 RX ADMIN — MIRTAZAPINE 7.5 MG: 15 TABLET, FILM COATED ORAL at 21:33

## 2018-11-18 RX ADMIN — PANTOPRAZOLE SODIUM 40 MG: 40 TABLET, DELAYED RELEASE ORAL at 07:30

## 2018-11-18 RX ADMIN — LOSARTAN POTASSIUM 25 MG: 50 TABLET, FILM COATED ORAL at 21:32

## 2018-11-18 RX ADMIN — ISOSORBIDE MONONITRATE 120 MG: 60 TABLET ORAL at 07:00

## 2018-11-18 RX ADMIN — Medication 10 ML: at 18:31

## 2018-11-18 RX ADMIN — POLYETHYLENE GLYCOL 3350 17 G: 17 POWDER, FOR SOLUTION ORAL at 09:45

## 2018-11-18 RX ADMIN — APIXABAN 2.5 MG: 2.5 TABLET, FILM COATED ORAL at 09:45

## 2018-11-18 NOTE — PROGRESS NOTES
Bedside shift change report given to KIMBERLY Bear (oncoming nurse) by Darla Sandhoff (offgoing nurse). Report included the following information SBAR, Kardex, Intake/Output and MAR.

## 2018-11-18 NOTE — CONSULTS
[facility] [place] [address]  [address] [facility], [address] [place] [address]  W: [phone]  F: [phone]      Reason for Visit:   Yola Daigle is a 80 y.o. male who is seen in consultation at the request of Dr. Aure Burch for evaluation of leukopenia and thrombocytopenia. Hematology / Oncology Treatment History:   History of prostate cancer in 2010 (s/p surgery) and RCC (s/p partial L nephrectomy)    History of Present Illness:   Presented to ED on 11/14 with chills, cough, weakness, starting that day. Dark urine present. Has dementia at baseline    Interval history: more alert today    Past Medical History:   Diagnosis Date    Arthritis     CAD (coronary artery disease)     Angina Jan 2011, PCI LAD with multilink stent 4.0x12mm;  also cath at [place] 7/13 -- no stents at that time    CKD (chronic kidney disease), stage III     Cochlear implant in place 2/3/2017    Dementia     DM type 2 causing renal disease ([facility]     DM type 2 causing vascular disease ([address]     GERD (gastroesophageal reflux disease)     HTN (hypertension)     Hypothyroidism     SAMUEL (obstructive sleep apnea)     not using CPAP    PAF (paroxysmal atrial fibrillation) (HCC)     [id number] score = 7    Prostate cancer (HCC)     Renal cell cancer ([facility]     Stage 4 Renal Cell Cancer.     Partial omentectomy 11/2/2015 with Dr. Adi Enriquez: omental mass-metastatic renal cell carcinoma    Renal mass, left     DAVINCI LEFT PARTIAL NEPHRECTOMY 12/19/13;       Past Surgical History:   Procedure Laterality Date    CARDIAC SURG PROCEDURE UNLIST  1/2011    stent to LAD    HX CHOLECYSTECTOMY  7/13/2012    HX CORONARY STENT PLACEMENT  2011    HX HEART CATHETERIZATION  2011, 2013    HX HEENT  1989    stapedectomy    [address]    left inguinal    HX LITHOTRIPSY  8/2015    HX ORTHOPAEDIC  1994    rotator cuff repair    HX ORTHOPAEDIC  1994    cervical disc removed    HX PROSTATECTOMY  2000    surgery intervention    HX TONSILLECTOMY      HX UROLOGICAL      valvular implant prevent incontinence    HX UROLOGICAL      insert gel to help with incontinence    HX UROLOGICAL  2013    left partial nephrectomy    HX UROLOGICAL  2015    CT guided biopsy of abdominal lymph nodes      Social History     Tobacco Use    Smoking status: Former Smoker     Packs/day: 0.10     Years: 23.00     Pack years: 2.30     Last attempt to quit: 12/3/1975     Years since quittin.9    Smokeless tobacco: Never Used   Substance Use Topics    Alcohol use: No     Alcohol/week: 0.0 oz      Family History   Problem Relation Age of Onset    Stroke Father     Cancer Father         prostate    Diabetes Sister         2 sisters    Hypertension Sister         2 sisters    Diabetes Brother     Stroke Brother     Diabetes Brother         all brothers    Stroke Brother     Kidney Disease Brother     Heart Attack Brother     Hypertension Other      Current Facility-Administered Medications   Medication Dose Route Frequency    piperacillin-tazobactam (ZOSYN) 3.375 g in 0.9% sodium chloride (MBP/ADV) 100 mL  3.375 g IntraVENous Q8H    cyanocobalamin (VITAMIN B12) tablet 500 mcg  500 mcg Oral DAILY    apixaban (ELIQUIS) tablet 2.5 mg  2.5 mg Oral BID    aspirin delayed-release tablet 81 mg  81 mg Oral QHS    isosorbide mononitrate ER (IMDUR) tablet 120 mg  120 mg Oral 7am    losartan (COZAAR) tablet 25 mg  25 mg Oral QHS    melatonin tablet 3 mg  3 mg Oral QHS    mirtazapine (REMERON) tablet 7.5 mg  7.5 mg Oral QHS    polyethylene glycol (MIRALAX) packet 17 g  17 g Oral DAILY    senna (SENOKOT) tablet 8.6 mg  1 Tab Oral DAILY    pantoprazole (PROTONIX) tablet 40 mg  40 mg Oral ACB    0.9% sodium chloride infusion  75 mL/hr IntraVENous CONTINUOUS    sodium chloride (NS) flush 5-10 mL  5-10 mL IntraVENous Q8H    sodium chloride (NS) flush 5-10 mL  5-10 mL IntraVENous PRN    acetaminophen (TYLENOL) tablet 650 mg  650 mg Oral Q4H PRN    prochlorperazine (COMPAZINE) injection 10 mg  10 mg IntraVENous Q6H PRN    insulin lispro (HUMALOG) injection   SubCUTAneous AC&HS    glucose chewable tablet 16 g  4 Tab Oral PRN    dextrose (D50W) injection syrg 12.5-25 g  25-50 mL IntraVENous PRN    glucagon (GLUCAGEN) injection 1 mg  1 mg IntraMUSCular PRN      Allergies   Allergen Reactions    Latex Rash, Swelling and Contact Dermatitis    Atorvastatin Unknown (comments)     Per patient's PCP allergy list- No reaction specified    Ezetimibe Myalgia    Lisinopril Cough    Metoprolol Other (comments)     Bradycardia    Morphine Other (comments)     Hallucinations and Nausea    Nitrofurantoin Rash     \"Blood blisters/rash\" per patient's family    Rosuvastatin Myalgia    Simvastatin Myalgia        Review of Systems: A complete review of systems was obtained, negative except as described above. Physical Exam:     Visit Vitals  /72 (BP 1 Location: Left arm, BP Patient Position: At rest)   Pulse (!) 58   Temp 97.3 °F (36.3 °C)   Resp 16   Ht 5' 7\" (1.702 m)   Wt 178 lb (80.7 kg)   SpO2 97%   BMI 27.88 kg/m²     ECOG PS: 3-4  General: No distress  Eyes: PERRLA, anicteric sclerae  HENT: Atraumatic with normal appearance of ears and nose; OP clear  Neck: Supple; no thyromegaly   Lymphatic: No cervical, supraclavicular, or axillary adenopathy  Respiratory: CTAB, normal respiratory effort  CV: Normal rate, regular rhythm, no murmurs, no peripheral edema  GI: Soft, nontender, nondistended, no masses, no hepatomegaly, no splenomegaly  MS: Digits without clubbing or cyanosis. Skin: No rashes, ecchymoses, or petechiae. Normal temperature, turgor, and texture. Results:     Lab Results   Component Value Date/Time    WBC 5.0 11/18/2018 07:44 AM    HGB 12.0 (L) 11/18/2018 07:44 AM    HCT 36.9 11/18/2018 07:44 AM    PLATELET 99 (L) 35/72/9247 07:44 AM    MCV 87.6 11/18/2018 07:44 AM    ABS.  NEUTROPHILS 3.7 11/18/2018 07:44 AM     Lab Results   Component Value Date/Time    Sodium 144 11/18/2018 07:44 AM    Potassium 3.8 11/18/2018 07:44 AM    Chloride 106 11/18/2018 07:44 AM    CO2 26 11/18/2018 07:44 AM    Glucose 128 (H) 11/18/2018 07:44 AM    BUN 19 11/18/2018 07:44 AM    Creatinine 1.64 (H) 11/18/2018 07:44 AM    GFR est AA 49 (L) 11/18/2018 07:44 AM    GFR est non-AA 40 (L) 11/18/2018 07:44 AM    Calcium 9.8 11/18/2018 07:44 AM    Glucose (POC) 127 (H) 11/18/2018 07:08 AM    Creatinine (POC) 1.5 (H) 11/26/2013 09:55 AM     Lab Results   Component Value Date/Time    Bilirubin, total 0.8 11/18/2018 07:44 AM    ALT (SGPT) 23 11/18/2018 07:44 AM    AST (SGOT) 11 (L) 11/18/2018 07:44 AM    Alk. phosphatase 154 (H) 11/18/2018 07:44 AM    Protein, total 5.0 (L) 11/18/2018 07:44 AM    Albumin 2.1 (L) 11/18/2018 07:44 AM    Globulin 2.9 11/18/2018 07:44 AM     11/15/18 CT c/a/p wo contrast  FINDINGS:  LOWER NECK: The visualized portions of the thyroid and structures of the lower  neck are within normal limits. CHEST:  The absence of intravenous contrast material reduces the sensitivity for  evaluation of the mediastinal structures. Lungs: There is interstitial disease with pulmonary fibrosis in the periphery of  the lungs which is unchanged. The lungs are clear of mass, nodule, airspace  disease or acute edema. Pleura: There is no pleural effusion or pneumothorax. Aorta: The aorta is atherosclerotic and has a normal contour without evidence of  aneurysm. There is extensive coronary artery calcification which is unchanged. Mediastinum: There is no mediastinal or hilar adenopathy or mass. Bones and soft tissues: The bones and soft tissues of the chest wall are normal.     ABDOMEN:  The absence of intravenous contrast material reduces the sensitivity for  evaluation of the solid parenchymal organs of the abdomen. Liver: The liver is normal in size and contour with no focal abnormality.   Gallbladder and bile ducts: There are clips in the gallbladder fossa and the  gallbladder is absent. Spleen: No abnormality. Pancreas: No abnormality. Adrenal glands: No abnormality. Kidneys: There is a 4.2 x 3.3 x 3.7 cm left renal cyst which is unchanged and  there is some radiopaque material in the left kidney from prior surgery. PELVIS:  Reproductive organs: The prostate gland is absent. There are surgical clips in  the pelvis. The seminal vesicles are symmetrical.  Bladder: No abnormality. BOWEL AND MESENTERY: The small bowel is normal. There is no mesenteric mass or  adenopathy. The appendix is not identified. There are diverticula of the colon. PERITONEUM: There is no ascites or free intraperitoneal air. RETROPERITONEUM: The aorta is atherosclerotic and tapers without aneurysm There  is no retroperitoneal adenopathy or mass. There is no pelvic mass or adenopathy. BONES AND SOFT TISSUES: There are degenerative changes of the spine. IMPRESSION  IMPRESSION:   1. No acute abnormality and no change. 2. Pulmonary fibrosis. 3. Atherosclerosis with coronary artery calcification. 4. Atherosclerotic abdominal aorta without aneurysm. 5. Status post cholecystectomy. 6. Status post prostatectomy. 7. Status post left renal surgery with left renal cyst.  8. Diverticulosis. 9. Thoracolumbar spondylosis. 11/17/18 abd US  IMPRESSION:    1. No visible abnormality. Assessment and Recommendations:   1. Thrombocytopenia:  Mild, improved from yesterday. Appears chronic.  spep pending; folate normal, b12 borderline low, will add homocysteine and MMA. Drop may have been due to viral illness. Will monitor    2. Leukopenia:  Resolved on recheck. Will monitor    3. Hypercalcemia:  Present for at least 2 months; IVF; PTH and PTHrp pending; PSA 0. No obvious malignancy on scans. Does have history of hyperparathyroidism per wife    Thank you for this consult. All of the patient's questions were answered today.       Signed By: Yariel Mckeon MD

## 2018-11-18 NOTE — PROGRESS NOTES
Bedside shift change report given to Guera Javier (oncoming nurse) by Gen Wan (offgoing nurse). Report included the following information SBAR, Kardex, MAR and Recent Results.

## 2018-11-18 NOTE — PROGRESS NOTES
Aristeo Tanner [facility] [address] 
[address] [facility], [address] [place] [address] 
[phone] Medical Progress Note NAME: Shola Nixon. :  1935 MRM:  [id number] Date/Time: 2018 Assessment / Plan:  
 
Viral illness:  Possible but working up bacterial infections, malignancy, MM, etc. So far yielding negative workup including CT c/a/p, UA, RUQ US. Improving today. Empiric Zosyn was started yesterday as he had borderline elevated temp to 100. Had chills at home. Viral resp panel negative. Blood cultures NGTD. Will stop Zosyn and watch closely off IV abx. Chest CT with suggestion of pulmonary fibrosis. Supportive care. 
   
CAD (coronary artery disease) () / PAF: Denies chest pain or sob. Continue ASA, imdur, losartan, and amlodipine. Continue apixaban for anticoagulation 
  
Jaundice / hyperbilirubinemia: No evidence of mass or biliary obstruction by noncontrast CT. GI consulted, unable to get MRCP until we ascertain MRI-compatibility of stapes hardware. Sent CA 19-9. Bilirubin normalized today. Repeat labs in AM 
 
Hypercalcemia: chronic, better today. Per wife, pt has hx of hyperparathyroidism. PTH WNR, PTHrP pending. Sent SPEP, UPEP, ESTEPHANIA, 25-hydroxy D, 1, 25-dihydroxy D.  PSA normal. 
 
Thrombocytopenia / leukopenia: leukopenia resolved on repeat CBC. B12 borderline low/normal. Agree with MMA/homocysteine. Start B12 replacement pending results. Folate ok. Appreciate hematology evaluation 
  
CKD (chronic kidney disease) stage 3, GFR 30-59 ml/min ([facility]) (12/10/2013):  Creatinine stable. Sent workup as above. Follow BMP 
  
Lactic acidemia:  Resolved. Repeated, normal. 
  
Dementia ():  Seems to be at baseline. Monitor. Better mentation today 
  
DM type 2 causing renal disease (HCC) ():  Controlled. Continue SSI Debility:  Seen by PT/OT, initial rec is for SNF level rehab. Out of bed 
  
  
Total time spent: 35 mins, d/w wife at bedside Time spent in the care of this patient including reviewing records, discussing with nursing and/or other providers on the treatment team, obtaining history and examining the patient, and discussing treatment plans. Care Plan discussed with: Patient, Nursing Staff and >50% of time spent in counseling and coordination of care Discussed:  Care Plan Prophylaxis:  Eliquis Disposition:  SNF/LTC Subjective: Chief Complaint:  Follow up weakness Chart/notes/labs/studies reviewed, patient examined at bedside. Feels better. Denies CP, SOB, abdominal pain, fevers, chills Objective:  
 
 
Vitals:  
 
  
Last 24hrs VS reviewed since prior progress note. Most recent are: 
 
Visit Vitals /61 (BP 1 Location: Left arm, BP Patient Position: At rest) Pulse 63 Temp 97.7 °F (36.5 °C) Resp 16 Ht 5' 7\" (1.702 m) Wt 80.7 kg (178 lb) SpO2 98% BMI 27.88 kg/m² SpO2 Readings from Last 6 Encounters:  
11/18/18 98% 09/13/18 97% 08/26/17 92% 07/16/17 94% 02/04/17 98% 03/24/16 97% Intake/Output Summary (Last 24 hours) at 11/18/2018 1424 Last data filed at 11/18/2018 0900 Gross per 24 hour Intake 1420 ml Output 850 ml Net 570 ml Exam:  
 
Physical Exam: 
 
Gen:  Well-developed, well-nourished, in no acute distress. Wife at bedside HEENT:  Pink conjunctivae, hearing intact to voice, moist mucous membranes Resp:  No accessory muscle use, clear breath sounds without wheezes rales or rhonchi 
Card:  normal S1, S2 without murmurs, thrills or peripheral edema Abd:  Soft, non-tender, non-distended, normoactive bowel sounds are present Skin:  No rashes Neuro: Face symmetric, tongue midline, speech fluent, follows commands appropriately Psych:  awake, alert, oriented to person, place Medications Reviewed: (see below) Lab Data Reviewed: (see below) 
 
______________________________________________________________________ Medications:  
 
Current Facility-Administered Medications Medication Dose Route Frequency  piperacillin-tazobactam (ZOSYN) 3.375 g in 0.9% sodium chloride (MBP/ADV) 100 mL  3.375 g IntraVENous Q8H  
 cyanocobalamin (VITAMIN B12) tablet 500 mcg  500 mcg Oral DAILY  apixaban (ELIQUIS) tablet 2.5 mg  2.5 mg Oral BID  aspirin delayed-release tablet 81 mg  81 mg Oral QHS  isosorbide mononitrate ER (IMDUR) tablet 120 mg  120 mg Oral 7am  
 losartan (COZAAR) tablet 25 mg  25 mg Oral QHS  melatonin tablet 3 mg  3 mg Oral QHS  mirtazapine (REMERON) tablet 7.5 mg  7.5 mg Oral QHS  polyethylene glycol (MIRALAX) packet 17 g  17 g Oral DAILY  senna (SENOKOT) tablet 8.6 mg  1 Tab Oral DAILY  pantoprazole (PROTONIX) tablet 40 mg  40 mg Oral ACB  
 0.9% sodium chloride infusion  75 mL/hr IntraVENous CONTINUOUS  
 sodium chloride (NS) flush 5-10 mL  5-10 mL IntraVENous Q8H  
 sodium chloride (NS) flush 5-10 mL  5-10 mL IntraVENous PRN  
 acetaminophen (TYLENOL) tablet 650 mg  650 mg Oral Q4H PRN  prochlorperazine (COMPAZINE) injection 10 mg  10 mg IntraVENous Q6H PRN  
 insulin lispro (HUMALOG) injection   SubCUTAneous AC&HS  
 glucose chewable tablet 16 g  4 Tab Oral PRN  
 dextrose (D50W) injection syrg 12.5-25 g  25-50 mL IntraVENous PRN  
 glucagon (GLUCAGEN) injection 1 mg  1 mg IntraMUSCular PRN Lab Review:  
 
Recent Labs 11/18/18 
0744 11/17/18 
1102 11/17/18 
5919 WBC 5.0 7.4 1.7* HGB 12.0* 12.0* 12.2 HCT 36.9 36.6 36.9 PLT 99* 112* 99* Recent Labs 11/18/18 
0744 11/17/18 
0650 11/16/18 
0522  135* 138  
K 3.8 4.0 4.0  
 103 106 CO2 26 23 23 * 159* 117* BUN 19 18 20 CREA 1.64* 1.58* 1.53* CA 9.8 11.2* 10.6* MG 1.8 1.6 1.6 PHOS 2.0*  --   --   
ALB 2.1* 2.5* 2.4* SGOT 11* 16 27 ALT 23 36 49 No components found for: [place] No results for input(s): PH, PCO2, PO2, HCO3, FIO2 in the last 72 hours. No results for input(s): INR in the last 72 hours. No lab exists for component: INREXT, INREXT Lab Results Component Value Date/Time Specimen Description: [facility] 12/19/2013 11:52 AM  
 Specimen Description: [place] 07/13/2012 04:45 PM  
 
Lab Results Component Value Date/Time Culture result: NO GROWTH AFTER 17 HOURS 11/17/2018 12:47 PM  
 Culture result: NO GROWTH 6 DAYS 09/12/2018 04:30 AM  
 Culture result: CITROBACTER FREUNDII (A) 09/09/2018 08:25 PM  
 Culture result:  09/09/2018 08:25 PM  
  (PLEASE DISREGARD THE SUSCEPTIBILITIES ON AMPICILLIN, AMP/SULBACTAM, CEFAZOLIN AND CEFUROXIME REPORTED ON 9/13 AS  SPECIES PER DRMahad  
AS THESE ARE NOT DRUGS OF CHOICE FOR CITROBACTER) 
  
 
        
___________________________________________________ Attending Physician: Allie Jade MD

## 2018-11-18 NOTE — PROGRESS NOTES
Problem: Falls - Risk of 
Goal: *Absence of Falls Document Elver Diazh Fall Risk and appropriate interventions in the flowsheet. Outcome: Progressing Towards Goal 
Fall Risk Interventions: 
Mobility Interventions: Bed/chair exit alarm Mentation Interventions: Adequate sleep, hydration, pain control Medication Interventions: Bed/chair exit alarm Elimination Interventions: Bed/chair exit alarm, Call light in reach

## 2018-11-19 LAB
ALBUMIN SERPL-MCNC: 2.3 G/DL (ref 3.5–5)
ALBUMIN/GLOB SERPL: 0.7 {RATIO} (ref 1.1–2.2)
ALP SERPL-CCNC: 179 U/L (ref 45–117)
ALT SERPL-CCNC: 22 U/L (ref 12–78)
ANION GAP SERPL CALC-SCNC: 6 MMOL/L (ref 5–15)
AST SERPL-CCNC: 12 U/L (ref 15–37)
BASOPHILS # BLD: 0 K/UL (ref 0–0.1)
BASOPHILS NFR BLD: 1 % (ref 0–1)
BILIRUB SERPL-MCNC: 0.8 MG/DL (ref 0.2–1)
BUN SERPL-MCNC: 19 MG/DL (ref 6–20)
BUN/CREAT SERPL: 13 (ref 12–20)
CALCIUM SERPL-MCNC: 10.5 MG/DL (ref 8.5–10.1)
CANCER AG19-9 SERPL-ACNC: 45 U/ML (ref 0–35)
CHLORIDE SERPL-SCNC: 107 MMOL/L (ref 97–108)
CO2 SERPL-SCNC: 26 MMOL/L (ref 21–32)
CREAT SERPL-MCNC: 1.5 MG/DL (ref 0.7–1.3)
DIFFERENTIAL METHOD BLD: ABNORMAL
EOSINOPHIL # BLD: 0.4 K/UL (ref 0–0.4)
EOSINOPHIL NFR BLD: 8 % (ref 0–7)
ERYTHROCYTE [DISTWIDTH] IN BLOOD BY AUTOMATED COUNT: 13.9 % (ref 11.5–14.5)
GLOBULIN SER CALC-MCNC: 3.4 G/DL (ref 2–4)
GLUCOSE BLD STRIP.AUTO-MCNC: 124 MG/DL (ref 65–100)
GLUCOSE BLD STRIP.AUTO-MCNC: 129 MG/DL (ref 65–100)
GLUCOSE BLD STRIP.AUTO-MCNC: 153 MG/DL (ref 65–100)
GLUCOSE BLD STRIP.AUTO-MCNC: 185 MG/DL (ref 65–100)
GLUCOSE SERPL-MCNC: 136 MG/DL (ref 65–100)
HCT VFR BLD AUTO: 37.7 % (ref 36.6–50.3)
HCYS SERPL-SCNC: 17.5 UMOL/L (ref 3.7–13.9)
HGB BLD-MCNC: 11.8 G/DL (ref 12.1–17)
IMM GRANULOCYTES # BLD: 0.1 K/UL (ref 0–0.04)
IMM GRANULOCYTES NFR BLD AUTO: 1 % (ref 0–0.5)
LYMPHOCYTES # BLD: 0.8 K/UL (ref 0.8–3.5)
LYMPHOCYTES NFR BLD: 18 % (ref 12–49)
MAGNESIUM SERPL-MCNC: 1.8 MG/DL (ref 1.6–2.4)
MCH RBC QN AUTO: 27.3 PG (ref 26–34)
MCHC RBC AUTO-ENTMCNC: 31.3 G/DL (ref 30–36.5)
MCV RBC AUTO: 87.1 FL (ref 80–99)
MONOCYTES # BLD: 0.5 K/UL (ref 0–1)
MONOCYTES NFR BLD: 11 % (ref 5–13)
NEUTS SEG # BLD: 2.7 K/UL (ref 1.8–8)
NEUTS SEG NFR BLD: 61 % (ref 32–75)
NRBC # BLD: 0 K/UL (ref 0–0.01)
NRBC BLD-RTO: 0 PER 100 WBC
PHOSPHATE SERPL-MCNC: 2.2 MG/DL (ref 2.6–4.7)
PLATELET # BLD AUTO: 106 K/UL (ref 150–400)
PMV BLD AUTO: 10.7 FL (ref 8.9–12.9)
POTASSIUM SERPL-SCNC: 4.3 MMOL/L (ref 3.5–5.1)
PROT SERPL-MCNC: 5.7 G/DL (ref 6.4–8.2)
RBC # BLD AUTO: 4.33 M/UL (ref 4.1–5.7)
SERVICE CMNT-IMP: ABNORMAL
SODIUM SERPL-SCNC: 139 MMOL/L (ref 136–145)
WBC # BLD AUTO: 4.4 K/UL (ref 4.1–11.1)

## 2018-11-19 PROCEDURE — 80053 COMPREHEN METABOLIC PANEL: CPT

## 2018-11-19 PROCEDURE — 84100 ASSAY OF PHOSPHORUS: CPT

## 2018-11-19 PROCEDURE — 74011250637 HC RX REV CODE- 250/637: Performed by: INTERNAL MEDICINE

## 2018-11-19 PROCEDURE — 36415 COLL VENOUS BLD VENIPUNCTURE: CPT

## 2018-11-19 PROCEDURE — C1758 CATHETER, URETERAL: HCPCS

## 2018-11-19 PROCEDURE — 83090 ASSAY OF HOMOCYSTEINE: CPT

## 2018-11-19 PROCEDURE — 83735 ASSAY OF MAGNESIUM: CPT

## 2018-11-19 PROCEDURE — 85025 COMPLETE CBC W/AUTO DIFF WBC: CPT

## 2018-11-19 PROCEDURE — 92610 EVALUATE SWALLOWING FUNCTION: CPT

## 2018-11-19 PROCEDURE — 74011000250 HC RX REV CODE- 250: Performed by: INTERNAL MEDICINE

## 2018-11-19 PROCEDURE — 74011250636 HC RX REV CODE- 250/636: Performed by: INTERNAL MEDICINE

## 2018-11-19 PROCEDURE — 65270000029 HC RM PRIVATE

## 2018-11-19 PROCEDURE — 82962 GLUCOSE BLOOD TEST: CPT

## 2018-11-19 PROCEDURE — 83921 ORGANIC ACID SINGLE QUANT: CPT

## 2018-11-19 RX ORDER — FACIAL-BODY WIPES
10 EACH TOPICAL ONCE
Status: COMPLETED | OUTPATIENT
Start: 2018-11-19 | End: 2018-11-19

## 2018-11-19 RX ORDER — FACIAL-BODY WIPES
10 EACH TOPICAL DAILY PRN
Status: DISCONTINUED | OUTPATIENT
Start: 2018-11-19 | End: 2018-11-27 | Stop reason: HOSPADM

## 2018-11-19 RX ORDER — CYANOCOBALAMIN 1000 UG/ML
1000 INJECTION, SOLUTION INTRAMUSCULAR; SUBCUTANEOUS DAILY
Status: DISCONTINUED | OUTPATIENT
Start: 2018-11-19 | End: 2018-11-23

## 2018-11-19 RX ADMIN — SENNOSIDES 8.6 MG: 8.6 TABLET, FILM COATED ORAL at 09:56

## 2018-11-19 RX ADMIN — PANTOPRAZOLE SODIUM 40 MG: 40 TABLET, DELAYED RELEASE ORAL at 06:33

## 2018-11-19 RX ADMIN — ASPIRIN 81 MG: 81 TABLET, COATED ORAL at 22:11

## 2018-11-19 RX ADMIN — MELATONIN TAB 3 MG 3 MG: 3 TAB at 22:10

## 2018-11-19 RX ADMIN — POLYETHYLENE GLYCOL 3350 17 G: 17 POWDER, FOR SOLUTION ORAL at 09:56

## 2018-11-19 RX ADMIN — LOSARTAN POTASSIUM 25 MG: 50 TABLET, FILM COATED ORAL at 22:11

## 2018-11-19 RX ADMIN — Medication 10 ML: at 22:15

## 2018-11-19 RX ADMIN — APIXABAN 2.5 MG: 2.5 TABLET, FILM COATED ORAL at 09:56

## 2018-11-19 RX ADMIN — MIRTAZAPINE 7.5 MG: 15 TABLET, FILM COATED ORAL at 22:11

## 2018-11-19 RX ADMIN — Medication 10 MG: at 11:06

## 2018-11-19 RX ADMIN — APIXABAN 2.5 MG: 2.5 TABLET, FILM COATED ORAL at 17:26

## 2018-11-19 RX ADMIN — CYANOCOBALAMIN 1000 MCG: 1000 INJECTION, SOLUTION INTRAMUSCULAR at 11:06

## 2018-11-19 RX ADMIN — ISOSORBIDE MONONITRATE 120 MG: 60 TABLET ORAL at 06:34

## 2018-11-19 NOTE — PROGRESS NOTES
Problem: Falls - Risk of 
Goal: *Absence of Falls Document Jordan Cadet Fall Risk and appropriate interventions in the flowsheet. Outcome: Progressing Towards Goal 
Fall Risk Interventions: 
Mobility Interventions: Bed/chair exit alarm, Patient to call before getting OOB, Utilize walker, cane, or other assistive device, Utilize gait belt for transfers/ambulation Mentation Interventions: Bed/chair exit alarm, Door open when patient unattended, Reorient patient Medication Interventions: Bed/chair exit alarm, Patient to call before getting OOB, Teach patient to arise slowly, Utilize gait belt for transfers/ambulation Elimination Interventions: Bed/chair exit alarm, Call light in reach, Patient to call for help with toileting needs

## 2018-11-19 NOTE — PROGRESS NOTES
Pt refusing PT stating he has had a really rough day and is exhausted. Pt advised of benefits of increased activity, but continued to refuse. Will follow.

## 2018-11-19 NOTE — PROGRESS NOTES
[facility] 
[address] [facility], [place], [address] [place] [address] 
[phone] Medical Progress Note NAME: Mathieu Peguero. :  1935 MRM:  [id number] Date/Time: 2018 Assessment / Plan:  
 
Viral illness:  Possible but working up bacterial infections, malignancy, MM, etc. So far yielding negative workup including CT c/a/p, UA, RUQ US. Improving. Doing well off IV Zosyn. No fevers or chills. Viral resp panel negative. Blood cultures NGTD. Chest CT with suggestion of pulmonary fibrosis. Supportive care. Discharge planning, very debilitated, likely needs SNF placement, discussed with pt. Anemia: mild, blood suggest vitamin B12 def. Started IM injections. Follow up on MMA (expect it to be elevated like homocysteine in B12 def)   
 
CAD (coronary artery disease) () / PAF: Denies chest pain or sob. Continue ASA, imdur, losartan, and amlodipine. Continue apixaban for anticoagulation 
  
Jaundice / hyperbilirubinemia: Resolved. No evidence of mass or biliary obstruction by noncontrast CT.  
RUQ US showed no acute findings. CA 19-9 returned today, elevated. May still need MRCP despite normalization of bilirubin. Will discuss with GI. Will need to ascertain MRI-compatibility of stapes hardware. Hypercalcemia: chronic, stable. Per wife, pt has hx of hyperparathyroidism. PTH WNR, PTHrP pending. Sent SPEP, UPEP, ESTEPHANIA, 25-hydroxy D low, 1, 25-dihydroxy D pending. PSA normal. 
 
Thrombocytopenia / leukopenia: leukopenia resolved on repeat CBC. B12 borderline low/normal. Started b12 repletion as above. Hem/onc following 
  
CKD (chronic kidney disease) stage 3, GFR 30-59 ml/min (HCC) (12/10/2013):  Creatinine stable. Workup as above. Follow BMP 
  
Lactic acidemia:  Resolved. Repeated, normal. 
  
Dementia ():  Seems to be at baseline. Monitor. Better mentation for the past 2 days 
  
DM type 2 causing renal disease (HCC) ():  Controlled. Continue SSI Debility:  Seen by PT/OT, initial rec is for SNF level rehab. Out of bed 
  
  
Total time spent: 35 mins, d/w wife at bedside Time spent in the care of this patient including reviewing records, discussing with nursing and/or other providers on the treatment team, obtaining history and examining the patient, and discussing treatment plans. Care Plan discussed with: Patient, Nursing Staff and >50% of time spent in counseling and coordination of care Discussed:  Care Plan Prophylaxis:  Eliquis Disposition:  SNF/LTC Subjective: Chief Complaint:  Follow up weakness Chart/notes/labs/studies reviewed, patient examined at bedside. Feels better. Denies CP, SOB, abdominal pain, fevers, chills Objective:  
 
 
Vitals:  
 
  
Last 24hrs VS reviewed since prior progress note. Most recent are: 
 
Visit Vitals /64 (BP 1 Location: Left arm, BP Patient Position: At rest) Pulse 64 Temp 98.6 °F (37 °C) Resp 16 Ht 5' 7\" (1.702 m) Wt 80.7 kg (178 lb) SpO2 95% BMI 27.88 kg/m² SpO2 Readings from Last 6 Encounters:  
11/19/18 95% 09/13/18 97% 08/26/17 92% 07/16/17 94% 02/04/17 98% 03/24/16 97% Intake/Output Summary (Last 24 hours) at 11/19/2018 1835 Last data filed at 11/19/2018 1118 Gross per 24 hour Intake 4407.5 ml Output 1350 ml Net 3057.5 ml Exam:  
 
Physical Exam: 
 
Gen:  Well-developed, well-nourished, in no acute distress. Chronically ill-appearing. Pleasant. Wife at bedside HEENT:  Pink conjunctivae, hearing intact to voice, moist mucous membranes Resp:  No accessory muscle use, clear breath sounds without wheezes rales or rhonchi 
Card:  normal S1, S2 without murmurs, thrills or peripheral edema Abd:  Soft, non-tender, non-distended, normoactive bowel sounds are present Skin:  No rashes Neuro: Face symmetric, tongue midline, speech fluent, follows commands appropriately Psych:  awake, alert, oriented to person, place Medications Reviewed: (see below) Lab Data Reviewed: (see below) 
 
______________________________________________________________________ Medications:  
 
Current Facility-Administered Medications Medication Dose Route Frequency  cyanocobalamin (VITAMIN B12) injection 1,000 mcg  1,000 mcg IntraMUSCular DAILY  bisacodyl (DULCOLAX) suppository 10 mg  10 mg Rectal DAILY PRN  
 apixaban (ELIQUIS) tablet 2.5 mg  2.5 mg Oral BID  aspirin delayed-release tablet 81 mg  81 mg Oral QHS  isosorbide mononitrate ER (IMDUR) tablet 120 mg  120 mg Oral 7am  
 losartan (COZAAR) tablet 25 mg  25 mg Oral QHS  melatonin tablet 3 mg  3 mg Oral QHS  mirtazapine (REMERON) tablet 7.5 mg  7.5 mg Oral QHS  polyethylene glycol (MIRALAX) packet 17 g  17 g Oral DAILY  senna (SENOKOT) tablet 8.6 mg  1 Tab Oral DAILY  pantoprazole (PROTONIX) tablet 40 mg  40 mg Oral ACB  sodium chloride (NS) flush 5-10 mL  5-10 mL IntraVENous Q8H  
 sodium chloride (NS) flush 5-10 mL  5-10 mL IntraVENous PRN  
 acetaminophen (TYLENOL) tablet 650 mg  650 mg Oral Q4H PRN  prochlorperazine (COMPAZINE) injection 10 mg  10 mg IntraVENous Q6H PRN  
 insulin lispro (HUMALOG) injection   SubCUTAneous AC&HS  
 glucose chewable tablet 16 g  4 Tab Oral PRN  
 dextrose (D50W) injection syrg 12.5-25 g  25-50 mL IntraVENous PRN  
 glucagon (GLUCAGEN) injection 1 mg  1 mg IntraMUSCular PRN Lab Review:  
 
Recent Labs 11/19/18 
0355 11/18/18 
0744 11/17/18 
1102 WBC 4.4 5.0 7.4 HGB 11.8* 12.0* 12.0*  
HCT 37.7 36.9 36.6 * 99* 112* Recent Labs 11/19/18 
[phone] 11/18/18 
0744 11/17/18 
9200  144 135* K 4.3 3.8 4.0  
 106 103 CO2 26 26 23 * 128* 159* BUN 19 19 18 CREA 1.50* 1.64* 1.58* CA 10.5* 9.8 11.2*  
MG 1.8 1.8 1.6 PHOS 2.2* 2.0*  --   
ALB 2.3* 2.1* 2.5* SGOT 12* 11* 16 ALT 22 23 36 No components found for: [place] No results for input(s): PH, PCO2, PO2, HCO3, FIO2 in the last 72 hours. No results for input(s): INR in the last 72 hours. No lab exists for component: INREXT, INREXT Lab Results Component Value Date/Time Specimen Description: [facility] 12/19/2013 11:52 AM  
 Specimen Description: [place] 07/13/2012 04:45 PM  
 
Lab Results Component Value Date/Time Culture result: NO GROWTH 2 DAYS 11/17/2018 12:47 PM  
 Culture result: NO GROWTH 6 DAYS 09/12/2018 04:30 AM  
 Culture result: CITROBACTER FREUNDII (A) 09/09/2018 08:25 PM  
 Culture result:  09/09/2018 08:25 PM  
  (PLEASE DISREGARD THE SUSCEPTIBILITIES ON AMPICILLIN, AMP/SULBACTAM, CEFAZOLIN AND CEFUROXIME REPORTED ON 9/13 AS  SPECIES PER   
AS THESE ARE NOT DRUGS OF CHOICE FOR CITROBACTER) 
  
 
        
___________________________________________________ Attending Physician: Norm Morin MD

## 2018-11-19 NOTE — PROGRESS NOTES
Problem: Falls - Risk of 
Goal: *Absence of Falls Document Lorenza Wallace Fall Risk and appropriate interventions in the flowsheet. Outcome: Progressing Towards Goal 
Fall Risk Interventions: 
Mobility Interventions: Bed/chair exit alarm Mentation Interventions: Adequate sleep, hydration, pain control Medication Interventions: Bed/chair exit alarm Elimination Interventions: Call light in reach

## 2018-11-19 NOTE — PROGRESS NOTES
Bedside shift change report given to KIMBERLY Rausch (oncoming nurse) by Noemy Santos (offgoing nurse). Report included the following information SBAR, Kardex, Intake/Output and MAR.

## 2018-11-19 NOTE — PROGRESS NOTES
11/19/2018   CARE MANAGEMENT NOTE:  CM met with pt's wife at bedside to discuss SNF as an option. A referral was made to [facility] per her choice and as pt has been there in the past.  Pt's wife will have a medical procedure tomorrow a.m however she will be available in the afternoon. [place]

## 2018-11-19 NOTE — PROGRESS NOTES
Occupational Therapy Note: 
Pt refusing OT stating he has had a really rough day and is exhausted. Pt advised of benefits of increased activity, but continued to refuse. Will follow next treatment date. e:

## 2018-11-19 NOTE — PROGRESS NOTES
Bedside shift change report given to [address] (oncoming nurse) by Stephanie Taylor (offgoing nurse). Report included the following information SBAR, Kardex, MAR and Recent Results. 200 Patient's daughter called me in to look at the patients mouth under his tongue. It appears that he has something swollen maybe glands or cysts under his tongue. Notified Dr. Feliciano Bucio. He will exam tomorrow. 1200 Patient had a large soft brown BM.

## 2018-11-19 NOTE — PROGRESS NOTES
Bedside shift change report given to KIMBERLY Rausch (oncoming nurse) by Jaron Stafford (offgoing nurse). Report included the following information SBAR, Kardex, Intake/Output and MAR.

## 2018-11-19 NOTE — PROGRESS NOTES
Bedside shift change report given to Antonia Calderon (oncoming nurse) by Harlan Horn (offgoing nurse). Report included the following information SBAR, Kardex, MAR and Recent Results.

## 2018-11-19 NOTE — PROGRESS NOTES
Problem: Dysphagia (Adult) Goal: *Acute Goals and Plan of Care (Insert Text) Swallowing goals initiated 11-19-18:(weekly re-eval due 11-26-18) 1) tolerate dysphagia 1, thins when awake without s/s aspiration by 11-22-18 2)  SLP will re-eval for diet advancement if consistently more alert and chewing/bolus formation improved by 11-23-18 Speech LAnguage Pathology bedside swallow evaluation Patient: Holley Zayas (80 y.o. male) Date: 11/19/2018 Primary Diagnosis: Viral illness Viral illness Precautions: aspiration  Fall ASSESSMENT : 
Based on the objective data described below, the patient presents with moderate oral dysphagia and mild-moderate pharyngeal dysphagia, all complicated by lethargy and dementia. He is not at baseline for swallowing, per family. Aspiration risks due to AMS and lethargy. Patient admitted with viral illnesss with vomiting. PMH: DM, CKD, TIAs dementia, HTN, hypothyroid, SAMUEL, L renal mass, CAD, prostate CA, arthritis, GERd. . 
 
Patient will benefit from skilled intervention to address the above impairments. Patients rehabilitation potential is considered to be Fair Factors which may influence rehabilitation potential include:  
[]            None noted [x]            Mental ability/status [x]            Medical condition []            Home/family situation and support systems [x]            Safety awareness 
[]            Pain tolerance/management []            Other: PLAN : 
Recommendations and Planned Interventions: 
Continue dysphagia 1, thins. meds crushed if able. FEED ONLY WHEN AWAKE AND ALERT. Frequency/Duration: Patient will be followed by speech-language pathology 3 times a week to address goals. Discharge Recommendations: Kevin Dumont SUBJECTIVE:  
Patient stated Jaylyn gar very much. Patient's wife and daughter report that he was eating regular food, thin lquids until admission with only occasional choking. OBJECTIVE:  
 
Past Medical History:  
Diagnosis Date  Arthritis  CAD (coronary artery disease) Angina Jan 2011, PCI LAD with multilink stent 4.0x12mm;  also cath at [address] 7/13 -- no stents at that time  CKD (chronic kidney disease), stage III  Cochlear implant in place 2/3/2017  Dementia  DM type 2 causing renal disease ([address]  DM type 2 causing vascular disease ([address]  GERD (gastroesophageal reflux disease)  HTN (hypertension)  Hypothyroidism  SAMUEL (obstructive sleep apnea)   
 not using CPAP  
 PAF (paroxysmal atrial fibrillation) ([address] [id number] score = 7  
 Prostate cancer ([address]  Renal cell cancer ([facility] [address] Stage 4 Renal Cell Cancer. Partial omentectomy 11/2/2015 with Dr. Nicole Randolph: omental mass-metastatic renal cell carcinoma  Renal mass, left [address] LEFT PARTIAL NEPHRECTOMY 12/19/13;   
 
Past Surgical History:  
Procedure Laterality Date  CARDIAC SURG PROCEDURE UNLIST  1/2011  
 stent to LAD  HX CHOLECYSTECTOMY  7/13/2012  HX CORONARY STENT PLACEMENT  [address]  2011, 2013 [address]  
 stapedectomy  HX HERNIA REPAIR  1968  
 left inguinal  
 HX LITHOTRIPSY  8/2015  HX ORTHOPAEDIC  1994  
 rotator cuff repair  HX ORTHOPAEDIC  1994  
 cervical disc removed  HX PROSTATECTOMY  2000  
 surgery intervention [address]  HX UROLOGICAL  2001  
 valvular implant prevent incontinence  HX UROLOGICAL  2003  
 insert gel to help with incontinence  HX UROLOGICAL  12/2013  
 left partial nephrectomy  HX UROLOGICAL  9/2015 CT guided biopsy of abdominal lymph nodes Prior Level of Function/Home Situation:  
Home Situation Home Environment: Private residence Wheelchair Ramp: Yes One/Two Story Residence: One story Living Alone: No 
Support Systems: Child(alee), Family member(s) Patient Expects to be Discharged to[de-identified] Private residence Current DME Used/Available at Home: Emi Felling, straight, Reggy Hem Tub or Shower Type: Tub/Shower combination Diet prior to admission: regular, thins Current Diet:  Dysphagia 1, thins Cognitive and Communication Status: 
Neurologic State: Alert, Eyes open to stimulus Orientation Level: Oriented to person, Oriented to place, Disoriented to situation, Disoriented to time Cognition: Memory loss, Impulsive, Impaired decision making Perception: Appears intact Perseveration: No perseveration noted Safety/Judgement: Lack of insight into deficits Oral Assessment: 
Oral Assessment Labial: (masked facies) Dentition: Natural 
Oral Hygiene: [facility] Lingual: No impairment Velum: No impairment P.O. Trials: 
Patient Position: upright in bed Vocal quality prior to P.O.: (mildly strained) Consistency Presented: Thin liquid; Solid;Puree How Presented: Self-fed/presented;Straw;Cup/sip; Successive swallows ORAL PHASE: 
Bolus Acceptance: (tended to take large and multiple sips if allowed) Bolus Formation/Control: Impaired(poor bolus formation and cohesion, reduced rotary chew-he used a vertical chew instead. Family reports oral holding and buccal pocketing at home. ) Type of Impairment: Mastication;Piecemeal 
Propulsion: Delayed (# of seconds) Oral Residue: 10-50% of bolus(with solids due to poor bolus formation, /cohesion) PHARYNGEAL PHASE:  
Initiation of Swallow: Delayed (# of seconds)(mild delay intermittently) Laryngeal Elevation: Weak(mild) Aspiration Signs/Symptoms: (choking on water multiple times this admission and sometimes on food) Daughter reports he had just choked on water this am.   
He is confused. Family reports that he falls asleep with food and drink in his mouth. SLP saw this patient in Feb 2017. At that time, he showed impulsive eating and drinking habits. He only choked at that time, when he drank with food in the mouth. Wife has been working with him on this at home.  
  
  
 
NOMS:  
 The NOMS functional outcome measure was used to quantify this patient's level of swallowing impairment. Based on the NOMS, the patient was determined to be at level 4 for swallow function G Codes: In compliance with CMSs Claims Based Outcome Reporting, the following G-code set was chosen for this patient based the use of the NOMS functional outcome to quantify this patient's level of swallowing impairment. Using the NOMS, the patient was determined to be at level 4 for swallow function which correlates with the CK= 40-59% level of severity. Based on the objective assessment provided within this note, the current, goal, and discharge g-codes are as follows: 
 
Swallow  Swallowing: 
 Swallow Current Status CK= 40-59%  Swallow Goal Status CJ= 20-39% NOMS Swallowing Levels: 
Level 1 (CN): NPO Level 2 (CM): NPO but takes consistency in therapy Level 3 (CL): Takes less than 50% of nutrition p.o. and continues with nonoral feedings; and/or safe with mod cues; and/or max diet restriction Level 4 (CK): Safe swallow but needs mod cues; and/or mod diet restriction; and/or still requires some nonoral feeding/supplements Level 5 (CJ): Safe swallow with min diet restriction; and/or needs min cues Level 6 (CI): Independent with p.o.; rare cues; usually self cues; may need to avoid some foods or needs extra time Level 7 (CH): Independent for all p.o. MATTHIEU. (2003). National Outcomes Measurement System (NOMS): Adult Speech-Language Pathology User's Guide. Pain: 
Pain Scale 1: Numeric (0 - 10) Pain Intensity 1: 0 After treatment:  
[]            Patient left in no apparent distress sitting up in chair 
[]            Patient left in no apparent distress in bed 
[x]            Call bell left within reach [x]            Nursing notified 
[]            Caregiver present 
[]            Bed alarm activated COMMUNICATION/EDUCATION:  
 The patients plan of care including recommendations, planned interventions, and recommended diet changes were discussed with: Registered Nurse. Patient was educated regarding His deficit(s) of dysphagia  as this relates to His diagnosis of illness with AMS, dementia. He demonstrated Guarded understanding as evidenced by dementia. Family understood. . 
[]            Posted safety precautions in patient's room. [x]            Patient/family have participated as able in goal setting and plan of care. [x]            Patient/family agree to work toward stated goals and plan of care. []            Patient understands intent and goals of therapy, but is neutral about his/her participation. []            Patient is unable to participate in goal setting and plan of care. Thank you for this referral. 
DESIRAE Joseph Time Calculation: 20 mins

## 2018-11-20 PROBLEM — K06.1 GINGIVAL ENLARGEMENT: Status: ACTIVE | Noted: 2018-11-20

## 2018-11-20 LAB
1,25(OH)2D3 SERPL-MCNC: 63.4 PG/ML (ref 19.9–79.3)
ALBUMIN MFR UR ELPH: 33.3 %
ALBUMIN SERPL ELPH-MCNC: 2.3 G/DL (ref 2.9–4.4)
ALBUMIN SERPL-MCNC: 2.3 G/DL (ref 3.5–5)
ALBUMIN/GLOB SERPL: 0.6 {RATIO} (ref 1.1–2.2)
ALBUMIN/GLOB SERPL: 1.1 {RATIO} (ref 0.7–1.7)
ALP SERPL-CCNC: 178 U/L (ref 45–117)
ALPHA1 GLOB MFR UR ELPH: 7.1 %
ALPHA1 GLOB SERPL ELPH-MCNC: 0.3 G/DL (ref 0–0.4)
ALPHA2 GLOB 24H MFR UR ELPH: 20.4 %
ALPHA2 GLOB SERPL ELPH-MCNC: 0.7 G/DL (ref 0.4–1)
ALT SERPL-CCNC: 18 U/L (ref 12–78)
ANION GAP SERPL CALC-SCNC: 9 MMOL/L (ref 5–15)
AST SERPL-CCNC: 12 U/L (ref 15–37)
B-GLOBULIN 24H MFR UR ELPH: 26.5 %
B-GLOBULIN SERPL ELPH-MCNC: 0.7 G/DL (ref 0.7–1.3)
BASOPHILS # BLD: 0 K/UL (ref 0–0.1)
BASOPHILS NFR BLD: 1 % (ref 0–1)
BILIRUB SERPL-MCNC: 0.7 MG/DL (ref 0.2–1)
BUN SERPL-MCNC: 15 MG/DL (ref 6–20)
BUN/CREAT SERPL: 11 (ref 12–20)
CALCIUM SERPL-MCNC: 10.7 MG/DL (ref 8.5–10.1)
CALCIUM SERPL-MCNC: 11.7 MG/DL (ref 8.5–10.1)
CHLORIDE SERPL-SCNC: 105 MMOL/L (ref 97–108)
CO2 SERPL-SCNC: 25 MMOL/L (ref 21–32)
CREAT SERPL-MCNC: 1.42 MG/DL (ref 0.7–1.3)
DIFFERENTIAL METHOD BLD: ABNORMAL
EOSINOPHIL # BLD: 0.4 K/UL (ref 0–0.4)
EOSINOPHIL NFR BLD: 10 % (ref 0–7)
ERYTHROCYTE [DISTWIDTH] IN BLOOD BY AUTOMATED COUNT: 13.8 % (ref 11.5–14.5)
GAMMA GLOB 24H MFR UR ELPH: 12.6 %
GAMMA GLOB SERPL ELPH-MCNC: 0.4 G/DL (ref 0.4–1.8)
GLOBULIN SER CALC-MCNC: 2.1 G/DL (ref 2.2–3.9)
GLOBULIN SER CALC-MCNC: 3.8 G/DL (ref 2–4)
GLUCOSE BLD STRIP.AUTO-MCNC: 124 MG/DL (ref 65–100)
GLUCOSE BLD STRIP.AUTO-MCNC: 175 MG/DL (ref 65–100)
GLUCOSE BLD STRIP.AUTO-MCNC: 185 MG/DL (ref 65–100)
GLUCOSE BLD STRIP.AUTO-MCNC: 187 MG/DL (ref 65–100)
GLUCOSE SERPL-MCNC: 131 MG/DL (ref 65–100)
HCT VFR BLD AUTO: 40.9 % (ref 36.6–50.3)
HGB BLD-MCNC: 12.9 G/DL (ref 12.1–17)
IMM GRANULOCYTES # BLD: 0.1 K/UL (ref 0–0.04)
IMM GRANULOCYTES NFR BLD AUTO: 1 % (ref 0–0.5)
LYMPHOCYTES # BLD: 0.8 K/UL (ref 0.8–3.5)
LYMPHOCYTES NFR BLD: 19 % (ref 12–49)
M PROTEIN MFR UR ELPH: 11.8 %
M PROTEIN SERPL ELPH-MCNC: 0.1 G/DL
MAGNESIUM SERPL-MCNC: 2 MG/DL (ref 1.6–2.4)
MCH RBC QN AUTO: 27.7 PG (ref 26–34)
MCHC RBC AUTO-ENTMCNC: 31.5 G/DL (ref 30–36.5)
MCV RBC AUTO: 87.8 FL (ref 80–99)
MONOCYTES # BLD: 0.4 K/UL (ref 0–1)
MONOCYTES NFR BLD: 10 % (ref 5–13)
NEUTS SEG # BLD: 2.4 K/UL (ref 1.8–8)
NEUTS SEG NFR BLD: 59 % (ref 32–75)
NRBC # BLD: 0 K/UL (ref 0–0.01)
NRBC BLD-RTO: 0 PER 100 WBC
PHOSPHATE SERPL-MCNC: 2.5 MG/DL (ref 2.6–4.7)
PLATELET # BLD AUTO: 137 K/UL (ref 150–400)
PLEASE NOTE:, 133800: ABNORMAL
PMV BLD AUTO: 9.9 FL (ref 8.9–12.9)
POTASSIUM SERPL-SCNC: 4.3 MMOL/L (ref 3.5–5.1)
PROT SERPL-MCNC: 4.4 G/DL (ref 6–8.5)
PROT SERPL-MCNC: 6.1 G/DL (ref 6.4–8.2)
PROT UR-MCNC: 20.3 MG/DL
PTH-INTACT SERPL-MCNC: 39.8 PG/ML (ref 18.4–88)
RBC # BLD AUTO: 4.66 M/UL (ref 4.1–5.7)
SERVICE CMNT-IMP: ABNORMAL
SODIUM SERPL-SCNC: 139 MMOL/L (ref 136–145)
TSH SERPL DL<=0.05 MIU/L-ACNC: 2.11 UIU/ML (ref 0.36–3.74)
WBC # BLD AUTO: 4.1 K/UL (ref 4.1–11.1)

## 2018-11-20 PROCEDURE — 36415 COLL VENOUS BLD VENIPUNCTURE: CPT

## 2018-11-20 PROCEDURE — 82962 GLUCOSE BLOOD TEST: CPT

## 2018-11-20 PROCEDURE — 97116 GAIT TRAINING THERAPY: CPT

## 2018-11-20 PROCEDURE — 97530 THERAPEUTIC ACTIVITIES: CPT

## 2018-11-20 PROCEDURE — 74011250636 HC RX REV CODE- 250/636: Performed by: INTERNAL MEDICINE

## 2018-11-20 PROCEDURE — 84443 ASSAY THYROID STIM HORMONE: CPT

## 2018-11-20 PROCEDURE — 65270000029 HC RM PRIVATE

## 2018-11-20 PROCEDURE — 74011250637 HC RX REV CODE- 250/637: Performed by: INTERNAL MEDICINE

## 2018-11-20 PROCEDURE — 74011000250 HC RX REV CODE- 250: Performed by: INTERNAL MEDICINE

## 2018-11-20 PROCEDURE — 84100 ASSAY OF PHOSPHORUS: CPT

## 2018-11-20 PROCEDURE — C1758 CATHETER, URETERAL: HCPCS

## 2018-11-20 PROCEDURE — 82164 ANGIOTENSIN I ENZYME TEST: CPT

## 2018-11-20 PROCEDURE — 80053 COMPREHEN METABOLIC PANEL: CPT

## 2018-11-20 PROCEDURE — 83735 ASSAY OF MAGNESIUM: CPT

## 2018-11-20 PROCEDURE — 83970 ASSAY OF PARATHORMONE: CPT

## 2018-11-20 PROCEDURE — 85025 COMPLETE CBC W/AUTO DIFF WBC: CPT

## 2018-11-20 RX ORDER — SODIUM CHLORIDE 9 MG/ML
75 INJECTION, SOLUTION INTRAVENOUS CONTINUOUS
Status: DISCONTINUED | OUTPATIENT
Start: 2018-11-20 | End: 2018-11-27 | Stop reason: HOSPADM

## 2018-11-20 RX ADMIN — SODIUM CHLORIDE 150 ML/HR: 900 INJECTION, SOLUTION INTRAVENOUS at 22:58

## 2018-11-20 RX ADMIN — SENNOSIDES 8.6 MG: 8.6 TABLET, FILM COATED ORAL at 08:59

## 2018-11-20 RX ADMIN — ISOSORBIDE MONONITRATE 120 MG: 60 TABLET ORAL at 07:12

## 2018-11-20 RX ADMIN — MELATONIN TAB 3 MG 3 MG: 3 TAB at 22:57

## 2018-11-20 RX ADMIN — CYANOCOBALAMIN 1000 MCG: 1000 INJECTION, SOLUTION INTRAMUSCULAR at 08:59

## 2018-11-20 RX ADMIN — Medication 10 ML: at 22:58

## 2018-11-20 RX ADMIN — POLYETHYLENE GLYCOL 3350 17 G: 17 POWDER, FOR SOLUTION ORAL at 08:59

## 2018-11-20 RX ADMIN — Medication 10 ML: at 07:13

## 2018-11-20 RX ADMIN — PANTOPRAZOLE SODIUM 40 MG: 40 TABLET, DELAYED RELEASE ORAL at 07:12

## 2018-11-20 RX ADMIN — ASPIRIN 81 MG: 81 TABLET, COATED ORAL at 22:57

## 2018-11-20 RX ADMIN — LOSARTAN POTASSIUM 25 MG: 50 TABLET, FILM COATED ORAL at 22:57

## 2018-11-20 RX ADMIN — APIXABAN 2.5 MG: 2.5 TABLET, FILM COATED ORAL at 18:18

## 2018-11-20 RX ADMIN — SODIUM CHLORIDE 150 ML/HR: 900 INJECTION, SOLUTION INTRAVENOUS at 08:59

## 2018-11-20 RX ADMIN — MIRTAZAPINE 7.5 MG: 15 TABLET, FILM COATED ORAL at 22:58

## 2018-11-20 RX ADMIN — APIXABAN 2.5 MG: 2.5 TABLET, FILM COATED ORAL at 08:59

## 2018-11-20 NOTE — PROGRESS NOTES
[facility] [place] at [address], [address] [id number] [facility] W: [phone]  F: [phone] Reason for Visit:  
Lisandro Jones is a 80 y.o. male who is seen in consultation at the request of Dr. Vaibhav Sinha for evaluation of leukopenia and thrombocytopenia. Hematology / Oncology Treatment History:  
History of prostate cancer in 2010 (s/p surgery) and RCC (s/p partial L nephrectomy) History of Present Illness:  
Presented to ED on 11/14 with chills, cough, weakness, starting that day. Dark urine present. Has dementia at baseline Interval History:  
 
Sitting up in chair at bedside. Grandson helping to feed pt. Wife at bedside. Past Medical History:  
Diagnosis Date  Arthritis  CAD (coronary artery disease) Angina Jan 2011, PCI LAD with multilink stent 4.0x12mm;  also cath at [place] 7/13 -- no stents at that time  CKD (chronic kidney disease), stage III  Cochlear implant in place 2/3/2017  Dementia  DM type 2 causing renal disease ([address]  DM type 2 causing vascular disease ([address]  GERD (gastroesophageal reflux disease)  HTN (hypertension)  Hypothyroidism  SAMUEL (obstructive sleep apnea)   
 not using CPAP  
 PAF (paroxysmal atrial fibrillation) ([address] [id number] score = 7  
 Prostate cancer ([address]  Renal cell cancer ([address] Stage 4 Renal Cell Cancer. Partial omentectomy 11/2/2015 with Dr. Birdie Montemayor: omental mass-metastatic renal cell carcinoma  Renal mass, left [address] LEFT PARTIAL NEPHRECTOMY 12/19/13;   
  
Past Surgical History:  
Procedure Laterality Date  CARDIAC SURG PROCEDURE UNLIST  1/2011  
 stent to LAD  HX CHOLECYSTECTOMY  7/13/2012  HX CORONARY STENT PLACEMENT  2011 [address]  2011, 2013 [address]  
 stapedectomy  HX HERNIA REPAIR  1968  
 left inguinal  
 HX LITHOTRIPSY  8/2015  HX ORTHOPAEDIC  1994  
 rotator cuff repair  HX ORTHOPAEDIC    
 cervical disc removed  HX PROSTATECTOMY    
 surgery intervention [facility]  HX UROLOGICAL    
 valvular implant prevent incontinence  HX UROLOGICAL    
 insert gel to help with incontinence  HX UROLOGICAL  2013  
 left partial nephrectomy  HX UROLOGICAL  2015 CT guided biopsy of abdominal lymph nodes Social History Tobacco Use  Smoking status: Former Smoker Packs/day: 0.10 Years: 23.00 Pack years: 2.30 Last attempt to quit: 12/3/1975 Years since quittin.9  Smokeless tobacco: Never Used Substance Use Topics  Alcohol use: No  
  Alcohol/week: 0.0 oz Family History Problem Relation Age of Onset  Stroke Father  Cancer Father   
     prostate  Diabetes Sister 2 sisters  Hypertension Sister 2 sisters  Diabetes Brother  Stroke Brother  Diabetes Brother   
     all brothers  Stroke Brother  Kidney Disease Brother  Heart Attack Brother  Hypertension Other Current Facility-Administered Medications Medication Dose Route Frequency  0.9% sodium chloride infusion  150 mL/hr IntraVENous CONTINUOUS  
 cyanocobalamin (VITAMIN B12) injection 1,000 mcg  1,000 mcg IntraMUSCular DAILY  bisacodyl (DULCOLAX) suppository 10 mg  10 mg Rectal DAILY PRN  
 apixaban (ELIQUIS) tablet 2.5 mg  2.5 mg Oral BID  aspirin delayed-release tablet 81 mg  81 mg Oral QHS  isosorbide mononitrate ER (IMDUR) tablet 120 mg  120 mg Oral 7am  
 losartan (COZAAR) tablet 25 mg  25 mg Oral QHS  melatonin tablet 3 mg  3 mg Oral QHS  mirtazapine (REMERON) tablet 7.5 mg  7.5 mg Oral QHS  polyethylene glycol (MIRALAX) packet 17 g  17 g Oral DAILY  senna (SENOKOT) tablet 8.6 mg  1 Tab Oral DAILY  pantoprazole (PROTONIX) tablet 40 mg  40 mg Oral ACB  sodium chloride (NS) flush 5-10 mL  5-10 mL IntraVENous Q8H  
  sodium chloride (NS) flush 5-10 mL  5-10 mL IntraVENous PRN  
 acetaminophen (TYLENOL) tablet 650 mg  650 mg Oral Q4H PRN  prochlorperazine (COMPAZINE) injection 10 mg  10 mg IntraVENous Q6H PRN  
 insulin lispro (HUMALOG) injection   SubCUTAneous AC&HS  
 glucose chewable tablet 16 g  4 Tab Oral PRN  
 dextrose (D50W) injection syrg 12.5-25 g  25-50 mL IntraVENous PRN  
 glucagon (GLUCAGEN) injection 1 mg  1 mg IntraMUSCular PRN Allergies Allergen Reactions  Latex Rash, Swelling and Contact Dermatitis  Atorvastatin Unknown (comments) Per patient's PCP allergy list- No reaction specified  Ezetimibe Myalgia  Lisinopril Cough  Metoprolol Other (comments) Bradycardia  Morphine Other (comments) Hallucinations and Nausea  Nitrofurantoin Rash \"Blood blisters/rash\" per patient's family  Rosuvastatin Myalgia  Simvastatin Myalgia Review of Systems: A complete review of systems was obtained, negative except as described above. Physical Exam:  
 
Visit Vitals /64 (BP 1 Location: Left arm, BP Patient Position: Sitting) Pulse 76 Temp 98.1 °F (36.7 °C) Resp 18 Ht 5' 7\" (1.702 m) Wt 80.7 kg (178 lb) SpO2 95% BMI 27.88 kg/m² ECOG PS: 3-4 General: No distress Eyes: PERRLA, anicteric sclerae HENT: Atraumatic with normal appearance of ears and nose; OP clear Neck: Supple; no thyromegaly Lymphatic: No cervical, supraclavicular, or axillary adenopathy Respiratory: CTAB, normal respiratory effort CV: Normal rate, regular rhythm, no murmurs, no peripheral edema GI: Soft, nontender, nondistended, no masses, no hepatomegaly, no splenomegaly MS: Digits without clubbing or cyanosis. Skin: No rashes, ecchymoses, or petechiae. Normal temperature, turgor, and texture. Results:  
 
Lab Results Component Value Date/Time  WBC 4.1 11/20/2018 06:18 AM  
 HGB 12.9 11/20/2018 06:18 AM  
 HCT 40.9 11/20/2018 06:18 AM  
 PLATELET 496 (L) 54/17/5362 06:18 AM  
 MCV 87.8 11/20/2018 06:18 AM  
 ABS. NEUTROPHILS 2.4 11/20/2018 06:18 AM  
 
Lab Results Component Value Date/Time Sodium 139 11/20/2018 06:18 AM  
 Potassium 4.3 11/20/2018 06:18 AM  
 Chloride 105 11/20/2018 06:18 AM  
 CO2 25 11/20/2018 06:18 AM  
 Glucose 131 (H) 11/20/2018 06:18 AM  
 BUN 15 11/20/2018 06:18 AM  
 Creatinine 1.42 (H) 11/20/2018 06:18 AM  
 GFR est AA 58 (L) 11/20/2018 06:18 AM  
 GFR est non-AA 48 (L) 11/20/2018 06:18 AM  
 Calcium 11.7 (H) 11/20/2018 06:18 AM  
 Glucose (POC) 187 (H) 11/20/2018 11:08 AM  
 Creatinine (POC) 1.5 (H) 11/26/2013 09:55 AM  
 
Lab Results Component Value Date/Time Bilirubin, total 0.7 11/20/2018 06:18 AM  
 ALT (SGPT) 18 11/20/2018 06:18 AM  
 AST (SGOT) 12 (L) 11/20/2018 06:18 AM  
 Alk. phosphatase 178 (H) 11/20/2018 06:18 AM  
 Protein, total 6.1 (L) 11/20/2018 06:18 AM  
 Albumin 2.3 (L) 11/20/2018 06:18 AM  
 Globulin 3.8 11/20/2018 06:18 AM  
 
11/15/18 CT c/a/p wo contrast 
FINDINGS: 
LOWER NECK: The visualized portions of the thyroid and structures of the lower 
neck are within normal limits. CHEST: 
The absence of intravenous contrast material reduces the sensitivity for 
evaluation of the mediastinal structures. Lungs: There is interstitial disease with pulmonary fibrosis in the periphery of 
the lungs which is unchanged. The lungs are clear of mass, nodule, airspace 
disease or acute edema. Pleura: There is no pleural effusion or pneumothorax. Aorta: The aorta is atherosclerotic and has a normal contour without evidence of 
aneurysm. There is extensive coronary artery calcification which is unchanged. Mediastinum: There is no mediastinal or hilar adenopathy or mass. Bones and soft tissues: The bones and soft tissues of the chest wall are normal. 
  
ABDOMEN: 
The absence of intravenous contrast material reduces the sensitivity for 
evaluation of the solid parenchymal organs of the abdomen. Liver: The liver is normal in size and contour with no focal abnormality. Gallbladder and bile ducts: There are clips in the gallbladder fossa and the 
gallbladder is absent. Spleen: No abnormality. Pancreas: No abnormality. Adrenal glands: No abnormality. Kidneys: There is a 4.2 x 3.3 x 3.7 cm left renal cyst which is unchanged and 
there is some radiopaque material in the left kidney from prior surgery. PELVIS: 
Reproductive organs: The prostate gland is absent. There are surgical clips in 
the pelvis. The seminal vesicles are symmetrical. 
Bladder: No abnormality. BOWEL AND MESENTERY: The small bowel is normal. There is no mesenteric mass or 
adenopathy. The appendix is not identified. There are diverticula of the colon. PERITONEUM: There is no ascites or free intraperitoneal air. RETROPERITONEUM: The aorta is atherosclerotic and tapers without aneurysm There 
is no retroperitoneal adenopathy or mass. There is no pelvic mass or adenopathy. BONES AND SOFT TISSUES: There are degenerative changes of the spine. IMPRESSION IMPRESSION:  
1. No acute abnormality and no change. 2. Pulmonary fibrosis. 3. Atherosclerosis with coronary artery calcification. 4. Atherosclerotic abdominal aorta without aneurysm. 5. Status post cholecystectomy. 6. Status post prostatectomy. 7. Status post left renal surgery with left renal cyst. 
8. Diverticulosis. 9. Thoracolumbar spondylosis. 11/17/18 abd US IMPRESSION:   
1. No visible abnormality. Assessment and Recommendations:  
1. Thrombocytopenia:  Continues to improve. Appears chronic.   
folate Drop may have been due to viral illness. b12 borderline low;  homocysteine elevated and MMA pending. Hospitalist has started B12 replacement. Will monitor 2. Leukopenia:  Resolved on recheck. Will monitor 3. Hypercalcemia:  Present for at least 2 months; IVF; PTH and PTHrp pending; PSA 0. No obvious malignancy on scans.   Does have history of hyperparathyroidism per wife Plan reviewed with Dr Jesu Thomas Signed By: Yaneth Morales NP

## 2018-11-20 NOTE — PROGRESS NOTES
Problem: Patient Education: Go to Patient Education Activity Goal: Patient/Family Education 
physical Therapy TREATMENT Patient: Nidhi Beltran (80 y.o. male) Date: 11/20/2018 Diagnosis: Viral illness Viral illness Viral illness Precautions: Fall Chart, physical therapy assessment, plan of care and goals were reviewed. ASSESSMENT: 
Pt comes to sit with max assist.Pt sits on EOB with CGA to min assist.Pt to stand with mod to max assist.Pt is russell unsteady standing leaning to the left and posterior. Pt ambulated to restroom very slowly. Pt struggled to advance feet. Pt had a bowel movement during ambulating 10ft with RW mod to max assist.Pt was sat on commode as NA assisted PT with cleaning pt and floor. .Pt ambulated 10ft to chair with RW mod to max assist.Pt left sitting in chair with alarm in place. Pt was notably weaker today. Progress slow Continue goals. Progression toward goals: 
[]    Improving appropriately and progressing toward goals 
[]    Improving slowly and progressing toward goals 
[]    Not making progress toward goals and plan of care will be adjusted PLAN: 
Patient continues to benefit from skilled intervention to address the above impairments. Continue treatment per established plan of care. Discharge Recommendations:  Kevin Dumont Further Equipment Recommendations for Discharge:  rolling walker SUBJECTIVE:  
 
 
OBJECTIVE DATA SUMMARY:  
Critical Behavior: 
Neurologic State: Alert, Confused Orientation Level: Oriented to person, Oriented to place, Disoriented to time, Disoriented to situation Cognition: Follows commands Safety/Judgement: Lack of insight into deficits Functional Mobility Training: 
Bed Mobility: 
  
Supine to Sit: Maximum assistance Transfers: 
Sit to Stand: Maximum assistance; Moderate assistance Stand to Sit: Moderate assistance Balance: 
Sitting: High guard Sitting - Static: Fair (occasional) Standing: Impaired; With support Standing - Static: Constant supportAmbulation/Gait Training: 
Distance (ft): 20 Feet (ft) Assistive Device: Gait belt;Walker, rolling Ambulation - Level of Assistance: Moderate assistance;Maximum assistance Gait Abnormalities: Decreased step clearance; Path deviations Base of Support: Shift to left Speed/Sabrina: Delayed Step Length: Left lengthened;Right lengthened Pain: 
Pain Scale 1: Numeric (0 - 10) Pain Intensity 1: 0 Activity Tolerance:  
Pt tolerated treatment fair. Please refer to the flowsheet for vital signs taken during this treatment. After treatment:  
[x]    Patient left in no apparent distress sitting up in chair 
[]    Patient left in no apparent distress in bed 
[]    Call bell left within reach 
[]    Nursing notified 
[]    Caregiver present 
[]    Bed alarm activated COMMUNICATION/COLLABORATION:  
The patients plan of care was discussed with: Physical Therapist 
 
Artemio Montero PTA Time Calculation: 53 mins

## 2018-11-20 NOTE — PROGRESS NOTES
SLP missed both meals today. Patient up in chair, but wife reports that he slept all afternoon yesterday. He is still very weak and not able to feed himself like last week. Not ready for diet upgrade. Will continue to follow.

## 2018-11-20 NOTE — PROGRESS NOTES
Problem: Falls - Risk of 
Goal: *Absence of Falls Document Robin Crawleys Fall Risk and appropriate interventions in the flowsheet. Outcome: Progressing Towards Goal 
Fall Risk Interventions: 
Mobility Interventions: Bed/chair exit alarm, Patient to call before getting OOB, Utilize walker, cane, or other assistive device, Utilize gait belt for transfers/ambulation Mentation Interventions: Bed/chair exit alarm, Door open when patient unattended, Reorient patient Medication Interventions: Bed/chair exit alarm, Patient to call before getting OOB, Teach patient to arise slowly, Utilize gait belt for transfers/ambulation Elimination Interventions: Bed/chair exit alarm, Call light in reach, Patient to call for help with toileting needs

## 2018-11-20 NOTE — PROGRESS NOTES
Ca trending up, corrected Ca 13.1. Start IVF. Nephrology consult. PTH WNR. PTHrP still pending. MM workup underway. D 25-hydroxy low. D 1, 25-dihydroxy pending. Discussed with Monty BARTHOLOMEW, no records available for Mr. Yessy Alas ,s/p stapedectomy in 1989, with wire placed? Per ENT, prosthesis placed back then may NOT be MRI-compatible. Will try to attempt to track down OR records, if possible.

## 2018-11-20 NOTE — PROGRESS NOTES
Palliative Medicine Consult Patient Name: Merly Salgado. YOB: 1935 Date of Initial Consult: 11/15/18 Reason for Consult: Care decisions Requesting Provider: Dr. Patric Henderson Primary Care Physician: Chance Ornelas MD 
  
 SUMMARY:  
Merly Sanchez is a 80 y.o. with a past history of TIA, RCC, CAD, CKD, DM, HTN, dementia(mild), who was admitted on 11/14/2018 from home with a diagnosis of probable viral illness/elevated bilirubin. Current medical issues leading to Palliative Medicine involvement include: care decisions. Chart reviewed/HPI-patient admitted to the hospital with suspected viral illness. Patient found to have bilirubin in urine and bilirubin of 3.2. CT scan of abdomen/pelvis without any acute process. Per his wife(via phone), he still ambulates with a walker, able to converse, feeds and dresses himself. Short term memory is the biggest problem. SH- for 51 years, 4 children. PALLIATIVE DIAGNOSES:  
1. GOC discussion 2. DNR discussion 3. ACP discussion 4. Debility 5. Elevated bilirubin 6. Dementia-appears mild-moderate per family PLAN:  
1. Met with patient,, grandson, and spouse. Reviewed current medical issues->rising calcium of unclear source. Patient is a little more awake overall today but overall his wife still worried about what these results could show. Discussed possible causes of elevated calcium. He is back on IVF 2. GOC-continue full restorative measures for now. Return home when feeling better. Ideally, family would rather him home than a SNF but PT/OT recommending SNF at time of discharge right now 3. AMD-in the chart with spouse [place] as primary MPOA, [place](son) as secondary and Mariana Granados as tertiary 4. Code status-reviewed resuscitation with spouse at prior visit and confirmed DNAR/DNI. DDNR completed today-original and 3 copies given to spouse and copy placed in chart. 5. Symptom management-no acute symptoms for us to manage 6. Psychosocial-good support from family. Children live locally but [facility] apparently provides the most support. No spiritual issues. 7. Discussed with bedside nurse, CM, Dr. David Yuan and Dr. Kathy Welch 8. Initial consult note routed to primary continuity provider 9. Communicated plan of care with: Palliative IDT 
 
 
 GOALS OF CARE / TREATMENT PREFERENCES:  
[====Goals of Care====] GOALS OF CARE: 
Patient/Health Care Proxy Stated Goals: Prolong life TREATMENT PREFERENCES:  
Code Status: DNR Advance Care Planning: 
Advance Care Planning 11/15/2018 Patient's Healthcare Decision Maker is: -  
Primary Decision Maker Name -  
Primary Decision Maker Phone Number -  
Primary Decision Maker Relationship to Patient -  
Confirm Advance Directive Yes, on file Does the patient have other document types - Medical Interventions: Limited additional interventions Other Instructions: The palliative care team has discussed with patient / health care proxy about goals of care / treatment preferences for patient. 
[====Goals of Care====] HISTORY:  
 
History obtained from: chart, daughter, patient, spouse CHIEF COMPLAINT: weakness HPI/SUBJECTIVE: The patient is:  
[x] Verbal and participatory [] Non-participatory due to:  
Patient is awake. Alert to person, place. Denies any pain. Ready to go home 11/16-patient appears tired, sleeping during a lot of the visit 11/20-patient sitting in chair. Denies any pain. A little more awake Clinical Pain Assessment (nonverbal scale for severity on nonverbal patients):  
Clinical Pain Assessment Severity: 0 Adult Nonverbal Pain Scale Face: No particular expression or smile Activity (Movement): Lying quietly, normal position Guarding: Lying quietly, no positioning of hands over areas of body Physiology (Vital Signs): Stable vital signs Respiratory: Baseline RR/SpO2 compliant with ventilator Total Score: 0 
 
 
 FUNCTIONAL ASSESSMENT:  
 
 Palliative Performance Scale (PPS): PPS: 60 PSYCHOSOCIAL/SPIRITUAL SCREENING:  
 
Advance Care Planning: 
Advance Care Planning 11/15/2018 Patient's Healthcare Decision Maker is: -  
Primary Decision Maker Name -  
Primary Decision Maker Phone Number -  
Primary Decision Maker Relationship to Patient -  
Confirm Advance Directive Yes, on file Does the patient have other document types - Any spiritual / Faith concerns: 
[] Yes /  [x] No 
 
Caregiver Burnout: 
[] Yes /  [x] No /  [] No Caregiver Present Anticipatory grief assessment:  
[x] Normal  / [] Maladaptive ESAS Anxiety: Anxiety: 0 
 
ESAS Depression: Depression: 0 REVIEW OF SYSTEMS:  
 
Positive and pertinent negative findings in ROS are noted above in HPI. The following systems were [x] reviewed / [] unable to be reviewed as noted in HPI Other findings are noted below. Systems: constitutional, ears/nose/mouth/throat, respiratory, gastrointestinal, genitourinary, musculoskeletal, integumentary, neurologic, psychiatric, endocrine. Positive findings noted below. Modified ESAS Completed by: provider Fatigue: 1 Drowsiness: 0 Depression: 0 Pain: 0 Anxiety: 0 Nausea: 0 Anorexia: 0 Dyspnea: 0 Constipation: No  
  Stool Occurrence(s): 1 PHYSICAL EXAM:  
 
From RN flowsheet: 
Wt Readings from Last 3 Encounters:  
11/14/18 178 lb (80.7 kg) 09/12/18 180 lb (81.6 kg) 08/25/17 195 lb (88.5 kg) Blood pressure 144/66, pulse 68, temperature 98.4 °F (36.9 °C), resp. rate 18, height 5' 7\" (1.702 m), weight 178 lb (80.7 kg), SpO2 97 %. Pain Scale 1: Numeric (0 - 10) Pain Intensity 1: 0 Last bowel movement, if known:  
 
Constitutional: alert to person only, NAD Eyes: pupils equal, minimal icterus today ENMT: no nasal discharge, moist mucous membranes Cardiovascular: regular rhythm, distal pulses intact Respiratory: breathing not labored, symmetric Gastrointestinal: soft non-tender, +bowel sounds Musculoskeletal: no deformity, no tenderness to palpation Skin: warm, dry, minimal jaundice Neurologic: following commands, moving all extremities Psychiatric: full affect, no hallucinations Other: 
 
 
 HISTORY:  
 
Principal Problem: 
  Viral illness (11/14/2018) Active Problems: 
  CAD (coronary artery disease) () Overview: Angina Jan 2011, PCI unknown vessel at [place], no MI 
 
  CKD (chronic kidney disease) stage 3, GFR 30-59 ml/min ([facility]) (12/10/2013) Dementia () 
 
  DM type 2 causing renal disease ([facility]) () Gingival enlargement (11/20/2018) Past Medical History:  
Diagnosis Date  Arthritis  CAD (coronary artery disease) Angina Jan 2011, PCI LAD with multilink stent 4.0x12mm;  also cath at [place] 7/13 -- no stents at that time  CKD (chronic kidney disease), stage III  Cochlear implant in place 2/3/2017  Dementia  DM type 2 causing renal disease ([address]  DM type 2 causing vascular disease ([address]  GERD (gastroesophageal reflux disease)  HTN (hypertension)  Hypothyroidism  SAMUEL (obstructive sleep apnea)   
 not using CPAP  
 PAF (paroxysmal atrial fibrillation) ([address] [id number] score = 7  
 Prostate cancer ([address]  Renal cell cancer ([address] Stage 4 Renal Cell Cancer. Partial omentectomy 11/2/2015 with Dr. Suyapa Art: omental mass-metastatic renal cell carcinoma  Renal mass, left [address] LEFT PARTIAL NEPHRECTOMY 12/19/13;   
  
Past Surgical History:  
Procedure Laterality Date  CARDIAC SURG PROCEDURE UNLIST  1/2011  
 stent to LAD  HX CHOLECYSTECTOMY  7/13/2012  HX CORONARY STENT PLACEMENT  2011 [address]  2011, 2013 [address]  
 stapedectomy  HX HERNIA REPAIR  1968  
 left inguinal  
 HX LITHOTRIPSY  8/2015  HX ORTHOPAEDIC  1994  
 rotator cuff repair  HX ORTHOPAEDIC  1994  
 cervical disc removed [address] surgery intervention [address] [place]  HX UROLOGICAL    
 valvular implant prevent incontinence  HX UROLOGICAL    
 insert gel to help with incontinence  HX UROLOGICAL  2013  
 left partial nephrectomy  HX UROLOGICAL  2015 CT guided biopsy of abdominal lymph nodes Family History Problem Relation Age of Onset  Stroke Father  Cancer Father   
     prostate  Diabetes Sister 2 sisters  Hypertension Sister 2 sisters  Diabetes Brother  Stroke Brother  Diabetes Brother   
     all brothers  Stroke Brother  Kidney Disease Brother  Heart Attack Brother  Hypertension Other History reviewed, no pertinent family history. Social History Tobacco Use  Smoking status: Former Smoker Packs/day: 0.10 Years: 23.00 Pack years: 2.30 Last attempt to quit: 12/3/1975 Years since quittin.9  Smokeless tobacco: Never Used Substance Use Topics  Alcohol use: No  
  Alcohol/week: 0.0 oz Allergies Allergen Reactions  Latex Rash, Swelling and Contact Dermatitis  Atorvastatin Unknown (comments) Per patient's PCP allergy list- No reaction specified  Ezetimibe Myalgia  Lisinopril Cough  Metoprolol Other (comments) Bradycardia  Morphine Other (comments) Hallucinations and Nausea  Nitrofurantoin Rash \"Blood blisters/rash\" per patient's family  Rosuvastatin Myalgia  Simvastatin Myalgia Current Facility-Administered Medications Medication Dose Route Frequency  0.9% sodium chloride infusion  150 mL/hr IntraVENous CONTINUOUS  
 cyanocobalamin (VITAMIN B12) injection 1,000 mcg  1,000 mcg IntraMUSCular DAILY  bisacodyl (DULCOLAX) suppository 10 mg  10 mg Rectal DAILY PRN  
 apixaban (ELIQUIS) tablet 2.5 mg  2.5 mg Oral BID  aspirin delayed-release tablet 81 mg  81 mg Oral QHS  isosorbide mononitrate ER (IMDUR) tablet 120 mg  120 mg Oral 7am  
  losartan (COZAAR) tablet 25 mg  25 mg Oral QHS  melatonin tablet 3 mg  3 mg Oral QHS  mirtazapine (REMERON) tablet 7.5 mg  7.5 mg Oral QHS  polyethylene glycol (MIRALAX) packet 17 g  17 g Oral DAILY  senna (SENOKOT) tablet 8.6 mg  1 Tab Oral DAILY  pantoprazole (PROTONIX) tablet 40 mg  40 mg Oral ACB  sodium chloride (NS) flush 5-10 mL  5-10 mL IntraVENous Q8H  
 sodium chloride (NS) flush 5-10 mL  5-10 mL IntraVENous PRN  
 acetaminophen (TYLENOL) tablet 650 mg  650 mg Oral Q4H PRN  prochlorperazine (COMPAZINE) injection 10 mg  10 mg IntraVENous Q6H PRN  
 insulin lispro (HUMALOG) injection   SubCUTAneous AC&HS  
 glucose chewable tablet 16 g  4 Tab Oral PRN  
 dextrose (D50W) injection syrg 12.5-25 g  25-50 mL IntraVENous PRN  
 glucagon (GLUCAGEN) injection 1 mg  1 mg IntraMUSCular PRN  
 
 
 
 LAB AND IMAGING FINDINGS:  
 
Lab Results Component Value Date/Time WBC 4.1 11/20/2018 06:18 AM  
 HGB 12.9 11/20/2018 06:18 AM  
 PLATELET 499 (L) 88/68/2315 06:18 AM  
 
Lab Results Component Value Date/Time Sodium 139 11/20/2018 06:18 AM  
 Potassium 4.3 11/20/2018 06:18 AM  
 Chloride 105 11/20/2018 06:18 AM  
 CO2 25 11/20/2018 06:18 AM  
 BUN 15 11/20/2018 06:18 AM  
 Creatinine 1.42 (H) 11/20/2018 06:18 AM  
 Calcium 10.7 (H) 11/20/2018 01:01 PM  
 Magnesium 2.0 11/20/2018 06:18 AM  
 Phosphorus 2.5 (L) 11/20/2018 06:18 AM  
  
Lab Results Component Value Date/Time AST (SGOT) 12 (L) 11/20/2018 06:18 AM  
 Alk. phosphatase 178 (H) 11/20/2018 06:18 AM  
 Protein, total 6.1 (L) 11/20/2018 06:18 AM  
 Albumin 2.3 (L) 11/20/2018 06:18 AM  
 Globulin 3.8 11/20/2018 06:18 AM  
 
Lab Results Component Value Date/Time INR 1.1 02/02/2017 04:20 PM  
 Prothrombin time 10.9 02/02/2017 04:20 PM  
 aPTT 27.4 02/02/2017 04:20 PM  
  
No results found for: IRON, FE, TIBC, IBCT, PSAT, FERR No results found for: PH, PCO2, PO2 No components found for: [place] Lab Results Component Value Date/Time  07/14/2012 03:00 AM  
 CK - MB 2.8 07/14/2012 03:00 AM  
  
 
 
   
 
Total time: 25 
Counseling / coordination time, spent as noted above: 20 
> 50% counseling / coordination?: yes Prolonged service was provided for  []30 min   []75 min in face to face time in the presence of the patient, spent as noted above. Time Start:  
Time End:  
Note: this can only be billed with 90464 (initial) or 83528 (follow up). If multiple start / stop times, list each separately.

## 2018-11-20 NOTE — PROGRESS NOTES
Aristeo Flores [facility] 
[address] [facility], [address] [place] [address] 
[phone] Medical Progress Note NAME: Rocio Ochoa. :  1935 MRM:  [id number] Date/Time: 2018 Assessment / Plan: Hypercalcemia: Corrected Ca 13 today. Started aggressive IVF, consulted nephrology. Watch for volume overload. May need diuresis. PTH inappropriately normal. 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 PTHrP still pending. MM workup underway. D 25-hydroxy low. D 1, 25-dihydroxy, ACE pending. May need parathyroid US vs NM scan. Lethargy: unclear etiology. May be related to hypercalcemia. Working up bacterial infections, malignancy, MM, etc. So far no evidence of infection including CT c/a/p, UA, RUQ US. No fevers or chills. Viral resp panel negative. Blood cultures NGTD. Chest CT with suggestion of pulmonary fibrosis. Supportive care. Discharge planning, very debilitated, likely needs SNF placement, discussed with pt and family Anemia: mild, blood suggest vitamin B12 def. Started IM injections. Follow up on MMA (expect it to be elevated like homocysteine in B12 def)   
 
CAD (coronary artery disease) () / PAF: Denies chest pain or sob. Continue ASA, imdur, losartan, and amlodipine. Continue apixaban for anticoagulation 
  
Jaundice / hyperbilirubinemia: Resolved. No evidence of mass or biliary obstruction by noncontrast CT.  
RUQ US showed no acute findings. CA 19-9 returned mildly elevated. Appreciate GI input, holding off on MRCP for now. Pt does have prosthesis which may NOT be MRI-compatible. s/p stapedectomy in . Discussed with [facility], no records available for Mr. Albert Heaton. Can attempt to track down OR records, if still needed. Thrombocytopenia / leukopenia: leukopenia resolved on repeat CBC. B12 borderline low/normal. Started b12 repletion as above.  Hem/onc following 
  
CKD (chronic kidney disease) stage 3, GFR 30-59 ml/min ([facility]) (12/10/2013):  Creatinine stable. Workup as above. Follow BMP 
  
Lactic acidemia:  Resolved. Repeated, normal. 
  
Dementia ():  Seems to be at baseline. Monitor. Better mentation for the past 2 days 
  
DM type 2 causing renal disease ([facility]) ():  Controlled. Continue SSI Debility:  Seen by PT/OT, initial rec is for SNF level rehab. Out of bed 
  
  
Total time spent: 65 mins, d/w wife on phone, daughter at bedside. D/w [facility] on phone, GI Dr. Albert Woodall Time spent in the care of this patient including reviewing records, discussing with nursing and/or other providers on the treatment team, obtaining history and examining the patient, and discussing treatment plans. Care Plan discussed with: Patient, Nursing Staff and >50% of time spent in counseling and coordination of care Discussed:  Care Plan Prophylaxis:  Eliquis Disposition:  SNF/LTC Subjective: Chief Complaint:  Follow up weakness Chart/notes/labs/studies reviewed, patient examined at bedside. Feels better. Denies CP, SOB, abdominal pain, fevers, chills Objective:  
 
 
Vitals:  
 
  
Last 24hrs VS reviewed since prior progress note. Most recent are: 
 
Visit Vitals /66 (BP 1 Location: Left arm, BP Patient Position: At rest) Pulse 68 Temp 98.4 °F (36.9 °C) Resp 18 Ht 5' 7\" (1.702 m) Wt 80.7 kg (178 lb) SpO2 97% BMI 27.88 kg/m² SpO2 Readings from Last 6 Encounters:  
11/20/18 97% 09/13/18 97% 08/26/17 92% 07/16/17 94% 02/04/17 98% 03/24/16 97% Intake/Output Summary (Last 24 hours) at 11/20/2018 1712 Last data filed at 11/20/2018 1454 Gross per 24 hour Intake 887.5 ml Output 2000 ml Net -1112.5 ml Exam:  
 
Physical Exam: 
 
Gen:  Well-developed, well-nourished, in no acute distress. Chronically ill-appearing. Pleasant. Wife at bedside HEENT:  Pink conjunctivae, hearing intact to voice, moist mucous membranes Resp:  No accessory muscle use, clear breath sounds without wheezes rales or rhonchi 
Card:  normal S1, S2 without murmurs, thrills or peripheral edema Abd:  Soft, non-tender, non-distended, normoactive bowel sounds are present Skin:  No rashes Neuro: Face symmetric, tongue midline, speech fluent, follows commands appropriately Psych:  awake, alert, oriented to person, place Medications Reviewed: (see below) Lab Data Reviewed: (see below) 
 
______________________________________________________________________ Medications:  
 
Current Facility-Administered Medications Medication Dose Route Frequency  0.9% sodium chloride infusion  150 mL/hr IntraVENous CONTINUOUS  
 cyanocobalamin (VITAMIN B12) injection 1,000 mcg  1,000 mcg IntraMUSCular DAILY  bisacodyl (DULCOLAX) suppository 10 mg  10 mg Rectal DAILY PRN  
 apixaban (ELIQUIS) tablet 2.5 mg  2.5 mg Oral BID  aspirin delayed-release tablet 81 mg  81 mg Oral QHS  isosorbide mononitrate ER (IMDUR) tablet 120 mg  120 mg Oral 7am  
 losartan (COZAAR) tablet 25 mg  25 mg Oral QHS  melatonin tablet 3 mg  3 mg Oral QHS  mirtazapine (REMERON) tablet 7.5 mg  7.5 mg Oral QHS  polyethylene glycol (MIRALAX) packet 17 g  17 g Oral DAILY  senna (SENOKOT) tablet 8.6 mg  1 Tab Oral DAILY  pantoprazole (PROTONIX) tablet 40 mg  40 mg Oral ACB  sodium chloride (NS) flush 5-10 mL  5-10 mL IntraVENous Q8H  
 sodium chloride (NS) flush 5-10 mL  5-10 mL IntraVENous PRN  
 acetaminophen (TYLENOL) tablet 650 mg  650 mg Oral Q4H PRN  prochlorperazine (COMPAZINE) injection 10 mg  10 mg IntraVENous Q6H PRN  
 insulin lispro (HUMALOG) injection   SubCUTAneous AC&HS  
 glucose chewable tablet 16 g  4 Tab Oral PRN  
 dextrose (D50W) injection syrg 12.5-25 g  25-50 mL IntraVENous PRN  
 glucagon (GLUCAGEN) injection 1 mg  1 mg IntraMUSCular PRN Lab Review:  
 
Recent Labs  
  11/20/18 
0618 11/19/18 
0355 11/18/18 
0744 WBC 4.1 4.4 5.0 HGB 12.9 11.8* 12.0*  
HCT 40.9 37.7 36.9 * 106* 99* Recent Labs  
  11/20/18 
1301 11/20/18 
0618 11/19/18 
0355 11/18/18 
7609 NA  --  139 139 144 K  --  4.3 4.3 3.8 CL  --  105 107 106 CO2  --  25 26 26 GLU  --  131* 136* 128* BUN  --  15 19 19 CREA  --  1.42* 1.50* 1.64* CA 10.7* 11.7* 10.5* 9.8 MG  --  2.0 1.8 1.8 PHOS  --  2.5* 2.2* 2.0* ALB  --  2.3* 2.3* 2.1*  
SGOT  --  12* 12* 11* ALT  --  18 22 23 No components found for: [place] No results for input(s): PH, PCO2, PO2, HCO3, FIO2 in the last 72 hours. No results for input(s): INR in the last 72 hours. No lab exists for component: INREXT, INREXT Lab Results Component Value Date/Time Specimen Description: [facility] [address] 12/19/2013 11:52 AM  
 Specimen Description: DM 07/13/2012 04:45 PM  
 
Lab Results Component Value Date/Time Culture result: NO GROWTH 3 DAYS 11/17/2018 12:47 PM  
 Culture result: NO GROWTH 6 DAYS 09/12/2018 04:30 AM  
 Culture result: CITROBACTER FREUNDII (A) 09/09/2018 08:25 PM  
 Culture result:  09/09/2018 08:25 PM  
  (PLEASE DISREGARD THE SUSCEPTIBILITIES ON AMPICILLIN, AMP/SULBACTAM, CEFAZOLIN AND CEFUROXIME REPORTED ON 9/13 AS  SPECIES PER   
AS THESE ARE NOT DRUGS OF CHOICE FOR CITROBACTER) 
  
 
        
___________________________________________________ Attending Physician: Renay Osgood, MD

## 2018-11-20 NOTE — ROUTINE PROCESS
Bedside and Verbal shift change report given to Celestino Rangel RN (oncoming nurse) by Jimi Conde RN (offgoing nurse). Report included the following information SBAR, Kardex, Intake/Output, Recent Results and Quality Measures.

## 2018-11-20 NOTE — PROGRESS NOTES
Bedside shift change report given to Feli Amor (oncoming nurse) by Jan Maldonado (offgoing nurse). Report included the following information SBAR, Kardex, MAR and Recent Results. 1845 Trying to collect the 24 hour urine but the patient is incontinent and the condom catheters continue to fall off. A new condom cath was placed on the patient for the 3rd time today.  Patient put back in bed, new condom cath placed and patient turned to the left. 1135 24 hour urine set up. 1055 Patient is up and in the chair. 5643 Daughter in feeding the patient. No complaints at this time.

## 2018-11-20 NOTE — CONSULTS
[address] YOB: 1935     Assessment & Plan:   1. Hypercalcemia and Hypophosphatemia  · Though PTH is normal, for high normal Ca,it should be low  · Repeat PTH  · 24 hr urine Ca  · H/o stones  · ? 1 PTH Vs 2 PTH  · May need Nuclear parathyroid scan  · IVF for high calcium  · Monitor and replete PO4  · Await PTHrp and paraprotein  2. BRITANY  · Better  · Hypercalcemia and Vial illness contributing  · BETTER  3. Anemia  · HB normal now  4. Hyperbili  5. Viral illness            Subjective:   CHIEF COMPLAIN:ARF  HPI:  80 YRS OLD WM is here with viral illness  We are seeing him for BRITANY,High Ca and low po4. He has CKD: Cr was 1.7 mg in 9 2018  He had high calcium at that time  PTH was done here and it was not low: 40.  He had low PO4. He has remote h/o stone. He denies Tums  He denies Smoking/wt loss. He has DM: HBA1 C6.8  UA:Trace prt, no rbc  CT: no hydro,Kidneys: There is a 4.2 x 3.3 x 3.7 cm left renal cyst which is unchanged and  there is some radiopaque material in the left kidney from prior surgery. Not on thiaizide. Review of Systems  A comprehensive review of systems was negative except for that written in the HPI. Past Medical History:   Diagnosis Date    Arthritis     CAD (coronary artery disease)     Angina Jan 2011, PCI LAD with multilink stent 4.0x12mm;  also cath at [place] 7/13 -- no stents at that time    CKD (chronic kidney disease), stage III     Cochlear implant in place 2/3/2017    Dementia     DM type 2 causing renal disease ([facility]     DM type 2 causing vascular disease ([address]     GERD (gastroesophageal reflux disease)     HTN (hypertension)     Hypothyroidism     SAMUEL (obstructive sleep apnea)     not using CPAP    PAF (paroxysmal atrial fibrillation) (HCC)     [id number] score = 7    Prostate cancer (HCC)     Renal cell cancer ([facility]     Stage 4 Renal Cell Cancer.     Partial omentectomy 11/2/2015 with  Aden: omental mass-metastatic renal cell carcinoma    Renal mass, left     DAVINCI LEFT PARTIAL NEPHRECTOMY 13;       Past Surgical History:   Procedure Laterality Date    CARDIAC SURG PROCEDURE UNLIST  2011    stent to LAD    HX CHOLECYSTECTOMY  2012    HX CORONARY STENT PLACEMENT      HX HEART CATHETERIZATION  ,     HX HEENT      stapedectomy    [address]    left inguinal    HX LITHOTRIPSY  2015    HX ORTHOPAEDIC      rotator cuff repair    HX ORTHOPAEDIC      cervical disc removed    HX PROSTATECTOMY      surgery intervention    HX TONSILLECTOMY      HX UROLOGICAL      valvular implant prevent incontinence    HX UROLOGICAL      insert gel to help with incontinence    HX UROLOGICAL  2013    left partial nephrectomy    HX UROLOGICAL  2015    CT guided biopsy of abdominal lymph nodes       Social History     Socioeconomic History    Marital status:      Spouse name: Not on file    Number of children: Not on file    Years of education: Not on file    Highest education level: Not on file   Social Needs    Financial resource strain: Not on file    Food insecurity - worry: Not on file    Food insecurity - inability: Not on file   Fridge needs - medical: Not on file   Fridge needs - non-medical: Not on file   Occupational History    Not on file   Tobacco Use    Smoking status: Former Smoker     Packs/day: 0.10     Years: 23.00     Pack years: 2.30     Last attempt to quit: 12/3/1975     Years since quittin.9    Smokeless tobacco: Never Used   Substance and Sexual Activity    Alcohol use: No     Alcohol/week: 0.0 oz    Drug use: No    Sexual activity: No     Birth control/protection: None   Other Topics Concern    Not on file   Social History Narrative    Not on file      Family History   Problem Relation Age of Onset    Stroke Father     Cancer Father         prostate    Diabetes Sister         2 sisters    Hypertension Sister         2 sisters    Diabetes Brother     Stroke Brother     Diabetes Brother         all brothers    Stroke Brother     Kidney Disease Brother     Heart Attack Brother     Hypertension Other       Prior to Admission medications    Medication Sig Start Date End Date Taking? Authorizing Provider   trimethoprim (TRIMPEX) 100 mg tablet Take 50 mg by mouth daily. Yes Provider, Historical   insulin NPH (HUMULIN N NPH U-100 INSULIN) 100 unit/mL injection 25 Units by SubCUTAneous route Daily (before breakfast). Yes Provider, Historical   aspirin delayed-release 81 mg tablet Take 81 mg by mouth nightly. Yes Provider, Historical   amLODIPine (NORVASC) 10 mg tablet Take 5 mg by mouth daily. Yes Provider, Historical   cholecalciferol (VITAMIN D3) 1,000 unit tablet Take 2,000 Units by mouth nightly. Yes Provider, Historical   losartan (COZAAR) 25 mg tablet Take 25 mg by mouth nightly. Yes Provider, Historical   nystatin (MYCOSTATIN) powder Apply  to affected area daily as needed for Other (Itching/Irritation- Groin area). To groin after showering and drying    Yes Provider, Historical   docusate sodium (COLACE) 100 mg capsule Take 100 mg by mouth daily as needed. Yes Provider, Historical   senna (SENNA) 8.6 mg tablet Take 1 Tab by mouth daily as needed for Constipation. Yes Provider, Historical   desonide (TRIDESILON) 0.05 % cream Apply  to affected area daily as needed for Skin Irritation. Yes Provider, Historical   isosorbide mononitrate ER (IMDUR) 120 mg CR tablet Take 1 Tab by mouth every morning. 2/4/17  Yes Benjie Mclean MD   melatonin 3 mg tablet Take 3 mg by mouth nightly. Yes Provider, Historical   polyethylene glycol (MIRALAX) 17 gram packet Take 17 g by mouth daily as needed (Constipation). Yes Provider, Historical   mirtazapine (REMERON) 15 mg tablet Take 7.5 mg by mouth nightly.    Yes Provider, Historical   apixaban (ELIQUIS) 2.5 mg tablet Take 2.5 mg by mouth two (2) times a day. Yes Provider, Historical   OTHER,NON-FORMULARY, Take 1 Cap by mouth nightly. Zafar May dietary supplement   Yes Provider, Historical   omeprazole (PRILOSEC) 20 mg capsule Take 20 mg by mouth Daily (before breakfast). Yes Provider, Historical   nitroglycerin (NITROSTAT) 0.4 mg SL tablet 0.4 mg by SubLINGual route every five (5) minutes as needed. Yes Other, MD Chance     Allergies   Allergen Reactions    Latex Rash, Swelling and Contact Dermatitis    Atorvastatin Unknown (comments)     Per patient's PCP allergy list- No reaction specified    Ezetimibe Myalgia    Lisinopril Cough    Metoprolol Other (comments)     Bradycardia    Morphine Other (comments)     Hallucinations and Nausea    Nitrofurantoin Rash     \"Blood blisters/rash\" per patient's family    Rosuvastatin Myalgia    Simvastatin Myalgia       Objective:     Vitals:  Blood pressure 133/64, pulse 76, temperature 98.1 °F (36.7 °C), resp. rate 18, height 5' 7\" (1.702 m), weight 80.7 kg (178 lb), SpO2 95 %. Temp (24hrs), Av.9 °F (36.6 °C), Min:97.1 °F (36.2 °C), Max:98.6 °F (37 °C)      Intake and Output:   0701 -  1900  In: 65 [I.V.:65]  Out: 800 [Urine:800]   1901 -  0700  In: 4407.5 [P.O.:120;  I.V.:4287.5]  Out: 2550 [Urine:2550]    Physical Exam:                Patient is intubated:  no    Physical Examination:   GENERAL ASSESSMENT: cachetic  SKIN: dry skin  HEAD: normocephalic  EYES: normal eyes  NECK: normal  CHEST: normal air exchange, no rales, no rhonchi, no wheezes  HEART: regular rate and rhythm, normal S1/S2  ABDOMEN: soft, non-distended, no masses  :Barber: yes  EXTREMITY: no joint swelling  NEURO: gross motor exam normal by observation      ECG/rhythm[de-identified] Rev:yes  Xray/CT/US/MRI REV:yes  Data Review   Recent Results (from the past 72 hour(s))   GLUCOSE, POC    Collection Time: 18 12:12 PM   Result Value Ref Range    Glucose (POC) 179 (H) 65 - 100 mg/dL Performed by Warren Barnett (PCT)    CULTURE, BLOOD    Collection Time: 11/17/18 12:47 PM   Result Value Ref Range    Special Requests: NO SPECIAL REQUESTS      Culture result: NO GROWTH 3 DAYS     LACTIC ACID    Collection Time: 11/17/18 12:47 PM   Result Value Ref Range    Lactic acid 1.2 0.4 - 2.0 MMOL/L   URINALYSIS W/ REFLEX CULTURE    Collection Time: 11/17/18  1:17 PM   Result Value Ref Range    Color YELLOW/STRAW      Appearance CLEAR CLEAR      Specific gravity 1.011 1.003 - 1.030      pH (UA) 7.0 5.0 - 8.0      Protein TRACE (A) NEG mg/dL    Glucose NEGATIVE  NEG mg/dL    Ketone NEGATIVE  NEG mg/dL    Bilirubin NEGATIVE  NEG      Blood TRACE (A) NEG      Urobilinogen 1.0 0.2 - 1.0 EU/dL    Nitrites NEGATIVE  NEG      Leukocyte Esterase NEGATIVE  NEG      WBC 0-4 0 - 4 /hpf    RBC 0-5 0 - 5 /hpf    Epithelial cells FEW FEW /lpf    Bacteria NEGATIVE  NEG /hpf    UA:UC IF INDICATED CULTURE NOT INDICATED BY UA RESULT CNI      Hyaline cast 0-2 0 - 5 /lpf   GLUCOSE, POC    Collection Time: 11/17/18  4:27 PM   Result Value Ref Range    Glucose (POC) 160 (H) 65 - 100 mg/dL    Performed by Warren Barnett (PCT)    GLUCOSE, POC    Collection Time: 11/17/18  9:43 PM   Result Value Ref Range    Glucose (POC) 170 (H) 65 - 100 mg/dL    Performed by Kristie Jamison (PCT)    GLUCOSE, POC    Collection Time: 11/18/18  6:18 AM   Result Value Ref Range    Glucose (POC) 134 (H) 65 - 100 mg/dL    Performed by Kristie Jamison (PCT)    GLUCOSE, POC    Collection Time: 11/18/18  7:08 AM   Result Value Ref Range    Glucose (POC) 127 (H) 65 - 100 mg/dL    Performed by Kristie Jamison (PCT)    CBC WITH AUTOMATED DIFF    Collection Time: 11/18/18  7:44 AM   Result Value Ref Range    WBC 5.0 4.1 - 11.1 K/uL    RBC 4.21 4.10 - 5.70 M/uL    HGB 12.0 (L) 12.1 - 17.0 g/dL    HCT 36.9 36.6 - 50.3 %    MCV 87.6 80.0 - 99.0 FL    MCH 28.5 26.0 - 34.0 PG    MCHC 32.5 30.0 - 36.5 g/dL    RDW 14.2 11.5 - 14.5 %    PLATELET 99 (L) 733 - 400 K/uL    MPV 10.2 8.9 - 12.9 FL    NRBC 0.0 0  WBC    ABSOLUTE NRBC 0.00 0.00 - 0.01 K/uL    NEUTROPHILS 73 32 - 75 %    BAND NEUTROPHILS 1 0 - 6 %    LYMPHOCYTES 14 12 - 49 %    MONOCYTES 8 5 - 13 %    EOSINOPHILS 4 0 - 7 %    BASOPHILS 0 0 - 1 %    IMMATURE GRANULOCYTES 0 %    ABS. NEUTROPHILS 3.7 1.8 - 8.0 K/UL    ABS. LYMPHOCYTES 0.7 (L) 0.8 - 3.5 K/UL    ABS. MONOCYTES 0.4 0.0 - 1.0 K/UL    ABS. EOSINOPHILS 0.2 0.0 - 0.4 K/UL    ABS. BASOPHILS 0.0 0.0 - 0.1 K/UL    ABS. IMM. GRANS. 0.0 K/UL    DF MANUAL      RBC COMMENTS NORMOCYTIC, NORMOCHROMIC     METABOLIC PANEL, COMPREHENSIVE    Collection Time: 11/18/18  7:44 AM   Result Value Ref Range    Sodium 144 136 - 145 mmol/L    Potassium 3.8 3.5 - 5.1 mmol/L    Chloride 106 97 - 108 mmol/L    CO2 26 21 - 32 mmol/L    Anion gap 12 5 - 15 mmol/L    Glucose 128 (H) 65 - 100 mg/dL    BUN 19 6 - 20 MG/DL    Creatinine 1.64 (H) 0.70 - 1.30 MG/DL    BUN/Creatinine ratio 12 12 - 20      GFR est AA 49 (L) >60 ml/min/1.73m2    GFR est non-AA 40 (L) >60 ml/min/1.73m2    Calcium 9.8 8.5 - 10.1 MG/DL    Bilirubin, total 0.8 0.2 - 1.0 MG/DL    ALT (SGPT) 23 12 - 78 U/L    AST (SGOT) 11 (L) 15 - 37 U/L    Alk.  phosphatase 154 (H) 45 - 117 U/L    Protein, total 5.0 (L) 6.4 - 8.2 g/dL    Albumin 2.1 (L) 3.5 - 5.0 g/dL    Globulin 2.9 2.0 - 4.0 g/dL    A-G Ratio 0.7 (L) 1.1 - 2.2     MAGNESIUM    Collection Time: 11/18/18  7:44 AM   Result Value Ref Range    Magnesium 1.8 1.6 - 2.4 mg/dL   PHOSPHORUS    Collection Time: 11/18/18  7:44 AM   Result Value Ref Range    Phosphorus 2.0 (L) 2.6 - 4.7 MG/DL   GLUCOSE, POC    Collection Time: 11/18/18 11:46 AM   Result Value Ref Range    Glucose (POC) 174 (H) 65 - 100 mg/dL    Performed by Vicky Graham (PCT)    GLUCOSE, POC    Collection Time: 11/18/18  4:16 PM   Result Value Ref Range    Glucose (POC) 161 (H) 65 - 100 mg/dL    Performed by Vicky Graham (PCT)    GLUCOSE, POC    Collection Time: 11/18/18  9:01 PM   Result Value Ref Range Glucose (POC) 161 (H) 65 - 100 mg/dL    Performed by Lucero Stevens (PCT)    HOMOCYSTEINE, PLASMA    Collection Time: 11/19/18  3:55 AM   Result Value Ref Range    Homocysteine, plasma 17.5 (H) 3.7 - 13.9 umol/L   CBC WITH AUTOMATED DIFF    Collection Time: 11/19/18  3:55 AM   Result Value Ref Range    WBC 4.4 4.1 - 11.1 K/uL    RBC 4.33 4.10 - 5.70 M/uL    HGB 11.8 (L) 12.1 - 17.0 g/dL    HCT 37.7 36.6 - 50.3 %    MCV 87.1 80.0 - 99.0 FL    MCH 27.3 26.0 - 34.0 PG    MCHC 31.3 30.0 - 36.5 g/dL    RDW 13.9 11.5 - 14.5 %    PLATELET 484 (L) 678 - 400 K/uL    MPV 10.7 8.9 - 12.9 FL    NRBC 0.0 0  WBC    ABSOLUTE NRBC 0.00 0.00 - 0.01 K/uL    NEUTROPHILS 61 32 - 75 %    LYMPHOCYTES 18 12 - 49 %    MONOCYTES 11 5 - 13 %    EOSINOPHILS 8 (H) 0 - 7 %    BASOPHILS 1 0 - 1 %    IMMATURE GRANULOCYTES 1 (H) 0.0 - 0.5 %    ABS. NEUTROPHILS 2.7 1.8 - 8.0 K/UL    ABS. LYMPHOCYTES 0.8 0.8 - 3.5 K/UL    ABS. MONOCYTES 0.5 0.0 - 1.0 K/UL    ABS. EOSINOPHILS 0.4 0.0 - 0.4 K/UL    ABS. BASOPHILS 0.0 0.0 - 0.1 K/UL    ABS. IMM. GRANS. 0.1 (H) 0.00 - 0.04 K/UL    DF AUTOMATED     METABOLIC PANEL, COMPREHENSIVE    Collection Time: 11/19/18  3:55 AM   Result Value Ref Range    Sodium 139 136 - 145 mmol/L    Potassium 4.3 3.5 - 5.1 mmol/L    Chloride 107 97 - 108 mmol/L    CO2 26 21 - 32 mmol/L    Anion gap 6 5 - 15 mmol/L    Glucose 136 (H) 65 - 100 mg/dL    BUN 19 6 - 20 MG/DL    Creatinine 1.50 (H) 0.70 - 1.30 MG/DL    BUN/Creatinine ratio 13 12 - 20      GFR est AA 54 (L) >60 ml/min/1.73m2    GFR est non-AA 45 (L) >60 ml/min/1.73m2    Calcium 10.5 (H) 8.5 - 10.1 MG/DL    Bilirubin, total 0.8 0.2 - 1.0 MG/DL    ALT (SGPT) 22 12 - 78 U/L    AST (SGOT) 12 (L) 15 - 37 U/L    Alk.  phosphatase 179 (H) 45 - 117 U/L    Protein, total 5.7 (L) 6.4 - 8.2 g/dL    Albumin 2.3 (L) 3.5 - 5.0 g/dL    Globulin 3.4 2.0 - 4.0 g/dL    A-G Ratio 0.7 (L) 1.1 - 2.2     MAGNESIUM    Collection Time: 11/19/18  3:55 AM   Result Value Ref Range    Magnesium 1. 8 1.6 - 2.4 mg/dL   PHOSPHORUS    Collection Time: 11/19/18  3:55 AM   Result Value Ref Range    Phosphorus 2.2 (L) 2.6 - 4.7 MG/DL   GLUCOSE, POC    Collection Time: 11/19/18  8:08 AM   Result Value Ref Range    Glucose (POC) 129 (H) 65 - 100 mg/dL    Performed by Evelina Perry  (PCT)    GLUCOSE, POC    Collection Time: 11/19/18 11:18 AM   Result Value Ref Range    Glucose (POC) 153 (H) 65 - 100 mg/dL    Performed by Evelina Perry  (PCT)    GLUCOSE, POC    Collection Time: 11/19/18  5:06 PM   Result Value Ref Range    Glucose (POC) 124 (H) 65 - 100 mg/dL    Performed by Evelina Perry  (PCT)    GLUCOSE, POC    Collection Time: 11/19/18  9:28 PM   Result Value Ref Range    Glucose (POC) 185 (H) 65 - 100 mg/dL    Performed by Maegan Nunez (PCT)    CBC WITH AUTOMATED DIFF    Collection Time: 11/20/18  6:18 AM   Result Value Ref Range    WBC 4.1 4.1 - 11.1 K/uL    RBC 4.66 4.10 - 5.70 M/uL    HGB 12.9 12.1 - 17.0 g/dL    HCT 40.9 36.6 - 50.3 %    MCV 87.8 80.0 - 99.0 FL    MCH 27.7 26.0 - 34.0 PG    MCHC 31.5 30.0 - 36.5 g/dL    RDW 13.8 11.5 - 14.5 %    PLATELET 282 (L) 104 - 400 K/uL    MPV 9.9 8.9 - 12.9 FL    NRBC 0.0 0  WBC    ABSOLUTE NRBC 0.00 0.00 - 0.01 K/uL    NEUTROPHILS 59 32 - 75 %    LYMPHOCYTES 19 12 - 49 %    MONOCYTES 10 5 - 13 %    EOSINOPHILS 10 (H) 0 - 7 %    BASOPHILS 1 0 - 1 %    IMMATURE GRANULOCYTES 1 (H) 0.0 - 0.5 %    ABS. NEUTROPHILS 2.4 1.8 - 8.0 K/UL    ABS. LYMPHOCYTES 0.8 0.8 - 3.5 K/UL    ABS. MONOCYTES 0.4 0.0 - 1.0 K/UL    ABS. EOSINOPHILS 0.4 0.0 - 0.4 K/UL    ABS. BASOPHILS 0.0 0.0 - 0.1 K/UL    ABS. IMM.  GRANS. 0.1 (H) 0.00 - 0.04 K/UL    DF AUTOMATED     METABOLIC PANEL, COMPREHENSIVE    Collection Time: 11/20/18  6:18 AM   Result Value Ref Range    Sodium 139 136 - 145 mmol/L    Potassium 4.3 3.5 - 5.1 mmol/L    Chloride 105 97 - 108 mmol/L    CO2 25 21 - 32 mmol/L    Anion gap 9 5 - 15 mmol/L    Glucose 131 (H) 65 - 100 mg/dL    BUN 15 6 - 20 MG/DL    Creatinine 1.42 (H) 0.70 - 1.30 MG/DL    BUN/Creatinine ratio 11 (L) 12 - 20      GFR est AA 58 (L) >60 ml/min/1.73m2    GFR est non-AA 48 (L) >60 ml/min/1.73m2    Calcium 11.7 (H) 8.5 - 10.1 MG/DL    Bilirubin, total 0.7 0.2 - 1.0 MG/DL    ALT (SGPT) 18 12 - 78 U/L    AST (SGOT) 12 (L) 15 - 37 U/L    Alk. phosphatase 178 (H) 45 - 117 U/L    Protein, total 6.1 (L) 6.4 - 8.2 g/dL    Albumin 2.3 (L) 3.5 - 5.0 g/dL    Globulin 3.8 2.0 - 4.0 g/dL    A-G Ratio 0.6 (L) 1.1 - 2.2     MAGNESIUM    Collection Time: 11/20/18  6:18 AM   Result Value Ref Range    Magnesium 2.0 1.6 - 2.4 mg/dL   PHOSPHORUS    Collection Time: 11/20/18  6:18 AM   Result Value Ref Range    Phosphorus 2.5 (L) 2.6 - 4.7 MG/DL   GLUCOSE, POC    Collection Time: 11/20/18  7:06 AM   Result Value Ref Range    Glucose (POC) 124 (H) 65 - 100 mg/dL    Performed by Geoff Hood (PCT)    GLUCOSE, POC    Collection Time: 11/20/18 11:08 AM   Result Value Ref Range    Glucose (POC) 187 (H) 65 - 100 mg/dL    Performed by Marcelle aCrrillo (PCT)        Discussed with:    Patient  Thank you so much to allow us to participate in this patient's care. We will follow.  : Fernando Ochoa MD  11/20/2018      [facility]:  [email or web address]  [place] office:  [address], [address] [place] [address],[address]  [address]  Phone: [phone]  Fax :     [phone]    [place] office:  [address] [place] [address]  Phone - [phone]  Fax - [phone]

## 2018-11-20 NOTE — PROGRESS NOTES
Gastrointestinal Progress Note 
 
11/20/2018 Admit Date: 11/14/2018 Subjective:  
 
New Complaints Today:  Patient eating breakfast with daughter at bedside. No complaints. She states he appears better today. Denies abdominal pain, nausea or vomiting. Tbili has normalized ALP trending down (currently 178) CA 19-9 is 45 (elevated) He was not able to get MRI due to stapedectomy in [facility] and probably not MRI compatible Current Facility-Administered Medications Medication Dose Route Frequency  0.9% sodium chloride infusion  150 mL/hr IntraVENous CONTINUOUS  
 cyanocobalamin (VITAMIN B12) injection 1,000 mcg  1,000 mcg IntraMUSCular DAILY  bisacodyl (DULCOLAX) suppository 10 mg  10 mg Rectal DAILY PRN  
 apixaban (ELIQUIS) tablet 2.5 mg  2.5 mg Oral BID  aspirin delayed-release tablet 81 mg  81 mg Oral QHS  isosorbide mononitrate ER (IMDUR) tablet 120 mg  120 mg Oral 7am  
 losartan (COZAAR) tablet 25 mg  25 mg Oral QHS  melatonin tablet 3 mg  3 mg Oral QHS  mirtazapine (REMERON) tablet 7.5 mg  7.5 mg Oral QHS  polyethylene glycol (MIRALAX) packet 17 g  17 g Oral DAILY  senna (SENOKOT) tablet 8.6 mg  1 Tab Oral DAILY  pantoprazole (PROTONIX) tablet 40 mg  40 mg Oral ACB  sodium chloride (NS) flush 5-10 mL  5-10 mL IntraVENous Q8H  
 sodium chloride (NS) flush 5-10 mL  5-10 mL IntraVENous PRN  
 acetaminophen (TYLENOL) tablet 650 mg  650 mg Oral Q4H PRN  prochlorperazine (COMPAZINE) injection 10 mg  10 mg IntraVENous Q6H PRN  
 insulin lispro (HUMALOG) injection   SubCUTAneous AC&HS  
 glucose chewable tablet 16 g  4 Tab Oral PRN  
 dextrose (D50W) injection syrg 12.5-25 g  25-50 mL IntraVENous PRN  
 glucagon (GLUCAGEN) injection 1 mg  1 mg IntraMUSCular PRN Objective:  
 
Blood pressure 135/62, pulse 69, temperature 98.6 °F (37 °C), resp. rate 18, height 5' 7\" (1.702 m), weight 80.7 kg (178 lb), SpO2 92 %. 11/20 0701 - 11/20 1900 In: 72 [I.V.:65] Out: 800 [Urine:800] 11/18 1901 - 11/20 0700 In: 4407.5 [P.O.:120; I.V.:4287.5] Out: 2550 [Urine:2550] EXAM:  GENERAL: alert, cooperative, no distress, HEART: regular rate and rhythm LUNGS:clear to auscultation ABDOMEN:  Soft, nondistended, nontender DERM: no jaundice Data Review Recent Results (from the past 24 hour(s)) GLUCOSE, POC Collection Time: 11/19/18 11:18 AM  
Result Value Ref Range Glucose (POC) 153 (H) 65 - 100 mg/dL Performed by Arnaud Medina  (PCT) GLUCOSE, POC Collection Time: 11/19/18  5:06 PM  
Result Value Ref Range Glucose (POC) 124 (H) 65 - 100 mg/dL Performed by Arnaud Medina  (PCT) GLUCOSE, POC Collection Time: 11/19/18  9:28 PM  
Result Value Ref Range Glucose (POC) 185 (H) 65 - 100 mg/dL Performed by Jami Barajas (PCT) CBC WITH AUTOMATED DIFF Collection Time: 11/20/18  6:18 AM  
Result Value Ref Range WBC 4.1 4.1 - 11.1 K/uL  
 RBC 4.66 4.10 - 5.70 M/uL  
 HGB 12.9 12.1 - 17.0 g/dL HCT 40.9 36.6 - 50.3 % MCV 87.8 80.0 - 99.0 FL  
 MCH 27.7 26.0 - 34.0 PG  
 MCHC 31.5 30.0 - 36.5 g/dL  
 RDW 13.8 11.5 - 14.5 % PLATELET 840 (L) 695 - 400 K/uL MPV 9.9 8.9 - 12.9 FL  
 NRBC 0.0 0  WBC ABSOLUTE NRBC 0.00 0.00 - 0.01 K/uL NEUTROPHILS 59 32 - 75 % LYMPHOCYTES 19 12 - 49 % MONOCYTES 10 5 - 13 % EOSINOPHILS 10 (H) 0 - 7 % BASOPHILS 1 0 - 1 % IMMATURE GRANULOCYTES 1 (H) 0.0 - 0.5 % ABS. NEUTROPHILS 2.4 1.8 - 8.0 K/UL  
 ABS. LYMPHOCYTES 0.8 0.8 - 3.5 K/UL  
 ABS. MONOCYTES 0.4 0.0 - 1.0 K/UL  
 ABS. EOSINOPHILS 0.4 0.0 - 0.4 K/UL  
 ABS. BASOPHILS 0.0 0.0 - 0.1 K/UL  
 ABS. IMM. GRANS. 0.1 (H) 0.00 - 0.04 K/UL  
 DF AUTOMATED METABOLIC PANEL, COMPREHENSIVE Collection Time: 11/20/18  6:18 AM  
Result Value Ref Range Sodium 139 136 - 145 mmol/L Potassium 4.3 3.5 - 5.1 mmol/L Chloride 105 97 - 108 mmol/L  
 CO2 25 21 - 32 mmol/L  Anion gap 9 5 - 15 mmol/L  
 Glucose 131 (H) 65 - 100 mg/dL BUN 15 6 - 20 MG/DL Creatinine 1.42 (H) 0.70 - 1.30 MG/DL  
 BUN/Creatinine ratio 11 (L) 12 - 20 GFR est AA 58 (L) >60 ml/min/1.73m2 GFR est non-AA 48 (L) >60 ml/min/1.73m2 Calcium 11.7 (H) 8.5 - 10.1 MG/DL Bilirubin, total 0.7 0.2 - 1.0 MG/DL  
 ALT (SGPT) 18 12 - 78 U/L  
 AST (SGOT) 12 (L) 15 - 37 U/L Alk. phosphatase 178 (H) 45 - 117 U/L Protein, total 6.1 (L) 6.4 - 8.2 g/dL Albumin 2.3 (L) 3.5 - 5.0 g/dL Globulin 3.8 2.0 - 4.0 g/dL A-G Ratio 0.6 (L) 1.1 - 2.2 MAGNESIUM Collection Time: 11/20/18  6:18 AM  
Result Value Ref Range Magnesium 2.0 1.6 - 2.4 mg/dL PHOSPHORUS Collection Time: 11/20/18  6:18 AM  
Result Value Ref Range Phosphorus 2.5 (L) 2.6 - 4.7 MG/DL  
GLUCOSE, POC Collection Time: 11/20/18  7:06 AM  
Result Value Ref Range Glucose (POC) 124 (H) 65 - 100 mg/dL Performed by Wu Billingsley (PCT) Assessment:  
 
Principal Problem: 
  Viral illness (11/14/2018) Active Problems: 
  CAD (coronary artery disease) () Overview: Angina Jan 2011, PCI unknown vessel at [facility], no MI 
 
  CKD (chronic kidney disease) stage 3, GFR 30-59 ml/min (HCC) (12/10/2013) Dementia () 
 
  DM type 2 causing renal disease (HCC) () Gingival enlargement (11/20/2018) Plan:  
 
Tbili has normalized and ALP trending down. CA 19-9 is elevated. CT scan without contrast and US does without evidence of mass. With Tbili now normal doubt biliary obstruction. If kidney function allows can consider CT scan abdomen with contrast to evaluate. No plans for endoscopic evaluation. Continued to monitor liver enzymes. Vijaya Awad PA-C 
11/20/18 
9:55 AM 
 
I have personally spoken with his wife Cat Coronel who is familiar with his current oncologic condition and is also familiar with pancreatic cancer.   Patient in past expressed with that efforts be devoted to issues reasonably amenable to intervention and not towards issues with only remote chances for meaningful change. Specifically she does not wish additional evaluation for potential pancreatic cancer. Advised will hold GI evaluation unless additional clinical issue recurs. I have interviewed and examined patient with addendum to note above and formulation care plan to reflect my evaluation Norbert Cosme M.D.

## 2018-11-21 ENCOUNTER — APPOINTMENT (OUTPATIENT)
Dept: GENERAL RADIOLOGY | Age: 83
DRG: 640 | End: 2018-11-21
Attending: NURSE PRACTITIONER
Payer: MEDICARE

## 2018-11-21 ENCOUNTER — APPOINTMENT (OUTPATIENT)
Dept: NUCLEAR MEDICINE | Age: 83
DRG: 640 | End: 2018-11-21
Attending: INTERNAL MEDICINE
Payer: MEDICARE

## 2018-11-21 ENCOUNTER — APPOINTMENT (OUTPATIENT)
Dept: ULTRASOUND IMAGING | Age: 83
DRG: 640 | End: 2018-11-21
Attending: INTERNAL MEDICINE
Payer: MEDICARE

## 2018-11-21 PROBLEM — E83.52 HYPERCALCEMIA: Status: ACTIVE | Noted: 2018-11-21

## 2018-11-21 PROBLEM — R17 JAUNDICE: Status: ACTIVE | Noted: 2018-11-21

## 2018-11-21 LAB
ALBUMIN SERPL-MCNC: 2.3 G/DL (ref 3.5–5)
ALBUMIN/GLOB SERPL: 0.7 {RATIO} (ref 1.1–2.2)
ALP SERPL-CCNC: 208 U/L (ref 45–117)
ALT SERPL-CCNC: 20 U/L (ref 12–78)
ANION GAP SERPL CALC-SCNC: 9 MMOL/L (ref 5–15)
AST SERPL-CCNC: 13 U/L (ref 15–37)
BASOPHILS # BLD: 0 K/UL (ref 0–0.1)
BASOPHILS NFR BLD: 0 % (ref 0–1)
BILIRUB SERPL-MCNC: 0.6 MG/DL (ref 0.2–1)
BUN SERPL-MCNC: 16 MG/DL (ref 6–20)
BUN/CREAT SERPL: 13 (ref 12–20)
CALCIUM SERPL-MCNC: 10.6 MG/DL (ref 8.5–10.1)
CHLORIDE SERPL-SCNC: 106 MMOL/L (ref 97–108)
CO2 SERPL-SCNC: 23 MMOL/L (ref 21–32)
CREAT SERPL-MCNC: 1.21 MG/DL (ref 0.7–1.3)
DIFFERENTIAL METHOD BLD: ABNORMAL
EOSINOPHIL # BLD: 0.3 K/UL (ref 0–0.4)
EOSINOPHIL NFR BLD: 6 % (ref 0–7)
ERYTHROCYTE [DISTWIDTH] IN BLOOD BY AUTOMATED COUNT: 13.5 % (ref 11.5–14.5)
GLOBULIN SER CALC-MCNC: 3.4 G/DL (ref 2–4)
GLUCOSE BLD STRIP.AUTO-MCNC: 124 MG/DL (ref 65–100)
GLUCOSE BLD STRIP.AUTO-MCNC: 125 MG/DL (ref 65–100)
GLUCOSE BLD STRIP.AUTO-MCNC: 145 MG/DL (ref 65–100)
GLUCOSE SERPL-MCNC: 176 MG/DL (ref 65–100)
HCT VFR BLD AUTO: 35.4 % (ref 36.6–50.3)
HGB BLD-MCNC: 11.3 G/DL (ref 12.1–17)
IGA SERPL-MCNC: 117 MG/DL (ref 61–437)
IGG SERPL-MCNC: 468 MG/DL (ref 700–1600)
IGM SERPL-MCNC: 12 MG/DL (ref 15–143)
IMM GRANULOCYTES # BLD: 0 K/UL (ref 0–0.04)
IMM GRANULOCYTES NFR BLD AUTO: 1 % (ref 0–0.5)
LYMPHOCYTES # BLD: 0.6 K/UL (ref 0.8–3.5)
LYMPHOCYTES NFR BLD: 13 % (ref 12–49)
Lab: NORMAL
MAGNESIUM SERPL-MCNC: 1.8 MG/DL (ref 1.6–2.4)
MCH RBC QN AUTO: 27.6 PG (ref 26–34)
MCHC RBC AUTO-ENTMCNC: 31.9 G/DL (ref 30–36.5)
MCV RBC AUTO: 86.6 FL (ref 80–99)
METHYLMALONATE SERPL-SCNC: 228 NMOL/L (ref 0–378)
MONOCYTES # BLD: 0.4 K/UL (ref 0–1)
MONOCYTES NFR BLD: 9 % (ref 5–13)
NEUTS SEG # BLD: 3.2 K/UL (ref 1.8–8)
NEUTS SEG NFR BLD: 72 % (ref 32–75)
NRBC # BLD: 0 K/UL (ref 0–0.01)
NRBC BLD-RTO: 0 PER 100 WBC
PHOSPHATE SERPL-MCNC: 2 MG/DL (ref 2.6–4.7)
PLATELET # BLD AUTO: 115 K/UL (ref 150–400)
PMV BLD AUTO: 10.2 FL (ref 8.9–12.9)
POTASSIUM SERPL-SCNC: 4.1 MMOL/L (ref 3.5–5.1)
PROT PATTERN SERPL IFE-IMP: ABNORMAL
PROT SERPL-MCNC: 5.7 G/DL (ref 6.4–8.2)
RBC # BLD AUTO: 4.09 M/UL (ref 4.1–5.7)
SERVICE CMNT-IMP: ABNORMAL
SODIUM SERPL-SCNC: 138 MMOL/L (ref 136–145)
WBC # BLD AUTO: 4.5 K/UL (ref 4.1–11.1)

## 2018-11-21 PROCEDURE — A9500 TC99M SESTAMIBI: HCPCS

## 2018-11-21 PROCEDURE — 80053 COMPREHEN METABOLIC PANEL: CPT

## 2018-11-21 PROCEDURE — 84166 PROTEIN E-PHORESIS/URINE/CSF: CPT

## 2018-11-21 PROCEDURE — 85025 COMPLETE CBC W/AUTO DIFF WBC: CPT

## 2018-11-21 PROCEDURE — 77075 RADEX OSSEOUS SURVEY COMPL: CPT

## 2018-11-21 PROCEDURE — 83883 ASSAY NEPHELOMETRY NOT SPEC: CPT

## 2018-11-21 PROCEDURE — 83735 ASSAY OF MAGNESIUM: CPT

## 2018-11-21 PROCEDURE — 84100 ASSAY OF PHOSPHORUS: CPT

## 2018-11-21 PROCEDURE — 82784 ASSAY IGA/IGD/IGG/IGM EACH: CPT

## 2018-11-21 PROCEDURE — 76536 US EXAM OF HEAD AND NECK: CPT

## 2018-11-21 PROCEDURE — 65270000029 HC RM PRIVATE

## 2018-11-21 PROCEDURE — 82962 GLUCOSE BLOOD TEST: CPT

## 2018-11-21 PROCEDURE — 36415 COLL VENOUS BLD VENIPUNCTURE: CPT

## 2018-11-21 PROCEDURE — 74011250636 HC RX REV CODE- 250/636: Performed by: INTERNAL MEDICINE

## 2018-11-21 PROCEDURE — 77030032490 HC SLV COMPR SCD KNE COVD -B

## 2018-11-21 PROCEDURE — 74011000250 HC RX REV CODE- 250: Performed by: INTERNAL MEDICINE

## 2018-11-21 PROCEDURE — 77030034850

## 2018-11-21 PROCEDURE — 74011250637 HC RX REV CODE- 250/637: Performed by: INTERNAL MEDICINE

## 2018-11-21 PROCEDURE — 92526 ORAL FUNCTION THERAPY: CPT

## 2018-11-21 RX ADMIN — POLYETHYLENE GLYCOL 3350 17 G: 17 POWDER, FOR SOLUTION ORAL at 08:53

## 2018-11-21 RX ADMIN — SODIUM CHLORIDE 150 ML/HR: 900 INJECTION, SOLUTION INTRAVENOUS at 06:28

## 2018-11-21 RX ADMIN — APIXABAN 2.5 MG: 2.5 TABLET, FILM COATED ORAL at 08:53

## 2018-11-21 RX ADMIN — MELATONIN TAB 3 MG 3 MG: 3 TAB at 22:02

## 2018-11-21 RX ADMIN — SENNOSIDES 8.6 MG: 8.6 TABLET, FILM COATED ORAL at 08:53

## 2018-11-21 RX ADMIN — APIXABAN 2.5 MG: 2.5 TABLET, FILM COATED ORAL at 17:15

## 2018-11-21 RX ADMIN — SODIUM CHLORIDE 150 ML/HR: 900 INJECTION, SOLUTION INTRAVENOUS at 17:15

## 2018-11-21 RX ADMIN — ISOSORBIDE MONONITRATE 120 MG: 60 TABLET ORAL at 06:28

## 2018-11-21 RX ADMIN — Medication 10 ML: at 14:51

## 2018-11-21 RX ADMIN — LOSARTAN POTASSIUM 25 MG: 50 TABLET, FILM COATED ORAL at 22:01

## 2018-11-21 RX ADMIN — PANTOPRAZOLE SODIUM 40 MG: 40 TABLET, DELAYED RELEASE ORAL at 06:28

## 2018-11-21 RX ADMIN — CYANOCOBALAMIN 1000 MCG: 1000 INJECTION, SOLUTION INTRAMUSCULAR at 08:53

## 2018-11-21 RX ADMIN — MIRTAZAPINE 7.5 MG: 15 TABLET, FILM COATED ORAL at 22:01

## 2018-11-21 RX ADMIN — Medication 10 ML: at 22:02

## 2018-11-21 RX ADMIN — ASPIRIN 81 MG: 81 TABLET, COATED ORAL at 22:02

## 2018-11-21 RX ADMIN — Medication 10 ML: at 06:28

## 2018-11-21 NOTE — PROGRESS NOTES
On further chart review, pt does have a history of metastatic RCC, s/p metastasectomy of his retroperitoneal disease in 2015. Per note by hem/onc Dr. Leah Pringle in March 2016, \"1) Renal Cell Carcinoma, metastatic He is s/p metastasectomy of his retroperitoneal disease. Unfortunately, recent restaging studies are concerning for possible recurrent/progressive disease. Not a good candidate for further surgery, and borderline candidate for systemic therapy. He is getting reimaging in a couple of months with Dr. Darya Montoya. I would like to see him again sometime after this. In the meantime, he should continue to focus on rehabilitation, building his strength such that he could hopefully tolerate some palliative systemic therapy if needed. My hope is that he would be able to tolerate pazopanib reasonably well, though a dose reduction may be necessary. Unfortunately, recent restaging studies are concerning for possible recurrent/progressive disease\" His constellation of s/sx may be 2/2 the above. 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 PTHrP pending. Hem/onc following.

## 2018-11-21 NOTE — PROGRESS NOTES
Gastrointestinal Progress Note 
 
11/21/2018 Admit Date: 11/14/2018 Subjective:  
 
New Complaints Today:  No 
 
Pain: Patient complains of abdominal pain no. Current Facility-Administered Medications Medication Dose Route Frequency  0.9% sodium chloride infusion  150 mL/hr IntraVENous CONTINUOUS  
 cyanocobalamin (VITAMIN B12) injection 1,000 mcg  1,000 mcg IntraMUSCular DAILY  bisacodyl (DULCOLAX) suppository 10 mg  10 mg Rectal DAILY PRN  
 apixaban (ELIQUIS) tablet 2.5 mg  2.5 mg Oral BID  aspirin delayed-release tablet 81 mg  81 mg Oral QHS  isosorbide mononitrate ER (IMDUR) tablet 120 mg  120 mg Oral 7am  
 losartan (COZAAR) tablet 25 mg  25 mg Oral QHS  melatonin tablet 3 mg  3 mg Oral QHS  mirtazapine (REMERON) tablet 7.5 mg  7.5 mg Oral QHS  polyethylene glycol (MIRALAX) packet 17 g  17 g Oral DAILY  senna (SENOKOT) tablet 8.6 mg  1 Tab Oral DAILY  pantoprazole (PROTONIX) tablet 40 mg  40 mg Oral ACB  sodium chloride (NS) flush 5-10 mL  5-10 mL IntraVENous Q8H  
 sodium chloride (NS) flush 5-10 mL  5-10 mL IntraVENous PRN  
 acetaminophen (TYLENOL) tablet 650 mg  650 mg Oral Q4H PRN  prochlorperazine (COMPAZINE) injection 10 mg  10 mg IntraVENous Q6H PRN  
 insulin lispro (HUMALOG) injection   SubCUTAneous AC&HS  
 glucose chewable tablet 16 g  4 Tab Oral PRN  
 dextrose (D50W) injection syrg 12.5-25 g  25-50 mL IntraVENous PRN  
 glucagon (GLUCAGEN) injection 1 mg  1 mg IntraMUSCular PRN Objective:  
 
Blood pressure 158/72, pulse 64, temperature 98.6 °F (37 °C), resp. rate 18, height 5' 7\" (1.702 m), weight 80.7 kg (178 lb), SpO2 95 %. No intake/output data recorded. 11/19 1901 - 11/21 0700 In: 2320 [P.O.:855; I.V.:1465] Out: 2000 [[id number]] EXAM:  GENERAL: no distress, HEART: regular rate and rhythm, LUNGS: chest clear, no wheezing, rales, normal symmetric air entry, ABDOMEN:  Bowel sounds are normal, liver is not enlarged, spleen is not enlarged and EXTREMITY: no edema Data Review Recent Results (from the past 24 hour(s)) GLUCOSE, POC Collection Time: 11/20/18 11:08 AM  
Result Value Ref Range Glucose (POC) 187 (H) 65 - 100 mg/dL Performed by Adiel Vivar (PCT) PTH INTACT Collection Time: 11/20/18  1:01 PM  
Result Value Ref Range Calcium 10.7 (H) 8.5 - 10.1 MG/DL  
 PTH, Intact 39.8 18.4 - 88.0 pg/mL GLUCOSE, POC Collection Time: 11/20/18  3:58 PM  
Result Value Ref Range Glucose (POC) 175 (H) 65 - 100 mg/dL Performed by Ken Woods  (PCT) GLUCOSE, POC Collection Time: 11/20/18  9:12 PM  
Result Value Ref Range Glucose (POC) 185 (H) 65 - 100 mg/dL Performed by Akbar Estevez (PCT) CBC WITH AUTOMATED DIFF Collection Time: 11/21/18  2:59 AM  
Result Value Ref Range WBC 4.5 4.1 - 11.1 K/uL  
 RBC 4.09 (L) 4.10 - 5.70 M/uL  
 HGB 11.3 (L) 12.1 - 17.0 g/dL HCT 35.4 (L) 36.6 - 50.3 % MCV 86.6 80.0 - 99.0 FL  
 MCH 27.6 26.0 - 34.0 PG  
 MCHC 31.9 30.0 - 36.5 g/dL  
 RDW 13.5 11.5 - 14.5 % PLATELET 390 (L) 666 - 400 K/uL MPV 10.2 8.9 - 12.9 FL  
 NRBC 0.0 0  WBC ABSOLUTE NRBC 0.00 0.00 - 0.01 K/uL NEUTROPHILS 72 32 - 75 % LYMPHOCYTES 13 12 - 49 % MONOCYTES 9 5 - 13 % EOSINOPHILS 6 0 - 7 % BASOPHILS 0 0 - 1 % IMMATURE GRANULOCYTES 1 (H) 0.0 - 0.5 % ABS. NEUTROPHILS 3.2 1.8 - 8.0 K/UL  
 ABS. LYMPHOCYTES 0.6 (L) 0.8 - 3.5 K/UL  
 ABS. MONOCYTES 0.4 0.0 - 1.0 K/UL  
 ABS. EOSINOPHILS 0.3 0.0 - 0.4 K/UL  
 ABS. BASOPHILS 0.0 0.0 - 0.1 K/UL  
 ABS. IMM. GRANS. 0.0 0.00 - 0.04 K/UL  
 DF AUTOMATED METABOLIC PANEL, COMPREHENSIVE Collection Time: 11/21/18  2:59 AM  
Result Value Ref Range Sodium 138 136 - 145 mmol/L Potassium 4.1 3.5 - 5.1 mmol/L Chloride 106 97 - 108 mmol/L  
 CO2 23 21 - 32 mmol/L Anion gap 9 5 - 15 mmol/L Glucose 176 (H) 65 - 100 mg/dL  BUN 16 6 - 20 MG/DL  
 Creatinine 1.21 0.70 - 1.30 MG/DL  
 BUN/Creatinine ratio 13 12 - 20 GFR est AA >60 >60 ml/min/1.73m2 GFR est non-AA 57 (L) >60 ml/min/1.73m2 Calcium 10.6 (H) 8.5 - 10.1 MG/DL Bilirubin, total 0.6 0.2 - 1.0 MG/DL  
 ALT (SGPT) 20 12 - 78 U/L  
 AST (SGOT) 13 (L) 15 - 37 U/L Alk. phosphatase 208 (H) 45 - 117 U/L Protein, total 5.7 (L) 6.4 - 8.2 g/dL Albumin 2.3 (L) 3.5 - 5.0 g/dL Globulin 3.4 2.0 - 4.0 g/dL A-G Ratio 0.7 (L) 1.1 - 2.2 MAGNESIUM Collection Time: 11/21/18  2:59 AM  
Result Value Ref Range Magnesium 1.8 1.6 - 2.4 mg/dL PHOSPHORUS Collection Time: 11/21/18  2:59 AM  
Result Value Ref Range Phosphorus 2.0 (L) 2.6 - 4.7 MG/DL  
GLUCOSE, POC Collection Time: 11/21/18  5:22 AM  
Result Value Ref Range Glucose (POC) 145 (H) 65 - 100 mg/dL Performed by Susan Salcedo (PCT) Assessment:  
 
Principal Problem: 
  Viral illness (11/14/2018) Active Problems: 
  CAD (coronary artery disease) () Overview: Angina Jan 2011, PCI unknown vessel at [place], no MI 
 
  CKD (chronic kidney disease) stage 3, GFR 30-59 ml/min (HCC) (12/10/2013) Dementia () 
 
  DM type 2 causing renal disease (HCC) () Gingival enlargement (11/20/2018) Plan: 1. No new recommendations 2. Will see again as needed

## 2018-11-21 NOTE — PROGRESS NOTES
[facility] [place] [address] [facility], [address] [facility] W: [phone]  F: [phone] Reason for Visit:  
Genesis Vidal. is a 80 y.o. male who is seen in consultation at the request of Dr. Destini Linares for evaluation of leukopenia and thrombocytopenia. Hematology / Oncology Treatment History:  
History of prostate cancer in 2010 (s/p surgery) and RCC (s/p partial L nephrectomy) History of Present Illness:  
Presented to ED on 11/14 with chills, cough, weakness, starting that day. Dark urine present. Has dementia at baseline Interval History:  
 
Just returned from Xray; tired; no further complaints. Family at bedside. Past Medical History:  
Diagnosis Date  Arthritis  CAD (coronary artery disease) Angina Jan 2011, PCI LAD with multilink stent 4.0x12mm;  also cath at [place] 7/13 -- no stents at that time  CKD (chronic kidney disease), stage III  Cochlear implant in place 2/3/2017  Dementia  DM type 2 causing renal disease ([address]  DM type 2 causing vascular disease ([address]  GERD (gastroesophageal reflux disease)  HTN (hypertension)  Hypothyroidism  SAMUEL (obstructive sleep apnea)   
 not using CPAP  
 PAF (paroxysmal atrial fibrillation) ([address] [id number] score = 7  
 Prostate cancer ([address]  Renal cell cancer ([address] Stage 4 Renal Cell Cancer. Partial omentectomy 11/2/2015 with Dr. Joya Dahl: omental mass-metastatic renal cell carcinoma  Renal mass, left [address] LEFT PARTIAL NEPHRECTOMY 12/19/13;   
  
Past Surgical History:  
Procedure Laterality Date  CARDIAC SURG PROCEDURE UNLIST  1/2011  
 stent to LAD  HX CHOLECYSTECTOMY  7/13/2012  HX CORONARY STENT PLACEMENT  2011 [address]  2011, 2013 [address]  
 stapedectomy  HX HERNIA REPAIR  1968  
 left inguinal  
 HX LITHOTRIPSY  8/2015  HX ORTHOPAEDIC  1994  
 rotator cuff repair  HX ORTHOPAEDIC    
 cervical disc removed  HX PROSTATECTOMY    
 surgery intervention [address]  HX UROLOGICAL    
 valvular implant prevent incontinence  HX UROLOGICAL    
 insert gel to help with incontinence  HX UROLOGICAL  2013  
 left partial nephrectomy  HX UROLOGICAL  2015 CT guided biopsy of abdominal lymph nodes Social History Tobacco Use  Smoking status: Former Smoker Packs/day: 0.10 Years: 23.00 Pack years: 2.30 Last attempt to quit: 12/3/1975 Years since quittin.9  Smokeless tobacco: Never Used Substance Use Topics  Alcohol use: No  
  Alcohol/week: 0.0 oz Family History Problem Relation Age of Onset  Stroke Father  Cancer Father   
     prostate  Diabetes Sister 2 sisters  Hypertension Sister 2 sisters  Diabetes Brother  Stroke Brother  Diabetes Brother   
     all brothers  Stroke Brother  Kidney Disease Brother  Heart Attack Brother  Hypertension Other Current Facility-Administered Medications Medication Dose Route Frequency  0.9% sodium chloride infusion  150 mL/hr IntraVENous CONTINUOUS  
 cyanocobalamin (VITAMIN B12) injection 1,000 mcg  1,000 mcg IntraMUSCular DAILY  bisacodyl (DULCOLAX) suppository 10 mg  10 mg Rectal DAILY PRN  
 apixaban (ELIQUIS) tablet 2.5 mg  2.5 mg Oral BID  aspirin delayed-release tablet 81 mg  81 mg Oral QHS  isosorbide mononitrate ER (IMDUR) tablet 120 mg  120 mg Oral 7am  
 losartan (COZAAR) tablet 25 mg  25 mg Oral QHS  melatonin tablet 3 mg  3 mg Oral QHS  mirtazapine (REMERON) tablet 7.5 mg  7.5 mg Oral QHS  polyethylene glycol (MIRALAX) packet 17 g  17 g Oral DAILY  senna (SENOKOT) tablet 8.6 mg  1 Tab Oral DAILY  pantoprazole (PROTONIX) tablet 40 mg  40 mg Oral ACB  sodium chloride (NS) flush 5-10 mL  5-10 mL IntraVENous Q8H  
  sodium chloride (NS) flush 5-10 mL  5-10 mL IntraVENous PRN  
 acetaminophen (TYLENOL) tablet 650 mg  650 mg Oral Q4H PRN  prochlorperazine (COMPAZINE) injection 10 mg  10 mg IntraVENous Q6H PRN  
 insulin lispro (HUMALOG) injection   SubCUTAneous AC&HS  
 glucose chewable tablet 16 g  4 Tab Oral PRN  
 dextrose (D50W) injection syrg 12.5-25 g  25-50 mL IntraVENous PRN  
 glucagon (GLUCAGEN) injection 1 mg  1 mg IntraMUSCular PRN Allergies Allergen Reactions  Latex Rash, Swelling and Contact Dermatitis  Atorvastatin Unknown (comments) Per patient's PCP allergy list- No reaction specified  Ezetimibe Myalgia  Lisinopril Cough  Metoprolol Other (comments) Bradycardia  Morphine Other (comments) Hallucinations and Nausea  Nitrofurantoin Rash \"Blood blisters/rash\" per patient's family  Rosuvastatin Myalgia  Simvastatin Myalgia Review of Systems: A complete review of systems was obtained, negative except as described above. Physical Exam:  
 
Visit Vitals /69 (BP 1 Location: Right arm, BP Patient Position: At rest) Pulse 66 Temp 98.4 °F (36.9 °C) Resp 18 Ht 5' 7\" (1.702 m) Wt 80.7 kg (178 lb) SpO2 93% BMI 27.88 kg/m² ECOG PS: 3-4 General: No distress Eyes: PERRLA, anicteric sclerae HENT: Atraumatic with normal appearance of ears and nose; OP clear Neck: Supple; no thyromegaly Lymphatic: No cervical, supraclavicular, or axillary adenopathy Respiratory: CTAB, normal respiratory effort CV: Normal rate, regular rhythm, no murmurs, no peripheral edema GI: Soft, nontender, nondistended, no masses, no hepatomegaly, no splenomegaly MS: Digits without clubbing or cyanosis. Skin: No rashes, ecchymoses, or petechiae. Normal temperature, turgor, and texture. Results:  
 
Lab Results Component Value Date/Time  WBC 4.5 11/21/2018 02:59 AM  
 HGB 11.3 (L) 11/21/2018 02:59 AM  
 HCT 35.4 (L) 11/21/2018 02:59 AM  
 PLATELET 391 (L) 37/42/4204 02:59 AM  
 MCV 86.6 11/21/2018 02:59 AM  
 ABS. NEUTROPHILS 3.2 11/21/2018 02:59 AM  
 
Lab Results Component Value Date/Time Sodium 138 11/21/2018 02:59 AM  
 Potassium 4.1 11/21/2018 02:59 AM  
 Chloride 106 11/21/2018 02:59 AM  
 CO2 23 11/21/2018 02:59 AM  
 Glucose 176 (H) 11/21/2018 02:59 AM  
 BUN 16 11/21/2018 02:59 AM  
 Creatinine 1.21 11/21/2018 02:59 AM  
 GFR est AA >60 11/21/2018 02:59 AM  
 GFR est non-AA 57 (L) 11/21/2018 02:59 AM  
 Calcium 10.6 (H) 11/21/2018 02:59 AM  
 Glucose (POC) 145 (H) 11/21/2018 05:22 AM  
 Creatinine (POC) 1.5 (H) 11/26/2013 09:55 AM  
 
Lab Results Component Value Date/Time Bilirubin, total 0.6 11/21/2018 02:59 AM  
 ALT (SGPT) 20 11/21/2018 02:59 AM  
 AST (SGOT) 13 (L) 11/21/2018 02:59 AM  
 Alk. phosphatase 208 (H) 11/21/2018 02:59 AM  
 Protein, total 5.7 (L) 11/21/2018 02:59 AM  
 Albumin 2.3 (L) 11/21/2018 02:59 AM  
 Globulin 3.4 11/21/2018 02:59 AM  
 
11/15/18 CT c/a/p wo contrast 
FINDINGS: 
LOWER NECK: The visualized portions of the thyroid and structures of the lower 
neck are within normal limits. CHEST: 
The absence of intravenous contrast material reduces the sensitivity for 
evaluation of the mediastinal structures. Lungs: There is interstitial disease with pulmonary fibrosis in the periphery of 
the lungs which is unchanged. The lungs are clear of mass, nodule, airspace 
disease or acute edema. Pleura: There is no pleural effusion or pneumothorax. Aorta: The aorta is atherosclerotic and has a normal contour without evidence of 
aneurysm. There is extensive coronary artery calcification which is unchanged. Mediastinum: There is no mediastinal or hilar adenopathy or mass. Bones and soft tissues: The bones and soft tissues of the chest wall are normal. 
  
ABDOMEN: 
The absence of intravenous contrast material reduces the sensitivity for 
evaluation of the solid parenchymal organs of the abdomen. Liver: The liver is normal in size and contour with no focal abnormality. Gallbladder and bile ducts: There are clips in the gallbladder fossa and the 
gallbladder is absent. Spleen: No abnormality. Pancreas: No abnormality. Adrenal glands: No abnormality. Kidneys: There is a 4.2 x 3.3 x 3.7 cm left renal cyst which is unchanged and 
there is some radiopaque material in the left kidney from prior surgery. PELVIS: 
Reproductive organs: The prostate gland is absent. There are surgical clips in 
the pelvis. The seminal vesicles are symmetrical. 
Bladder: No abnormality. BOWEL AND MESENTERY: The small bowel is normal. There is no mesenteric mass or 
adenopathy. The appendix is not identified. There are diverticula of the colon. PERITONEUM: There is no ascites or free intraperitoneal air. RETROPERITONEUM: The aorta is atherosclerotic and tapers without aneurysm There 
is no retroperitoneal adenopathy or mass. There is no pelvic mass or adenopathy. BONES AND SOFT TISSUES: There are degenerative changes of the spine. IMPRESSION IMPRESSION:  
1. No acute abnormality and no change. 2. Pulmonary fibrosis. 3. Atherosclerosis with coronary artery calcification. 4. Atherosclerotic abdominal aorta without aneurysm. 5. Status post cholecystectomy. 6. Status post prostatectomy. 7. Status post left renal surgery with left renal cyst. 
8. Diverticulosis. 9. Thoracolumbar spondylosis. 11/17/18 abd US IMPRESSION:   
1. No visible abnormality. 11/20/2018 NM PARA THYROID SCAN 
IMPRESSION: 
No evidence of parathyroid adenoma. 
  
11/21/2018 XR Bone survey Pending 11/21/2018 US THYROID/PARA THYROID 
pending Assessment and Recommendations:  
 
1. Thrombocytopenia:  Continues to improve. Appears chronic.   
folate Drop may have been due to viral illness. b12 borderline low;  homocysteine elevated and MMA 
remains pending. Hospitalist has started B12 replacement. Will monitor 2. Leukopenia:  Resolved on recheck. Will monitor 3. Hypercalcemia:  Present for at least 2 months; IVF; PTH and PTHrp pending; PSA 0. No obvious malignancy on scans. Does have history of hyperparathyroidism per wife SPEP with low M-spike; SPEP 24-hr urine added; serum light chains and bone survey Discussed with nephrology and nursing; will have temporary mcmillan placed for Ca and SPEP Plan reviewed with Dr Jesu Thomas Signed By: Yaneth Morales NP

## 2018-11-21 NOTE — DISCHARGE SUMMARY
Physician Interim Discharge Summary     Patient ID:  Jessie Bryant  [id number]  80 y.o.  1935    Admit date: 11/14/2018    Discharge date and time: TBD    Hospital Course: This is an interim summary and covers the hospitalization from 11/17 to 11/20/2018. For any preceding portion of the patient's hospitalization, please see my partner's notes. Mr. Maxine Trujillo is an 81 yo WM w/ hx of CAD, DM, CKD presenting with weakness, initially thought to be viral illness. Hospitalization complicated by problems as below:    Hypercalcemia: Corrected Ca 13 today. Started aggressive IVF, consulted nephrology. Watch for volume overload. May need diuresis. PTH inappropriately normal. 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 PTHrP still pending. MM workup underway. D 25-hydroxy low. D 1, 25-dihydroxy, ACE pending. May need parathyroid US vs NM scan.      Lethargy: unclear etiology. May be related to hypercalcemia. Working up bacterial infections, malignancy, MM, etc. So far no evidence of infection including CT c/a/p, UA, RUQ US. No fevers or chills. Viral resp panel negative. Blood cultures NGTD. Chest CT with suggestion of pulmonary fibrosis. Supportive care. Discharge planning, very debilitated, likely needs SNF placement, discussed with pt and family     Anemia: mild, blood suggest vitamin B12 def. Started IM injections. Follow up on MMA (expect it to be elevated like homocysteine in B12 def)       CAD (coronary artery disease) () / PAF: Denies chest pain or sob. Continue ASA, imdur, losartan, and amlodipine. Continue apixaban for anticoagulation     Jaundice / hyperbilirubinemia: Resolved. No evidence of mass or biliary obstruction by noncontrast CT.  RUQ US showed no acute findings. CA 19-9 returned mildly elevated. Appreciate GI input, holding off on MRCP for now. Pt does have prosthesis which may NOT be MRI-compatible. s/p stapedectomy in 1989.  Discussed with [facility], no records available for  Mary. Can attempt to track down OR records, if still needed.      Thrombocytopenia / leukopenia: leukopenia resolved on repeat CBC. B12 borderline low/normal. Started b12 repletion as above. Hem/onc following        See daily note for details. Final discharge summary will be done by the discharging physician.        Signed:  Cleo Aguayo MD  11/20/2018  7:38 PM

## 2018-11-21 NOTE — PROGRESS NOTES
Aristeo Tanner [facility] 79 
60[address] [facility], [address] [place] [address] 
[phone] Medical Progress Note NAME: Marlo Matthews. :  1935 MRM:  [id number] Date/Time: 2018  11:44 AM 
 
 
  
Subjective: Chief Complaint:  Confusion: patient doesn't know what has happened recently or why he is here ROS: 
(bold if positive, if negative) Tolerating Diet Objective:  
 
 
Vitals:  
 
 
  
Last 24hrs VS reviewed since prior progress note. Most recent are: 
 
Visit Vitals /69 (BP 1 Location: Right arm, BP Patient Position: At rest) Pulse 66 Temp 98.4 °F (36.9 °C) Resp 18 Ht 5' 7\" (1.702 m) Wt 80.7 kg (178 lb) SpO2 93% BMI 27.88 kg/m² SpO2 Readings from Last 6 Encounters:  
18 93% 18 97% 17 92% 17 94% 17 98% 16 97% Intake/Output Summary (Last 24 hours) at 2018 1146 Last data filed at 2018 8128 Gross per 24 hour Intake 6809.3 ml Output  Net 6809.3 ml Exam:  
 
Physical Exam: 
 
Gen:  Well-developed, well-nourished, frail, elderly, chronically ill-appearing in no acute distress HEENT:  Pink conjunctivae, PERRL, hearing intact to voice, moist mucous membranes Neck:  Supple, without masses, thyroid non-tender Resp:  No accessory muscle use, clear breath sounds without wheezes rales or rhonchi 
Card:  No murmurs, normal S1, S2 without thrills, bruits or peripheral edema Abd:  Soft, non-tender, non-distended, normoactive bowel sounds are present, no palpable organomegaly and no detectable hernias Lymph:  No cervical or inguinal adenopathy Musc:  No cyanosis or clubbing Skin:  No rashes or ulcers, skin turgor is good Neuro:  Cranial nerves are grossly intact, no focal motor weakness, follows commands appropriately Psych:  Poor insight, oriented to person, place but not time, alert Medications Reviewed: (see below) Lab Data Reviewed: (see below) 
 
______________________________________________________________________ Medications:  
 
Current Facility-Administered Medications Medication Dose Route Frequency  0.9% sodium chloride infusion  150 mL/hr IntraVENous CONTINUOUS  
 cyanocobalamin (VITAMIN B12) injection 1,000 mcg  1,000 mcg IntraMUSCular DAILY  bisacodyl (DULCOLAX) suppository 10 mg  10 mg Rectal DAILY PRN  
 apixaban (ELIQUIS) tablet 2.5 mg  2.5 mg Oral BID  aspirin delayed-release tablet 81 mg  81 mg Oral QHS  isosorbide mononitrate ER (IMDUR) tablet 120 mg  120 mg Oral 7am  
 losartan (COZAAR) tablet 25 mg  25 mg Oral QHS  melatonin tablet 3 mg  3 mg Oral QHS  mirtazapine (REMERON) tablet 7.5 mg  7.5 mg Oral QHS  polyethylene glycol (MIRALAX) packet 17 g  17 g Oral DAILY  senna (SENOKOT) tablet 8.6 mg  1 Tab Oral DAILY  pantoprazole (PROTONIX) tablet 40 mg  40 mg Oral ACB  sodium chloride (NS) flush 5-10 mL  5-10 mL IntraVENous Q8H  
 sodium chloride (NS) flush 5-10 mL  5-10 mL IntraVENous PRN  
 acetaminophen (TYLENOL) tablet 650 mg  650 mg Oral Q4H PRN  prochlorperazine (COMPAZINE) injection 10 mg  10 mg IntraVENous Q6H PRN  
 insulin lispro (HUMALOG) injection   SubCUTAneous AC&HS  
 glucose chewable tablet 16 g  4 Tab Oral PRN  
 dextrose (D50W) injection syrg 12.5-25 g  25-50 mL IntraVENous PRN  
 glucagon (GLUCAGEN) injection 1 mg  1 mg IntraMUSCular PRN Lab Review:  
 
Recent Labs  
  11/21/18 
0259 11/20/18 
0618 11/19/18 
0355 WBC 4.5 4.1 4.4 HGB 11.3* 12.9 11.8* HCT 35.4* 40.9 37.7 * 137* 106* Recent Labs  
  11/21/18 
0259 11/20/18 
1301 11/20/18 
0618 11/19/18 
0355   --  139 139  
K 4.1  --  4.3 4.3   --  105 107 CO2 23  --  25 26 *  --  131* 136* BUN 16  --  15 19 CREA 1.21  --  1.42* 1.50* CA 10.6* 10.7* 11.7* 10.5* MG 1.8  --  2.0 1.8 PHOS 2.0*  --  2.5* 2.2* ALB 2.3*  --  2.3* 2.3*  
 TBILI 0.6  --  0.7 0.8 SGOT 13*  --  12* 12* ALT 20  --  18 22 Lab Results Component Value Date/Time Glucose (POC) 145 (H) 11/21/2018 05:22 AM  
 Glucose (POC) 185 (H) 11/20/2018 09:12 PM  
 Glucose (POC) 175 (H) 11/20/2018 03:58 PM  
 Glucose (POC) 187 (H) 11/20/2018 11:08 AM  
 Glucose (POC) 124 (H) 11/20/2018 07:06 AM  
 
No results for input(s): PH, PCO2, PO2, HCO3, FIO2 in the last 72 hours. No results for input(s): INR in the last 72 hours. No lab exists for component: INREXT, INREXT Lab Results Component Value Date/Time Specimen Description: [facility] 12/19/2013 11:52 AM  
 Specimen Description: DM 07/13/2012 04:45 PM  
 
Lab Results Component Value Date/Time Culture result: NO GROWTH 4 DAYS 11/17/2018 12:47 PM  
 Culture result: NO GROWTH 6 DAYS 09/12/2018 04:30 AM  
 Culture result: CITROBACTER FREUNDII (A) 09/09/2018 08:25 PM  
 Culture result:  09/09/2018 08:25 PM  
  (PLEASE DISREGARD THE SUSCEPTIBILITIES ON AMPICILLIN, AMP/SULBACTAM, CEFAZOLIN AND CEFUROXIME REPORTED ON 9/13 AS  SPECIES PER   
AS THESE ARE NOT DRUGS OF CHOICE FOR CITROBACTER) Assessment:  
 
Principal Problem: Hypercalcemia (11/21/2018) Active Problems: 
  CAD (coronary artery disease) () Overview: Angina Jan 2011, PCI unknown vessel at [place], no MI 
 
  CKD (chronic kidney disease) stage 3, GFR 30-59 ml/min (HCC) (12/10/2013) Dementia () 
 
  DM type 2 causing renal disease (HCC) () 
 
  Jaundice (11/21/2018) Plan:  
 
Principal Problem: Hypercalcemia (11/21/2018) - nuclear scan today 
 - continue hydration 
 - calcium not really dropping Active Problems: 
  CAD (coronary artery disease) () 
 - stable, continue cardiac meds CKD (chronic kidney disease) stage 3, GFR 30-59 ml/min (HCC) (12/10/2013) - creatinine at baseline Dementia () 
 - has acute encephalopathy, likely metabolic, on top of dementia - not really much improved overall from admission DM type 2 causing renal disease (HCC) () 
 - BS okay, follow on current meds Jaundice (11/21/2018) 
 - CT at some point per GI Renal cell carcinoma 
 - outpatient follow up at some point, I am concerned this is the underlying issue Debility 
 - attempting PT/OT 
 - SNF for rehab, family agreeable, CM working on it but not medically ready Total time spent in patient care: 25 minutes Care Plan discussed with: Patient, Family, Care Manager and Nursing Staff Discussed:  Code Status, Care Plan and D/C Planning Prophylaxis:  Eliquis Disposition:  SNF/LTC 
        
___________________________________________________ Attending Physician: Wojciech Smith MD

## 2018-11-21 NOTE — PROGRESS NOTES
11/21/2018   CARE MANAGEMENT NOTE:  CM reviewed EMR and pt was discussed in IDRs with MD.  Plan remains for SNF with [facility] choice. CM will f/u on Friday to confirm SNF bed availability and whether pt is medically stable for discharge. Dakotah

## 2018-11-21 NOTE — PROGRESS NOTES
Problem: Falls - Risk of 
Goal: *Absence of Falls Document Vick Mendosa Fall Risk and appropriate interventions in the flowsheet. Outcome: Progressing Towards Goal 
Fall Risk Interventions: 
Mobility Interventions: Bed/chair exit alarm, Patient to call before getting OOB, Utilize walker, cane, or other assistive device, Utilize gait belt for transfers/ambulation Mentation Interventions: Bed/chair exit alarm, Door open when patient unattended, Room close to nurse's station Medication Interventions: Bed/chair exit alarm, Patient to call before getting OOB, Teach patient to arise slowly, Utilize gait belt for transfers/ambulation Elimination Interventions: Bed/chair exit alarm, Call light in reach, Patient to call for help with toileting needs

## 2018-11-21 NOTE — PROGRESS NOTES
Problem: Dysphagia (Adult) Goal: *Acute Goals and Plan of Care (Insert Text) Swallowing goals initiated 11-19-18:(weekly re-eval due 11-26-18) 1) tolerate dysphagia 1, thins when awake without s/s aspiration by 11-22-18; continue to 11-26-18 
2)  SLP will re-eval for diet advancement if consistently more alert and chewing/bolus formation improved by 11-23-18 ; continu to 11--26-18 Speech language pathology dysphagia treatment Patient: Matthew Russell (80 y.o. male) Date: 11/21/2018 Diagnosis: Viral illness Viral illness <principal problem not specified> Precautions: aspiration Fall ASSESSMENT: 
Patient tolerating dysphagia 1, thins without overt s/s aspiration. Chronic aspiration risk factors due to dementia, feeder status, dependence for oral care. Progression toward goals: 
[]         Improving appropriately and progressing toward goals [x]         Improving slowly and progressing toward goals 
[]         Not making progress toward goals and plan of care will be adjusted PLAN: 
Recommendations and Planned Interventions: 
Continue dysphagia 1, thins. Feed only when awake and alert. Patient continues to benefit from skilled intervention to address the above impairments. Continue treatment per established plan of care. Discharge Recommendations:  None SUBJECTIVE:  
Patient stated What did you say? .-[facility] OBJECTIVE:  
Cognitive and Communication Status: 
Neurologic State: Alert, Confused Orientation Level: Oriented to person, Oriented to place, Disoriented to time, Disoriented to situation Cognition: Follows commands Perception: Appears intact Perseveration: No perseveration noted Safety/Judgement: Lack of insight into deficits Dysphagia Treatment: 
Oral Assessment: P.O. Trials: 
Patient Position: up in chair Vocal quality prior to P.O.: No impairment Consistency Presented: Thin liquid How Presented: (daughter fed-sips from straw) Patient took multiple sips from straw at one time without overt s/s aspiration Patient's daughter reported that he only ate a little bfast, then needed to use the bathroom. She reproted that he is eating well every other meal. Sometimes, she scooped the bite and then handed him the spoon. Discussed his aspiration risks with family: will consider re-eval for solids once he is consistently alert and feeding himself. Exercises: 
Laryngeal Exercises: 
  
  
  
  
  
  
  
  
  
  
  
  
  
  
  
  
  
  
  
  
  
  
  
  
  
  
  
  
  
  
  
  
  
  
  
  
  
  
  
  
  
  
  
Pain: 
Pain Scale 1: Numeric (0 - 10) Pain Intensity 1: 0 After treatment:  
[]              Patient left in no apparent distress sitting up in chair 
[]              Patient left in no apparent distress in bed 
[]              Call bell left within reach 
[]              Nursing notified 
[]              Caregiver present 
[]              Bed alarm activated COMMUNICATION/EDUCATION:  
Patient was educated regarding His deficit(s) of dysphagia  as this relates to His diagnosis of viral illness with dementia. He demonstrated Guarded understanding as evidenced by dementia. Family understood. . 
 
The patients plan of care including recommendations, planned interventions, and recommended diet changes were discussed with: none. []              Posted safety precautions in patient's room. DESIRAE Oliva Time Calculation: 10 mins

## 2018-11-21 NOTE — ROUTINE PROCESS
Bedside and Verbal shift change report given to Del Sawyer RN (oncoming nurse) by Frank Soria RN (offgoing nurse). Report included the following information SBAR, Kardex, Intake/Output, Recent Results and Quality Measures.

## 2018-11-21 NOTE — PROGRESS NOTES
Nutrition Assessment: 
 
RECOMMENDATIONS/INTERVENTION(S):  
Continue Pureed- added CCD 2000 kcal to diet Monitor PO intakes, weight, BG, LFTs, last BM 11/19-  
Monitor POC. If intakes <50%, add Ensure HP for snacks. ASSESSMENT:  
11/21: F/U. Pt downgraded to pureed diet per SLP. Added CCD- BG elevated 176, 131, 136, 128 mg/dL. Calcium high- off floor for parathyroid testing. Last BM 11/19. Nephrology following- 24 hr urine unable to complete. Intakes good, family helping feed. >75% most of the time. No liquid consistency alterations. Phos 2.0, trending down. 11/16: 80 yr old male admitted for chills, n/v, weakness. PMHx: HTN, hypothyroidism, SAMUEL, left renal mass, PAF, CAD, prostate cancer, arthritis, GERD, renal cell cancer, DM, and CKD. MST noted for weight loss, poor appetite. Per chart pt has lost 2 lbs in the last 2 months. Pt down 17 lbs (8%) from 1 yr ago. Not significant for time frame. Intakes documented as >50%. Pt was to have ERCP for elevated LFTs but unsure of implant from previous surgery. Monitor testing/results. No n/v. Check last BM. Labs reviwed. Cr 1.53. BiliT 1.8. SUBJECTIVE/OBJECTIVE:  
Diet Order: Cardiac, Consistent carb 
% Eaten:   
Patient Vitals for the past 168 hrs: 
 % Diet Eaten 11/20/18 1800 95 % 11/20/18 1230 50 % 11/20/18 0830 100 % 11/18/18 1914 80 % 11/18/18 1500 100 % 11/18/18 0900 100 % 11/17/18 1948 75 % 11/15/18 1415 75 % 11/15/18 1000 50 % Pertinent Medications: [x] Reviewed Labs reviewed:  [x] Anthropometrics: Height: 5' 7\" (170.2 cm) Weight: 80.7 kg (178 lb) IBW (%IBW):   ( ) UBW (%UBW):   (  %) BMI: Body mass index is 27.88 kg/m². This BMI is indicative of: 
 
 [] Underweight    [x] Normal for age   [] Overweight    []  Obesity    []  Extreme Obesity (BMI>40) Estimated Nutrition Needs (Based on): 4481 Kcals/day , 71 g(-85g/day(1.0-1.2g/kg)) Protein Carbohydrate:  At Least 130 g/day  Fluids: 1800 mL/day (1mL/kg rounded to 50 mL) Last BM: ?   []Active     []Hyperactive  []Hypoactive       [] Absent   BS Skin:    [] Intact   [] Incision  [x] Breakdown - redness sacrum  [] DTI   [] Tears/Excoriation/Abrasion  []Edema [] Other: Wt Readings from Last 30 Encounters:  
11/14/18 80.7 kg (178 lb)  
09/12/18 81.6 kg (180 lb) 08/25/17 88.5 kg (195 lb)  
07/16/17 88.5 kg (195 lb) 03/02/17 91.6 kg (202 lb)  
02/20/17 91.6 kg (202 lb) 02/04/17 89.1 kg (196 lb 6.4 oz) 03/24/16 87.2 kg (192 lb 4.8 oz) 02/17/16 90.7 kg (200 lb) 12/30/15 89 kg (196 lb 3.2 oz)  
11/24/15 92.4 kg (203 lb 9.6 oz) 11/02/15 93.4 kg (206 lb) 10/26/15 93.4 kg (206 lb) 10/09/15 92.7 kg (204 lb 6.4 oz) 09/30/15 92.3 kg (203 lb 6.4 oz) 08/12/15 94.8 kg (209 lb) 04/13/15 93.4 kg (206 lb) 12/08/14 95.5 kg (210 lb 9.6 oz) 08/06/14 92.1 kg (203 lb) 04/28/14 88 kg (194 lb)  
03/26/14 88.5 kg (195 lb) 12/19/13 88.5 kg (195 lb) 12/10/13 90.7 kg (199 lb 15.3 oz) 05/06/13 92.5 kg (204 lb) 12/10/12 90.7 kg (200 lb) 11/05/12 84.4 kg (186 lb) 08/13/12 88.8 kg (195 lb 12.8 oz) 08/03/12 79.4 kg (175 lb)  
07/30/12 88 kg (194 lb)  
07/16/12 89.4 kg (197 lb) NUTRITION DIAGNOSES:  
Problem:  Unintended weight loss Etiology: related to decreased ability to maintain weight Signs/Symptoms: as evidenced by reported weight loss NUTRITION INTERVENTIONS: 
Meals/Snacks: General/healthful diet   Supplements: Commercial supplement GOAL:  
pt will consume >50% of meals and ONS w/i 3-5 days Cultural, Jainism, or Ethnic Dietary Needs: None LEARNING NEEDS (Diet, Food/Nutrient-Drug Interaction):  
 [x] None Identified 
 [] Identified and Education Provided/Documented 
 [] Identified and Pt declined/was not appropriate [x] Interdisciplinary Care Plan Reviewed/Documented  
 [x] Discharge Needs:    TBD [] No Nutrition Related Discharge Needs NUTRITION RISK:  
Pt Is At Nutrition Risk  [x] No Nutrition Risk Identified  [] PT SEEN FOR:  
 []  MD Consult: []Calorie Count []Diabetic Diet Education []Diet Education []Electrolyte Management []General Nutrition Management and Supplements []Management of Tube Feeding []TPN Recommendations [x]  RN Referral:  [x]MST score >=2 
   []Enteral/Parenteral Nutrition PTA []Pregnant: Gestational DM or Multigestation  
              [] Pressure Ulcer 
   
[]  Low BMI      []  Length of Stay       [] Dysphagia Diet     [] Ventilator      [] Follow-Up Previous Recommendations: 
 [] Implemented          [] Not Implemented          [] Not Applicable Previous Goal: 
 [] Met              [] Progressing Towards Goal              [] Not Progressing Towards Goal   [x] Not Applicable Tan Zarate RD Pager: [phone] Office: [phone]

## 2018-11-21 NOTE — ROUTINE PROCESS
Bedside shift change report given to [facility] (oncoming nurse) by Lien Kumari (offgoing nurse). Report included the following information SBAR, Kardex, MAR and Recent Results. 1430 Barber catheter placed and 24 hour urine started. 1400 Patient back from procedures. 4859 Patient called out because he stated her needed to use the bathroom. Helped the patient on the bedpan. His sheets are soaked and will clean when he is done on the bedpan. He continues to pull off his condom catheter so the 24 hour urine has not yet started.

## 2018-11-21 NOTE — PROGRESS NOTES
Aristeo Huber [place] 79 Anabel Carbajal. YOB: 1935 Assessment & Plan: 1. Hypercalcemia and Hypophosphatemia · Though PTH is normal, for high normal Ca,it should be low · Repeat PTH not low · 24 hr urine Ca: needs temp mcmillan · H/o stones · ? 1 PTH Vs 2 PTH 
· Get Nuclear parathyroid scan · IVF for high calcium · Monitor and replete PO4 · Await PTHrp · Paraprotein : High urine M spike: DR. Davidson following · As calcium slightly better: cont volume, may need loops if breathing an issue 2. BRITANY · Better · Hypercalcemia and Vial illness contributing · BETTER 3. Anemia · HB normal now 4. Hyperbili 5. Viral illness Subjective:  
CC:arf HPI: Patient seen BRITANY is better Ca better PTH not low Not able to collect 24 hr urine due to incontinence. ROS:neg for 10 sys Current Facility-Administered Medications Medication Dose Route Frequency  0.9% sodium chloride infusion  150 mL/hr IntraVENous CONTINUOUS  
 cyanocobalamin (VITAMIN B12) injection 1,000 mcg  1,000 mcg IntraMUSCular DAILY  bisacodyl (DULCOLAX) suppository 10 mg  10 mg Rectal DAILY PRN  
 apixaban (ELIQUIS) tablet 2.5 mg  2.5 mg Oral BID  aspirin delayed-release tablet 81 mg  81 mg Oral QHS  isosorbide mononitrate ER (IMDUR) tablet 120 mg  120 mg Oral 7am  
 losartan (COZAAR) tablet 25 mg  25 mg Oral QHS  melatonin tablet 3 mg  3 mg Oral QHS  mirtazapine (REMERON) tablet 7.5 mg  7.5 mg Oral QHS  polyethylene glycol (MIRALAX) packet 17 g  17 g Oral DAILY  senna (SENOKOT) tablet 8.6 mg  1 Tab Oral DAILY  pantoprazole (PROTONIX) tablet 40 mg  40 mg Oral ACB  sodium chloride (NS) flush 5-10 mL  5-10 mL IntraVENous Q8H  
 sodium chloride (NS) flush 5-10 mL  5-10 mL IntraVENous PRN  
 acetaminophen (TYLENOL) tablet 650 mg  650 mg Oral Q4H PRN  prochlorperazine (COMPAZINE) injection 10 mg  10 mg IntraVENous Q6H PRN  
  insulin lispro (HUMALOG) injection   SubCUTAneous AC&HS  
 glucose chewable tablet 16 g  4 Tab Oral PRN  
 dextrose (D50W) injection syrg 12.5-25 g  25-50 mL IntraVENous PRN  
 glucagon (GLUCAGEN) injection 1 mg  1 mg IntraMUSCular PRN Objective:  
 
Vitals: 
Blood pressure 149/69, pulse 66, temperature 98.4 °F (36.9 °C), resp. rate 18, height 5' 7\" (1.702 m), weight 80.7 kg (178 lb), SpO2 93 %. Temp (24hrs), Av.3 °F (36.8 °C), Min:98.1 °F (36.7 °C), Max:98.6 °F (37 °C) Intake and Output: 
701 - 1900 In: 5159.3 [I.V.:5159.3] Out: -  
1901 -  07 In: 2320 [P.O.:855; I.V.:1465] Out:  [QOIBF:6964] Physical Exam:              
 Patient is intubated:  no 
 
Physical Examination:  
GENERAL ASSESSMENT: NAD HEENT:Nontraumatic CHEST: CTA HEART: S1S2 ABDOMEN: Soft,NT, 
:Barber:  
EXTREMITY: EDEMA 
NEURO:Grossly non focal 
 
 
   
ECG/rhythm: 
 
Data Review No results for input(s): TNIPOC in the last 72 hours. No lab exists for component: ITNL No results for input(s): CPK, CKMB, TROIQ in the last 72 hours. Recent Labs  
  18 
0259 18 
1301 18 
0618 18 
0355   --  139 139  
K 4.1  --  4.3 4.3   --  105 107 CO2 23  --  25 26 BUN 16  --  15 19 CREA 1.21  --  1.42* 1.50* *  --  131* 136* PHOS 2.0*  --  2.5* 2.2*  
MG 1.8  --  2.0 1.8  
CA 10.6* 10.7* 11.7* 10.5* ALB 2.3*  --  2.3* 2.3* WBC 4.5  --  4.1 4.4 HGB 11.3*  --  12.9 11.8* HCT 35.4*  --  40.9 37.7 *  --  137* 106* No results for input(s): INR, PTP, APTT in the last 72 hours. No lab exists for component: INREXT Needs: urine analysis, urine sodium, protein and creatinine No results found for: Shanika Minaya Discussed with:  Colleague 
 
: Eliana Dean MD 
2018 [facility]: 
[email or web address] Mercedes Quintana office: [address] [place], [address] [facility] Phone: [phone] Fax :     [phone] [place] office: 
[address] [place], [place] Phone - [phone] Fax - [phone]

## 2018-11-22 LAB
ACE SERPL-CCNC: 40 U/L (ref 14–82)
ANION GAP SERPL CALC-SCNC: 9 MMOL/L (ref 5–15)
BUN SERPL-MCNC: 10 MG/DL (ref 6–20)
BUN/CREAT SERPL: 8 (ref 12–20)
CALCIUM SERPL-MCNC: 11.4 MG/DL (ref 8.5–10.1)
CHLORIDE SERPL-SCNC: 106 MMOL/L (ref 97–108)
CO2 SERPL-SCNC: 24 MMOL/L (ref 21–32)
CREAT SERPL-MCNC: 1.29 MG/DL (ref 0.7–1.3)
ERYTHROCYTE [DISTWIDTH] IN BLOOD BY AUTOMATED COUNT: 13.4 % (ref 11.5–14.5)
GLUCOSE BLD STRIP.AUTO-MCNC: 126 MG/DL (ref 65–100)
GLUCOSE BLD STRIP.AUTO-MCNC: 146 MG/DL (ref 65–100)
GLUCOSE BLD STRIP.AUTO-MCNC: 155 MG/DL (ref 65–100)
GLUCOSE BLD STRIP.AUTO-MCNC: 159 MG/DL (ref 65–100)
GLUCOSE SERPL-MCNC: 136 MG/DL (ref 65–100)
HCT VFR BLD AUTO: 37.8 % (ref 36.6–50.3)
HGB BLD-MCNC: 12 G/DL (ref 12.1–17)
MAGNESIUM SERPL-MCNC: 1.9 MG/DL (ref 1.6–2.4)
MCH RBC QN AUTO: 27.6 PG (ref 26–34)
MCHC RBC AUTO-ENTMCNC: 31.7 G/DL (ref 30–36.5)
MCV RBC AUTO: 87.1 FL (ref 80–99)
NRBC # BLD: 0 K/UL (ref 0–0.01)
NRBC BLD-RTO: 0 PER 100 WBC
PHOSPHATE SERPL-MCNC: 2.7 MG/DL (ref 2.6–4.7)
PLATELET # BLD AUTO: 145 K/UL (ref 150–400)
PMV BLD AUTO: 10.4 FL (ref 8.9–12.9)
POTASSIUM SERPL-SCNC: 4.1 MMOL/L (ref 3.5–5.1)
PTH RELATED PROT SERPL-SCNC: <2 PMOL/L
RBC # BLD AUTO: 4.34 M/UL (ref 4.1–5.7)
SERVICE CMNT-IMP: ABNORMAL
SODIUM SERPL-SCNC: 139 MMOL/L (ref 136–145)
WBC # BLD AUTO: 4.6 K/UL (ref 4.1–11.1)

## 2018-11-22 PROCEDURE — 83735 ASSAY OF MAGNESIUM: CPT

## 2018-11-22 PROCEDURE — 65270000029 HC RM PRIVATE

## 2018-11-22 PROCEDURE — 74011000250 HC RX REV CODE- 250: Performed by: INTERNAL MEDICINE

## 2018-11-22 PROCEDURE — 74011250637 HC RX REV CODE- 250/637: Performed by: INTERNAL MEDICINE

## 2018-11-22 PROCEDURE — 80048 BASIC METABOLIC PNL TOTAL CA: CPT

## 2018-11-22 PROCEDURE — 36415 COLL VENOUS BLD VENIPUNCTURE: CPT

## 2018-11-22 PROCEDURE — 84100 ASSAY OF PHOSPHORUS: CPT

## 2018-11-22 PROCEDURE — 82962 GLUCOSE BLOOD TEST: CPT

## 2018-11-22 PROCEDURE — 74011250636 HC RX REV CODE- 250/636: Performed by: INTERNAL MEDICINE

## 2018-11-22 PROCEDURE — 85027 COMPLETE CBC AUTOMATED: CPT

## 2018-11-22 RX ORDER — FUROSEMIDE 10 MG/ML
40 INJECTION INTRAMUSCULAR; INTRAVENOUS ONCE
Status: COMPLETED | OUTPATIENT
Start: 2018-11-22 | End: 2018-11-22

## 2018-11-22 RX ADMIN — Medication 10 ML: at 06:05

## 2018-11-22 RX ADMIN — POLYETHYLENE GLYCOL 3350 17 G: 17 POWDER, FOR SOLUTION ORAL at 09:58

## 2018-11-22 RX ADMIN — APIXABAN 2.5 MG: 2.5 TABLET, FILM COATED ORAL at 09:57

## 2018-11-22 RX ADMIN — APIXABAN 2.5 MG: 2.5 TABLET, FILM COATED ORAL at 18:00

## 2018-11-22 RX ADMIN — SENNOSIDES 8.6 MG: 8.6 TABLET, FILM COATED ORAL at 09:57

## 2018-11-22 RX ADMIN — SODIUM CHLORIDE 150 ML/HR: 900 INJECTION, SOLUTION INTRAVENOUS at 23:15

## 2018-11-22 RX ADMIN — Medication 10 ML: at 12:26

## 2018-11-22 RX ADMIN — Medication 10 ML: at 22:00

## 2018-11-22 RX ADMIN — MIRTAZAPINE 7.5 MG: 15 TABLET, FILM COATED ORAL at 22:00

## 2018-11-22 RX ADMIN — MELATONIN TAB 3 MG 3 MG: 3 TAB at 22:00

## 2018-11-22 RX ADMIN — ASPIRIN 81 MG: 81 TABLET, COATED ORAL at 21:00

## 2018-11-22 RX ADMIN — CYANOCOBALAMIN 1000 MCG: 1000 INJECTION, SOLUTION INTRAMUSCULAR at 09:58

## 2018-11-22 RX ADMIN — ISOSORBIDE MONONITRATE 120 MG: 60 TABLET ORAL at 06:05

## 2018-11-22 RX ADMIN — LOSARTAN POTASSIUM 25 MG: 50 TABLET, FILM COATED ORAL at 22:00

## 2018-11-22 RX ADMIN — PANTOPRAZOLE SODIUM 40 MG: 40 TABLET, DELAYED RELEASE ORAL at 06:05

## 2018-11-22 RX ADMIN — FUROSEMIDE 40 MG: 10 INJECTION, SOLUTION INTRAMUSCULAR; INTRAVENOUS at 12:25

## 2018-11-22 NOTE — PROGRESS NOTES
Aristeo Tanner shaheed [place] 79 
[address] [facility], [address] [place] [address] 
[phone] Medical Progress Note NAME: Conner Toledo. :  1935 MRM:  [id number] Date/Time: 2018  10:04 AM  
 
  
Subjective: Chief Complaint:  Confusion: patient doesn't know what has happened recently or why he is here ROS: 
(bold if positive, if negative) Tolerating Diet Objective:  
 
 
Vitals:  
 
 
  
Last 24hrs VS reviewed since prior progress note. Most recent are: 
 
Visit Vitals /65 (BP 1 Location: Right arm, BP Patient Position: At rest) Pulse 63 Temp 96.5 °F (35.8 °C) Resp 16 Ht 5' 7\" (1.702 m) Wt 80.7 kg (178 lb) SpO2 94% BMI 27.88 kg/m² SpO2 Readings from Last 6 Encounters:  
18 94% 18 97% 17 92% 17 94% 17 98% 16 97% Intake/Output Summary (Last 24 hours) at 2018 1004 Last data filed at 2018 0800 Gross per 24 hour Intake 3394.6 ml Output 1825 ml Net 1569.6 ml  
  
 
 
Exam:  
 
Physical Exam: 
 
Gen:  Well-developed, well-nourished, frail, elderly, chronically ill-appearing in no acute distress HEENT:  Pink conjunctivae, PERRL, hearing intact to voice, moist mucous membranes Neck:  Supple, without masses, thyroid non-tender Resp:  No accessory muscle use, clear breath sounds without wheezes rales or rhonchi 
Card:  No murmurs, normal S1, S2 without thrills, bruits or peripheral edema Abd:  Soft, non-tender, non-distended, normoactive bowel sounds are present, no palpable organomegaly and no detectable hernias Lymph:  No cervical or inguinal adenopathy Musc:  No cyanosis or clubbing Skin:  No rashes or ulcers, skin turgor is good Neuro:  Cranial nerves are grossly intact, no focal motor weakness, follows commands appropriately Psych:  Poor insight, oriented to person, place but not time, alert Medications Reviewed: (see below) Lab Data Reviewed: (see below) 
 
______________________________________________________________________ Medications:  
 
Current Facility-Administered Medications Medication Dose Route Frequency  0.9% sodium chloride infusion  150 mL/hr IntraVENous CONTINUOUS  
 cyanocobalamin (VITAMIN B12) injection 1,000 mcg  1,000 mcg IntraMUSCular DAILY  bisacodyl (DULCOLAX) suppository 10 mg  10 mg Rectal DAILY PRN  
 apixaban (ELIQUIS) tablet 2.5 mg  2.5 mg Oral BID  aspirin delayed-release tablet 81 mg  81 mg Oral QHS  isosorbide mononitrate ER (IMDUR) tablet 120 mg  120 mg Oral 7am  
 losartan (COZAAR) tablet 25 mg  25 mg Oral QHS  melatonin tablet 3 mg  3 mg Oral QHS  mirtazapine (REMERON) tablet 7.5 mg  7.5 mg Oral QHS  polyethylene glycol (MIRALAX) packet 17 g  17 g Oral DAILY  senna (SENOKOT) tablet 8.6 mg  1 Tab Oral DAILY  pantoprazole (PROTONIX) tablet 40 mg  40 mg Oral ACB  sodium chloride (NS) flush 5-10 mL  5-10 mL IntraVENous Q8H  
 sodium chloride (NS) flush 5-10 mL  5-10 mL IntraVENous PRN  
 acetaminophen (TYLENOL) tablet 650 mg  650 mg Oral Q4H PRN  prochlorperazine (COMPAZINE) injection 10 mg  10 mg IntraVENous Q6H PRN  
 insulin lispro (HUMALOG) injection   SubCUTAneous AC&HS  
 glucose chewable tablet 16 g  4 Tab Oral PRN  
 dextrose (D50W) injection syrg 12.5-25 g  25-50 mL IntraVENous PRN  
 glucagon (GLUCAGEN) injection 1 mg  1 mg IntraMUSCular PRN Lab Review:  
 
Recent Labs  
  11/22/18 
0606 11/21/18 
0259 11/20/18 
3745 WBC 4.6 4.5 4.1 HGB 12.0* 11.3* 12.9 HCT 37.8 35.4* 40.9 * 115* 137* Recent Labs  
  11/22/18 
0606 11/21/18 
0259 11/20/18 
1301 11/20/18 
1067  138  --  139  
K 4.1 4.1  --  4.3  106  --  105 CO2 24 23  --  25 * 176*  --  131* BUN 10 16  --  15  
CREA 1.29 1.21  --  1.42* CA 11.4* 10.6* 10.7* 11.7* MG 1.9 1.8  --  2.0 PHOS 2.7 2.0*  --  2.5* ALB  --  2.3*  --  2.3*  
 TBILI  --  0.6  --  0.7 SGOT  --  13*  --  12* ALT  --  20  --  18 Lab Results Component Value Date/Time Glucose (POC) 126 (H) 11/22/2018 07:01 AM  
 Glucose (POC) 125 (H) 11/21/2018 09:28 PM  
 Glucose (POC) 124 (H) 11/21/2018 04:20 PM  
 Glucose (POC) 145 (H) 11/21/2018 05:22 AM  
 Glucose (POC) 185 (H) 11/20/2018 09:12 PM  
 
No results for input(s): PH, PCO2, PO2, HCO3, FIO2 in the last 72 hours. No results for input(s): INR in the last 72 hours. No lab exists for component: INREXT, INREXT Lab Results Component Value Date/Time Specimen Description: [facility] 12/19/2013 11:52 AM  
 Specimen Description: DM 07/13/2012 04:45 PM  
 
Lab Results Component Value Date/Time Culture result: NO GROWTH 5 DAYS 11/17/2018 12:47 PM  
 Culture result: NO GROWTH 6 DAYS 09/12/2018 04:30 AM  
 Culture result: CITROBACTER FREUNDII (A) 09/09/2018 08:25 PM  
 Culture result:  09/09/2018 08:25 PM  
  (PLEASE DISREGARD THE SUSCEPTIBILITIES ON AMPICILLIN, AMP/SULBACTAM, CEFAZOLIN AND CEFUROXIME REPORTED ON 9/13 AS  SPECIES PER   
AS THESE ARE NOT DRUGS OF CHOICE FOR CITROBACTER) Assessment:  
 
Principal Problem: Hypercalcemia (11/21/2018) Active Problems: 
  CAD (coronary artery disease) () Overview: Angina Jan 2011, PCI unknown vessel at [place], no MI 
 
  CKD (chronic kidney disease) stage 3, GFR 30-59 ml/min ([facility]) (12/10/2013) Dementia () 
 
  DM type 2 causing renal disease ([facility]) () 
 
  Jaundice (11/21/2018) Plan:  
 
Principal Problem: Hypercalcemia (11/21/2018) - parathyroid scan negative, skeletal survey unremarkable, PRH-rp negative 
 - unclear cause for hypercalcemia 
 - continues on hydration 
 - calcium up further today - Hem/Onc had suggested bone scan if skeletal survey is negative, perhaps tomorrow, given holiday - I think he needs some additional treatment for hypercalcemia beyond hydration, will give furosemide today - would a bisphosphonate help even if we don't know the source of his hypercalcemia? Active Problems: 
  CAD (coronary artery disease) () 
 - stable, continue cardiac meds CKD (chronic kidney disease) stage 3, GFR 30-59 ml/min ([facility]) (12/10/2013) - creatinine better than baseline due to aggressive hydration Dementia () 
 - has acute encephalopathy, likely metabolic, on top of dementia 
 - not really much improved overall from admission DM type 2 causing renal disease (HCC) () 
 - BS okay, follow on current meds Jaundice (11/21/2018) 
 - CT at some point per GI Renal cell carcinoma 
 - outpatient follow up at some point, I am concerned this is the underlying issue Debility 
 - attempting PT/OT 
 - SNF for rehab, family agreeable, CM working on it but not medically ready Total time spent in patient care: 25 minutes Care Plan discussed with: Patient, Family, Care Manager and Nursing Staff Discussed:  Code Status, Care Plan and D/C Planning Prophylaxis:  Eliquis Disposition:  SNF/LTC 
        
___________________________________________________ Attending Physician: Stefany Howard MD

## 2018-11-22 NOTE — ROUTINE PROCESS
Bedside and Verbal shift change report given to Teresa Briggs RN (oncoming nurse) by Bridgette Campuzano RN (offgoing nurse). Report included the following information SBAR, Kardex, Intake/Output, MAR and Quality Measures.

## 2018-11-22 NOTE — PROGRESS NOTES
[facility] [place] at [address], [address] [facility] 100[address] [facility] W: [phone]  F: [phone] Reason for Visit:  
Shola Duncan is a 80 y.o. male who is seen in consultation at the request of Dr. Issac Yi for evaluation of leukopenia and thrombocytopenia. Hematology / Oncology Treatment History:  
History of prostate cancer in 2010 (s/p surgery) and RCC (s/p partial L nephrectomy) History of Present Illness:  
Presented to ED on 11/14 with chills, cough, weakness, starting that day. Dark urine present. Has dementia at baseline Interval History:  
 
Doing well today. Has fatigue. No fevers. Past Medical History:  
Diagnosis Date  Arthritis  CAD (coronary artery disease) Angina Jan 2011, PCI LAD with multilink stent 4.0x12mm;  also cath at [facility] 7/13 -- no stents at that time  CKD (chronic kidney disease), stage III  Cochlear implant in place 2/3/2017  Dementia  DM type 2 causing renal disease ([address]  DM type 2 causing vascular disease ([address]  GERD (gastroesophageal reflux disease)  HTN (hypertension)  Hypothyroidism  SAMUEL (obstructive sleep apnea)   
 not using CPAP  
 PAF (paroxysmal atrial fibrillation) ([address] [id number] score = 7  
 Prostate cancer ([address]  Renal cell cancer ([address] Stage 4 Renal Cell Cancer. Partial omentectomy 11/2/2015 with Dr. Denny Wood: omental mass-metastatic renal cell carcinoma  Renal mass, left [address] LEFT PARTIAL NEPHRECTOMY 12/19/13;   
  
Past Surgical History:  
Procedure Laterality Date  CARDIAC SURG PROCEDURE UNLIST  1/2011  
 stent to LAD  HX CHOLECYSTECTOMY  7/13/2012  HX CORONARY STENT PLACEMENT  2011 [address]  2011, 2013 [address]  
 stapedectomy  HX HERNIA REPAIR  1968  
 left inguinal  
 HX LITHOTRIPSY  8/2015  HX ORTHOPAEDIC  1994  
 rotator cuff repair [facility] cervical disc removed  HX PROSTATECTOMY    
 surgery intervention [address] [place]  HX UROLOGICAL    
 valvular implant prevent incontinence  HX UROLOGICAL    
 insert gel to help with incontinence  HX UROLOGICAL  2013  
 left partial nephrectomy  HX UROLOGICAL  2015 CT guided biopsy of abdominal lymph nodes Social History Tobacco Use  Smoking status: Former Smoker Packs/day: 0.10 Years: 23.00 Pack years: 2.30 Last attempt to quit: 12/3/1975 Years since quittin.0  Smokeless tobacco: Never Used Substance Use Topics  Alcohol use: No  
  Alcohol/week: 0.0 oz Family History Problem Relation Age of Onset  Stroke Father  Cancer Father   
     prostate  Diabetes Sister 2 sisters  Hypertension Sister 2 sisters  Diabetes Brother  Stroke Brother  Diabetes Brother   
     all brothers  Stroke Brother  Kidney Disease Brother  Heart Attack Brother  Hypertension Other Current Facility-Administered Medications Medication Dose Route Frequency  furosemide (LASIX) injection 40 mg  40 mg IntraVENous ONCE  
 0.9% sodium chloride infusion  150 mL/hr IntraVENous CONTINUOUS  
 cyanocobalamin (VITAMIN B12) injection 1,000 mcg  1,000 mcg IntraMUSCular DAILY  bisacodyl (DULCOLAX) suppository 10 mg  10 mg Rectal DAILY PRN  
 apixaban (ELIQUIS) tablet 2.5 mg  2.5 mg Oral BID  aspirin delayed-release tablet 81 mg  81 mg Oral QHS  isosorbide mononitrate ER (IMDUR) tablet 120 mg  120 mg Oral 7am  
 losartan (COZAAR) tablet 25 mg  25 mg Oral QHS  melatonin tablet 3 mg  3 mg Oral QHS  mirtazapine (REMERON) tablet 7.5 mg  7.5 mg Oral QHS  polyethylene glycol (MIRALAX) packet 17 g  17 g Oral DAILY  senna (SENOKOT) tablet 8.6 mg  1 Tab Oral DAILY  pantoprazole (PROTONIX) tablet 40 mg  40 mg Oral ACB  sodium chloride (NS) flush 5-10 mL  5-10 mL IntraVENous Q8H  
  sodium chloride (NS) flush 5-10 mL  5-10 mL IntraVENous PRN  
 acetaminophen (TYLENOL) tablet 650 mg  650 mg Oral Q4H PRN  prochlorperazine (COMPAZINE) injection 10 mg  10 mg IntraVENous Q6H PRN  
 insulin lispro (HUMALOG) injection   SubCUTAneous AC&HS  
 glucose chewable tablet 16 g  4 Tab Oral PRN  
 dextrose (D50W) injection syrg 12.5-25 g  25-50 mL IntraVENous PRN  
 glucagon (GLUCAGEN) injection 1 mg  1 mg IntraMUSCular PRN Allergies Allergen Reactions  Latex Rash, Swelling and Contact Dermatitis  Atorvastatin Unknown (comments) Per patient's PCP allergy list- No reaction specified  Ezetimibe Myalgia  Lisinopril Cough  Metoprolol Other (comments) Bradycardia  Morphine Other (comments) Hallucinations and Nausea  Nitrofurantoin Rash \"Blood blisters/rash\" per patient's family  Rosuvastatin Myalgia  Simvastatin Myalgia Review of Systems: A complete review of systems was obtained, negative except as described above. Physical Exam:  
 
Visit Vitals /60 (BP 1 Location: Left arm, BP Patient Position: Sitting) Pulse 67 Temp 99 °F (37.2 °C) Resp 17 Ht 5' 7\" (1.702 m) Wt 178 lb (80.7 kg) SpO2 97% BMI 27.88 kg/m² ECOG PS: 3-4 General: No distress Eyes: PERRLA, anicteric sclerae HENT: Atraumatic with normal appearance of ears and nose; OP clear Neck: Supple; no thyromegaly Lymphatic: No cervical, supraclavicular, or axillary adenopathy Respiratory: CTAB, normal respiratory effort CV: Normal rate, regular rhythm, no murmurs, no peripheral edema GI: Soft, nontender, nondistended, no masses, no hepatomegaly, no splenomegaly MS: Digits without clubbing or cyanosis. Skin: No rashes, ecchymoses, or petechiae. Normal temperature, turgor, and texture. Results:  
 
Lab Results Component Value Date/Time  WBC 4.6 11/22/2018 06:06 AM  
 HGB 12.0 (L) 11/22/2018 06:06 AM  
 HCT 37.8 11/22/2018 06:06 AM  
 PLATELET 256 (L) 17/23/1311 06:06 AM  
 MCV 87.1 11/22/2018 06:06 AM  
 ABS. NEUTROPHILS 3.2 11/21/2018 02:59 AM  
 
Lab Results Component Value Date/Time Sodium 139 11/22/2018 06:06 AM  
 Potassium 4.1 11/22/2018 06:06 AM  
 Chloride 106 11/22/2018 06:06 AM  
 CO2 24 11/22/2018 06:06 AM  
 Glucose 136 (H) 11/22/2018 06:06 AM  
 BUN 10 11/22/2018 06:06 AM  
 Creatinine 1.29 11/22/2018 06:06 AM  
 GFR est AA >60 11/22/2018 06:06 AM  
 GFR est non-AA 53 (L) 11/22/2018 06:06 AM  
 Calcium 11.4 (H) 11/22/2018 06:06 AM  
 Glucose (POC) 146 (H) 11/22/2018 11:17 AM  
 Creatinine (POC) 1.5 (H) 11/26/2013 09:55 AM  
 
Lab Results Component Value Date/Time Bilirubin, total 0.6 11/21/2018 02:59 AM  
 ALT (SGPT) 20 11/21/2018 02:59 AM  
 AST (SGOT) 13 (L) 11/21/2018 02:59 AM  
 Alk. phosphatase 208 (H) 11/21/2018 02:59 AM  
 Protein, total 5.7 (L) 11/21/2018 02:59 AM  
 Albumin 2.3 (L) 11/21/2018 02:59 AM  
 Globulin 3.4 11/21/2018 02:59 AM  
 
11/15/18 CT c/a/p wo contrast 
FINDINGS: 
LOWER NECK: The visualized portions of the thyroid and structures of the lower 
neck are within normal limits. CHEST: 
The absence of intravenous contrast material reduces the sensitivity for 
evaluation of the mediastinal structures. Lungs: There is interstitial disease with pulmonary fibrosis in the periphery of 
the lungs which is unchanged. The lungs are clear of mass, nodule, airspace 
disease or acute edema. Pleura: There is no pleural effusion or pneumothorax. Aorta: The aorta is atherosclerotic and has a normal contour without evidence of 
aneurysm. There is extensive coronary artery calcification which is unchanged. Mediastinum: There is no mediastinal or hilar adenopathy or mass. Bones and soft tissues: The bones and soft tissues of the chest wall are normal. 
  
ABDOMEN: 
The absence of intravenous contrast material reduces the sensitivity for 
evaluation of the solid parenchymal organs of the abdomen. Liver: The liver is normal in size and contour with no focal abnormality. Gallbladder and bile ducts: There are clips in the gallbladder fossa and the 
gallbladder is absent. Spleen: No abnormality. Pancreas: No abnormality. Adrenal glands: No abnormality. Kidneys: There is a 4.2 x 3.3 x 3.7 cm left renal cyst which is unchanged and 
there is some radiopaque material in the left kidney from prior surgery. PELVIS: 
Reproductive organs: The prostate gland is absent. There are surgical clips in 
the pelvis. The seminal vesicles are symmetrical. 
Bladder: No abnormality. BOWEL AND MESENTERY: The small bowel is normal. There is no mesenteric mass or 
adenopathy. The appendix is not identified. There are diverticula of the colon. PERITONEUM: There is no ascites or free intraperitoneal air. RETROPERITONEUM: The aorta is atherosclerotic and tapers without aneurysm There 
is no retroperitoneal adenopathy or mass. There is no pelvic mass or adenopathy. BONES AND SOFT TISSUES: There are degenerative changes of the spine. IMPRESSION IMPRESSION:  
1. No acute abnormality and no change. 2. Pulmonary fibrosis. 3. Atherosclerosis with coronary artery calcification. 4. Atherosclerotic abdominal aorta without aneurysm. 5. Status post cholecystectomy. 6. Status post prostatectomy. 7. Status post left renal surgery with left renal cyst. 
8. Diverticulosis. 9. Thoracolumbar spondylosis. 11/17/18 abd US IMPRESSION:   
1. No visible abnormality. 11/20/2018 NM PARA THYROID SCAN 
IMPRESSION: 
No evidence of parathyroid adenoma. 
  
11/21/2018 XR Bone survey Negative 11/21/2018 US THYROID/PARA THYROID 
pending No parathyroid adenoma Assessment and Recommendations:  
 
1. Thrombocytopenia:  Continues to improve. Appears chronic. Work up unremarkable, borderline B12 levels , MMA not elevated, no HSM Continue po b12 2. Leukopenia:  Resolved 3. Hypercalcemia:  Present for at least 2 months; IVF; PTH normal, PTHrp pending; PSA 0. No obvious malignancy on scans. No parathyroid adenoma and skeletal survey is normal 
 
He has an MGUS- appears to be low risk and does not explain his hypercalcemia 24 hour UPEP and light chains awaited Signed By: Birdie Davis MD

## 2018-11-23 LAB
ANION GAP SERPL CALC-SCNC: 5 MMOL/L (ref 5–15)
BACTERIA SPEC CULT: NORMAL
BUN SERPL-MCNC: 11 MG/DL (ref 6–20)
BUN/CREAT SERPL: 7 (ref 12–20)
CALCIUM SERPL-MCNC: 10.5 MG/DL (ref 8.5–10.1)
CHLORIDE SERPL-SCNC: 104 MMOL/L (ref 97–108)
CO2 SERPL-SCNC: 29 MMOL/L (ref 21–32)
CREAT SERPL-MCNC: 1.5 MG/DL (ref 0.7–1.3)
GLUCOSE BLD STRIP.AUTO-MCNC: 146 MG/DL (ref 65–100)
GLUCOSE BLD STRIP.AUTO-MCNC: 170 MG/DL (ref 65–100)
GLUCOSE BLD STRIP.AUTO-MCNC: 192 MG/DL (ref 65–100)
GLUCOSE BLD STRIP.AUTO-MCNC: 216 MG/DL (ref 65–100)
GLUCOSE SERPL-MCNC: 189 MG/DL (ref 65–100)
POTASSIUM SERPL-SCNC: 3.8 MMOL/L (ref 3.5–5.1)
SERVICE CMNT-IMP: ABNORMAL
SERVICE CMNT-IMP: NORMAL
SODIUM SERPL-SCNC: 138 MMOL/L (ref 136–145)

## 2018-11-23 PROCEDURE — 36415 COLL VENOUS BLD VENIPUNCTURE: CPT

## 2018-11-23 PROCEDURE — 74011000250 HC RX REV CODE- 250: Performed by: INTERNAL MEDICINE

## 2018-11-23 PROCEDURE — 74011250636 HC RX REV CODE- 250/636: Performed by: INTERNAL MEDICINE

## 2018-11-23 PROCEDURE — 80048 BASIC METABOLIC PNL TOTAL CA: CPT

## 2018-11-23 PROCEDURE — 65270000029 HC RM PRIVATE

## 2018-11-23 PROCEDURE — 74011636637 HC RX REV CODE- 636/637: Performed by: INTERNAL MEDICINE

## 2018-11-23 PROCEDURE — 82962 GLUCOSE BLOOD TEST: CPT

## 2018-11-23 PROCEDURE — 74011250637 HC RX REV CODE- 250/637: Performed by: INTERNAL MEDICINE

## 2018-11-23 PROCEDURE — 74011000258 HC RX REV CODE- 258: Performed by: INTERNAL MEDICINE

## 2018-11-23 RX ORDER — CALCITONIN SALMON 200 [USP'U]/ML
4 INJECTION, SOLUTION INTRAMUSCULAR; SUBCUTANEOUS EVERY 12 HOURS
Status: COMPLETED | OUTPATIENT
Start: 2018-11-23 | End: 2018-11-23

## 2018-11-23 RX ORDER — BUMETANIDE 0.25 MG/ML
1 INJECTION INTRAMUSCULAR; INTRAVENOUS EVERY 12 HOURS
Status: DISCONTINUED | OUTPATIENT
Start: 2018-11-23 | End: 2018-11-24

## 2018-11-23 RX ADMIN — MELATONIN TAB 3 MG 3 MG: 3 TAB at 22:26

## 2018-11-23 RX ADMIN — BUMETANIDE 1 MG: 0.25 INJECTION INTRAMUSCULAR; INTRAVENOUS at 22:25

## 2018-11-23 RX ADMIN — INSULIN LISPRO 1 UNITS: 100 INJECTION, SOLUTION INTRAVENOUS; SUBCUTANEOUS at 22:29

## 2018-11-23 RX ADMIN — Medication 10 ML: at 22:39

## 2018-11-23 RX ADMIN — ISOSORBIDE MONONITRATE 120 MG: 60 TABLET ORAL at 07:00

## 2018-11-23 RX ADMIN — POLYETHYLENE GLYCOL 3350 17 G: 17 POWDER, FOR SOLUTION ORAL at 09:15

## 2018-11-23 RX ADMIN — ASPIRIN 81 MG: 81 TABLET, COATED ORAL at 22:26

## 2018-11-23 RX ADMIN — BUMETANIDE 1 MG: 0.25 INJECTION INTRAMUSCULAR; INTRAVENOUS at 09:14

## 2018-11-23 RX ADMIN — Medication 10 ML: at 06:00

## 2018-11-23 RX ADMIN — ZOLEDRONIC ACID 4 MG: 4 INJECTION, SOLUTION, CONCENTRATE INTRAVENOUS at 10:50

## 2018-11-23 RX ADMIN — APIXABAN 2.5 MG: 2.5 TABLET, FILM COATED ORAL at 18:05

## 2018-11-23 RX ADMIN — MIRTAZAPINE 7.5 MG: 15 TABLET, FILM COATED ORAL at 22:26

## 2018-11-23 RX ADMIN — CYANOCOBALAMIN 1000 MCG: 1000 INJECTION, SOLUTION INTRAMUSCULAR at 10:49

## 2018-11-23 RX ADMIN — APIXABAN 2.5 MG: 2.5 TABLET, FILM COATED ORAL at 09:14

## 2018-11-23 RX ADMIN — CALCITONIN SALMON 322 INT'L UNITS: 200 INJECTION, SOLUTION INTRAMUSCULAR; SUBCUTANEOUS at 22:30

## 2018-11-23 RX ADMIN — Medication 10 ML: at 14:00

## 2018-11-23 RX ADMIN — CALCITONIN SALMON 322 INT'L UNITS: 200 INJECTION, SOLUTION INTRAMUSCULAR; SUBCUTANEOUS at 10:50

## 2018-11-23 RX ADMIN — LOSARTAN POTASSIUM 25 MG: 50 TABLET, FILM COATED ORAL at 22:26

## 2018-11-23 RX ADMIN — SODIUM CHLORIDE 150 ML/HR: 900 INJECTION, SOLUTION INTRAVENOUS at 05:53

## 2018-11-23 RX ADMIN — PANTOPRAZOLE SODIUM 40 MG: 40 TABLET, DELAYED RELEASE ORAL at 06:20

## 2018-11-23 RX ADMIN — SENNOSIDES 8.6 MG: 8.6 TABLET, FILM COATED ORAL at 09:14

## 2018-11-23 NOTE — PROGRESS NOTES
Aristeo Tanner shaheed [place] [address] 
[address] 
[phone] Medical Progress Note NAME: Brennan Veronica. :  1935 MRM:  [id number] Date/Time: 2018  9:56 AM  
 
  
Subjective: Chief Complaint:  Confusion: patient doesn't know what has happened recently or why he is here ROS: 
(bold if positive, if negative) Tolerating Diet Objective:  
 
 
Vitals:  
 
 
  
Last 24hrs VS reviewed since prior progress note. Most recent are: 
 
Visit Vitals /66 (BP 1 Location: Left arm, BP Patient Position: At rest) Pulse 61 Temp 98 °F (36.7 °C) Resp 18 Ht 5' 7\" (1.702 m) Wt 80.7 kg (178 lb) SpO2 95% BMI 27.88 kg/m² SpO2 Readings from Last 6 Encounters:  
18 95% 18 97% 17 92% 17 94% 17 98% 16 97% Intake/Output Summary (Last 24 hours) at 2018 0996 Last data filed at 2018 1520 Gross per 24 hour Intake  Output 1200 ml Net -1200 ml Exam:  
 
Physical Exam: 
 
Gen:  Well-developed, well-nourished, frail, elderly, chronically ill-appearing in no acute distress HEENT:  Pink conjunctivae, PERRL, hearing intact to voice, moist mucous membranes Neck:  Supple, without masses, thyroid non-tender Resp:  No accessory muscle use, clear breath sounds without wheezes rales or rhonchi 
Card:  No murmurs, normal S1, S2 without thrills, bruits or peripheral edema Abd:  Soft, non-tender, non-distended, normoactive bowel sounds are present, no palpable organomegaly and no detectable hernias Lymph:  No cervical or inguinal adenopathy Musc:  No cyanosis or clubbing Skin:  No rashes or ulcers, skin turgor is good Neuro:  Cranial nerves are grossly intact, no focal motor weakness, follows commands appropriately Psych:  Poor insight, oriented to person, place but not time, alert Medications Reviewed: (see below) Lab Data Reviewed: (see below) 
 
______________________________________________________________________ Medications:  
 
Current Facility-Administered Medications Medication Dose Route Frequency  calciTONIN (MIACALCIN) injection 200 Int'l Units  200 Int'l Units SubCUTAneous Q12H  
 zoledronic acid (ZOMETA) 4 mg in 0.9% sodium chloride 100 mL IVPB  4 mg IntraVENous NOW  bumetanide (BUMEX) injection 1 mg  1 mg IntraVENous Q12H  
 0.9% sodium chloride infusion  75 mL/hr IntraVENous CONTINUOUS  
 cyanocobalamin (VITAMIN B12) injection 1,000 mcg  1,000 mcg IntraMUSCular DAILY  bisacodyl (DULCOLAX) suppository 10 mg  10 mg Rectal DAILY PRN  
 apixaban (ELIQUIS) tablet 2.5 mg  2.5 mg Oral BID  aspirin delayed-release tablet 81 mg  81 mg Oral QHS  isosorbide mononitrate ER (IMDUR) tablet 120 mg  120 mg Oral 7am  
 losartan (COZAAR) tablet 25 mg  25 mg Oral QHS  melatonin tablet 3 mg  3 mg Oral QHS  mirtazapine (REMERON) tablet 7.5 mg  7.5 mg Oral QHS  polyethylene glycol (MIRALAX) packet 17 g  17 g Oral DAILY  senna (SENOKOT) tablet 8.6 mg  1 Tab Oral DAILY  pantoprazole (PROTONIX) tablet 40 mg  40 mg Oral ACB  sodium chloride (NS) flush 5-10 mL  5-10 mL IntraVENous Q8H  
 sodium chloride (NS) flush 5-10 mL  5-10 mL IntraVENous PRN  
 acetaminophen (TYLENOL) tablet 650 mg  650 mg Oral Q4H PRN  prochlorperazine (COMPAZINE) injection 10 mg  10 mg IntraVENous Q6H PRN  
 insulin lispro (HUMALOG) injection   SubCUTAneous AC&HS  
 glucose chewable tablet 16 g  4 Tab Oral PRN  
 dextrose (D50W) injection syrg 12.5-25 g  25-50 mL IntraVENous PRN  
 glucagon (GLUCAGEN) injection 1 mg  1 mg IntraMUSCular PRN Lab Review:  
 
Recent Labs  
  11/22/18 
0606 11/21/18 
0259 WBC 4.6 4.5 HGB 12.0* 11.3* HCT 37.8 35.4*  
* 115* Recent Labs  
  11/22/18 
0606 11/21/18 
0259 11/20/18 
1301  138  --   
K 4.1 4.1  --   
 106  --   
CO2 24 23  --   
 * 176*  --   
BUN 10 16  --   
CREA 1.29 1.21  --   
CA 11.4* 10.6* 10.7* MG 1.9 1.8  --   
PHOS 2.7 2.0*  --   
ALB  --  2.3*  --   
TBILI  --  0.6  --   
SGOT  --  13*  --   
ALT  --  20  -- Lab Results Component Value Date/Time Glucose (POC) 146 (H) 11/23/2018 08:01 AM  
 Glucose (POC) 159 (H) 11/22/2018 10:01 PM  
 Glucose (POC) 155 (H) 11/22/2018 04:27 PM  
 Glucose (POC) 146 (H) 11/22/2018 11:17 AM  
 Glucose (POC) 126 (H) 11/22/2018 07:01 AM  
 
No results for input(s): PH, PCO2, PO2, HCO3, FIO2 in the last 72 hours. No results for input(s): INR in the last 72 hours. No lab exists for component: INREXT, INREXT Lab Results Component Value Date/Time Specimen Description: [facility] 12/19/2013 11:52 AM  
 Specimen Description: [facility] 07/13/2012 04:45 PM  
 
Lab Results Component Value Date/Time Culture result: NO GROWTH 6 DAYS 11/17/2018 12:47 PM  
 Culture result: NO GROWTH 6 DAYS 09/12/2018 04:30 AM  
 Culture result: CITROBACTER FREUNDII (A) 09/09/2018 08:25 PM  
 Culture result:  09/09/2018 08:25 PM  
  (PLEASE DISREGARD THE SUSCEPTIBILITIES ON AMPICILLIN, AMP/SULBACTAM, CEFAZOLIN AND CEFUROXIME REPORTED ON 9/13 AS  SPECIES PER   
AS THESE ARE NOT DRUGS OF CHOICE FOR CITROBACTER) Assessment:  
 
Principal Problem: Hypercalcemia (11/21/2018) Active Problems: 
  CAD (coronary artery disease) () Overview: Angina Jan 2011, PCI unknown vessel at [place], no MI 
 
  CKD (chronic kidney disease) stage 3, GFR 30-59 ml/min ([facility]) (12/10/2013) Dementia () 
 
  DM type 2 causing renal disease (HCC) () 
 
  Jaundice (11/21/2018) Plan:  
 
Principal Problem: Hypercalcemia (11/21/2018) - parathyroid scan negative, skeletal survey unremarkable, PRH-rp negative 
 - unclear cause for hypercalcemia 
 - gave furosemide for saline diuresis yesterday 
 - appreciated Renal input, to receive calcitonin and Zometa today 
 - follow calcium Active Problems: 
  CAD (coronary artery disease) () 
 - stable, continue cardiac meds CKD (chronic kidney disease) stage 3, GFR 30-59 ml/min ([facility]) (12/10/2013) - creatinine better than baseline due to aggressive hydration Dementia () 
 - has acute encephalopathy, likely metabolic, on top of dementia 
 - not really much improved overall from admission DM type 2 causing renal disease (HCC) () 
 - BS okay, follow on current meds Jaundice (11/21/2018) 
 - CT at some point per GI Renal cell carcinoma 
 - outpatient follow up at some point, I am concerned this is the underlying issue 
 - PET scan at some point, perhaps as outpatient Debility 
 - attempting PT/OT 
 - SNF for rehab, family agreeable, CM working on it but not medically ready Total time spent in patient care: 25 minutes Care Plan discussed with: Patient, Family, Care Manager and Nursing Staff Discussed:  Code Status, Care Plan and D/C Planning Prophylaxis:  Eliquis Disposition:  SNF/LTC 
        
___________________________________________________ Attending Physician: Cristel Daniel MD  
 
 (0) independent

## 2018-11-23 NOTE — PROGRESS NOTES
Attempted to work with the patient for Physical Therapy, chart reviewed and discussed with nurse patient blood pressure elevated today and feeling tired. We ill continue to follow up with the patient for therapy. Thank you.

## 2018-11-23 NOTE — PROGRESS NOTES
Aristeo Tanner [facility] 79 [address] Galindo. YOB: 1935 Assessment & Plan: Hypercalcemia 
-MGUS on SPEP/IEP - felt not to be cause per Hematology 
-inappropriately normal PTH and 1,25 vitamin d levels 
-normal ACE, PTHrP, PSA 
-normal sestamibi pth scan 
-CT scans non diagnositc 
-needs treatment for high Ca - Calcitonin x 4 doses, Zometa ordered 
-decrease IVF, add bumex 
-most concerned about history of metastatic renal cell cancer - PET scan Metastatic renal cell carcinoma, S/P partial left nephrectomy 2013 
-s/p omentectomy 11/2015 for mets DM Mild CKD Dementia Pulm fibrosis on CT Subjective:  
CC:arf HPI: Patient seen BRITANY is better Ca better PTH not low Not able to collect 24 hr urine due to incontinence. ROS:neg for 10 sys Current Facility-Administered Medications Medication Dose Route Frequency  calciTONIN (MIACALCIN) injection 200 Int'l Units  200 Int'l Units SubCUTAneous Q12H  
 zoledronic acid (ZOMETA) 4 mg in 0.9% sodium chloride 100 mL IVPB  4 mg IntraVENous NOW  bumetanide (BUMEX) injection 1 mg  1 mg IntraVENous Q12H  
 0.9% sodium chloride infusion  75 mL/hr IntraVENous CONTINUOUS  
 cyanocobalamin (VITAMIN B12) injection 1,000 mcg  1,000 mcg IntraMUSCular DAILY  bisacodyl (DULCOLAX) suppository 10 mg  10 mg Rectal DAILY PRN  
 apixaban (ELIQUIS) tablet 2.5 mg  2.5 mg Oral BID  aspirin delayed-release tablet 81 mg  81 mg Oral QHS  isosorbide mononitrate ER (IMDUR) tablet 120 mg  120 mg Oral 7am  
 losartan (COZAAR) tablet 25 mg  25 mg Oral QHS  melatonin tablet 3 mg  3 mg Oral QHS  mirtazapine (REMERON) tablet 7.5 mg  7.5 mg Oral QHS  polyethylene glycol (MIRALAX) packet 17 g  17 g Oral DAILY  senna (SENOKOT) tablet 8.6 mg  1 Tab Oral DAILY  pantoprazole (PROTONIX) tablet 40 mg  40 mg Oral ACB  sodium chloride (NS) flush 5-10 mL  5-10 mL IntraVENous Q8H  
  sodium chloride (NS) flush 5-10 mL  5-10 mL IntraVENous PRN  
 acetaminophen (TYLENOL) tablet 650 mg  650 mg Oral Q4H PRN  prochlorperazine (COMPAZINE) injection 10 mg  10 mg IntraVENous Q6H PRN  
 insulin lispro (HUMALOG) injection   SubCUTAneous AC&HS  
 glucose chewable tablet 16 g  4 Tab Oral PRN  
 dextrose (D50W) injection syrg 12.5-25 g  25-50 mL IntraVENous PRN  
 glucagon (GLUCAGEN) injection 1 mg  1 mg IntraMUSCular PRN Objective:  
 
Vitals: 
Blood pressure 169/71, pulse 60, temperature 98.2 °F (36.8 °C), resp. rate 18, height 5' 7\" (1.702 m), weight 80.7 kg (178 lb), SpO2 96 %. Temp (24hrs), Av.3 °F (36.8 °C), Min:97.8 °F (36.6 °C), Max:99 °F (37.2 °C) Intake and Output: 
No intake/output data recorded.  1901 -  0700 In: 2374.6 [I.V.:2374.6] Out: 3025 [ATDFI:1237] Physical Exam:              
 Patient is intubated:  no 
 
Physical Examination:  
GENERAL ASSESSMENT: NAD HEENT:Nontraumatic CHEST: CTA HEART: S1S2 ABDOMEN: Soft,NT, 
:Barber:  
EXTREMITY: EDEMA 
NEURO:Grossly non focal 
 
 
   
ECG/rhythm: 
 
Data Review No results for input(s): TNIPOC in the last 72 hours. No lab exists for component: ITNL No results for input(s): CPK, CKMB, TROIQ in the last 72 hours. Recent Labs  
  18 
0606 18 
0259 18 
1301  138  --   
K 4.1 4.1  --   
 106  --   
CO2 24 23  --   
BUN 10 16  --   
CREA 1.29 1.21  --   
* 176*  --   
PHOS 2.7 2.0*  --   
MG 1.9 1.8  --   
CA 11.4* 10.6* 10.7* ALB  --  2.3*  --   
WBC 4.6 4.5  --   
HGB 12.0* 11.3*  --   
HCT 37.8 35.4*  --   
* 115*  -- No results for input(s): INR, PTP, APTT in the last 72 hours. No lab exists for component: INREXT, INREXT Needs: urine analysis, urine sodium, protein and creatinine No results found for: Marisol Driss Discussed with:  Colleague 
 
: Lisa Garcia MD 
2018 [facility]: [email or web address] [place] office: 
[address], [address] [place], [address] [facility] Phone: [phone] Fax :     [phone] [place] office: 
[address] [facility], [address] Phone - [phone] Fax - [phone]

## 2018-11-23 NOTE — PROGRESS NOTES
1915 
Bedside and Verbal shift change report given to Rigoberto RN (oncoming nurse) by Hernan Zhao RN (offgoing nurse). Report included the following information SBAR, Kardex, MAR, Accordion and Recent Results. [facility] [address] Assumed care of patient. Pt resting in chair and states he is comfortable. Pt and family educated on the importance of turning and repositioning more frequently for skin protection. Pt and family verbalized understanding.

## 2018-11-24 LAB
ALBUMIN SERPL-MCNC: 2.4 G/DL (ref 3.5–5)
ALBUMIN/GLOB SERPL: 0.6 {RATIO} (ref 1.1–2.2)
ALP SERPL-CCNC: 292 U/L (ref 45–117)
ALT SERPL-CCNC: 24 U/L (ref 12–78)
ANION GAP SERPL CALC-SCNC: 8 MMOL/L (ref 5–15)
AST SERPL-CCNC: 12 U/L (ref 15–37)
BASOPHILS # BLD: 0 K/UL (ref 0–0.1)
BASOPHILS NFR BLD: 0 % (ref 0–1)
BILIRUB SERPL-MCNC: 0.7 MG/DL (ref 0.2–1)
BUN SERPL-MCNC: 10 MG/DL (ref 6–20)
BUN/CREAT SERPL: 8 (ref 12–20)
CALCIUM SERPL-MCNC: 10.4 MG/DL (ref 8.5–10.1)
CHLORIDE SERPL-SCNC: 101 MMOL/L (ref 97–108)
CO2 SERPL-SCNC: 26 MMOL/L (ref 21–32)
CREAT SERPL-MCNC: 1.26 MG/DL (ref 0.7–1.3)
DIFFERENTIAL METHOD BLD: ABNORMAL
EOSINOPHIL # BLD: 0.1 K/UL (ref 0–0.4)
EOSINOPHIL NFR BLD: 2 % (ref 0–7)
ERYTHROCYTE [DISTWIDTH] IN BLOOD BY AUTOMATED COUNT: 13.8 % (ref 11.5–14.5)
GLOBULIN SER CALC-MCNC: 3.8 G/DL (ref 2–4)
GLUCOSE BLD STRIP.AUTO-MCNC: 187 MG/DL (ref 65–100)
GLUCOSE BLD STRIP.AUTO-MCNC: 190 MG/DL (ref 65–100)
GLUCOSE BLD STRIP.AUTO-MCNC: 195 MG/DL (ref 65–100)
GLUCOSE BLD STRIP.AUTO-MCNC: 199 MG/DL (ref 65–100)
GLUCOSE SERPL-MCNC: 210 MG/DL (ref 65–100)
HCT VFR BLD AUTO: 39.6 % (ref 36.6–50.3)
HGB BLD-MCNC: 12.7 G/DL (ref 12.1–17)
IMM GRANULOCYTES # BLD: 0.1 K/UL (ref 0–0.04)
IMM GRANULOCYTES NFR BLD AUTO: 1 % (ref 0–0.5)
LYMPHOCYTES # BLD: 0.4 K/UL (ref 0.8–3.5)
LYMPHOCYTES NFR BLD: 6 % (ref 12–49)
MCH RBC QN AUTO: 27.7 PG (ref 26–34)
MCHC RBC AUTO-ENTMCNC: 32.1 G/DL (ref 30–36.5)
MCV RBC AUTO: 86.3 FL (ref 80–99)
MONOCYTES # BLD: 0.3 K/UL (ref 0–1)
MONOCYTES NFR BLD: 5 % (ref 5–13)
NEUTS SEG # BLD: 5.8 K/UL (ref 1.8–8)
NEUTS SEG NFR BLD: 86 % (ref 32–75)
NRBC # BLD: 0 K/UL (ref 0–0.01)
NRBC BLD-RTO: 0 PER 100 WBC
PLATELET # BLD AUTO: 168 K/UL (ref 150–400)
PMV BLD AUTO: 10 FL (ref 8.9–12.9)
POTASSIUM SERPL-SCNC: 4 MMOL/L (ref 3.5–5.1)
PROT SERPL-MCNC: 6.2 G/DL (ref 6.4–8.2)
RBC # BLD AUTO: 4.59 M/UL (ref 4.1–5.7)
SERVICE CMNT-IMP: ABNORMAL
SODIUM SERPL-SCNC: 135 MMOL/L (ref 136–145)
WBC # BLD AUTO: 6.7 K/UL (ref 4.1–11.1)

## 2018-11-24 PROCEDURE — 65270000029 HC RM PRIVATE

## 2018-11-24 PROCEDURE — 82962 GLUCOSE BLOOD TEST: CPT

## 2018-11-24 PROCEDURE — 74011250637 HC RX REV CODE- 250/637: Performed by: INTERNAL MEDICINE

## 2018-11-24 PROCEDURE — 74011000250 HC RX REV CODE- 250: Performed by: INTERNAL MEDICINE

## 2018-11-24 PROCEDURE — 74011250636 HC RX REV CODE- 250/636: Performed by: INTERNAL MEDICINE

## 2018-11-24 PROCEDURE — 36415 COLL VENOUS BLD VENIPUNCTURE: CPT

## 2018-11-24 PROCEDURE — 80053 COMPREHEN METABOLIC PANEL: CPT

## 2018-11-24 PROCEDURE — 85025 COMPLETE CBC W/AUTO DIFF WBC: CPT

## 2018-11-24 RX ORDER — BUMETANIDE 0.25 MG/ML
2 INJECTION INTRAMUSCULAR; INTRAVENOUS EVERY 12 HOURS
Status: COMPLETED | OUTPATIENT
Start: 2018-11-24 | End: 2018-11-24

## 2018-11-24 RX ADMIN — SODIUM CHLORIDE 75 ML/HR: 900 INJECTION, SOLUTION INTRAVENOUS at 04:51

## 2018-11-24 RX ADMIN — PANTOPRAZOLE SODIUM 40 MG: 40 TABLET, DELAYED RELEASE ORAL at 06:43

## 2018-11-24 RX ADMIN — PROCHLORPERAZINE EDISYLATE 10 MG: 5 INJECTION INTRAMUSCULAR; INTRAVENOUS at 09:38

## 2018-11-24 RX ADMIN — ISOSORBIDE MONONITRATE 120 MG: 60 TABLET ORAL at 06:43

## 2018-11-24 RX ADMIN — Medication 10 ML: at 17:48

## 2018-11-24 RX ADMIN — LOSARTAN POTASSIUM 25 MG: 50 TABLET, FILM COATED ORAL at 23:01

## 2018-11-24 RX ADMIN — POLYETHYLENE GLYCOL 3350 17 G: 17 POWDER, FOR SOLUTION ORAL at 09:41

## 2018-11-24 RX ADMIN — APIXABAN 2.5 MG: 2.5 TABLET, FILM COATED ORAL at 17:48

## 2018-11-24 RX ADMIN — MIRTAZAPINE 7.5 MG: 15 TABLET, FILM COATED ORAL at 23:01

## 2018-11-24 RX ADMIN — BUMETANIDE 2 MG: 0.25 INJECTION INTRAMUSCULAR; INTRAVENOUS at 09:42

## 2018-11-24 RX ADMIN — SODIUM CHLORIDE 75 ML/HR: 900 INJECTION, SOLUTION INTRAVENOUS at 18:44

## 2018-11-24 RX ADMIN — MELATONIN TAB 3 MG 3 MG: 3 TAB at 23:01

## 2018-11-24 RX ADMIN — SENNOSIDES 8.6 MG: 8.6 TABLET, FILM COATED ORAL at 09:41

## 2018-11-24 RX ADMIN — APIXABAN 2.5 MG: 2.5 TABLET, FILM COATED ORAL at 09:42

## 2018-11-24 RX ADMIN — ASPIRIN 81 MG: 81 TABLET, COATED ORAL at 23:01

## 2018-11-24 RX ADMIN — Medication 10 ML: at 23:05

## 2018-11-24 NOTE — PROGRESS NOTES
Chart reviewed. Received clearance from nursing to work with pt. Pt was extremely lethargic after being given medication for nausea. Pt unable to keep eyes open. Will f/u tomorrow when pt able to fully participate. Thanks!

## 2018-11-24 NOTE — PROGRESS NOTES
Aristeo Tanner shaheed [place] [address] 
[address] 
[phone] Medical Progress Note NAME: Rebekah Henderson. :  1935 MRM:  [id number] Date/Time: 2018  9:59 AM  
 
  
Subjective: Chief Complaint:  Confusion: patient doesn't know what has happened recently or why he is here ROS: 
(bold if positive, if negative) Tolerating Diet Objective:  
 
 
Vitals:  
 
 
  
Last 24hrs VS reviewed since prior progress note. Most recent are: 
 
Visit Vitals /72 (BP 1 Location: Right arm, BP Patient Position: At rest) Pulse 70 Temp 97.7 °F (36.5 °C) Resp 16 Ht 5' 7\" (1.702 m) Wt 80.7 kg (178 lb) SpO2 94% BMI 27.88 kg/m² SpO2 Readings from Last 6 Encounters:  
18 94% 18 97% 17 92% 17 94% 17 98% 16 97% Intake/Output Summary (Last 24 hours) at 2018 2420 Last data filed at 2018 0405 Gross per 24 hour Intake 0 ml Output 100 ml Net -100 ml Exam:  
 
Physical Exam: 
 
Gen:  Well-developed, well-nourished, frail, elderly, chronically ill-appearing in no acute distress HEENT:  Pink conjunctivae, PERRL, hearing intact to voice, moist mucous membranes Neck:  Supple, without masses, thyroid non-tender Resp:  No accessory muscle use, clear breath sounds without wheezes rales or rhonchi 
Card:  No murmurs, normal S1, S2 without thrills, bruits or peripheral edema Abd:  Soft, non-tender, non-distended, normoactive bowel sounds are present, no palpable organomegaly and no detectable hernias Lymph:  No cervical or inguinal adenopathy Musc:  No cyanosis or clubbing Skin:  No rashes or ulcers, skin turgor is good Neuro:  Cranial nerves are grossly intact, no focal motor weakness, follows commands appropriately Psych:  Poor insight, oriented to person, place but not time, alert Medications Reviewed: (see below) Lab Data Reviewed: (see below) 
 
______________________________________________________________________ Medications:  
 
Current Facility-Administered Medications Medication Dose Route Frequency  0.9% sodium chloride infusion  75 mL/hr IntraVENous CONTINUOUS  
 bisacodyl (DULCOLAX) suppository 10 mg  10 mg Rectal DAILY PRN  
 apixaban (ELIQUIS) tablet 2.5 mg  2.5 mg Oral BID  aspirin delayed-release tablet 81 mg  81 mg Oral QHS  isosorbide mononitrate ER (IMDUR) tablet 120 mg  120 mg Oral 7am  
 losartan (COZAAR) tablet 25 mg  25 mg Oral QHS  melatonin tablet 3 mg  3 mg Oral QHS  mirtazapine (REMERON) tablet 7.5 mg  7.5 mg Oral QHS  polyethylene glycol (MIRALAX) packet 17 g  17 g Oral DAILY  senna (SENOKOT) tablet 8.6 mg  1 Tab Oral DAILY  pantoprazole (PROTONIX) tablet 40 mg  40 mg Oral ACB  sodium chloride (NS) flush 5-10 mL  5-10 mL IntraVENous Q8H  
 sodium chloride (NS) flush 5-10 mL  5-10 mL IntraVENous PRN  
 acetaminophen (TYLENOL) tablet 650 mg  650 mg Oral Q4H PRN  prochlorperazine (COMPAZINE) injection 10 mg  10 mg IntraVENous Q6H PRN  
 insulin lispro (HUMALOG) injection   SubCUTAneous AC&HS  
 glucose chewable tablet 16 g  4 Tab Oral PRN  
 dextrose (D50W) injection syrg 12.5-25 g  25-50 mL IntraVENous PRN  
 glucagon (GLUCAGEN) injection 1 mg  1 mg IntraMUSCular PRN Lab Review:  
 
Recent Labs  
  11/24/18 
0258 11/22/18 
0606 WBC 6.7 4.6 HGB 12.7 12.0*  
HCT 39.6 37.8  145* Recent Labs  
  11/24/18 
0258 11/23/18 
0950 11/22/18 
0606 * 138 139  
K 4.0 3.8 4.1  104 106 CO2 26 29 24 * 189* 136* BUN 10 11 10 CREA 1.26 1.50* 1.29  
CA 10.4* 10.5* 11.4* MG  --   --  1.9 PHOS  --   --  2.7 ALB 2.4*  --   --   
TBILI 0.7  --   --   
SGOT 12*  --   --   
ALT 24  --   --   
 
Lab Results Component Value Date/Time  Glucose (POC) 195 (H) 11/24/2018 07:25 AM  
 Glucose (POC) 216 (H) 11/23/2018 09:36 PM  
 Glucose (POC) 192 (H) 11/23/2018 06:21 PM  
 Glucose (POC) 170 (H) 11/23/2018 12:06 PM  
 Glucose (POC) 146 (H) 11/23/2018 08:01 AM  
 
No results for input(s): PH, PCO2, PO2, HCO3, FIO2 in the last 72 hours. No results for input(s): INR in the last 72 hours. No lab exists for component: INREXT, INREXT Lab Results Component Value Date/Time Specimen Description: [facility] 12/19/2013 11:52 AM  
 Specimen Description: DM 07/13/2012 04:45 PM  
 
Lab Results Component Value Date/Time Culture result: NO GROWTH 6 DAYS 11/17/2018 12:47 PM  
 Culture result: NO GROWTH 6 DAYS 09/12/2018 04:30 AM  
 Culture result: CITROBACTER FREUNDII (A) 09/09/2018 08:25 PM  
 Culture result:  09/09/2018 08:25 PM  
  (PLEASE DISREGARD THE SUSCEPTIBILITIES ON AMPICILLIN, AMP/SULBACTAM, CEFAZOLIN AND CEFUROXIME REPORTED ON 9/13 AS  SPECIES PER   
AS THESE ARE NOT DRUGS OF CHOICE FOR CITROBACTER) Assessment:  
 
Principal Problem: Hypercalcemia (11/21/2018) Active Problems: 
  CAD (coronary artery disease) () Overview: Angina Jan 2011, PCI unknown vessel at [place], no MI 
 
  CKD (chronic kidney disease) stage 3, GFR 30-59 ml/min ([facility]) (12/10/2013) Dementia () 
 
  DM type 2 causing renal disease ([facility]) () 
 
  Jaundice (11/21/2018) Plan:  
 
Principal Problem: Hypercalcemia (11/21/2018) - parathyroid scan negative, skeletal survey unremarkable, PRH-rp negative 
 - unclear cause for hypercalcemia, PTH inappropriately normal 
 - appreciate Renal input, receiving calcitonin and got Zometa yesterday 
 - follow calcium, hasn't budged much, mental status is about the same Active Problems: 
  CAD (coronary artery disease) () 
 - stable, continue cardiac meds CKD (chronic kidney disease) stage 3, GFR 30-59 ml/min ([facility]) (12/10/2013) - creatinine better than baseline due to aggressive hydration Dementia () - has acute encephalopathy, metabolic, on top of dementia 
 - not really much improved overall from admission DM type 2 causing renal disease (HCC) () 
 - BS okay, follow on current meds Jaundice (11/21/2018) 
 - CT at some point per GI Renal cell carcinoma 
 - outpatient follow up at some point, I am concerned this is the underlying issue 
 - PET scan at some point, perhaps as outpatient Debility 
 - attempting PT/OT 
 - SNF for rehab, family agreeable, CM working on it but not medically ready Total time spent in patient care: 25 minutes Care Plan discussed with: Patient, Family, Care Manager and Nursing Staff Discussed:  Code Status, Care Plan and D/C Planning Prophylaxis:  Eliquis Disposition:  SNF/LTC 
        
___________________________________________________ Attending Physician: Radha Porter MD

## 2018-11-24 NOTE — PROGRESS NOTES
Aristeo Tanner shaheed [place] 79 Catrina Sung. 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 
-inappropriately normal PTH and 1,25 vitamin D levels 
-normal ACE, PTHrP, PSA 
-normal sestamibi pth scan 
-CT scans non diagnositc 
-Calcitonin x 4 doses started 11/23, Zometa 4 mg given 11/23 
-IVF, Bumex ongoing 
-most concerned about history of metastatic renal cell cancer - PET scan when able Metastatic renal cell carcinoma, S/P partial left nephrectomy 2013 
-s/p omentectomy 11/2015 for mets DM Mild CKD Dementia Pulm fibrosis on CT Subjective:  
CC:arf HPI: Patient seen; alert and responsive; U/O??; still with dependent rales ROS:neg for 10 sys Current Facility-Administered Medications Medication Dose Route Frequency  bumetanide (BUMEX) injection 1 mg  1 mg IntraVENous Q12H  
 0.9% sodium chloride infusion  75 mL/hr IntraVENous CONTINUOUS  
 bisacodyl (DULCOLAX) suppository 10 mg  10 mg Rectal DAILY PRN  
 apixaban (ELIQUIS) tablet 2.5 mg  2.5 mg Oral BID  aspirin delayed-release tablet 81 mg  81 mg Oral QHS  isosorbide mononitrate ER (IMDUR) tablet 120 mg  120 mg Oral 7am  
 losartan (COZAAR) tablet 25 mg  25 mg Oral QHS  melatonin tablet 3 mg  3 mg Oral QHS  mirtazapine (REMERON) tablet 7.5 mg  7.5 mg Oral QHS  polyethylene glycol (MIRALAX) packet 17 g  17 g Oral DAILY  senna (SENOKOT) tablet 8.6 mg  1 Tab Oral DAILY  pantoprazole (PROTONIX) tablet 40 mg  40 mg Oral ACB  sodium chloride (NS) flush 5-10 mL  5-10 mL IntraVENous Q8H  
 sodium chloride (NS) flush 5-10 mL  5-10 mL IntraVENous PRN  
 acetaminophen (TYLENOL) tablet 650 mg  650 mg Oral Q4H PRN  prochlorperazine (COMPAZINE) injection 10 mg  10 mg IntraVENous Q6H PRN  
 insulin lispro (HUMALOG) injection   SubCUTAneous AC&HS  
  glucose chewable tablet 16 g  4 Tab Oral PRN  
 dextrose (D50W) injection syrg 12.5-25 g  25-50 mL IntraVENous PRN  
 glucagon (GLUCAGEN) injection 1 mg  1 mg IntraMUSCular PRN Objective:  
 
Vitals: 
Blood pressure 173/79, pulse 71, temperature 98.4 °F (36.9 °C), resp. rate 18, height 5' 7\" (1.702 m), weight 80.7 kg (178 lb), SpO2 96 %. Temp (24hrs), Av °F (36.7 °C), Min:97.4 °F (36.3 °C), Max:98.5 °F (36.9 °C) Intake and Output: 
No intake/output data recorded.  1901 -  0700 In: 0 Out: 100 [Urine:100] Physical Exam:              
 Patient is intubated:  no 
 
Physical Examination:  
GENERAL ASSESSMENT: NAD HEENT:Nontraumatic CHEST: dependent rales HEART: S1S2, no rub ABDOMEN: Soft,NT, 
:Barber:  
EXTREMITY: EDEMA 
NEURO:Grossly non focal 
 
 
   
ECG/rhythm: 
 
Data Review No results for input(s): TNIPOC in the last 72 hours. No lab exists for component: ITNL No results for input(s): CPK, CKMB, TROIQ in the last 72 hours. Recent Labs  
  18 
0258 18 
0950 18 
0606 * 138 139  
K 4.0 3.8 4.1  104 106 CO2 26 29 24 BUN 10 11 10 CREA 1.26 1.50* 1.29  
* 189* 136* PHOS  --   --  2.7 MG  --   --  1.9  
CA 10.4* 10.5* 11.4* ALB 2.4*  --   --   
WBC 6.7  --  4.6 HGB 12.7  --  12.0*  
HCT 39.6  --  37.8   --  145* No results for input(s): INR, PTP, APTT in the last 72 hours. No lab exists for component: INREXT, INREXT Needs: urine analysis, urine sodium, protein and creatinine No results found for: Lisa Hartman Discussed with:  patient 
 
: Vinnie Escudero MD 
2018 [facility]: 
[email or web address] [place] office: 
[address] [facility] PROVIDERS [facility]SH[facility], [address] [place], [address] [facility] Phone: [phone] Fax :     [phone] [place] office: 
[address] [facility], [address] Phone - [phone] Fax - [phone]

## 2018-11-24 NOTE — PROGRESS NOTES
[facility] [place] [facility] [place] [id number] 301 [facility], [address] [place] [address] W: [phone]  F: [phone] Reason for Visit:  
Conner Toledo. is a 80 y.o. male who is seen in consultation at the request of Dr. Chela Joseph for evaluation of leukopenia and thrombocytopenia. Hematology / Oncology Treatment History:  
History of prostate cancer in 2010 (s/p surgery) and RCC (s/p partial L nephrectomy) History of Present Illness:  
Presented to ED on 11/14 with chills, cough, weakness, starting that day. Dark urine present. Has dementia at baseline Interval History:  
 
Doing well today. Has fatigue and is sleepy. No fevers Past Medical History:  
Diagnosis Date  Arthritis  CAD (coronary artery disease) Angina Jan 2011, PCI LAD with multilink stent 4.0x12mm;  also cath at [place] 7/13 -- no stents at that time  CKD (chronic kidney disease), stage III  Cochlear implant in place 2/3/2017  Dementia  DM type 2 causing renal disease ([address]  DM type 2 causing vascular disease ([address]  GERD (gastroesophageal reflux disease)  HTN (hypertension)  Hypothyroidism  SAMUEL (obstructive sleep apnea)   
 not using CPAP  
 PAF (paroxysmal atrial fibrillation) ([address] [id number] score = 7  
 [facility]  Renal cell cancer ([address] Stage 4 Renal Cell Cancer. Partial omentectomy 11/2/2015 with Dr. Carla Turner: omental mass-metastatic renal cell carcinoma  Renal mass, left [address] LEFT PARTIAL NEPHRECTOMY 12/19/13;   
  
Past Surgical History:  
Procedure Laterality Date  CARDIAC SURG PROCEDURE UNLIST  1/2011  
 stent to LAD  HX CHOLECYSTECTOMY  7/13/2012  HX CORONARY STENT PLACEMENT  2011 [address]  2011, 2013 [address]  
 stapedectomy  HX HERNIA REPAIR  1968  
 left inguinal  
 HX LITHOTRIPSY  8/2015  HX ORTHOPAEDIC  1994  
 rotator cuff repair [address] cervical disc removed  HX PROSTATECTOMY    
 surgery intervention [address]  HX UROLOGICAL    
 valvular implant prevent incontinence  HX UROLOGICAL    
 insert gel to help with incontinence  HX UROLOGICAL  2013  
 left partial nephrectomy  HX UROLOGICAL  2015 CT guided biopsy of abdominal lymph nodes Social History Tobacco Use  Smoking status: Former Smoker Packs/day: 0.10 Years: 23.00 Pack years: 2.30 Last attempt to quit: 12/3/1975 Years since quittin.0  Smokeless tobacco: Never Used Substance Use Topics  Alcohol use: No  
  Alcohol/week: 0.0 oz Family History Problem Relation Age of Onset  Stroke Father  Cancer Father   
     prostate  Diabetes Sister 2 sisters  Hypertension Sister 2 sisters  Diabetes Brother  Stroke Brother  Diabetes Brother   
     all brothers  Stroke Brother  Kidney Disease Brother  Heart Attack Brother  Hypertension Other Current Facility-Administered Medications Medication Dose Route Frequency  0.9% sodium chloride infusion  75 mL/hr IntraVENous CONTINUOUS  
 bisacodyl (DULCOLAX) suppository 10 mg  10 mg Rectal DAILY PRN  
 apixaban (ELIQUIS) tablet 2.5 mg  2.5 mg Oral BID  aspirin delayed-release tablet 81 mg  81 mg Oral QHS  isosorbide mononitrate ER (IMDUR) tablet 120 mg  120 mg Oral 7am  
 losartan (COZAAR) tablet 25 mg  25 mg Oral QHS  melatonin tablet 3 mg  3 mg Oral QHS  mirtazapine (REMERON) tablet 7.5 mg  7.5 mg Oral QHS  polyethylene glycol (MIRALAX) packet 17 g  17 g Oral DAILY  senna (SENOKOT) tablet 8.6 mg  1 Tab Oral DAILY  pantoprazole (PROTONIX) tablet 40 mg  40 mg Oral ACB  sodium chloride (NS) flush 5-10 mL  5-10 mL IntraVENous Q8H  
 sodium chloride (NS) flush 5-10 mL  5-10 mL IntraVENous PRN  
 acetaminophen (TYLENOL) tablet 650 mg  650 mg Oral Q4H PRN  
  prochlorperazine (COMPAZINE) injection 10 mg  10 mg IntraVENous Q6H PRN  
 insulin lispro (HUMALOG) injection   SubCUTAneous AC&HS  
 glucose chewable tablet 16 g  4 Tab Oral PRN  
 dextrose (D50W) injection syrg 12.5-25 g  25-50 mL IntraVENous PRN  
 glucagon (GLUCAGEN) injection 1 mg  1 mg IntraMUSCular PRN Allergies Allergen Reactions  Latex Rash, Swelling and Contact Dermatitis  Atorvastatin Unknown (comments) Per patient's PCP allergy list- No reaction specified  Ezetimibe Myalgia  Lisinopril Cough  Metoprolol Other (comments) Bradycardia  Morphine Other (comments) Hallucinations and Nausea  Nitrofurantoin Rash \"Blood blisters/rash\" per patient's family  Rosuvastatin Myalgia  Simvastatin Myalgia Review of Systems: A complete review of systems was obtained, negative except as described above. Physical Exam:  
 
Visit Vitals /72 (BP 1 Location: Right arm, BP Patient Position: At rest) Pulse 70 Temp 97.7 °F (36.5 °C) Resp 16 Ht 5' 7\" (1.702 m) Wt 178 lb (80.7 kg) SpO2 94% BMI 27.88 kg/m² ECOG PS: 3-4 General: No distress, sleepy, daughter at bedside HENT: Atraumatic with normal appearance of ears and nose; OP clear Neck: Supple; no thyromegaly Lymphatic: No cervical, supraclavicular, or axillary adenopathy CV: Normal rate, regular rhythm, no murmurs, no peripheral edema GI: Soft, nontender, nondistended, no masses, no hepatomegaly, no splenomegaly Skin: No rashes, ecchymoses, or petechiae. Normal temperature, turgor, and texture. Results:  
 
Lab Results Component Value Date/Time WBC 6.7 11/24/2018 02:58 AM  
 HGB 12.7 11/24/2018 02:58 AM  
 HCT 39.6 11/24/2018 02:58 AM  
 PLATELET 480 63/79/2572 02:58 AM  
 MCV 86.3 11/24/2018 02:58 AM  
 ABS. NEUTROPHILS 5.8 11/24/2018 02:58 AM  
 
Lab Results Component Value Date/Time  Sodium 135 (L) 11/24/2018 02:58 AM  
 Potassium 4.0 11/24/2018 02:58 AM  
 Chloride 101 11/24/2018 02:58 AM  
 CO2 26 11/24/2018 02:58 AM  
 Glucose 210 (H) 11/24/2018 02:58 AM  
 BUN 10 11/24/2018 02:58 AM  
 Creatinine 1.26 11/24/2018 02:58 AM  
 GFR est AA >60 11/24/2018 02:58 AM  
 GFR est non-AA 55 (L) 11/24/2018 02:58 AM  
 Calcium 10.4 (H) 11/24/2018 02:58 AM  
 Glucose (POC) 199 (H) 11/24/2018 12:41 PM  
 Creatinine (POC) 1.5 (H) 11/26/2013 09:55 AM  
 
Lab Results Component Value Date/Time Bilirubin, total 0.7 11/24/2018 02:58 AM  
 ALT (SGPT) 24 11/24/2018 02:58 AM  
 AST (SGOT) 12 (L) 11/24/2018 02:58 AM  
 Alk. phosphatase 292 (H) 11/24/2018 02:58 AM  
 Protein, total 6.2 (L) 11/24/2018 02:58 AM  
 Albumin 2.4 (L) 11/24/2018 02:58 AM  
 Globulin 3.8 11/24/2018 02:58 AM  
 
11/15/18 CT c/a/p wo contrast 
FINDINGS: 
LOWER NECK: The visualized portions of the thyroid and structures of the lower 
neck are within normal limits. CHEST: 
The absence of intravenous contrast material reduces the sensitivity for 
evaluation of the mediastinal structures. Lungs: There is interstitial disease with pulmonary fibrosis in the periphery of 
the lungs which is unchanged. The lungs are clear of mass, nodule, airspace 
disease or acute edema. Pleura: There is no pleural effusion or pneumothorax. Aorta: The aorta is atherosclerotic and has a normal contour without evidence of 
aneurysm. There is extensive coronary artery calcification which is unchanged. Mediastinum: There is no mediastinal or hilar adenopathy or mass. Bones and soft tissues: The bones and soft tissues of the chest wall are normal. 
  
ABDOMEN: 
The absence of intravenous contrast material reduces the sensitivity for 
evaluation of the solid parenchymal organs of the abdomen. Liver: The liver is normal in size and contour with no focal abnormality. Gallbladder and bile ducts: There are clips in the gallbladder fossa and the 
gallbladder is absent. Spleen: No abnormality. Pancreas: No abnormality. Adrenal glands: No abnormality. Kidneys: There is a 4.2 x 3.3 x 3.7 cm left renal cyst which is unchanged and 
there is some radiopaque material in the left kidney from prior surgery. PELVIS: 
Reproductive organs: The prostate gland is absent. There are surgical clips in 
the pelvis. The seminal vesicles are symmetrical. 
Bladder: No abnormality. BOWEL AND MESENTERY: The small bowel is normal. There is no mesenteric mass or 
adenopathy. The appendix is not identified. There are diverticula of the colon. PERITONEUM: There is no ascites or free intraperitoneal air. RETROPERITONEUM: The aorta is atherosclerotic and tapers without aneurysm There 
is no retroperitoneal adenopathy or mass. There is no pelvic mass or adenopathy. BONES AND SOFT TISSUES: There are degenerative changes of the spine. IMPRESSION IMPRESSION:  
1. No acute abnormality and no change. 2. Pulmonary fibrosis. 3. Atherosclerosis with coronary artery calcification. 4. Atherosclerotic abdominal aorta without aneurysm. 5. Status post cholecystectomy. 6. Status post prostatectomy. 7. Status post left renal surgery with left renal cyst. 
8. Diverticulosis. 9. Thoracolumbar spondylosis. 11/17/18 abd US IMPRESSION:   
1. No visible abnormality. 11/20/2018 NM PARA THYROID SCAN 
IMPRESSION: 
No evidence of parathyroid adenoma. 
  
11/21/2018 XR Bone survey Negative 11/21/2018 US THYROID/PARA THYROID 
pending No parathyroid adenoma Assessment and Recommendations:  
 
1. Thrombocytopenia:  Resolved Work up unremarkable, borderline B12 levels , MMA not elevated, no HSM Continue po b12 2. Leukopenia:  Resolved 3. Hypercalcemia:  Present for at least 2 months; IVF; PTH normal, PTHrp pending; PSA 0. No obvious malignancy on scans. No parathyroid adenoma and skeletal survey is normal 
 
He has an MGUS- appears to be low risk and does not explain his hypercalcemia 24 hour UPEP and light chains awaited He does also have a h/o RCC Since his CT scans are negative may consider a Bone scan. I note that nephrology suggests a PET CT which is fine too if we are able to obtain this next week Signed By: Meli Yi MD

## 2018-11-24 NOTE — ROUTINE PROCESS
Bedside and Verbal shift change report given to Evan Riley (oncoming nurse) by Melvin Freeman (offgoing nurse). Report included the following information SBAR, Kardex and Recent Results.

## 2018-11-25 LAB
ALBUMIN SERPL-MCNC: 2.1 G/DL (ref 3.5–5)
ALBUMIN/GLOB SERPL: 0.7 {RATIO} (ref 1.1–2.2)
ALP SERPL-CCNC: 228 U/L (ref 45–117)
ALT SERPL-CCNC: 17 U/L (ref 12–78)
ANION GAP SERPL CALC-SCNC: 6 MMOL/L (ref 5–15)
AST SERPL-CCNC: 9 U/L (ref 15–37)
BASOPHILS # BLD: 0 K/UL (ref 0–0.1)
BASOPHILS NFR BLD: 1 % (ref 0–1)
BILIRUB SERPL-MCNC: 0.5 MG/DL (ref 0.2–1)
BUN SERPL-MCNC: 13 MG/DL (ref 6–20)
BUN/CREAT SERPL: 10 (ref 12–20)
CALCIUM SERPL-MCNC: 9.4 MG/DL (ref 8.5–10.1)
CHLORIDE SERPL-SCNC: 104 MMOL/L (ref 97–108)
CO2 SERPL-SCNC: 26 MMOL/L (ref 21–32)
CREAT SERPL-MCNC: 1.33 MG/DL (ref 0.7–1.3)
DIFFERENTIAL METHOD BLD: ABNORMAL
EOSINOPHIL # BLD: 0.2 K/UL (ref 0–0.4)
EOSINOPHIL NFR BLD: 3 % (ref 0–7)
ERYTHROCYTE [DISTWIDTH] IN BLOOD BY AUTOMATED COUNT: 14 % (ref 11.5–14.5)
GLOBULIN SER CALC-MCNC: 3.2 G/DL (ref 2–4)
GLUCOSE BLD STRIP.AUTO-MCNC: 170 MG/DL (ref 65–100)
GLUCOSE BLD STRIP.AUTO-MCNC: 170 MG/DL (ref 65–100)
GLUCOSE BLD STRIP.AUTO-MCNC: 192 MG/DL (ref 65–100)
GLUCOSE BLD STRIP.AUTO-MCNC: 195 MG/DL (ref 65–100)
GLUCOSE SERPL-MCNC: 187 MG/DL (ref 65–100)
HCT VFR BLD AUTO: 33.9 % (ref 36.6–50.3)
HGB BLD-MCNC: 11.2 G/DL (ref 12.1–17)
IMM GRANULOCYTES # BLD: 0 K/UL (ref 0–0.04)
IMM GRANULOCYTES NFR BLD AUTO: 1 % (ref 0–0.5)
LYMPHOCYTES # BLD: 0.5 K/UL (ref 0.8–3.5)
LYMPHOCYTES NFR BLD: 11 % (ref 12–49)
MCH RBC QN AUTO: 28.6 PG (ref 26–34)
MCHC RBC AUTO-ENTMCNC: 33 G/DL (ref 30–36.5)
MCV RBC AUTO: 86.7 FL (ref 80–99)
MONOCYTES # BLD: 0.4 K/UL (ref 0–1)
MONOCYTES NFR BLD: 8 % (ref 5–13)
NEUTS SEG # BLD: 3.7 K/UL (ref 1.8–8)
NEUTS SEG NFR BLD: 77 % (ref 32–75)
NRBC # BLD: 0 K/UL (ref 0–0.01)
NRBC BLD-RTO: 0 PER 100 WBC
PLATELET # BLD AUTO: 167 K/UL (ref 150–400)
PMV BLD AUTO: 9.8 FL (ref 8.9–12.9)
POTASSIUM SERPL-SCNC: 3.9 MMOL/L (ref 3.5–5.1)
PROT SERPL-MCNC: 5.3 G/DL (ref 6.4–8.2)
RBC # BLD AUTO: 3.91 M/UL (ref 4.1–5.7)
SERVICE CMNT-IMP: ABNORMAL
SODIUM SERPL-SCNC: 136 MMOL/L (ref 136–145)
WBC # BLD AUTO: 4.8 K/UL (ref 4.1–11.1)

## 2018-11-25 PROCEDURE — 74011000250 HC RX REV CODE- 250: Performed by: INTERNAL MEDICINE

## 2018-11-25 PROCEDURE — 74011250637 HC RX REV CODE- 250/637: Performed by: INTERNAL MEDICINE

## 2018-11-25 PROCEDURE — 36415 COLL VENOUS BLD VENIPUNCTURE: CPT

## 2018-11-25 PROCEDURE — 85025 COMPLETE CBC W/AUTO DIFF WBC: CPT

## 2018-11-25 PROCEDURE — 80053 COMPREHEN METABOLIC PANEL: CPT

## 2018-11-25 PROCEDURE — 82962 GLUCOSE BLOOD TEST: CPT

## 2018-11-25 PROCEDURE — 65270000029 HC RM PRIVATE

## 2018-11-25 PROCEDURE — 74011250636 HC RX REV CODE- 250/636: Performed by: INTERNAL MEDICINE

## 2018-11-25 RX ADMIN — Medication 10 ML: at 17:51

## 2018-11-25 RX ADMIN — POLYETHYLENE GLYCOL 3350 17 G: 17 POWDER, FOR SOLUTION ORAL at 08:49

## 2018-11-25 RX ADMIN — APIXABAN 2.5 MG: 2.5 TABLET, FILM COATED ORAL at 17:49

## 2018-11-25 RX ADMIN — MELATONIN TAB 3 MG 3 MG: 3 TAB at 22:08

## 2018-11-25 RX ADMIN — SENNOSIDES 8.6 MG: 8.6 TABLET, FILM COATED ORAL at 08:48

## 2018-11-25 RX ADMIN — LOSARTAN POTASSIUM 25 MG: 50 TABLET, FILM COATED ORAL at 22:08

## 2018-11-25 RX ADMIN — APIXABAN 2.5 MG: 2.5 TABLET, FILM COATED ORAL at 08:48

## 2018-11-25 RX ADMIN — ISOSORBIDE MONONITRATE 120 MG: 60 TABLET ORAL at 06:18

## 2018-11-25 RX ADMIN — ASPIRIN 81 MG: 81 TABLET, COATED ORAL at 22:08

## 2018-11-25 RX ADMIN — SODIUM CHLORIDE 75 ML/HR: 900 INJECTION, SOLUTION INTRAVENOUS at 22:07

## 2018-11-25 RX ADMIN — PANTOPRAZOLE SODIUM 40 MG: 40 TABLET, DELAYED RELEASE ORAL at 06:19

## 2018-11-25 RX ADMIN — SODIUM CHLORIDE 75 ML/HR: 900 INJECTION, SOLUTION INTRAVENOUS at 08:49

## 2018-11-25 RX ADMIN — MIRTAZAPINE 7.5 MG: 15 TABLET, FILM COATED ORAL at 22:08

## 2018-11-25 RX ADMIN — Medication 10 ML: at 22:09

## 2018-11-25 NOTE — PROGRESS NOTES
Bedside and Verbal shift change report given to Medardo Simon (oncoming nurse) by Natasha Hearn (offgoing nurse). Report included the following information SBAR, Kardex and ED Summary.

## 2018-11-25 NOTE — PROGRESS NOTES
Bedside and Verbal shift change report given to Rigoberto evangelista  (oncoming nurse) by Jovanni Snell  (offgoing nurse). Report included the following information SBAR and Kardex.

## 2018-11-25 NOTE — PROGRESS NOTES
[facility] [place] [facility] 
566 [facility], [address] [place] [address] 
[phone] Medical Progress Note NAME:         Ginger Yi. :        1935 MRM:        [id number] Date:          2018 Subjective: Patient has been seen and examined as a follow up for hypercalcemia. Chart, labs, diagnostics reviewed. He remains weak. Poor appetite. Sleeping most of the time. Afebrile Objective: 
 
Vital Signs: 
 
Visit Vitals /59 (BP 1 Location: Right arm, BP Patient Position: At rest) Pulse 68 Temp 98.6 °F (37 °C) Resp 18 Ht 5' 7\" (1.702 m) Wt 80.7 kg (178 lb) SpO2 96% BMI 27.88 kg/m² Intake/Output Summary (Last 24 hours) at 2018 1407 Last data filed at 2018 5418 Gross per 24 hour Intake 831.25 ml Output  Net 831.25 ml Physical Examination: 
General:   Weak and ill looking male, not in distress Eyes:   pink conjunctivae, PERRLA with no discharge. ENT:   no ottorrhea or rhinorrhea with dry mucous membranes Neck: no masses, thyroid non-tender and trachea central. 
Pulm:  no accessory muscle use, decreased but clear breath sounds without crackles or wheezes Card:  no JVD or murmurs, has regular and normal S1, S2 without thrills, bruits or peripheral edema Abd:  Soft, non-tender, non-distended, normoactive bowel sounds with no palpable organomegaly Musc:  No cyanosis, clubbing, atrophy or deformities. Skin:  No rashes, bruising or ulcers. Neuro: Awake and alert. Weak. Non focal exam.  
Psych:  Has a poor insight to illness Current Facility-Administered Medications Medication Dose Route Frequency  0.9% sodium chloride infusion  75 mL/hr IntraVENous CONTINUOUS  
 bisacodyl (DULCOLAX) suppository 10 mg  10 mg Rectal DAILY PRN  
 apixaban (ELIQUIS) tablet 2.5 mg  2.5 mg Oral BID  aspirin delayed-release tablet 81 mg  81 mg Oral QHS  isosorbide mononitrate ER (IMDUR) tablet 120 mg  120 mg Oral 7am  
 losartan (COZAAR) tablet 25 mg  25 mg Oral QHS  melatonin tablet 3 mg  3 mg Oral QHS  mirtazapine (REMERON) tablet 7.5 mg  7.5 mg Oral QHS  polyethylene glycol (MIRALAX) packet 17 g  17 g Oral DAILY  senna (SENOKOT) tablet 8.6 mg  1 Tab Oral DAILY  pantoprazole (PROTONIX) tablet 40 mg  40 mg Oral ACB  sodium chloride (NS) flush 5-10 mL  5-10 mL IntraVENous Q8H  
 sodium chloride (NS) flush 5-10 mL  5-10 mL IntraVENous PRN  
 acetaminophen (TYLENOL) tablet 650 mg  650 mg Oral Q4H PRN  prochlorperazine (COMPAZINE) injection 10 mg  10 mg IntraVENous Q6H PRN  
 insulin lispro (HUMALOG) injection   SubCUTAneous AC&HS  
 glucose chewable tablet 16 g  4 Tab Oral PRN  
 dextrose (D50W) injection syrg 12.5-25 g  25-50 mL IntraVENous PRN  
 glucagon (GLUCAGEN) injection 1 mg  1 mg IntraMUSCular PRN Laboratory data and review: 
 
Recent Labs  
  11/25/18 
0528 11/24/18 
0258 WBC 4.8 6.7 HGB 11.2* 12.7 HCT 33.9* 39.6  168 Recent Labs  
  11/25/18 
0528 11/24/18 
0258 11/23/18 
0950  135* 138  
K 3.9 4.0 3.8  101 104 CO2 26 26 29 * 210* 189* BUN 13 10 11 CREA 1.33* 1.26 1.50* CA 9.4 10.4* 10.5* ALB 2.1* 2.4*  --   
SGOT 9* 12*  --   
ALT 17 24  -- No components found for: [place] Other Diagnostics: Imaging studies - reviewed Assessment and Plan: Hypercalcemia (11/21/2018): unclear etiology. Parathyroid scan negative, skeletal survey unremarkable, PRH-rp negative. Wayland Magyar been reviewed by renal and is sp calcitonin and Zometa. Calcium trending down. Monitor clinically DM type 2 causing renal disease ([address]): appetite remains poor. Diet as tolerated. SSI per protocol CAD (coronary artery disease): asymptomatic. Continue Asprin, Imdur CKD (chronic kidney disease) stage 3, GFR 30-59 ml/min ([facility]) (12/10/2013): SCr stable. Continue IVFs and follow renal function Dementia: acute metabolic encephalopathy better as per family. Follow clinically. Hyperbilirubinemia/ Jaundice (11/21/2018): better. Seen by GI. CA 19-9 is elevated. CT scan without contrast and US does without evidence of mass. CT with contrast as per GI Hx Renal cell carcinoma: Outpatient follow up and work up at some point. Debility: SNF for rehab once medically stable Total time spent for the patient's care: 35 Minutes Care Plan discussed with: Patient, Family and Nursing Staff Discussed:  Care Plan and D/C Planning Prophylaxis:  Eliquis Anticipated Disposition:  SNF/LTC 
        
___________________________________________________ Attending Physician:   Kee May MD

## 2018-11-25 NOTE — PROGRESS NOTES
Follow up visit with Mr. Lubna Santoro on 5 Med Surg. His daughter and son in law were present. At first Mr. Lubna Santoro indicated he was not doing so well. Then when I asked about his daughter he provided a huge smile and a twinkle came into his eyes. He affirmed his families love and the presence of God surrounding him. He indicated he has no needs at this time. Provided spiritual presence and assurance of prayer. Visited by: Bianka Marion MS., [address] [facility] [address] ([id number])

## 2018-11-26 ENCOUNTER — APPOINTMENT (OUTPATIENT)
Dept: NUCLEAR MEDICINE | Age: 83
DRG: 640 | End: 2018-11-26
Attending: NURSE PRACTITIONER
Payer: MEDICARE

## 2018-11-26 LAB
ALBUMIN 24H MFR UR ELPH: 31 %
ALBUMIN 24H MFR UR ELPH: 34.1 %
ALBUMIN SERPL-MCNC: 2.3 G/DL (ref 3.5–5)
ALBUMIN/GLOB SERPL: 0.7 {RATIO} (ref 1.1–2.2)
ALP SERPL-CCNC: 233 U/L (ref 45–117)
ALPHA1 GLOB 24H MFR UR ELPH: 5.1 %
ALPHA1 GLOB 24H MFR UR ELPH: 6.5 %
ALPHA2 GLOB 24H MFR UR ELPH: 11.1 %
ALPHA2 GLOB 24H MFR UR ELPH: 18.5 %
ALT SERPL-CCNC: 18 U/L (ref 12–78)
ANION GAP SERPL CALC-SCNC: 10 MMOL/L (ref 5–15)
AST SERPL-CCNC: 11 U/L (ref 15–37)
B-GLOBULIN MFR UR ELPH: 16.1 %
B-GLOBULIN MFR UR ELPH: 29.5 %
BASOPHILS # BLD: 0 K/UL (ref 0–0.1)
BASOPHILS NFR BLD: 0 % (ref 0–1)
BILIRUB SERPL-MCNC: 0.6 MG/DL (ref 0.2–1)
BUN SERPL-MCNC: 13 MG/DL (ref 6–20)
BUN/CREAT SERPL: 10 (ref 12–20)
CALCIUM SERPL-MCNC: 9.3 MG/DL (ref 8.5–10.1)
CHLORIDE SERPL-SCNC: 104 MMOL/L (ref 97–108)
CO2 SERPL-SCNC: 23 MMOL/L (ref 21–32)
COLLECT DURATION TIME UR: 24 HR
COLLECT DURATION TIME UR: 24 HR
CREAT SERPL-MCNC: 1.25 MG/DL (ref 0.7–1.3)
DIFFERENTIAL METHOD BLD: ABNORMAL
EOSINOPHIL # BLD: 0.2 K/UL (ref 0–0.4)
EOSINOPHIL NFR BLD: 5 % (ref 0–7)
ERYTHROCYTE [DISTWIDTH] IN BLOOD BY AUTOMATED COUNT: 13.9 % (ref 11.5–14.5)
GAMMA GLOB 24H MFR UR ELPH: 12.8 %
GAMMA GLOB 24H MFR UR ELPH: 35.3 %
GLOBULIN SER CALC-MCNC: 3.4 G/DL (ref 2–4)
GLUCOSE BLD STRIP.AUTO-MCNC: 147 MG/DL (ref 65–100)
GLUCOSE BLD STRIP.AUTO-MCNC: 174 MG/DL (ref 65–100)
GLUCOSE BLD STRIP.AUTO-MCNC: 195 MG/DL (ref 65–100)
GLUCOSE BLD STRIP.AUTO-MCNC: 196 MG/DL (ref 65–100)
GLUCOSE SERPL-MCNC: 166 MG/DL (ref 65–100)
HCT VFR BLD AUTO: 36.6 % (ref 36.6–50.3)
HGB BLD-MCNC: 11.7 G/DL (ref 12.1–17)
IGA SERPL-MCNC: 116 MG/DL (ref 61–437)
IGG SERPL-MCNC: 469 MG/DL (ref 700–1600)
IGM SERPL-MCNC: 13 MG/DL (ref 15–143)
IMM GRANULOCYTES # BLD: 0 K/UL (ref 0–0.04)
IMM GRANULOCYTES NFR BLD AUTO: 1 % (ref 0–0.5)
INTERPRETATION UR IFE-IMP: ABNORMAL
INTERPRETATION UR IFE-IMP: ABNORMAL
KAPPA LC FREE SER-MCNC: 18.1 MG/L (ref 3.3–19.4)
KAPPA LC FREE/LAMBDA FREE SER: 1.15 {RATIO} (ref 0.26–1.65)
LAMBDA LC FREE SERPL-MCNC: 15.7 MG/L (ref 5.7–26.3)
LYMPHOCYTES # BLD: 0.8 K/UL (ref 0.8–3.5)
LYMPHOCYTES NFR BLD: 17 % (ref 12–49)
M PROTEIN 24H MFR UR ELPH: ABNORMAL %
M PROTEIN 24H MFR UR ELPH: ABNORMAL %
MCH RBC QN AUTO: 27.7 PG (ref 26–34)
MCHC RBC AUTO-ENTMCNC: 32 G/DL (ref 30–36.5)
MCV RBC AUTO: 86.5 FL (ref 80–99)
MONOCYTES # BLD: 0.5 K/UL (ref 0–1)
MONOCYTES NFR BLD: 10 % (ref 5–13)
NEUTS SEG # BLD: 3.2 K/UL (ref 1.8–8)
NEUTS SEG NFR BLD: 67 % (ref 32–75)
NOTE, 149533: ABNORMAL
NOTE, 149533: ABNORMAL
NRBC # BLD: 0 K/UL (ref 0–0.01)
NRBC BLD-RTO: 0 PER 100 WBC
PLATELET # BLD AUTO: 178 K/UL (ref 150–400)
PMV BLD AUTO: 10.1 FL (ref 8.9–12.9)
POTASSIUM SERPL-SCNC: 4 MMOL/L (ref 3.5–5.1)
PROT 24H UR-MRATE: 209 MG/24 HR (ref 30–150)
PROT 24H UR-MRATE: 236 MG/24 HR (ref 30–150)
PROT PATTERN SERPL IFE-IMP: ABNORMAL
PROT SERPL-MCNC: 5.7 G/DL (ref 6.4–8.2)
PROT UR-MCNC: 24.8 MG/DL
PROT UR-MCNC: 6.7 MG/DL
RBC # BLD AUTO: 4.23 M/UL (ref 4.1–5.7)
SERVICE CMNT-IMP: ABNORMAL
SODIUM SERPL-SCNC: 137 MMOL/L (ref 136–145)
SPECIMEN VOL ?TM UR: 3120 ML
SPECIMEN VOL ?TM UR: 950 ML
WBC # BLD AUTO: 4.8 K/UL (ref 4.1–11.1)

## 2018-11-26 PROCEDURE — 82962 GLUCOSE BLOOD TEST: CPT

## 2018-11-26 PROCEDURE — 97535 SELF CARE MNGMENT TRAINING: CPT | Performed by: OCCUPATIONAL THERAPIST

## 2018-11-26 PROCEDURE — 97530 THERAPEUTIC ACTIVITIES: CPT

## 2018-11-26 PROCEDURE — 36415 COLL VENOUS BLD VENIPUNCTURE: CPT

## 2018-11-26 PROCEDURE — 80053 COMPREHEN METABOLIC PANEL: CPT

## 2018-11-26 PROCEDURE — 74011000250 HC RX REV CODE- 250: Performed by: INTERNAL MEDICINE

## 2018-11-26 PROCEDURE — 65270000029 HC RM PRIVATE

## 2018-11-26 PROCEDURE — 85025 COMPLETE CBC W/AUTO DIFF WBC: CPT

## 2018-11-26 PROCEDURE — 97530 THERAPEUTIC ACTIVITIES: CPT | Performed by: OCCUPATIONAL THERAPIST

## 2018-11-26 PROCEDURE — 74011250637 HC RX REV CODE- 250/637: Performed by: INTERNAL MEDICINE

## 2018-11-26 PROCEDURE — 92526 ORAL FUNCTION THERAPY: CPT

## 2018-11-26 PROCEDURE — A9503 TC99M MEDRONATE: HCPCS

## 2018-11-26 RX ADMIN — ASPIRIN 81 MG: 81 TABLET, COATED ORAL at 21:00

## 2018-11-26 RX ADMIN — Medication 10 ML: at 18:22

## 2018-11-26 RX ADMIN — SENNOSIDES 8.6 MG: 8.6 TABLET, FILM COATED ORAL at 09:49

## 2018-11-26 RX ADMIN — APIXABAN 2.5 MG: 2.5 TABLET, FILM COATED ORAL at 09:49

## 2018-11-26 RX ADMIN — Medication 10 ML: at 21:18

## 2018-11-26 RX ADMIN — POLYETHYLENE GLYCOL 3350 17 G: 17 POWDER, FOR SOLUTION ORAL at 09:49

## 2018-11-26 RX ADMIN — MIRTAZAPINE 7.5 MG: 15 TABLET, FILM COATED ORAL at 21:17

## 2018-11-26 RX ADMIN — LOSARTAN POTASSIUM 25 MG: 50 TABLET, FILM COATED ORAL at 21:17

## 2018-11-26 RX ADMIN — MELATONIN TAB 3 MG 3 MG: 3 TAB at 21:18

## 2018-11-26 RX ADMIN — ISOSORBIDE MONONITRATE 120 MG: 60 TABLET ORAL at 06:54

## 2018-11-26 RX ADMIN — APIXABAN 2.5 MG: 2.5 TABLET, FILM COATED ORAL at 18:22

## 2018-11-26 RX ADMIN — PANTOPRAZOLE SODIUM 40 MG: 40 TABLET, DELAYED RELEASE ORAL at 06:54

## 2018-11-26 RX ADMIN — Medication 10 ML: at 06:55

## 2018-11-26 NOTE — PROGRESS NOTES
Helped patient get settled. SCD's back on patient and floated heels on a pillow. Also US ordered [facility] bed for patient from [address] [place] [address] - [facility].  Pt continues to be on turn team.

## 2018-11-26 NOTE — PROGRESS NOTES
11/26/2018  
2:34 AM 
Updates sent to [facility] in [place], They are able to accept pt tomorrow. Mo [place]  8:34 AM 
CM discussed pt w/ , pt is continuing to require medical management for low Ca++, planned NM bone scan tomorrow 11/27. Plan for d/c is SNF at [facility], will send updates when available today. Mo [place]

## 2018-11-26 NOTE — PROGRESS NOTES
[facility] 
380 [facility], [address] [place] [address] 
[phone] Medical Progress Note NAME:         Karthik Burr. :        1935 MRM:        [id number] Date:          2018 Subjective: Patient has been seen and examined as a follow up for hypercalcemia. Chart, labs, diagnostics reviewed. He remains weak but much more alert today. Appetite remains poor. No fever or chills. Objective: 
 
Vital Signs: 
 
Visit Vitals /68 (BP 1 Location: Right arm, BP Patient Position: At rest) Pulse 76 Temp 97.9 °F (36.6 °C) Resp 17 Ht 5' 7\" (1.702 m) Wt 80.7 kg (178 lb) SpO2 97% BMI 27.88 kg/m² Intake/Output Summary (Last 24 hours) at 2018 0249 Last data filed at 2018 4114 Gross per 24 hour Intake 0 ml Output 500 ml Net -500 ml Physical Examination: 
General:   Weak and ill looking male, not in distress Eyes:   pink conjunctivae, PERRLA with no discharge. ENT:   no ottorrhea or rhinorrhea with dry mucous membranes Pulm:  no accessory muscle use, decreased but clear breath sounds without crackles or wheezes Card:  no JVD or murmurs, has regular and normal S1, S2 without thrills, bruits or peripheral edema Abd:  Soft, non-tender, non-distended, normoactive bowel sounds with no palpable organomegaly Musc:  No cyanosis, clubbing, atrophy or deformities. Skin:  No rashes, bruising or ulcers. Neuro: Awake and alert. Moves all extremities but remains weak. Psych:  Has a poor insight to illness Current Facility-Administered Medications Medication Dose Route Frequency  0.9% sodium chloride infusion  75 mL/hr IntraVENous CONTINUOUS  
 bisacodyl (DULCOLAX) suppository 10 mg  10 mg Rectal DAILY PRN  
 apixaban (ELIQUIS) tablet 2.5 mg  2.5 mg Oral BID  aspirin delayed-release tablet 81 mg  81 mg Oral QHS  isosorbide mononitrate ER (IMDUR) tablet 120 mg  120 mg Oral 7am  
 losartan (COZAAR) tablet 25 mg  25 mg Oral QHS  melatonin tablet 3 mg  3 mg Oral QHS  mirtazapine (REMERON) tablet 7.5 mg  7.5 mg Oral QHS  polyethylene glycol (MIRALAX) packet 17 g  17 g Oral DAILY  senna (SENOKOT) tablet 8.6 mg  1 Tab Oral DAILY  pantoprazole (PROTONIX) tablet 40 mg  40 mg Oral ACB  sodium chloride (NS) flush 5-10 mL  5-10 mL IntraVENous Q8H  
 sodium chloride (NS) flush 5-10 mL  5-10 mL IntraVENous PRN  
 acetaminophen (TYLENOL) tablet 650 mg  650 mg Oral Q4H PRN  prochlorperazine (COMPAZINE) injection 10 mg  10 mg IntraVENous Q6H PRN  
 insulin lispro (HUMALOG) injection   SubCUTAneous AC&HS  
 glucose chewable tablet 16 g  4 Tab Oral PRN  
 dextrose (D50W) injection syrg 12.5-25 g  25-50 mL IntraVENous PRN  
 glucagon (GLUCAGEN) injection 1 mg  1 mg IntraMUSCular PRN Laboratory data and review: 
 
Recent Labs  
  11/26/18 
0338 11/25/18 
0528 11/24/18 
0258 WBC 4.8 4.8 6.7 HGB 11.7* 11.2* 12.7 HCT 36.6 33.9* 39.6  167 168 Recent Labs  
  11/26/18 
8910 11/25/18 
0528 11/24/18 
0258  136 135* K 4.0 3.9 4.0  
 104 101 CO2 23 26 26 * 187* 210* BUN 13 13 10 CREA 1.25 1.33* 1.26  
CA 9.3 9.4 10.4* ALB 2.3* 2.1* 2.4* SGOT 11* 9* 12* ALT 18 17 24 No components found for: [place] Other Diagnostics: Imaging studies - reviewed Assessment and Plan: Hypercalcemia (11/21/2018): unclear etiology. Parathyroid scan negative, skeletal survey unremarkable, PRH-rp negative. Has been reviewed by renal and is sp calcitonin and Zometa. Calcium trending down. NM bone  scan planned for tomorrow. Continue current management DM type 2 causing renal disease ([address]): appetite remains poor. Diet as tolerated. SSI per protocol CAD (coronary artery disease): asymptomatic. Continue Asprin, Imdur CKD (chronic kidney disease) stage 3, GFR 30-59 ml/min ([facility]) (12/10/2013): SCr stable. Continue IVFs and monitor renal function Dementia: acute metabolic encephalopathy better as per family and more alert today. Mobilize as tolerated. Hyperbilirubinemia/ Jaundice (11/21/2018): better. Seen by GI. CA 19-9 was slightly elevated. CT scan without contrast and US does without evidence of mass. CT with contrast as per GI. Hx Renal cell carcinoma: Outpatient follow up and surveillance Debility: SNF for rehab once medically stable Total time spent for the patient's care: 35 Minutes Care Plan discussed with: Patient, Family and Nursing Staff Discussed:  Care Plan and D/C Planning Prophylaxis:  Eliquis Anticipated Disposition:  SNF/LTC 
        
___________________________________________________ Attending Physician:   Zainab Aceves MD

## 2018-11-26 NOTE — PROGRESS NOTES
Problem: Dysphagia (Adult) Goal: *Acute Goals and Plan of Care (Insert Text) Swallowing goals initiated 11-19-18:(weekly re-eval due 11-26-18) 1) tolerate dysphagia 1, thins when awake without s/s aspiration by 11-22-18; continue to 11-26-18; continue to 12-3-18 2)  SLP will re-eval for diet advancement if consistently more alert and chewing/bolus formation improved by 11-23-18 ; continu to 11--26-18 -continue to 12-3-18 Speech language pathology dysphagia treatment: WEEKLY REASSESSMENT Patient: Anabel Giles (80 y.o. male) Date: 11/26/2018 Diagnosis: Viral illness Viral illness Hypercalcemia Precautions:  Fall ASSESSMENT: 
Patient tolerating dysphagia 1 liquids well. Some coughing on thins at times. He is starting to feed himself. Patient's progression toward goals since last assessment: Slightly improving strength. Still some coughing on thins. Suspect he is close to his new baseline for swallowing. PLAN: 
Goals have been updated based on progression since last assessment. Patient continues to benefit from skilled intervention to address the above impairments. Continue to follow the patient 2 times a week to address goals. Recommendations and Planned Interventions: 
Continue dysphagia 1, thins. Re-eval for solids when consistently feeding self. Discharge Recommendations: [place] vs [place] SUBJECTIVE:  
Patient alert and cooperative. . 
 
OBJECTIVE:  
Cognitive and Communication Status: 
Neurologic State: Alert Orientation Level: Oriented to person Cognition: Memory loss Perception: Appears intact Perseveration: No perseveration noted Safety/Judgement: Lack of insight into deficits Dysphagia Treatment: 
Oral Assessment: P.O. Trials: 
Patient Position: up in bed Vocal quality prior to P.O.: No impairment Consistency Presented: Pudding; Thin liquid;Pill/Tablet(meds crushed by RN) How Presented: Spoon;Straw;Successive swallows  ORAL PHASE:  
 Bolus Acceptance: No impairment Bolus Formation/Control: No impairment Less chewing of purees. Good oral clearance of pures. PHARYNGEAL PHASE:  
Mild weakness Delayed coughing s/p thins. Exercises: 
Laryngeal Exercises: 
  
  
  
  
  
  
  
  
  
  
  
  
  
  
  
  
  
  
  
  
  
  
  
  
  
  
  
  
  
  
  
  
  
  
  
  
  
  
  
  
  
  
  
Pain: 
Pain Scale 1: Numeric (0 - 10) Pain Intensity 1: 0 After treatment:  
[]                Patient left in no apparent distress sitting up in chair 
[]                Patient left in no apparent distress in bed 
[]                Call bell left within reach 
[]                Nursing notified 
[]                Caregiver present 
[]                Bed alarm activated COMMUNICATION/EDUCATION:  
Patient was educated regarding His deficit(s) of dysphagia  as this relates to His diagnosis of dementia. He demonstrated Guarded understanding as evidenced by dementia. Wife present and understood. . 
 
The patients plan of care including recommendations, planned interventions, and recommended diet changes were discussed with: Registered Nurse. []                Posted safety precautions in patient's room. DESIRAE Alberto Time Calculation: 15 mins

## 2018-11-26 NOTE — PROGRESS NOTES
[facility] [place] [facility] [place] [id number] [facility], [address] [place] [address] W: [phone]  F: [phone] Reason for Visit:  
Ginger Yi. is a 80 y.o. male who is seen in consultation at the request of Dr. Jorge Joel for evaluation of leukopenia and thrombocytopenia. Hematology / Oncology Treatment History:  
History of prostate cancer in 2010 (s/p surgery) and RCC (s/p partial L nephrectomy) History of Present Illness:  
Presented to ED on 11/14 with chills, cough, weakness, starting that day. Dark urine present. Has dementia at baseline Interval History:  
 
 
Working with PT; up with walker at bedside Wife and grandson at bedside. Current Facility-Administered Medications Medication Dose Route Frequency  0.9% sodium chloride infusion  75 mL/hr IntraVENous CONTINUOUS  
 bisacodyl (DULCOLAX) suppository 10 mg  10 mg Rectal DAILY PRN  
 apixaban (ELIQUIS) tablet 2.5 mg  2.5 mg Oral BID  aspirin delayed-release tablet 81 mg  81 mg Oral QHS  isosorbide mononitrate ER (IMDUR) tablet 120 mg  120 mg Oral 7am  
 losartan (COZAAR) tablet 25 mg  25 mg Oral QHS  melatonin tablet 3 mg  3 mg Oral QHS  mirtazapine (REMERON) tablet 7.5 mg  7.5 mg Oral QHS  polyethylene glycol (MIRALAX) packet 17 g  17 g Oral DAILY  senna (SENOKOT) tablet 8.6 mg  1 Tab Oral DAILY  pantoprazole (PROTONIX) tablet 40 mg  40 mg Oral ACB  sodium chloride (NS) flush 5-10 mL  5-10 mL IntraVENous Q8H  
 sodium chloride (NS) flush 5-10 mL  5-10 mL IntraVENous PRN  
 acetaminophen (TYLENOL) tablet 650 mg  650 mg Oral Q4H PRN  prochlorperazine (COMPAZINE) injection 10 mg  10 mg IntraVENous Q6H PRN  
 insulin lispro (HUMALOG) injection   SubCUTAneous AC&HS  
 glucose chewable tablet 16 g  4 Tab Oral PRN  
 dextrose (D50W) injection syrg 12.5-25 g  25-50 mL IntraVENous PRN  
 glucagon (GLUCAGEN) injection 1 mg  1 mg IntraMUSCular PRN Allergies Allergen Reactions  Latex Rash, Swelling and Contact Dermatitis  Atorvastatin Unknown (comments) Per patient's PCP allergy list- No reaction specified  Ezetimibe Myalgia  Lisinopril Cough  Metoprolol Other (comments) Bradycardia  Morphine Other (comments) Hallucinations and Nausea  Nitrofurantoin Rash \"Blood blisters/rash\" per patient's family  Rosuvastatin Myalgia  Simvastatin Myalgia Review of Systems: A complete review of systems was obtained, negative except as described above. Physical Exam:  
 
Visit Vitals /68 (BP 1 Location: Right arm, BP Patient Position: At rest) Pulse 76 Temp 97.9 °F (36.6 °C) Resp 17 Ht 5' 7\" (1.702 m) Wt 80.7 kg (178 lb) SpO2 97% BMI 27.88 kg/m² ECOG PS: 3-4 General: No distress, sleepy, daughter at bedside HENT: Atraumatic with normal appearance of ears and nose; OP clear Neck: Supple; no thyromegaly Lymphatic: No cervical, supraclavicular, or axillary adenopathy CV: Normal rate, regular rhythm, no murmurs, no peripheral edema GI: Soft, nontender, nondistended, no masses, no hepatomegaly, no splenomegaly Skin: No rashes, ecchymoses, or petechiae. Normal temperature, turgor, and texture. Results:  
 
Lab Results Component Value Date/Time WBC 4.8 11/26/2018 03:38 AM  
 HGB 11.7 (L) 11/26/2018 03:38 AM  
 HCT 36.6 11/26/2018 03:38 AM  
 PLATELET 067 17/29/6681 03:38 AM  
 MCV 86.5 11/26/2018 03:38 AM  
 ABS. NEUTROPHILS 3.2 11/26/2018 03:38 AM  
 
Lab Results Component Value Date/Time  Sodium 137 11/26/2018 03:38 AM  
 Potassium 4.0 11/26/2018 03:38 AM  
 Chloride 104 11/26/2018 03:38 AM  
 CO2 23 11/26/2018 03:38 AM  
 Glucose 166 (H) 11/26/2018 03:38 AM  
 BUN 13 11/26/2018 03:38 AM  
 Creatinine 1.25 11/26/2018 03:38 AM  
 GFR est AA >60 11/26/2018 03:38 AM  
 GFR est non-AA 55 (L) 11/26/2018 03:38 AM  
 Calcium 9.3 11/26/2018 03:38 AM  
 Glucose (POC) 147 (H) 11/26/2018 06:56 AM  
 Creatinine (POC) 1.5 (H) 11/26/2013 09:55 AM  
 
Lab Results Component Value Date/Time Bilirubin, total 0.6 11/26/2018 03:38 AM  
 ALT (SGPT) 18 11/26/2018 03:38 AM  
 AST (SGOT) 11 (L) 11/26/2018 03:38 AM  
 Alk. phosphatase 233 (H) 11/26/2018 03:38 AM  
 Protein, total 5.7 (L) 11/26/2018 03:38 AM  
 Albumin 2.3 (L) 11/26/2018 03:38 AM  
 Globulin 3.4 11/26/2018 03:38 AM  
 
11/15/18 CT c/a/p wo contrast 
FINDINGS: 
LOWER NECK: The visualized portions of the thyroid and structures of the lower 
neck are within normal limits. CHEST: 
The absence of intravenous contrast material reduces the sensitivity for 
evaluation of the mediastinal structures. Lungs: There is interstitial disease with pulmonary fibrosis in the periphery of 
the lungs which is unchanged. The lungs are clear of mass, nodule, airspace 
disease or acute edema. Pleura: There is no pleural effusion or pneumothorax. Aorta: The aorta is atherosclerotic and has a normal contour without evidence of 
aneurysm. There is extensive coronary artery calcification which is unchanged. Mediastinum: There is no mediastinal or hilar adenopathy or mass. Bones and soft tissues: The bones and soft tissues of the chest wall are normal. 
  
ABDOMEN: 
The absence of intravenous contrast material reduces the sensitivity for 
evaluation of the solid parenchymal organs of the abdomen. Liver: The liver is normal in size and contour with no focal abnormality. Gallbladder and bile ducts: There are clips in the gallbladder fossa and the 
gallbladder is absent. Spleen: No abnormality. Pancreas: No abnormality. Adrenal glands: No abnormality. Kidneys: There is a 4.2 x 3.3 x 3.7 cm left renal cyst which is unchanged and 
there is some radiopaque material in the left kidney from prior surgery. PELVIS: 
Reproductive organs: The prostate gland is absent. There are surgical clips in 
the pelvis. The seminal vesicles are symmetrical. 
Bladder: No abnormality. BOWEL AND MESENTERY: The small bowel is normal. There is no mesenteric mass or 
adenopathy. The appendix is not identified. There are diverticula of the colon. PERITONEUM: There is no ascites or free intraperitoneal air. RETROPERITONEUM: The aorta is atherosclerotic and tapers without aneurysm There 
is no retroperitoneal adenopathy or mass. There is no pelvic mass or adenopathy. BONES AND SOFT TISSUES: There are degenerative changes of the spine. IMPRESSION IMPRESSION:  
1. No acute abnormality and no change. 2. Pulmonary fibrosis. 3. Atherosclerosis with coronary artery calcification. 4. Atherosclerotic abdominal aorta without aneurysm. 5. Status post cholecystectomy. 6. Status post prostatectomy. 7. Status post left renal surgery with left renal cyst. 
8. Diverticulosis. 9. Thoracolumbar spondylosis. 11/17/18 abd US IMPRESSION:   
1. No visible abnormality. 11/20/2018 NM PARA THYROID SCAN 
IMPRESSION: 
No evidence of parathyroid adenoma. 
  
11/21/2018 XR Bone survey Negative 11/21/2018 US THYROID/PARA THYROID 
pending No parathyroid adenoma Assessment and Recommendations:  
 
1. Thrombocytopenia:  Resolved; continues  to improve Work up unremarkable, borderline B12 levels , MMA not elevated, no HSM Continue po b12 2. Leukopenia:  Resolved 3. Hypercalcemia:  Corrected Ca today 10.66 Present for at least 2 months; IVF; PTH normal, PTHrp pending; PSA 0. He does also have a h/o RCC No obvious malignancy on scans. No parathyroid adenoma and skeletal survey is normal 
He has an MGUS- appears to be low risk and does not explain his hypercalcemia 
--24 hour UPEP and light chains pending 
--CT scan negative; Bone scan ordered for today to further eval 
 
 
Plan reviewed with Dr Mariluz Helm Signed By: Alpesh Blakely NP

## 2018-11-26 NOTE — PROGRESS NOTES
Bedside and Verbal shift change report given to Rigoberto evangelista  (oncoming nurse) by Darylene Madden  (offgoing nurse). Report included the following information SBAR and Kardex.

## 2018-11-26 NOTE — PROGRESS NOTES
Bedside and Verbal shift change report given to [employer] (oncoming nurse) by Jessie Barton (offgoing nurse). Report included the following information SBAR, Kardex, ED Summary and Recent Results.

## 2018-11-26 NOTE — PROGRESS NOTES
Problem: Self Care Deficits Care Plan (Adult) Goal: *Acute Goals and Plan of Care (Insert Text) Occupational Therapy Goals Initiated 11/15/2018. Reviewed 11/26/2018 and all goals remain appropriate to be met within the next 7 days. 1.  Patient will perform grooming with supervision/set-up in unsupported sit within 7 day(s). 2.  Patient will perform upper body dressing with supervision/set-up within 7 day(s). 3.  Patient will perform lower body dressing with supervision/set-up within 7 day(s). 4.  Patient will perform toilet transfers with contact guard assist within 7 day(s). 5.  Patient will perform all aspects of toileting with minimal assistance/contact guard assist within 7 day(s). 6.  Patient will participate in upper extremity therapeutic exercise/activities with supervision/set-up for 5 minutes within 7 day(s). Occupational Therapy TREATMENT: WEEKLY REASSESSMENT Patient: Diana Acosta (80 y.o. male) Date: 11/26/2018 Diagnosis: Viral illness Viral illness Hypercalcemia Precautions: Fall Chart, occupational therapy assessment, plan of care, and goals were reviewed. ASSESSMENT: 
Pt is making slow progress towards goals. He remains limited by impaired cognition, decreased strength, endurance, mobility, balance in sitting and standing and safety. In addition he is very [place]. He currently requires total A for toileting, max A for LE ADLs, and mod-max A x2 for functional mobility OOB. Recommend SNF at discharge to improve his strength, endurance, safety and independence with all functional tasks prior to return home. Progression toward goals: 
[]            Improving appropriately and progressing toward goals [x]            Improving slowly and progressing toward goals []            Not making progress toward goals and plan of care will be adjusted PLAN: 
Goals have been updated based on progression since last assessment. Patient continues to benefit from skilled intervention to address the above impairments. Continue to follow patient 5 times a week to address goals. Planned Interventions: 
[x]                    Self Care Training                  [x]             Therapeutic Activities [x]                    Functional Mobility Training    [x]             Cognitive Retraining 
[x]                    Therapeutic Exercises           [x]             Endurance Activities [x]                    Balance Training                   [x]             Neuromuscular Re-Education []                    Visual/Perceptual Training     [x]        Home Safety Training 
[x]                    Patient Education                 [x]             Family Training/Education []                    Other (comment): 
Discharge Recommendations: Kevin Dumont Further Equipment Recommendations for Discharge: TBD SUBJECTIVE:  
Patient stated I am doing ok.  OBJECTIVE DATA SUMMARY:  
Cognitive/Behavioral Status: 
Neurologic State: Alert;Confused Orientation Level: Oriented to person;Oriented to place; Disoriented to situation;Disoriented to time Cognition: Decreased attention/concentration; Follows commands;Memory loss Perception: Cues to maintain midline in sitting;Cues to maintain midline in standing; Tactile;Verbal;Visual(LOB posteriorly) Perseveration: No perseveration noted Safety/Judgement: Awareness of environment;Decreased awareness of need for assistance;Decreased awareness of need for safety;Decreased insight into deficits; Fall prevention Functional Mobility and Transfers for ADLs:Bed Mobility: 
Rolling: Minimum assistance Supine to Sit: Moderate assistance;Assist x2 Sit to Supine: Moderate assistance;Assist x1;Additional time Scooting: Moderate assistance Transfers: 
Sit to Stand: Maximum assistance;Assist x2 Balance: 
Sitting: Impaired; With support Sitting - Static: Fair (occasional) Sitting - Dynamic: Poor (constant support)(posterior lean with verbal cuing, able to correct, decreased) Standing: Impaired; With support Standing - Static: Constant support Standing - Dynamic : Poor(posterior loss of balance) ADL Intervention: 
Upper Body Bathing Bathing Assistance: Supervision/set-up Position Performed: Long sitting on bed Cues: Verbal cues provided;Visual cues provided(cues to clean all parts and attention to task) Lower Body Bathing Bathing Assistance: Moderate assistance Perineal  : Maximum assistance(pt incontinent of urine) Position Performed: Supine Cues: Tactile cues provided;Verbal cues provided;Visual cues provided Adaptive Equipment: Wipes(personal cleansing cloth) Lower Body : Minimum assistance Position Performed: Seated edge of bed;Bending forward method Cues: Physical assistance; Tactile cues provided;Verbal cues provided;Visual cues provided Upper Body Dressing Assistance Dressing Assistance: Minimum assistance Hospital Gown: Minimum  assistance(to tie gown in back) Cues: Physical assistance; Tactile cues provided;Verbal cues provided;Visual cues provided Lower Body Dressing Assistance Socks: Minimum assistance(pt able to reach toes seated EOB and bending forward) Leg Crossed Method Used: No 
Position Performed: Bending forward method;Seated edge of bed Cues: Don;Doff;Physical assistance;Verbal cues provided;Visual cues provided Toileting Toileting Assistance: Total assistance(dependent) Bladder Hygiene: Total assistance (dependent) Bowel Hygiene: Total assistance (dependent) Clothing Management: Total assistance (dependent) Cues: Tactile cues provided;Verbal cues provided;Visual cues provided Cognitive Retraining Safety/Judgement: Awareness of environment;Decreased awareness of need for assistance;Decreased awareness of need for safety;Decreased insight into deficits; Fall prevention Pain: 
Pain Scale 1: Numeric (0 - 10) Pain Intensity 1: 0 
 Activity Tolerance:  
Fair Please refer to the flowsheet for vital signs taken during this treatment. After treatment:  
[x] Patient left in no apparent distress sitting up in chair position in bed 
[] Patient left in no apparent distress in bed 
[x] Call bell left within reach [x] Nursing notified 
[x] Caregiver present [x] Bed alarm activated COMMUNICATION/COLLABORATION:  
The patients plan of care was discussed with: Physical Therapist and Registered Nurse Autumn Chong OT Time Calculation: 39 mins

## 2018-11-26 NOTE — PROGRESS NOTES
Problem: Mobility Impaired (Adult and Pediatric) Goal: *Acute Goals and Plan of Care (Insert Text) Physical Therapy Goals 7-day assessment on 11/26/18 Physical Therapy Goals Initiated 11/26/2018 1. Patient will move from supine to sit and sit to supine  in bed with moderate assistance x1 within 7 day(s). 2.  Patient will transfer from bed to chair and chair to bed with moderate assistance x1 using the least restrictive device within 7 day(s). 3.  Patient will perform sit to stand with moderate assistance x1 within 7 day(s). 4.  Patient will ambulate with moderate assistance x1  for 25 feet with the least restrictive device within 7 day(s). Initiated 11/15/2018 1. Patient will move from supine to sit and sit to supine  in bed with supervision/set-up within 7 day(s). 2.  Patient will transfer from bed to chair and chair to bed with supervision/set-up using the least restrictive device within 7 day(s). 3.  Patient will perform sit to stand with supervision/set-up within 7 day(s). 4.  Patient will ambulate with supervision/set-up for 150 feet with the least restrictive device within 7 day(s). physical Therapy TREATMENT: WEEKLY REASSESSMENT Patient: Lisandro Jones (80 y.o. male) Date: 11/26/2018 Diagnosis: Viral illness Viral illness Hypercalcemia Precautions: Fall Chart, physical therapy assessment, plan of care and goals were reviewed. ASSESSMENT: 
Patient continues to have slow motor processing and decreased righting reactions, along with hard of hearing. He has posterior loss of balance in sitting, requires verbal cuing due to decreased righting reactions. He requires 2 person assist for all upright mobility. He fatigues quickly and at most needs MAX A x2, at best MOD A x2. Patient's progression toward goals since last assessment: Patient with slow progress since evaluation on 11/16/18.   Patient still requires 2 person assist, still with decreased activity tolerance and overall functional mobility. He is below his baseline and would benefit from SNF at discharge. PLAN: 
Goals have been updated based on progression since last assessment. Patient continues to benefit from skilled intervention to address the above impairments. Continue to follow the patient 5 times a week to address goals. Planned Interventions: 
[x]              Bed Mobility Training             [x]       Neuromuscular Re-Education [x]              Transfer Training                   []       Orthotic/Prosthetic Training 
[x]              Gait Training                         []       Modalities [x]              Therapeutic Exercises           []       Edema Management/Control [x]              Therapeutic Activities            [x]       Patient and Family Training/Education []              Other (comment): 
Discharge Recommendations: Kevin Dumont Further Equipment Recommendations for Discharge: TBD at rehab SUBJECTIVE:  
Patient stated what did say? Kaylynn Faith OBJECTIVE DATA SUMMARY:  
Critical Behavior: 
Neurologic State: Alert Orientation Level: Oriented to person Cognition: Memory loss Safety/Judgement: Lack of insight into deficits Strength:  
  
  
  
  
  
  
  
 
Functional Mobility Training: 
Bed Mobility: 
Rolling: Minimum assistance Supine to Sit: Moderate assistance;Assist x2 Sit to Supine: Moderate assistance;Assist x1;Additional time Scooting: Moderate assistance Transfers: 
Sit to Stand: Maximum assistance;Assist x2 Stand to Sit: Moderate assistance;Assist x2; Additional time Balance: 
Sitting - Static: Fair (occasional) Sitting - Dynamic: Poor (constant support)(posterior lean with verbal cuing, able to correct, decreased) Standing - Static: Constant support Standing - Dynamic : Poor(posterior loss of balance)Ambulation/Gait Training: 
Distance (ft): 5 Feet (ft) Assistive Device: Walker, rolling;Gait belt Ambulation - Level of Assistance: Moderate assistance;Assist x2; Additional time Gait Abnormalities: Decreased step clearance; Step to gait(posterior lean that is more pronounced with fatigue) Stance: Weight shift;Time Speed/Sabrina: Slow;Shuffled Neuro Re-Education: 
 
Therapeutic Exercises:  
 
Pain: 
Pain Scale 1: Numeric (0 - 10) Pain Intensity 1: 0 Activity Tolerance:  
Vitals stable, continues increased assistance for all functional activites Please refer to the flowsheet for vital signs taken during this treatment. After treatment:  
[]  Patient left in no apparent distress sitting up in chair 
[x]  Patient left in no apparent distress in bed- in chair position 
[x]  Call bell left within reach [x]  Nursing notified 
[x]  Caregiver present [x]  Bed alarm activated COMMUNICATION/COLLABORATION:  
The patients plan of care was discussed with: Registered Nurse Joanie Goyal PT, DPT Time Calculation: 20 mins

## 2018-11-27 VITALS
BODY MASS INDEX: 27.94 KG/M2 | WEIGHT: 178 LBS | DIASTOLIC BLOOD PRESSURE: 67 MMHG | SYSTOLIC BLOOD PRESSURE: 144 MMHG | TEMPERATURE: 98.5 F | OXYGEN SATURATION: 95 % | HEART RATE: 66 BPM | HEIGHT: 67 IN | RESPIRATION RATE: 16 BRPM

## 2018-11-27 LAB
GLUCOSE BLD STRIP.AUTO-MCNC: 150 MG/DL (ref 65–100)
GLUCOSE BLD STRIP.AUTO-MCNC: 162 MG/DL (ref 65–100)
SERVICE CMNT-IMP: ABNORMAL
SERVICE CMNT-IMP: ABNORMAL

## 2018-11-27 PROCEDURE — 74011250637 HC RX REV CODE- 250/637: Performed by: INTERNAL MEDICINE

## 2018-11-27 PROCEDURE — 82962 GLUCOSE BLOOD TEST: CPT

## 2018-11-27 PROCEDURE — 74011000250 HC RX REV CODE- 250: Performed by: INTERNAL MEDICINE

## 2018-11-27 RX ADMIN — APIXABAN 2.5 MG: 2.5 TABLET, FILM COATED ORAL at 08:01

## 2018-11-27 RX ADMIN — POLYETHYLENE GLYCOL 3350 17 G: 17 POWDER, FOR SOLUTION ORAL at 08:01

## 2018-11-27 RX ADMIN — PANTOPRAZOLE SODIUM 40 MG: 40 TABLET, DELAYED RELEASE ORAL at 06:16

## 2018-11-27 RX ADMIN — SENNOSIDES 8.6 MG: 8.6 TABLET, FILM COATED ORAL at 08:01

## 2018-11-27 RX ADMIN — Medication 5 ML: at 06:00

## 2018-11-27 RX ADMIN — ISOSORBIDE MONONITRATE 120 MG: 60 TABLET ORAL at 06:15

## 2018-11-27 NOTE — DISCHARGE INSTRUCTIONS
TRANSFER  INSTRUCTIONS    NAME:             Shola Nixon. :  1935   MRN:  [id number]     Date/Time:  2018 7:50 AM    ADMIT DATE: 2018     TRANSFER DATE: 2018     DISPOSITION: SNF    DISCHARGE DIAGNOSIS:  Principal Problem:    Hypercalcemia (2018)    CAD (coronary artery disease): Overview: Angina 2011, PCI unknown vessel at [place], no MI    CKD (chronic kidney disease) stage 3, GFR 30-59 ml/min ([facility]) (12/10/2013)    Dementia     [facility] type 2 [facility]    MEDICATIONS:    Refer to current medication profile. Monitor blood glucose three times before meals. Check BMP weekly to monitor calcium    Recommended diet:  Diabetic Diet    Recommended activity: Activity as tolerated    Follow Up:    Facility physician and then PCP: call to schedule follow up upon discharge from rehab    Information obtained by :  I understand that if any problems occur once I am at home I am to contact my physician. I understand and acknowledge receipt of the instructions indicated above.                                                                                                                                            Physician's or R.N.'s Signature                                                                  Date/Time                                                                                                                                              Patient or Representative Signature                                                          Date/Time

## 2018-11-27 NOTE — PROGRESS NOTES
Report called to [facility] [phone] in SBAR format. Opportunity to ask questions given.

## 2018-11-27 NOTE — PROGRESS NOTES
Patient's PIV removed. Discharge instructions reviewed with patient's wife who is caregiver. Patient's wife verbalizes understanding of plan of care to transport patient to [facility] at noon by [facility]. Patient's wife verbalizes understanding of plan of care to follow up with PCP after rehab at [facility]. Patient awaiting transport, patient packed and ready for transportation.

## 2018-11-27 NOTE — ROUTINE PROCESS
Bedside and Verbal shift change report given to Starla Purcell rn (oncoming nurse) by Luciana Franklin (offgoing nurse). Report included the following information SBAR, Kardex, Procedure Summary, Intake/Output, MAR and Recent Results.

## 2018-11-27 NOTE — DISCHARGE SUMMARY
Aristeo Plummerelsen [facility] 79  [address] [facility], [address] [place] [address] Nw  Tel: [phone]    Physician Discharge Summary    Patient ID:    Robert Galindo. Age:              80 y.o.    : 1935  MRN:             [id number]     PCP: Rocio Ramírez MD     Admit date: 2018    Discharge date: 2018    Principal admission Diagnosis:  Viral illness    Discharge Diagnoses:  Principal Problem:    Hypercalcemia (2018)    CAD (coronary artery disease): Overview: Angina 2011, PCI unknown vessel at [address], no MI      CKD (chronic kidney disease) stage 3, GFR 30-59 ml/min ([facility]) (12/10/2013)    Dementia     DM type 2 causing renal disease ([facility] [address] ()    Resolving hyperbilirubinemia (2018)    Hx Renal cell Carcinoma    Consults: Nephrology and Hematology/Oncology    Hospital Course:     Mr. Rosalee Keenan is a 80 y.o. admitted to [facility] and treated for the following:    Hypercalcemia (2018): unclear etiology. Parathyroid scan negative, skeletal survey unremarkable, PRH-rp negative. NM bone scan neg. Has been reviewed by renal and is sp calcitonin and Zometa. Calcium has trended down. He will need calcium follow up. Hold vitamin D at discharge    DM type 2 causing renal disease ([facility] [address]): appetite remains poor. Diet as tolerated. Hold home NPH but this could be gradually resumed as he tolerates his diet.      CAD (coronary artery disease): asymptomatic. Continue Asprin, Imdur     CKD (chronic kidney disease) stage 3, GFR 30-59 ml/min ([facility]) (12/10/2013): SCr stable after IVFs.    Dementia: acute metabolic encephalopathy better as per family and has remained alert past several days.       Hyperbilirubinemia/ Jaundice (2018): better. Seen by GI. CA 19-9 was slightly elevated. CT scan without contrast and US done showed no mass.  After discussing with GI and spouse, no further work up was recommended other than surveillance.      Hx Renal cell carcinoma: Outpatient follow up and surveilance by Urology     Debility: being discharged to SNF today. Discharge Exam:    Visit Vitals  /67 (BP 1 Location: Right arm, BP Patient Position: At rest)   Pulse 72   Temp 98.6 °F (37 °C)   Resp 16   Ht 5' 7\" (1.702 m)   Wt 80.7 kg (178 lb)   SpO2 94%   BMI 27.88 kg/m²      General: weak but not in any acute distress   Pulm: clear breath sounds without wheezes  Card: no murmurs, normal S1, S2 without thrills, bruits   Abd:    soft, non-tender, normoactive bowel sounds  Skin: no rashes or ulcers, skin turgor is good  Neuro: awake, alert and has a non focal     Activity: Activity as tolerated    Diet: Diabetic Diet    Current Discharge Medication List      CONTINUE these medications which have NOT CHANGED    Details   aspirin delayed-release 81 mg tablet Take 81 mg by mouth nightly. amLODIPine (NORVASC) 10 mg tablet Take 5 mg by mouth daily. losartan (COZAAR) 25 mg tablet Take 25 mg by mouth nightly. nystatin (MYCOSTATIN) powder Apply  to affected area daily as needed for Other (Itching/Irritation- Groin area). To groin after showering and drying       docusate sodium (COLACE) 100 mg capsule Take 100 mg by mouth daily as needed. senna (SENNA) 8.6 mg tablet Take 1 Tab by mouth daily as needed for Constipation. isosorbide mononitrate ER (IMDUR) 120 mg CR tablet Take 1 Tab by mouth every morning. Qty: 30 Tab, Refills: 0      melatonin 3 mg tablet Take 3 mg by mouth nightly. polyethylene glycol (MIRALAX) 17 gram packet Take 17 g by mouth daily as needed (Constipation). mirtazapine (REMERON) 15 mg tablet Take 7.5 mg by mouth nightly. apixaban (ELIQUIS) 2.5 mg tablet Take 2.5 mg by mouth two (2) times a day. omeprazole (PRILOSEC) 20 mg capsule Take 20 mg by mouth Daily (before breakfast). nitroglycerin (NITROSTAT) 0.4 mg SL tablet 0.4 mg by SubLINGual route every five (5) minutes as needed.            STOP taking these medications       trimethoprim (TRIMPEX) 100 mg tablet Comments:   Reason for Stopping:         insulin NPH (HUMULIN N NPH U-100 INSULIN) 100 unit/mL injection Comments:   Reason for Stopping:         cholecalciferol (VITAMIN D3) 1,000 unit tablet Comments:   Reason for Stopping:         desonide (TRIDESILON) 0.05 % cream Comments:   Reason for Stopping:         OTHER,NON-FORMULARY, Comments:   Reason for Stopping: Follow-up Information     Follow up With Specialties Details Why Contact Info    Other, MD Chance    Patient can only remember the practice name and not the physician            Follow-up tests or labs: Follow up BMP    Discharge Condition: Stable    Disposition: SNF    Time taken to arrange discharge:  35 minutes.     Signed:  Marta Jernigan MD     [facility]  11/27/2018   7:52 AM

## 2018-11-27 NOTE — PROGRESS NOTES
11/27/2018 11:13 AM 
[facility] can transport pt at 12:00 PM to [facility], packet to chart. Discharge clinicals faxed to [phone]. Nursing pt will admit to Rm 112, please call report to  [phone]. Pt ready for d/c from CM standpoint. Fabian Oquendo  8:36 AM 
D/C order noted, CM requested stretcher transport this AM to [facility]. Fabian Oquendo

## 2018-11-27 NOTE — PROGRESS NOTES
Pharmacist Discharge Medication Reconciliation Discharge Provider:  Dr. Laveta Jeans Discharge Medications: My Medications CONTINUE taking these medications Instructions Each Dose to Equal Morning Noon Evening Bedtime  
amLODIPine 10 mg tablet Commonly known as:  Emilio Gerda Your last dose was: Your next dose is: Take 5 mg by mouth daily. 5 mg 
  
  
  
  
  
aspirin delayed-release 81 mg tablet Your last dose was: Your next dose is: Take 81 mg by mouth nightly. 81 mg 
  
  
  
  
  
docusate sodium 100 mg capsule Commonly known as:  Kyle Turcios Your last dose was: Your next dose is: Take 100 mg by mouth daily as needed. 100 mg 
  
  
  
  
  
ELIQUIS 2.5 mg tablet Generic drug:  apixaban Your last dose was: Your next dose is: Take 2.5 mg by mouth two (2) times a day. 2.5 mg 
  
  
  
  
  
isosorbide mononitrate  mg CR tablet Commonly known as:  IMDUR Your last dose was: Your next dose is: Take 1 Tab by mouth every morning. 120 mg 
  
  
  
  
  
losartan 25 mg tablet Commonly known as:  COZAAR Your last dose was: Your next dose is: Take 25 mg by mouth nightly. 25 mg 
  
  
  
  
  
melatonin 3 mg tablet Your last dose was: Your next dose is: Take 3 mg by mouth nightly. 3 mg MIRALAX 17 gram packet Generic drug:  polyethylene glycol Your last dose was: Your next dose is: Take 17 g by mouth daily as needed (Constipation). 17 g 
  
  
  
  
  
nitroglycerin 0.4 mg SL tablet Commonly known as:  NITROSTAT Your last dose was: Your next dose is: 0.4 mg by SubLINGual route every five (5) minutes as needed. 0.4 mg 
  
  
  
  
  
nystatin powder Commonly known as:  MYCOSTATIN Your last dose was:    
 
Your next dose is:   
 
  
 Apply  to affected area daily as needed for Other (Itching/Irritation- Groin area). To groin after showering and drying PriLOSEC 20 mg capsule Generic drug:  omeprazole Your last dose was: Your next dose is: Take 20 mg by mouth Daily (before breakfast). 20 mg 
  
  
  
  
  
REMERON 15 mg tablet Generic drug:  mirtazapine Your last dose was: Your next dose is: Take 7.5 mg by mouth nightly. 7.5 mg Senna 8.6 mg tablet Generic drug:  senna Your last dose was: Your next dose is: Take 1 Tab by mouth daily as needed for Constipation. 1 Tab STOP taking these medications   
 
desonide 0.05 % cream 
Commonly known as:  Costella Frame HumuLIN N NPH U-100 Insulin 100 unit/mL injection Generic drug:  insulin NPH 
  
  
OTHER(NON-FORMULARY) 
  
  
trimethoprim 100 mg tablet Commonly known as:  TRIMPEX 
  
  
VITAMIN D3 1,000 unit tablet Generic drug:  cholecalciferol The patient's chart, MAR, and AVS were reviewed by Gareth Bernardo, PharmD Contact: [phone]

## 2018-11-27 NOTE — ROUTINE PROCESS
Bedside shift change report given to Nelly Garcia (oncoming nurse) by Kalli Cox (offgoing nurse). Report included the following information SBAR, Kardex, Intake/Output, MAR, Accordion, Recent Results and Med Rec Status.

## 2018-11-27 NOTE — PROGRESS NOTES
[facility] at [address], [address] [place] [address] W: [phone]  F: [phone] Reason for Visit:  
Jyoti Hayes is a 80 y.o. male who is seen in consultation at the request of Dr. Donaldo Condon for evaluation of leukopenia and thrombocytopenia. Hematology / Oncology Treatment History:  
History of prostate cancer in 2010 (s/p surgery) and RCC (s/p partial L nephrectomy) History of Present Illness:  
Presented to ED on 11/14 with chills, cough, weakness, starting that day. Dark urine present. Has dementia at baseline Interval History:  
 
Resting in bed; no complaints. Wife and grandson at bedside. Wife reports leaving today to return to [facility]. Current Facility-Administered Medications Medication Dose Route Frequency  0.9% sodium chloride infusion  75 mL/hr IntraVENous CONTINUOUS  
 bisacodyl (DULCOLAX) suppository 10 mg  10 mg Rectal DAILY PRN  
 apixaban (ELIQUIS) tablet 2.5 mg  2.5 mg Oral BID  aspirin delayed-release tablet 81 mg  81 mg Oral QHS  isosorbide mononitrate ER (IMDUR) tablet 120 mg  120 mg Oral 7am  
 losartan (COZAAR) tablet 25 mg  25 mg Oral QHS  melatonin tablet 3 mg  3 mg Oral QHS  mirtazapine (REMERON) tablet 7.5 mg  7.5 mg Oral QHS  polyethylene glycol (MIRALAX) packet 17 g  17 g Oral DAILY  senna (SENOKOT) tablet 8.6 mg  1 Tab Oral DAILY  pantoprazole (PROTONIX) tablet 40 mg  40 mg Oral ACB  sodium chloride (NS) flush 5-10 mL  5-10 mL IntraVENous Q8H  
 sodium chloride (NS) flush 5-10 mL  5-10 mL IntraVENous PRN  
 acetaminophen (TYLENOL) tablet 650 mg  650 mg Oral Q4H PRN  prochlorperazine (COMPAZINE) injection 10 mg  10 mg IntraVENous Q6H PRN  
 insulin lispro (HUMALOG) injection   SubCUTAneous AC&HS  
 glucose chewable tablet 16 g  4 Tab Oral PRN  
 dextrose (D50W) injection syrg 12.5-25 g  25-50 mL IntraVENous PRN  
  glucagon (GLUCAGEN) injection 1 mg  1 mg IntraMUSCular PRN Allergies Allergen Reactions  Latex Rash, Swelling and Contact Dermatitis  Atorvastatin Unknown (comments) Per patient's PCP allergy list- No reaction specified  Ezetimibe Myalgia  Lisinopril Cough  Metoprolol Other (comments) Bradycardia  Morphine Other (comments) Hallucinations and Nausea  Nitrofurantoin Rash \"Blood blisters/rash\" per patient's family  Rosuvastatin Myalgia  Simvastatin Myalgia Review of Systems: A complete review of systems was obtained, negative except as described above. Physical Exam:  
 
Visit Vitals /67 (BP 1 Location: Right arm, BP Patient Position: At rest) Pulse 72 Temp 98.6 °F (37 °C) Resp 16 Ht 5' 7\" (1.702 m) Wt 80.7 kg (178 lb) SpO2 94% BMI 27.88 kg/m² ECOG PS: 3 General: elderly, frail, no acute distress HENT: Atraumatic with normal appearance of ears and nose; OP clear Neck: Supple; no thyromegaly Lymphatic: No cervical, supraclavicular, or axillary adenopathy CV: Normal rate, regular rhythm, no murmurs, no peripheral edema GI: Soft, nontender, nondistended, no masses, no hepatomegaly, no splenomegaly Skin: No rashes, ecchymoses, or petechiae. Normal temperature, turgor, and texture. Results:  
 
Lab Results Component Value Date/Time WBC 4.8 11/26/2018 03:38 AM  
 HGB 11.7 (L) 11/26/2018 03:38 AM  
 HCT 36.6 11/26/2018 03:38 AM  
 PLATELET 988 89/34/6540 03:38 AM  
 MCV 86.5 11/26/2018 03:38 AM  
 ABS. NEUTROPHILS 3.2 11/26/2018 03:38 AM  
 
Lab Results Component Value Date/Time  Sodium 137 11/26/2018 03:38 AM  
 Potassium 4.0 11/26/2018 03:38 AM  
 Chloride 104 11/26/2018 03:38 AM  
 CO2 23 11/26/2018 03:38 AM  
 Glucose 166 (H) 11/26/2018 03:38 AM  
 BUN 13 11/26/2018 03:38 AM  
 Creatinine 1.25 11/26/2018 03:38 AM  
 GFR est AA >60 11/26/2018 03:38 AM  
 GFR est non-AA 55 (L) 11/26/2018 03:38 AM  
 Calcium 9.3 11/26/2018 03:38 AM  
 Glucose (POC) 162 (H) 11/27/2018 07:01 AM  
 Creatinine (POC) 1.5 (H) 11/26/2013 09:55 AM  
 
Lab Results Component Value Date/Time Bilirubin, total 0.6 11/26/2018 03:38 AM  
 ALT (SGPT) 18 11/26/2018 03:38 AM  
 AST (SGOT) 11 (L) 11/26/2018 03:38 AM  
 Alk. phosphatase 233 (H) 11/26/2018 03:38 AM  
 Protein, total 5.7 (L) 11/26/2018 03:38 AM  
 Albumin 2.3 (L) 11/26/2018 03:38 AM  
 Globulin 3.4 11/26/2018 03:38 AM  
 
11/15/18 CT c/a/p wo contrast 
FINDINGS: 
LOWER NECK: The visualized portions of the thyroid and structures of the lower 
neck are within normal limits. CHEST: 
The absence of intravenous contrast material reduces the sensitivity for 
evaluation of the mediastinal structures. Lungs: There is interstitial disease with pulmonary fibrosis in the periphery of 
the lungs which is unchanged. The lungs are clear of mass, nodule, airspace 
disease or acute edema. Pleura: There is no pleural effusion or pneumothorax. Aorta: The aorta is atherosclerotic and has a normal contour without evidence of 
aneurysm. There is extensive coronary artery calcification which is unchanged. Mediastinum: There is no mediastinal or hilar adenopathy or mass. Bones and soft tissues: The bones and soft tissues of the chest wall are normal. 
  
ABDOMEN: 
The absence of intravenous contrast material reduces the sensitivity for 
evaluation of the solid parenchymal organs of the abdomen. Liver: The liver is normal in size and contour with no focal abnormality. Gallbladder and bile ducts: There are clips in the gallbladder fossa and the 
gallbladder is absent. Spleen: No abnormality. Pancreas: No abnormality. Adrenal glands: No abnormality. Kidneys: There is a 4.2 x 3.3 x 3.7 cm left renal cyst which is unchanged and 
there is some radiopaque material in the left kidney from prior surgery. PELVIS: 
Reproductive organs: The prostate gland is absent.  There are surgical clips in 
 the pelvis. The seminal vesicles are symmetrical. 
Bladder: No abnormality. BOWEL AND MESENTERY: The small bowel is normal. There is no mesenteric mass or 
adenopathy. The appendix is not identified. There are diverticula of the colon. PERITONEUM: There is no ascites or free intraperitoneal air. RETROPERITONEUM: The aorta is atherosclerotic and tapers without aneurysm There 
is no retroperitoneal adenopathy or mass. There is no pelvic mass or adenopathy. BONES AND SOFT TISSUES: There are degenerative changes of the spine. IMPRESSION IMPRESSION:  
1. No acute abnormality and no change. 2. Pulmonary fibrosis. 3. Atherosclerosis with coronary artery calcification. 4. Atherosclerotic abdominal aorta without aneurysm. 5. Status post cholecystectomy. 6. Status post prostatectomy. 7. Status post left renal surgery with left renal cyst. 
8. Diverticulosis. 9. Thoracolumbar spondylosis. 11/17/18 abd US IMPRESSION:   
1. No visible abnormality. 11/20/2018 NM PARA THYROID SCAN 
IMPRESSION: 
No evidence of parathyroid adenoma. 
  
11/21/2018 XR Bone survey Negative 11/21/2018 US THYROID/PARA THYROID 
pending No parathyroid adenoma 11/27/2018 NM BONE SCAN  
IMPRESSION: No osseous metastatic disease Assessment and Recommendations:  
 
1. Thrombocytopenia:  Resolved; continues  to improve Work up unremarkable, borderline B12 levels , MMA not elevated, no HSM Continue po b12 2. Leukopenia:  Resolved 3. Hypercalcemia:  Corrected Ca today 10.66 Present for at least 2 months; IVF; PTH normal, PTHrp pending; PSA 0. He does also have a h/o RCC No obvious malignancy on scans.  No parathyroid adenoma and skeletal survey is normal 
He has an MGUS- appears to be low risk and does not explain his hypercalcemia 
--24 hour UPEP and light chains normal 
--CT scan negative; Bone scan negative; no evidence that elevated calcium is from malignancy. Recommend continued follow up with PCP to monitor calcium We will sign off at this time but are available for any further questions or concerns. Plan reviewed with Dr Robbi Pablo Signed By: Marilin Martinez NP

## 2018-12-01 ENCOUNTER — HOSPITAL ENCOUNTER (EMERGENCY)
Age: 83
Discharge: HOME | DRG: 871 | End: 2018-12-01
Attending: STUDENT IN AN ORGANIZED HEALTH CARE EDUCATION/TRAINING PROGRAM
Payer: MEDICARE

## 2018-12-01 ENCOUNTER — APPOINTMENT (OUTPATIENT)
Dept: GENERAL RADIOLOGY | Age: 83
DRG: 871 | End: 2018-12-01
Attending: NURSE PRACTITIONER
Payer: MEDICARE

## 2018-12-01 VITALS
RESPIRATION RATE: 20 BRPM | DIASTOLIC BLOOD PRESSURE: 53 MMHG | HEIGHT: 67 IN | HEART RATE: 67 BPM | BODY MASS INDEX: 27.88 KG/M2 | SYSTOLIC BLOOD PRESSURE: 135 MMHG | TEMPERATURE: 99.9 F | OXYGEN SATURATION: 96 %

## 2018-12-01 DIAGNOSIS — F03.90 DEMENTIA WITHOUT BEHAVIORAL DISTURBANCE, UNSPECIFIED DEMENTIA TYPE: Chronic | ICD-10-CM

## 2018-12-01 DIAGNOSIS — R53.81 DEBILITATED PATIENT: ICD-10-CM

## 2018-12-01 DIAGNOSIS — R50.9 ACUTE FEBRILE ILLNESS: Primary | ICD-10-CM

## 2018-12-01 LAB
ALBUMIN SERPL-MCNC: 2.4 G/DL (ref 3.5–5)
ALBUMIN/GLOB SERPL: 0.7 {RATIO} (ref 1.1–2.2)
ALP SERPL-CCNC: 333 U/L (ref 45–117)
ALT SERPL-CCNC: 24 U/L (ref 12–78)
AMYLASE SERPL-CCNC: 43 U/L (ref 25–115)
ANION GAP SERPL CALC-SCNC: 11 MMOL/L (ref 5–15)
APPEARANCE UR: CLEAR
AST SERPL-CCNC: 12 U/L (ref 15–37)
BACTERIA URNS QL MICRO: NEGATIVE /HPF
BASOPHILS # BLD: 0 K/UL (ref 0–0.1)
BASOPHILS NFR BLD: 0 % (ref 0–1)
BILIRUB SERPL-MCNC: 0.8 MG/DL (ref 0.2–1)
BILIRUB UR QL: NEGATIVE
BUN SERPL-MCNC: 14 MG/DL (ref 6–20)
BUN/CREAT SERPL: 9 (ref 12–20)
CALCIUM SERPL-MCNC: 8.6 MG/DL (ref 8.5–10.1)
CHLORIDE SERPL-SCNC: 102 MMOL/L (ref 97–108)
CO2 SERPL-SCNC: 23 MMOL/L (ref 21–32)
COLOR UR: NORMAL
COMMENT, HOLDF: NORMAL
CREAT SERPL-MCNC: 1.5 MG/DL (ref 0.7–1.3)
DIFFERENTIAL METHOD BLD: ABNORMAL
EOSINOPHIL # BLD: 0.1 K/UL (ref 0–0.4)
EOSINOPHIL NFR BLD: 2 % (ref 0–7)
EPITH CASTS URNS QL MICRO: NORMAL /LPF
ERYTHROCYTE [DISTWIDTH] IN BLOOD BY AUTOMATED COUNT: 13.9 % (ref 11.5–14.5)
FLUAV AG NPH QL IA: NEGATIVE
FLUBV AG NOSE QL IA: NEGATIVE
GLOBULIN SER CALC-MCNC: 3.4 G/DL (ref 2–4)
GLUCOSE SERPL-MCNC: 197 MG/DL (ref 65–100)
GLUCOSE UR STRIP.AUTO-MCNC: NEGATIVE MG/DL
HCT VFR BLD AUTO: 33.6 % (ref 36.6–50.3)
HGB BLD-MCNC: 11 G/DL (ref 12.1–17)
HGB UR QL STRIP: NEGATIVE
HYALINE CASTS URNS QL MICRO: NORMAL /LPF (ref 0–5)
IMM GRANULOCYTES # BLD: 0.1 K/UL (ref 0–0.04)
IMM GRANULOCYTES NFR BLD AUTO: 1 % (ref 0–0.5)
KETONES UR QL STRIP.AUTO: NEGATIVE MG/DL
LACTATE SERPL-SCNC: 1 MMOL/L (ref 0.4–2)
LEUKOCYTE ESTERASE UR QL STRIP.AUTO: NEGATIVE
LIPASE SERPL-CCNC: 132 U/L (ref 73–393)
LYMPHOCYTES # BLD: 0.7 K/UL (ref 0.8–3.5)
LYMPHOCYTES NFR BLD: 11 % (ref 12–49)
MCH RBC QN AUTO: 28.3 PG (ref 26–34)
MCHC RBC AUTO-ENTMCNC: 32.7 G/DL (ref 30–36.5)
MCV RBC AUTO: 86.4 FL (ref 80–99)
MONOCYTES # BLD: 0.8 K/UL (ref 0–1)
MONOCYTES NFR BLD: 13 % (ref 5–13)
NEUTS SEG # BLD: 4.5 K/UL (ref 1.8–8)
NEUTS SEG NFR BLD: 73 % (ref 32–75)
NITRITE UR QL STRIP.AUTO: NEGATIVE
NRBC # BLD: 0 K/UL (ref 0–0.01)
NRBC BLD-RTO: 0 PER 100 WBC
PH UR STRIP: 6 [PH] (ref 5–8)
PLATELET # BLD AUTO: 180 K/UL (ref 150–400)
PMV BLD AUTO: 10.6 FL (ref 8.9–12.9)
POTASSIUM SERPL-SCNC: 3.9 MMOL/L (ref 3.5–5.1)
PROT SERPL-MCNC: 5.8 G/DL (ref 6.4–8.2)
PROT UR STRIP-MCNC: NEGATIVE MG/DL
RBC # BLD AUTO: 3.89 M/UL (ref 4.1–5.7)
RBC #/AREA URNS HPF: NORMAL /HPF (ref 0–5)
RBC MORPH BLD: ABNORMAL
SAMPLES BEING HELD,HOLD: NORMAL
SODIUM SERPL-SCNC: 136 MMOL/L (ref 136–145)
SP GR UR REFRACTOMETRY: 1.01 (ref 1–1.03)
UA: UC IF INDICATED,UAUC: NORMAL
UROBILINOGEN UR QL STRIP.AUTO: 1 EU/DL (ref 0.2–1)
WBC # BLD AUTO: 6.2 K/UL (ref 4.1–11.1)
WBC URNS QL MICRO: NORMAL /HPF (ref 0–4)

## 2018-12-01 PROCEDURE — 87804 INFLUENZA ASSAY W/OPTIC: CPT

## 2018-12-01 PROCEDURE — 87077 CULTURE AEROBIC IDENTIFY: CPT

## 2018-12-01 PROCEDURE — 74011250637 HC RX REV CODE- 250/637: Performed by: NURSE PRACTITIONER

## 2018-12-01 PROCEDURE — 96360 HYDRATION IV INFUSION INIT: CPT

## 2018-12-01 PROCEDURE — 81001 URINALYSIS AUTO W/SCOPE: CPT

## 2018-12-01 PROCEDURE — 71045 X-RAY EXAM CHEST 1 VIEW: CPT

## 2018-12-01 PROCEDURE — 80053 COMPREHEN METABOLIC PANEL: CPT

## 2018-12-01 PROCEDURE — 36415 COLL VENOUS BLD VENIPUNCTURE: CPT

## 2018-12-01 PROCEDURE — 96361 HYDRATE IV INFUSION ADD-ON: CPT

## 2018-12-01 PROCEDURE — 74011250636 HC RX REV CODE- 250/636: Performed by: NURSE PRACTITIONER

## 2018-12-01 PROCEDURE — 99285 EMERGENCY DEPT VISIT HI MDM: CPT

## 2018-12-01 PROCEDURE — 83690 ASSAY OF LIPASE: CPT

## 2018-12-01 PROCEDURE — 83605 ASSAY OF LACTIC ACID: CPT

## 2018-12-01 PROCEDURE — 85025 COMPLETE CBC W/AUTO DIFF WBC: CPT

## 2018-12-01 PROCEDURE — 82150 ASSAY OF AMYLASE: CPT

## 2018-12-01 PROCEDURE — 87040 BLOOD CULTURE FOR BACTERIA: CPT

## 2018-12-01 PROCEDURE — 87186 SC STD MICRODIL/AGAR DIL: CPT

## 2018-12-01 RX ORDER — IBUPROFEN 600 MG/1
600 TABLET ORAL
Status: COMPLETED | OUTPATIENT
Start: 2018-12-01 | End: 2018-12-01

## 2018-12-01 RX ORDER — SODIUM CHLORIDE 0.9 % (FLUSH) 0.9 %
5-10 SYRINGE (ML) INJECTION AS NEEDED
Status: DISCONTINUED | OUTPATIENT
Start: 2018-12-01 | End: 2018-12-01 | Stop reason: HOSPADM

## 2018-12-01 RX ORDER — IBUPROFEN 600 MG/1
600 TABLET ORAL
Qty: 20 TAB | Refills: 0 | Status: SHIPPED | OUTPATIENT
Start: 2018-12-01 | End: 2018-12-05

## 2018-12-01 RX ADMIN — SODIUM CHLORIDE 1000 ML: 900 INJECTION, SOLUTION INTRAVENOUS at 18:42

## 2018-12-01 RX ADMIN — IBUPROFEN 600 MG: 600 TABLET ORAL at 19:25

## 2018-12-01 NOTE — ED PROVIDER NOTES
Melo Gandhi. is a 80 y.o. male with Hx of arthritis, CAD, CKD (stage III), Cochlear implant, dementia, insulin dependent DMII, GERD,  HTN, hypothyroidism, sleep apnea, PAF (on Eliquis), RCC post nephrectomy (2015) who presents via EMS  to [facility] ED with cc of fever, chills, confusion. Pt arrives from [facility] w/ report from EMS of Fever (tMax 101)/ mild confusion that started today. EMS reports temperature of 99 on their arrival and pt A&O x4. Staff at [facility] gave Tylenol 2h prior to transport to the ED. Pt has no medical complaints at this time. He is on prophylactic daily Bactrim for recurrent UTIs. Per MAR review, he was given other meds as scheduled. No recent falls, trauma, or injuries. No recent N/V/D, HA, rashes, wounds, cough, congestion, CP, SOB, dysuria, urinary frequency/hesitancy, flank pain. Was d/c from [facility] this past Tuesday post admission for CAP. Medical Record Review Discharge Summary: - Hypercalcemia (11/21/2018): unclear etiology. Parathyroid scan negative, skeletal survey unremarkable, PRH-rp negative. NM bone scan neg. Has been reviewed by renal and is sp calcitonin and Zometa. Calcium has trended down. He will need calcium follow up. Hold vitamin D at discharge 
 - DM type 2 causing renal disease ([facility] [address]): appetite remains poor. Diet as tolerated. Hold home NPH but this could be gradually resumed as he tolerates his diet. - Dementia: acute metabolic encephalopathy better as per family and has remained alert past several days. 
- Hyperbilirubinemia/ Jaundice (11/21/2018): better. Seen by GI. CA 19-9 was slightly elevated. CT scan without contrast and US done showed no mass. After discussing with GI and spouse, no further work up was recommended other than surveillance.  
  
Hx Renal cell carcinoma: Outpatient follow up and surveilance by Urology 
  
 
 
 
PCP: Ceci, MD Chance 
 
There are no other complaints, changes or physical findings at this time. Past Medical History:  
Diagnosis Date  Arthritis  CAD (coronary artery disease) Angina Jan 2011, PCI LAD with multilink stent 4.0x12mm;  also cath at [facility] 7/13 -- no stents at that time  CKD (chronic kidney disease), stage III  Cochlear implant in place 2/3/2017  Dementia  DM type 2 causing renal disease ([address]  DM type 2 causing vascular disease ([address]  GERD (gastroesophageal reflux disease)  HTN (hypertension)  Hypothyroidism  SAMUEL (obstructive sleep apnea)   
 not using CPAP  
 PAF (paroxysmal atrial fibrillation) ([address] [id number] score = 7  
 Prostate cancer ([facility] [address]  Renal cell cancer ([facility] [address] Stage 4 Renal Cell Cancer. Partial omentectomy 11/2/2015 with Dr. Shruti Singleton: omental mass-metastatic renal cell carcinoma  Renal mass, left [address] LEFT PARTIAL NEPHRECTOMY 12/19/13;   
 
 
Past Surgical History:  
Procedure Laterality Date  CARDIAC SURG PROCEDURE UNLIST  1/2011  
 stent to LAD  HX CHOLECYSTECTOMY  7/13/2012  HX CORONARY STENT PLACEMENT  2011 [address]  2011, 2013 [address]  
 stapedectomy  HX HERNIA REPAIR  1968  
 left inguinal  
 HX LITHOTRIPSY  8/2015  HX ORTHOPAEDIC  1994  
 rotator cuff repair  HX ORTHOPAEDIC  1994  
 cervical disc removed  HX PROSTATECTOMY  2000  
 surgery intervention [address] [place]  HX UROLOGICAL  2001  
 valvular implant prevent incontinence  HX UROLOGICAL  2003  
 insert gel to help with incontinence  HX UROLOGICAL  12/2013  
 left partial nephrectomy  HX UROLOGICAL  9/2015 CT guided biopsy of abdominal lymph nodes Family History:  
Problem Relation Age of Onset  Stroke Father  Cancer Father   
     prostate  Diabetes Sister 2 sisters  Hypertension Sister 2 sisters  Diabetes Brother  Stroke Brother  Diabetes Brother   
     all brothers  Stroke Brother  Kidney Disease Brother  Heart Attack Brother  Hypertension Other Social History Socioeconomic History  Marital status:  Spouse name: Not on file  Number of children: Not on file  Years of education: Not on file  Highest education level: Not on file Social Needs  Financial resource strain: Not on file  Food insecurity - worry: Not on file  Food insecurity - inability: Not on file  Transportation needs - medical: Not on file  Transportation needs - non-medical: Not on file Occupational History  Not on file Tobacco Use  Smoking status: Former Smoker Packs/day: 0.10 Years: 23.00 Pack years: 2.30 Last attempt to quit: 12/3/1975 Years since quittin.0  Smokeless tobacco: Never Used Substance and Sexual Activity  Alcohol use: No  
  Alcohol/week: 0.0 oz  Drug use: No  
 Sexual activity: No  
  Birth control/protection: None Other Topics Concern  Not on file Social History Narrative  Not on file ALLERGIES: Latex; Atorvastatin; Ezetimibe; Lisinopril; Metoprolol; Morphine; Nitrofurantoin; Rosuvastatin; and Simvastatin Review of Systems Constitutional: Positive for chills and fever. Negative for activity change and appetite change. HENT: Negative for congestion, rhinorrhea, sinus pressure, sneezing and sore throat. Eyes: Negative for pain, discharge and visual disturbance. Respiratory: Negative for cough and shortness of breath. Cardiovascular: Negative for chest pain. Gastrointestinal: Negative for abdominal pain, diarrhea, nausea and vomiting. Genitourinary: Negative for dysuria, flank pain, frequency and urgency. Musculoskeletal: Negative for arthralgias, back pain, gait problem, joint swelling, myalgias and neck pain. Skin: Negative for color change and rash. Neurological: Negative for dizziness, speech difficulty, weakness, light-headedness, numbness and headaches. Psychiatric/Behavioral: Negative for agitation, behavioral problems and confusion. All other systems reviewed and are negative. Vitals:  
 12/01/18 1930 12/01/18 1945 12/01/18 2000 12/01/18 2015 BP: 160/61 146/52 145/58 135/53 Pulse: 75 70 67 67 Resp: 20 21 19 20 Temp:    99.9 °F (37.7 °C) SpO2: 98% 96% 96% 96% Height:      
      
 
Physical Exam  
Constitutional: He is oriented to person, place, and time. He appears well-developed and well-nourished. No distress. HENT:  
Head: Normocephalic and atraumatic. Right Ear: External ear normal.  
Left Ear: External ear normal.  
Nose: Nose normal.  
Mouth/Throat: Oropharynx is clear and moist. No oropharyngeal exudate. Eyes: Conjunctivae and EOM are normal. Pupils are equal, round, and reactive to light. Neck: Normal range of motion. Neck supple. Cardiovascular: Normal rate, regular rhythm, normal heart sounds and intact distal pulses. Pulmonary/Chest: Effort normal and breath sounds normal.  
Abdominal: Soft. Bowel sounds are normal. There is no tenderness. There is no rebound and no guarding. Musculoskeletal: Normal range of motion. Neurological: He is alert and oriented to person, place, and time. Skin: Skin is warm and dry. Psychiatric: He has a normal mood and affect. His behavior is normal. Judgment and thought content normal.  
Nursing note and vitals reviewed. MDM Number of Diagnoses or Management Options Acute febrile illness:  
Debilitated patient:  
Dementia without behavioral disturbance, unspecified dementia type:  
Diagnosis management comments: DDx: UTI, dehydration, CAP, viral illness, flu  
 
79 yo M presented from SNF 2/2 concern for fevers today. Pt recently d/c from [facility]. Lactic (-), given Motrin x1 in ED w/ improved VS. He is not tachycardic or hypoxic. WBC 6.2.  (-) UA/ flu. CXR WNL. There are no medical complaints presented other than fever and chills.  Discussed plan of care w/ Dr. Zoila Nichols who agrees that pt can be discharged w/ strict return precautions (which I relieved w/ pt and family). Discussed pt can take Motrin for fever if Tylenol does not relieve fever. Would not recommend this as regular practice given pt on anticoaglants. Amount and/or Complexity of Data Reviewed Clinical lab tests: ordered and reviewed Tests in the radiology section of CPT®: ordered and reviewed Review and summarize past medical records: yes Procedures LABORATORY TESTS: 
Recent Results (from the past 12 hour(s)) LACTIC ACID Collection Time: 12/01/18  6:54 PM  
Result Value Ref Range Lactic acid 1.0 0.4 - 2.0 MMOL/L  
CBC WITH AUTOMATED DIFF Collection Time: 12/01/18  6:54 PM  
Result Value Ref Range WBC 6.2 4.1 - 11.1 K/uL  
 RBC 3.89 (L) 4.10 - 5.70 M/uL  
 HGB 11.0 (L) 12.1 - 17.0 g/dL HCT 33.6 (L) 36.6 - 50.3 % MCV 86.4 80.0 - 99.0 FL  
 MCH 28.3 26.0 - 34.0 PG  
 MCHC 32.7 30.0 - 36.5 g/dL  
 RDW 13.9 11.5 - 14.5 % PLATELET 359 773 - 832 K/uL MPV 10.6 8.9 - 12.9 FL  
 NRBC 0.0 0  WBC ABSOLUTE NRBC 0.00 0.00 - 0.01 K/uL NEUTROPHILS 73 32 - 75 % LYMPHOCYTES 11 (L) 12 - 49 % MONOCYTES 13 5 - 13 % EOSINOPHILS 2 0 - 7 % BASOPHILS 0 0 - 1 % IMMATURE GRANULOCYTES 1 (H) 0.0 - 0.5 % ABS. NEUTROPHILS 4.5 1.8 - 8.0 K/UL  
 ABS. LYMPHOCYTES 0.7 (L) 0.8 - 3.5 K/UL  
 ABS. MONOCYTES 0.8 0.0 - 1.0 K/UL  
 ABS. EOSINOPHILS 0.1 0.0 - 0.4 K/UL  
 ABS. BASOPHILS 0.0 0.0 - 0.1 K/UL  
 ABS. IMM. GRANS. 0.1 (H) 0.00 - 0.04 K/UL  
 DF SMEAR SCANNED    
 RBC COMMENTS NORMOCYTIC, NORMOCHROMIC METABOLIC PANEL, COMPREHENSIVE Collection Time: 12/01/18  6:54 PM  
Result Value Ref Range Sodium 136 136 - 145 mmol/L Potassium 3.9 3.5 - 5.1 mmol/L Chloride 102 97 - 108 mmol/L  
 CO2 23 21 - 32 mmol/L Anion gap 11 5 - 15 mmol/L Glucose 197 (H) 65 - 100 mg/dL BUN 14 6 - 20 MG/DL  Creatinine 1.50 (H) 0.70 - 1.30 MG/DL  
 BUN/Creatinine ratio 9 (L) 12 - 20 GFR est AA 54 (L) >60 ml/min/1.73m2 GFR est non-AA 45 (L) >60 ml/min/1.73m2 Calcium 8.6 8.5 - 10.1 MG/DL Bilirubin, total 0.8 0.2 - 1.0 MG/DL  
 ALT (SGPT) 24 12 - 78 U/L  
 AST (SGOT) 12 (L) 15 - 37 U/L Alk. phosphatase 333 (H) 45 - 117 U/L Protein, total 5.8 (L) 6.4 - 8.2 g/dL Albumin 2.4 (L) 3.5 - 5.0 g/dL Globulin 3.4 2.0 - 4.0 g/dL A-G Ratio 0.7 (L) 1.1 - 2.2 SAMPLES BEING HELD Collection Time: 12/01/18  6:54 PM  
Result Value Ref Range SAMPLES BEING HELD 1BL,1RED,1SST   
 COMMENT Add-on orders for these samples will be processed based on acceptable specimen integrity and analyte stability, which may vary by analyte. INFLUENZA A & B AG (RAPID TEST) Collection Time: 12/01/18  6:54 PM  
Result Value Ref Range Influenza A Antigen NEGATIVE  NEG Influenza B Antigen NEGATIVE  NEG    
LIPASE Collection Time: 12/01/18  6:54 PM  
Result Value Ref Range Lipase 132 73 - 393 U/L  
AMYLASE Collection Time: 12/01/18  6:54 PM  
Result Value Ref Range Amylase 43 25 - 115 U/L  
URINALYSIS W/ REFLEX CULTURE Collection Time: 12/01/18  7:31 PM  
Result Value Ref Range Color YELLOW/STRAW Appearance CLEAR CLEAR Specific gravity 1.009 1.003 - 1.030    
 pH (UA) 6.0 5.0 - 8.0 Protein NEGATIVE  NEG mg/dL Glucose NEGATIVE  NEG mg/dL Ketone NEGATIVE  NEG mg/dL Bilirubin NEGATIVE  NEG Blood NEGATIVE  NEG Urobilinogen 1.0 0.2 - 1.0 EU/dL Nitrites NEGATIVE  NEG Leukocyte Esterase NEGATIVE  NEG    
 WBC 0-4 0 - 4 /hpf  
 RBC 0-5 0 - 5 /hpf Epithelial cells FEW FEW /lpf Bacteria NEGATIVE  NEG /hpf  
 UA:UC IF INDICATED CULTURE NOT INDICATED BY UA RESULT CNI Hyaline cast 0-2 0 - 5 /lpf IMAGING RESULTS: 
XR CHEST PORT Final Result Final result by Lorenzo, Rad Results In (12/01/18 18:31:34) Initial Result:  
Impression: IMPRESSION: No focal airspace disease. No change. Narrative: EXAM:  XR CHEST PORT. INDICATION: Fever and recent pneumonia. COMPARISON: 11/17/2018. FINDINGS:  
A portable AP radiograph of the chest was obtained at 1820 hours. Lines and tubes: The patient is on a cardiac monitor. Lungs: Is interstitial disease throughout the lungs with some atelectasis at the 
lung base which is unchanged. There is no focal or confluent airspace disease. Pleura: There is no pneumothorax or pleural effusion. Mediastinum: The cardiac and mediastinal contours and pulmonary vascularity are 
normal. 
Bones and soft tissues: The bones and soft tissues are grossly within normal  
  
  
  
 
 
 
MEDICATIONS GIVEN: 
Medications  
sodium chloride 0.9 % bolus infusion 1,000 mL (0 mL IntraVENous IV Completed 12/1/18 2046) ibuprofen (MOTRIN) tablet 600 mg (600 mg Oral Given 12/1/18 1925) IMPRESSION: 
1. Acute febrile illness 2. Debilitated patient 3. Dementia without behavioral disturbance, unspecified dementia type PLAN: 
1. Discharge Medication List as of 12/1/2018  8:18 PM  
  
START taking these medications Details  
ibuprofen (MOTRIN) 600 mg tablet Take 1 Tab by mouth every six (6) hours as needed for Pain (or fevers > 101- if unresponsive to Tylenol). , Print, Disp-20 Tab, R-0  
  
  
CONTINUE these medications which have NOT CHANGED Details  
aspirin delayed-release 81 mg tablet Take 81 mg by mouth nightly., Historical Med  
  
amLODIPine (NORVASC) 10 mg tablet Take 5 mg by mouth daily. , Historical Med  
  
losartan (COZAAR) 25 mg tablet Take 25 mg by mouth nightly., Historical Med  
  
nystatin (MYCOSTATIN) powder Apply  to affected area daily as needed for Other (Itching/Irritation- Groin area).  To groin after showering and drying , Historical Med  
  
docusate sodium (COLACE) 100 mg capsule Take 100 mg by mouth daily as needed., Historical Med  
  
 senna (SENNA) 8.6 mg tablet Take 1 Tab by mouth daily as needed for Constipation. , Historical Med  
  
isosorbide mononitrate ER (IMDUR) 120 mg CR tablet Take 1 Tab by mouth every morning., Print, Disp-30 Tab, R-0  
  
melatonin 3 mg tablet Take 3 mg by mouth nightly., Historical Med  
  
polyethylene glycol (MIRALAX) 17 gram packet Take 17 g by mouth daily as needed (Constipation). , Historical Med  
  
mirtazapine (REMERON) 15 mg tablet Take 7.5 mg by mouth nightly., Historical Med  
  
apixaban (ELIQUIS) 2.5 mg tablet Take 2.5 mg by mouth two (2) times a day., Historical Med  
  
omeprazole (PRILOSEC) 20 mg capsule Take 20 mg by mouth Daily (before breakfast). , Historical Med  
  
nitroglycerin (NITROSTAT) 0.4 mg SL tablet 0.4 mg by SubLINGual route every five (5) minutes as needed.  , Historical Med 2. Follow-up Information Follow up With Specialties Details Why Contact Info [facility] EMERGENCY DEPT Emergency Medicine Go to As needed, If symptoms worsen [address] [place] [address] 
[phone] Your primary care provider   Schedule an appointment as soon as possible for a visit 3. Return to ED if worse

## 2018-12-01 NOTE — ED TRIAGE NOTES
Pt arrives via EMS from [facility] with cc of fever/chills and confusion that started today. Pt was d/c from [facility] this past Tuesday for pneumonia. Pt on Bactrim for prophylaxis tx for frequent UTIs.

## 2018-12-02 ENCOUNTER — HOSPITAL ENCOUNTER (INPATIENT)
Age: 83
LOS: 3 days | Discharge: NURSING, REHAB OR LONG-TERM CARE | DRG: 871 | End: 2018-12-05
Attending: EMERGENCY MEDICINE | Admitting: INTERNAL MEDICINE
Payer: MEDICARE

## 2018-12-02 DIAGNOSIS — Z71.89 ADVANCED CARE PLANNING/COUNSELING DISCUSSION: ICD-10-CM

## 2018-12-02 DIAGNOSIS — R53.81 DEBILITY: ICD-10-CM

## 2018-12-02 DIAGNOSIS — C64.2 RENAL CELL CARCINOMA OF LEFT KIDNEY (HCC): ICD-10-CM

## 2018-12-02 DIAGNOSIS — R50.9 FEVER, UNSPECIFIED FEVER CAUSE: Primary | ICD-10-CM

## 2018-12-02 DIAGNOSIS — R78.81 BACTEREMIA: ICD-10-CM

## 2018-12-02 DIAGNOSIS — Z71.89 DNR (DO NOT RESUSCITATE) DISCUSSION: ICD-10-CM

## 2018-12-02 DIAGNOSIS — Z71.89 GOALS OF CARE, COUNSELING/DISCUSSION: ICD-10-CM

## 2018-12-02 LAB
ALBUMIN SERPL-MCNC: 2.4 G/DL (ref 3.5–5)
ALBUMIN/GLOB SERPL: 0.7 {RATIO} (ref 1.1–2.2)
ALP SERPL-CCNC: 325 U/L (ref 45–117)
ALT SERPL-CCNC: 23 U/L (ref 12–78)
ANION GAP SERPL CALC-SCNC: 13 MMOL/L (ref 5–15)
AST SERPL-CCNC: 11 U/L (ref 15–37)
BASOPHILS # BLD: 0 K/UL (ref 0–0.1)
BASOPHILS NFR BLD: 0 % (ref 0–1)
BILIRUB SERPL-MCNC: 0.9 MG/DL (ref 0.2–1)
BUN SERPL-MCNC: 14 MG/DL (ref 6–20)
BUN/CREAT SERPL: 9 (ref 12–20)
CALCIUM SERPL-MCNC: 9 MG/DL (ref 8.5–10.1)
CHLORIDE SERPL-SCNC: 101 MMOL/L (ref 97–108)
CO2 SERPL-SCNC: 21 MMOL/L (ref 21–32)
COMMENT, HOLDF: NORMAL
CREAT SERPL-MCNC: 1.62 MG/DL (ref 0.7–1.3)
DIFFERENTIAL METHOD BLD: ABNORMAL
EOSINOPHIL # BLD: 0 K/UL (ref 0–0.4)
EOSINOPHIL NFR BLD: 1 % (ref 0–7)
ERYTHROCYTE [DISTWIDTH] IN BLOOD BY AUTOMATED COUNT: 13.6 % (ref 11.5–14.5)
GLOBULIN SER CALC-MCNC: 3.6 G/DL (ref 2–4)
GLUCOSE SERPL-MCNC: 219 MG/DL (ref 65–100)
HCT VFR BLD AUTO: 34.1 % (ref 36.6–50.3)
HGB BLD-MCNC: 11 G/DL (ref 12.1–17)
IMM GRANULOCYTES # BLD: 0 K/UL (ref 0–0.04)
IMM GRANULOCYTES NFR BLD AUTO: 0 % (ref 0–0.5)
LACTATE SERPL-SCNC: 2.6 MMOL/L (ref 0.4–2)
LYMPHOCYTES # BLD: 0.4 K/UL (ref 0.8–3.5)
LYMPHOCYTES NFR BLD: 9 % (ref 12–49)
MCH RBC QN AUTO: 27.7 PG (ref 26–34)
MCHC RBC AUTO-ENTMCNC: 32.3 G/DL (ref 30–36.5)
MCV RBC AUTO: 85.9 FL (ref 80–99)
MONOCYTES # BLD: 0.4 K/UL (ref 0–1)
MONOCYTES NFR BLD: 10 % (ref 5–13)
NEUTS SEG # BLD: 3.2 K/UL (ref 1.8–8)
NEUTS SEG NFR BLD: 80 % (ref 32–75)
NRBC # BLD: 0 K/UL (ref 0–0.01)
NRBC BLD-RTO: 0 PER 100 WBC
PLATELET # BLD AUTO: 152 K/UL (ref 150–400)
PMV BLD AUTO: 9.9 FL (ref 8.9–12.9)
POTASSIUM SERPL-SCNC: 3.7 MMOL/L (ref 3.5–5.1)
PROT SERPL-MCNC: 6 G/DL (ref 6.4–8.2)
RBC # BLD AUTO: 3.97 M/UL (ref 4.1–5.7)
RBC MORPH BLD: ABNORMAL
SAMPLES BEING HELD,HOLD: NORMAL
SODIUM SERPL-SCNC: 135 MMOL/L (ref 136–145)
WBC # BLD AUTO: 4 K/UL (ref 4.1–11.1)

## 2018-12-02 PROCEDURE — 99284 EMERGENCY DEPT VISIT MOD MDM: CPT

## 2018-12-02 PROCEDURE — 93005 ELECTROCARDIOGRAM TRACING: CPT

## 2018-12-02 PROCEDURE — 74011000258 HC RX REV CODE- 258: Performed by: PHYSICIAN ASSISTANT

## 2018-12-02 PROCEDURE — 74011250636 HC RX REV CODE- 250/636: Performed by: PHYSICIAN ASSISTANT

## 2018-12-02 PROCEDURE — 87040 BLOOD CULTURE FOR BACTERIA: CPT

## 2018-12-02 PROCEDURE — 85025 COMPLETE CBC W/AUTO DIFF WBC: CPT

## 2018-12-02 PROCEDURE — 96365 THER/PROPH/DIAG IV INF INIT: CPT

## 2018-12-02 PROCEDURE — 74011250637 HC RX REV CODE- 250/637: Performed by: PHYSICIAN ASSISTANT

## 2018-12-02 PROCEDURE — 36415 COLL VENOUS BLD VENIPUNCTURE: CPT

## 2018-12-02 PROCEDURE — 83605 ASSAY OF LACTIC ACID: CPT

## 2018-12-02 PROCEDURE — 65270000029 HC RM PRIVATE

## 2018-12-02 PROCEDURE — 80053 COMPREHEN METABOLIC PANEL: CPT

## 2018-12-02 PROCEDURE — 74011250637 HC RX REV CODE- 250/637: Performed by: INTERNAL MEDICINE

## 2018-12-02 PROCEDURE — 94761 N-INVAS EAR/PLS OXIMETRY MLT: CPT

## 2018-12-02 PROCEDURE — 96368 THER/DIAG CONCURRENT INF: CPT

## 2018-12-02 RX ORDER — LANOLIN ALCOHOL/MO/W.PET/CERES
3 CREAM (GRAM) TOPICAL
Status: DISCONTINUED | OUTPATIENT
Start: 2018-12-02 | End: 2018-12-05 | Stop reason: HOSPADM

## 2018-12-02 RX ORDER — NALOXONE HYDROCHLORIDE 0.4 MG/ML
0.4 INJECTION, SOLUTION INTRAMUSCULAR; INTRAVENOUS; SUBCUTANEOUS AS NEEDED
Status: DISCONTINUED | OUTPATIENT
Start: 2018-12-02 | End: 2018-12-05 | Stop reason: HOSPADM

## 2018-12-02 RX ORDER — SODIUM CHLORIDE 0.9 % (FLUSH) 0.9 %
5-10 SYRINGE (ML) INJECTION AS NEEDED
Status: DISCONTINUED | OUTPATIENT
Start: 2018-12-02 | End: 2018-12-05 | Stop reason: HOSPADM

## 2018-12-02 RX ORDER — INSULIN LISPRO 100 [IU]/ML
INJECTION, SOLUTION INTRAVENOUS; SUBCUTANEOUS
Status: DISCONTINUED | OUTPATIENT
Start: 2018-12-03 | End: 2018-12-05 | Stop reason: HOSPADM

## 2018-12-02 RX ORDER — SODIUM CHLORIDE 9 MG/ML
125 INJECTION, SOLUTION INTRAVENOUS CONTINUOUS
Status: DISCONTINUED | OUTPATIENT
Start: 2018-12-03 | End: 2018-12-04

## 2018-12-02 RX ORDER — BISACODYL 5 MG
5 TABLET, DELAYED RELEASE (ENTERIC COATED) ORAL DAILY PRN
Status: DISCONTINUED | OUTPATIENT
Start: 2018-12-02 | End: 2018-12-05 | Stop reason: HOSPADM

## 2018-12-02 RX ORDER — PANTOPRAZOLE SODIUM 40 MG/1
40 TABLET, DELAYED RELEASE ORAL
Status: DISCONTINUED | OUTPATIENT
Start: 2018-12-03 | End: 2018-12-05 | Stop reason: HOSPADM

## 2018-12-02 RX ORDER — ACETAMINOPHEN 500 MG
1000 TABLET ORAL
Status: COMPLETED | OUTPATIENT
Start: 2018-12-02 | End: 2018-12-02

## 2018-12-02 RX ORDER — MIRTAZAPINE 15 MG/1
7.5 TABLET, FILM COATED ORAL
Status: DISCONTINUED | OUTPATIENT
Start: 2018-12-02 | End: 2018-12-05 | Stop reason: HOSPADM

## 2018-12-02 RX ORDER — SODIUM CHLORIDE 0.9 % (FLUSH) 0.9 %
5-10 SYRINGE (ML) INJECTION EVERY 8 HOURS
Status: DISCONTINUED | OUTPATIENT
Start: 2018-12-03 | End: 2018-12-05 | Stop reason: HOSPADM

## 2018-12-02 RX ORDER — ASPIRIN 81 MG/1
81 TABLET ORAL
Status: DISCONTINUED | OUTPATIENT
Start: 2018-12-02 | End: 2018-12-04

## 2018-12-02 RX ORDER — MAGNESIUM SULFATE 100 %
4 CRYSTALS MISCELLANEOUS AS NEEDED
Status: DISCONTINUED | OUTPATIENT
Start: 2018-12-02 | End: 2018-12-05 | Stop reason: HOSPADM

## 2018-12-02 RX ORDER — ONDANSETRON 2 MG/ML
4 INJECTION INTRAMUSCULAR; INTRAVENOUS
Status: DISCONTINUED | OUTPATIENT
Start: 2018-12-02 | End: 2018-12-05 | Stop reason: HOSPADM

## 2018-12-02 RX ORDER — AMLODIPINE BESYLATE 5 MG/1
5 TABLET ORAL DAILY
Status: DISCONTINUED | OUTPATIENT
Start: 2018-12-03 | End: 2018-12-05 | Stop reason: HOSPADM

## 2018-12-02 RX ORDER — ACETAMINOPHEN 325 MG/1
650 TABLET ORAL
Status: DISCONTINUED | OUTPATIENT
Start: 2018-12-02 | End: 2018-12-05 | Stop reason: HOSPADM

## 2018-12-02 RX ORDER — POLYETHYLENE GLYCOL 3350 17 G/17G
17 POWDER, FOR SOLUTION ORAL
Status: DISCONTINUED | OUTPATIENT
Start: 2018-12-02 | End: 2018-12-04

## 2018-12-02 RX ORDER — ACETAMINOPHEN 325 MG/1
650 TABLET ORAL
COMMUNITY

## 2018-12-02 RX ORDER — DEXTROSE 50 % IN WATER (D50W) INTRAVENOUS SYRINGE
25-50 AS NEEDED
Status: DISCONTINUED | OUTPATIENT
Start: 2018-12-02 | End: 2018-12-05 | Stop reason: HOSPADM

## 2018-12-02 RX ORDER — ISOSORBIDE MONONITRATE 60 MG/1
120 TABLET, EXTENDED RELEASE ORAL
Status: DISCONTINUED | OUTPATIENT
Start: 2018-12-03 | End: 2018-12-05 | Stop reason: HOSPADM

## 2018-12-02 RX ADMIN — ACETAMINOPHEN 1000 MG: 500 TABLET ORAL at 22:26

## 2018-12-02 RX ADMIN — VANCOMYCIN HYDROCHLORIDE 2000 MG: 10 INJECTION, POWDER, LYOPHILIZED, FOR SOLUTION INTRAVENOUS at 21:52

## 2018-12-02 RX ADMIN — PIPERACILLIN SODIUM,TAZOBACTAM SODIUM 3.38 G: 3; .375 INJECTION, POWDER, FOR SOLUTION INTRAVENOUS at 21:51

## 2018-12-02 RX ADMIN — ASPIRIN 81 MG: 81 TABLET ORAL at 22:27

## 2018-12-02 NOTE — ED NOTES
Lab called regarding patient's positive blood cultures. I called the patient and there was no answer. I left the patient a message for him to call us back ASAP regarding important lab culture results.   Kartik Calderon PA-C

## 2018-12-02 NOTE — ED NOTES
RANDY Bernal gave and reviewed discharge instructions with the patient and caregiver. The patient and caregiver verbalized understanding. The patient and caregiver was given opportunity for questions. Patient discharged in stable condition to the waiting room via wheelchair with family.

## 2018-12-02 NOTE — DISCHARGE INSTRUCTIONS
Learning About Fever  What is a fever? A fever is a high body temperature. It's one way your body fights being sick. A fever shows that the body is responding to infection or other illnesses, both minor and severe. A fever is a symptom, not an illness by itself. A fever can be a sign that you are ill, but most fevers are not caused by a serious problem. You may have a fever with a minor illness, such as a cold. But sometimes a very serious infection may cause little or no fever. It is important to look at other symptoms, other conditions you have, and how you feel in general. In children, notice how they act and see what symptoms they complain of. What is a normal body temperature? A normal body temperature is about 98. 6ºF. Some people have a normal temperature that is a little higher or a little lower than this. Your temperature may be a little lower in the morning than it is later in the day. It may go up during hot weather or when you exercise, wear heavy clothes, or take a hot bath. Your temperature may also be different depending on how you take it. A temperature taken in the mouth (oral) or under the arm may be a little lower than your core temperature (rectal). What is a fever temperature? A core temperature of 100.4°F or above is considered a fever. What can cause a fever? A fever may be caused by:  · Infections. This is the most common cause of a fever. Examples of infections that can cause a fever include the flu, a kidney infection, or pneumonia. · Some medicines. · Severe trauma or injury, such as a heart attack, stroke, heatstroke, or burns. · Other medical conditions, such as arthritis and some cancers. How can you treat a fever at home? · Ask your doctor if you can take an over-the-counter pain medicine, such as acetaminophen (Tylenol), ibuprofen (Advil, Motrin), or naproxen (Aleve). Be safe with medicines. Read and follow all instructions on the label.   · To prevent dehydration, drink plenty of fluids. Choose water and other caffeine-free clear liquids until you feel better. If you have kidney, heart, or liver disease and have to limit fluids, talk with your doctor before you increase the amount of fluids you drink. Follow-up care is a key part of your treatment and safety. Be sure to make and go to all appointments, and call your doctor if you are having problems. It's also a good idea to know your test results and keep a list of the medicines you take. Where can you learn more? Go to http://inez-bart.info/. Enter Y999 in the search box to learn more about \"Learning About Fever. \"  Current as of: November 20, 2017  Content Version: 11.8  © 2764-1564 [employer]. Care instructions adapted under license by [employer] (which disclaims liability or warranty for this information). If you have questions about a medical condition or this instruction, always ask your healthcare professional. [address] any warranty or liability for your use of this information.

## 2018-12-03 ENCOUNTER — APPOINTMENT (OUTPATIENT)
Dept: CT IMAGING | Age: 83
DRG: 871 | End: 2018-12-03
Attending: INTERNAL MEDICINE
Payer: MEDICARE

## 2018-12-03 PROBLEM — E83.52 HYPERCALCEMIA: Status: RESOLVED | Noted: 2018-11-21 | Resolved: 2018-12-03

## 2018-12-03 PROBLEM — R17 JAUNDICE: Status: RESOLVED | Noted: 2018-11-21 | Resolved: 2018-12-03

## 2018-12-03 LAB
ALBUMIN SERPL-MCNC: 2.1 G/DL (ref 3.5–5)
ALBUMIN/GLOB SERPL: 0.6 {RATIO} (ref 1.1–2.2)
ALP SERPL-CCNC: 276 U/L (ref 45–117)
ALT SERPL-CCNC: 20 U/L (ref 12–78)
ANION GAP SERPL CALC-SCNC: 13 MMOL/L (ref 5–15)
AST SERPL-CCNC: 11 U/L (ref 15–37)
ATRIAL RATE: 91 BPM
BASOPHILS # BLD: 0 K/UL (ref 0–0.1)
BASOPHILS NFR BLD: 0 % (ref 0–1)
BILIRUB DIRECT SERPL-MCNC: 0.4 MG/DL (ref 0–0.2)
BILIRUB SERPL-MCNC: 1.1 MG/DL (ref 0.2–1)
BUN SERPL-MCNC: 14 MG/DL (ref 6–20)
BUN/CREAT SERPL: 9 (ref 12–20)
CALCIUM SERPL-MCNC: 8.7 MG/DL (ref 8.5–10.1)
CALCULATED P AXIS, ECG09: 37 DEGREES
CALCULATED R AXIS, ECG10: -32 DEGREES
CALCULATED T AXIS, ECG11: 114 DEGREES
CHLORIDE SERPL-SCNC: 106 MMOL/L (ref 97–108)
CO2 SERPL-SCNC: 20 MMOL/L (ref 21–32)
CREAT SERPL-MCNC: 1.54 MG/DL (ref 0.7–1.3)
DIAGNOSIS, 93000: NORMAL
DIFFERENTIAL METHOD BLD: ABNORMAL
EOSINOPHIL # BLD: 0.1 K/UL (ref 0–0.4)
EOSINOPHIL NFR BLD: 1 % (ref 0–7)
ERYTHROCYTE [DISTWIDTH] IN BLOOD BY AUTOMATED COUNT: 13.9 % (ref 11.5–14.5)
EST. AVERAGE GLUCOSE BLD GHB EST-MCNC: 134 MG/DL
FERRITIN SERPL-MCNC: 246 NG/ML (ref 26–388)
FOLATE SERPL-MCNC: 11.6 NG/ML (ref 5–21)
GLOBULIN SER CALC-MCNC: 3.4 G/DL (ref 2–4)
GLUCOSE BLD STRIP.AUTO-MCNC: 142 MG/DL (ref 65–100)
GLUCOSE BLD STRIP.AUTO-MCNC: 156 MG/DL (ref 65–100)
GLUCOSE BLD STRIP.AUTO-MCNC: 204 MG/DL (ref 65–100)
GLUCOSE SERPL-MCNC: 184 MG/DL (ref 65–100)
HAPTOGLOB SERPL-MCNC: 336 MG/DL (ref 30–200)
HBA1C MFR BLD: 6.3 % (ref 4.2–6.3)
HCT VFR BLD AUTO: 34.5 % (ref 36.6–50.3)
HGB BLD-MCNC: 11 G/DL (ref 12.1–17)
IMM GRANULOCYTES # BLD: 0 K/UL (ref 0–0.04)
IMM GRANULOCYTES NFR BLD AUTO: 1 % (ref 0–0.5)
IRON SATN MFR SERPL: 4 % (ref 20–50)
IRON SERPL-MCNC: 8 UG/DL (ref 35–150)
LDH SERPL L TO P-CCNC: 113 U/L (ref 85–241)
LYMPHOCYTES # BLD: 0.7 K/UL (ref 0.8–3.5)
LYMPHOCYTES NFR BLD: 11 % (ref 12–49)
MAGNESIUM SERPL-MCNC: 1.5 MG/DL (ref 1.6–2.4)
MCH RBC QN AUTO: 27.7 PG (ref 26–34)
MCHC RBC AUTO-ENTMCNC: 31.9 G/DL (ref 30–36.5)
MCV RBC AUTO: 86.9 FL (ref 80–99)
MONOCYTES # BLD: 0.8 K/UL (ref 0–1)
MONOCYTES NFR BLD: 13 % (ref 5–13)
NEUTS SEG # BLD: 4.4 K/UL (ref 1.8–8)
NEUTS SEG NFR BLD: 74 % (ref 32–75)
NRBC # BLD: 0 K/UL (ref 0–0.01)
NRBC BLD-RTO: 0 PER 100 WBC
P-R INTERVAL, ECG05: 236 MS
PHOSPHATE SERPL-MCNC: 2.5 MG/DL (ref 2.6–4.7)
PLATELET # BLD AUTO: 144 K/UL (ref 150–400)
PMV BLD AUTO: 10.4 FL (ref 8.9–12.9)
POTASSIUM SERPL-SCNC: 3.8 MMOL/L (ref 3.5–5.1)
PROT SERPL-MCNC: 5.5 G/DL (ref 6.4–8.2)
Q-T INTERVAL, ECG07: 404 MS
QRS DURATION, ECG06: 138 MS
QTC CALCULATION (BEZET), ECG08: 496 MS
RBC # BLD AUTO: 3.97 M/UL (ref 4.1–5.7)
RETICS # AUTO: 0.05 M/UL (ref 0.03–0.1)
RETICS/RBC NFR AUTO: 1.4 % (ref 0.7–2.1)
SERVICE CMNT-IMP: ABNORMAL
SODIUM SERPL-SCNC: 139 MMOL/L (ref 136–145)
TIBC SERPL-MCNC: 187 UG/DL (ref 250–450)
TSH SERPL DL<=0.05 MIU/L-ACNC: 1.38 UIU/ML (ref 0.36–3.74)
VENTRICULAR RATE, ECG03: 91 BPM
VIT B12 SERPL-MCNC: 898 PG/ML (ref 193–986)
WBC # BLD AUTO: 6 K/UL (ref 4.1–11.1)

## 2018-12-03 PROCEDURE — 65270000029 HC RM PRIVATE

## 2018-12-03 PROCEDURE — 85045 AUTOMATED RETICULOCYTE COUNT: CPT

## 2018-12-03 PROCEDURE — 93306 TTE W/DOPPLER COMPLETE: CPT

## 2018-12-03 PROCEDURE — 74011636320 HC RX REV CODE- 636/320: Performed by: RADIOLOGY

## 2018-12-03 PROCEDURE — 82728 ASSAY OF FERRITIN: CPT

## 2018-12-03 PROCEDURE — 82962 GLUCOSE BLOOD TEST: CPT

## 2018-12-03 PROCEDURE — 97161 PT EVAL LOW COMPLEX 20 MIN: CPT

## 2018-12-03 PROCEDURE — 84100 ASSAY OF PHOSPHORUS: CPT

## 2018-12-03 PROCEDURE — 74011250637 HC RX REV CODE- 250/637: Performed by: INTERNAL MEDICINE

## 2018-12-03 PROCEDURE — 80076 HEPATIC FUNCTION PANEL: CPT

## 2018-12-03 PROCEDURE — 85025 COMPLETE CBC W/AUTO DIFF WBC: CPT

## 2018-12-03 PROCEDURE — 80048 BASIC METABOLIC PNL TOTAL CA: CPT

## 2018-12-03 PROCEDURE — 82607 VITAMIN B-12: CPT

## 2018-12-03 PROCEDURE — 36415 COLL VENOUS BLD VENIPUNCTURE: CPT

## 2018-12-03 PROCEDURE — 84443 ASSAY THYROID STIM HORMONE: CPT

## 2018-12-03 PROCEDURE — 74011250636 HC RX REV CODE- 250/636: Performed by: INTERNAL MEDICINE

## 2018-12-03 PROCEDURE — 83010 ASSAY OF HAPTOGLOBIN QUANT: CPT

## 2018-12-03 PROCEDURE — 74011636637 HC RX REV CODE- 636/637: Performed by: INTERNAL MEDICINE

## 2018-12-03 PROCEDURE — 83540 ASSAY OF IRON: CPT

## 2018-12-03 PROCEDURE — 94760 N-INVAS EAR/PLS OXIMETRY 1: CPT

## 2018-12-03 PROCEDURE — 97165 OT EVAL LOW COMPLEX 30 MIN: CPT

## 2018-12-03 PROCEDURE — 83615 LACTATE (LD) (LDH) ENZYME: CPT

## 2018-12-03 PROCEDURE — 76450000000

## 2018-12-03 PROCEDURE — 74011000258 HC RX REV CODE- 258: Performed by: INTERNAL MEDICINE

## 2018-12-03 PROCEDURE — 83036 HEMOGLOBIN GLYCOSYLATED A1C: CPT

## 2018-12-03 PROCEDURE — 82746 ASSAY OF FOLIC ACID SERUM: CPT

## 2018-12-03 PROCEDURE — 83735 ASSAY OF MAGNESIUM: CPT

## 2018-12-03 PROCEDURE — 74176 CT ABD & PELVIS W/O CONTRAST: CPT

## 2018-12-03 PROCEDURE — 97535 SELF CARE MNGMENT TRAINING: CPT

## 2018-12-03 RX ADMIN — Medication 10 ML: at 06:18

## 2018-12-03 RX ADMIN — APIXABAN 2.5 MG: 2.5 TABLET, FILM COATED ORAL at 09:00

## 2018-12-03 RX ADMIN — Medication 10 ML: at 16:32

## 2018-12-03 RX ADMIN — ISOSORBIDE MONONITRATE 120 MG: 60 TABLET ORAL at 07:00

## 2018-12-03 RX ADMIN — IOHEXOL 50 ML: 240 INJECTION, SOLUTION INTRATHECAL; INTRAVASCULAR; INTRAVENOUS; ORAL at 16:29

## 2018-12-03 RX ADMIN — INSULIN LISPRO 2 UNITS: 100 INJECTION, SOLUTION INTRAVENOUS; SUBCUTANEOUS at 09:14

## 2018-12-03 RX ADMIN — PIPERACILLIN SODIUM,TAZOBACTAM SODIUM 3.38 G: 3; .375 INJECTION, POWDER, FOR SOLUTION INTRAVENOUS at 06:15

## 2018-12-03 RX ADMIN — MELATONIN TAB 3 MG 3 MG: 3 TAB at 22:26

## 2018-12-03 RX ADMIN — AMLODIPINE BESYLATE 5 MG: 5 TABLET ORAL at 09:00

## 2018-12-03 RX ADMIN — PANTOPRAZOLE SODIUM 40 MG: 40 TABLET, DELAYED RELEASE ORAL at 09:19

## 2018-12-03 RX ADMIN — Medication 10 ML: at 22:26

## 2018-12-03 RX ADMIN — PIPERACILLIN SODIUM,TAZOBACTAM SODIUM 3.38 G: 3; .375 INJECTION, POWDER, FOR SOLUTION INTRAVENOUS at 22:26

## 2018-12-03 RX ADMIN — ASPIRIN 81 MG: 81 TABLET ORAL at 22:26

## 2018-12-03 RX ADMIN — SODIUM CHLORIDE 125 ML/HR: 900 INJECTION, SOLUTION INTRAVENOUS at 00:20

## 2018-12-03 RX ADMIN — PIPERACILLIN SODIUM,TAZOBACTAM SODIUM 3.38 G: 3; .375 INJECTION, POWDER, FOR SOLUTION INTRAVENOUS at 16:30

## 2018-12-03 RX ADMIN — SODIUM CHLORIDE 125 ML/HR: 900 INJECTION, SOLUTION INTRAVENOUS at 16:30

## 2018-12-03 RX ADMIN — MIRTAZAPINE 7.5 MG: 15 TABLET, FILM COATED ORAL at 22:26

## 2018-12-03 NOTE — PROGRESS NOTES
[facility]: MED RECONCILIATION Comments/Recommendations: · Spoke with patient's wife who reports that patient resides at [facility]. To her knowledge there have been no changes to his medication regimen since discharge from [facility] on 11/[facility]/18 except for the addition of ibuprofen. · DDI: Apixaban/Aspirin/Ibuprofen - Patient has been getting ibuprofen PRN for pain or fevers unresponsive to Tylenol at [facility] Recommend Tylenol instead to lower risk of bleeding. Information obtained from: patient's family, Rx query, Discharge summary from 11/27/18 Significant PMH/Disease States:  
Past Medical History:  
Diagnosis Date  Arthritis  CAD (coronary artery disease) Angina Jan 2011, PCI LAD with multilink stent 4.0x12mm;  also cath at [place] 7/13 -- no stents at that time  CKD (chronic kidney disease), stage III ([facility] [address]  Cochlear implant in place 2/3/2017  Dementia  DM type 2 causing renal disease ([facility] [address]  DM type 2 causing vascular disease ([address]  GERD (gastroesophageal reflux disease)  HTN (hypertension)  Hypothyroidism  SAMUEL (obstructive sleep apnea)   
 not using CPAP  
 PAF (paroxysmal atrial fibrillation) ([address] [id number] score = 7  
 Prostate cancer ([facility] [address]  Renal cell cancer ([facility] [address] Stage 4 Renal Cell Cancer. Partial omentectomy 11/2/2015 with Dr. Felisa Robb: omental mass-metastatic renal cell carcinoma  Renal mass, left [address] LEFT PARTIAL NEPHRECTOMY 12/19/13;   
 
Chief Complaint for this Admission: Chief Complaint Patient presents with  Abnormal Lab Results Allergies: Latex; Atorvastatin; Ezetimibe; Lisinopril; Metoprolol; Morphine; Nitrofurantoin; Rosuvastatin; and Simvastatin Prior to Admission Medications:  
 
Prior to Admission Medications Prescriptions Last Dose Informant Patient Reported? Taking?  
acetaminophen (TYLENOL) 325 mg tablet 12/2/2018 at Unknown time Significant Other Yes Yes Sig: Take 650 mg by mouth every four (4) hours as needed for Pain or Fever. amLODIPine (NORVASC) 10 mg tablet 2018 at am Significant Other Yes Yes Sig: Take 5 mg by mouth daily. apixaban (ELIQUIS) 2.5 mg tablet 2018 at am Significant Other Yes Yes Sig: Take 2.5 mg by mouth two (2) times a day. aspirin delayed-release 81 mg tablet 2018 at pm Significant Other Yes Yes Sig: Take 81 mg by mouth nightly. docusate sodium (COLACE) 100 mg capsule  Significant Other Yes Yes Sig: Take 100 mg by mouth daily as needed. ibuprofen (MOTRIN) 600 mg tablet 2018 at 1700 Significant Other No Yes Sig: Take 1 Tab by mouth every six (6) hours as needed for Pain (or fevers > 101- if unresponsive to Tylenol). isosorbide mononitrate ER (IMDUR) 120 mg CR tablet 2018 at am Significant Other No Yes Sig: Take 1 Tab by mouth every morning. losartan (COZAAR) 25 mg tablet 2018 at am Significant Other Yes Yes Sig: Take 25 mg by mouth nightly. melatonin 3 mg tablet 2018 at pm Significant Other Yes Yes Sig: Take 3 mg by mouth nightly. mirtazapine (REMERON) 15 mg tablet 2018 at pm Significant Other Yes Yes Sig: Take 7.5 mg by mouth nightly. nitroglycerin (NITROSTAT) 0.4 mg SL tablet  Significant Other Yes Yes Si.4 mg by SubLINGual route every five (5) minutes as needed. nystatin (MYCOSTATIN) powder  Significant Other Yes Yes Sig: Apply  to affected area daily as needed for Other (Itching/Irritation- Groin area). To groin after showering and drying   
omeprazole (PRILOSEC) 20 mg capsule 2018 at am Significant Other Yes Yes Sig: Take 20 mg by mouth Daily (before breakfast). polyethylene glycol (MIRALAX) 17 gram packet  Significant Other Yes Yes Sig: Take 17 g by mouth daily as needed (Constipation). senna (SENNA) 8.6 mg tablet  Significant Other Yes Yes Sig: Take 1 Tab by mouth daily as needed for Constipation. Facility-Administered Medications: None Thank you for the consult, 
Cristóbal Gutierrez D, [address]

## 2018-12-03 NOTE — PROGRESS NOTES
[address] Dosing Services: Antimicrobial Stewardship Progress Note Consult for antibiotic dosing of vancomycin by MALDONADO Perkins in ED (continued by Dr. Addison Butler for admission until cultures finalize) Pharmacist reviewed antibiotic appropriateness for 80year old , male  for indication of bloodstream infection Day of Therapy 1 Plan: 
Vancomycin therapy: 
? Start Vancomycin therapy, with loading dose of 2000 mg IV x 1 (given in ED @ 2152) ? Follow with maintenance dose of 1250 mg IV Q24H to begin 12/3 @ 2200 
? Dose calculated to approximate a therapeutic trough of 15-20 mcg/mL. ? Last trough level / Plan for level: prior to 3rd dose (not yet ordered) ? Pharmacy to follow daily and will make changes to dose and/or frequency based on clinical status. Other Antimicrobial 
(not dosed by pharmacist) Zosyn 3.375 gm IV Q8H (over 240 min per protocol) Cultures 12/1 Blood - gram neg coccobacilli 4/4 (pending) Serum Creatinine Lab Results Component Value Date/Time Creatinine 1.62 (H) 12/02/2018 09:18 PM  
 Creatinine (POC) 1.5 (H) 11/26/2013 09:55 AM  
   
Creatinine Clearance Estimated Creatinine Clearance: 35.1 mL/min (A) (based on SCr of 1.62 mg/dL (H)). Temp  
(!) 102.6 °F (39.2 °C) WBC Lab Results Component Value Date/Time WBC 4.0 (L) 12/02/2018 09:18 PM  
   
H/H Lab Results Component Value Date/Time HGB 11.0 (L) 12/02/2018 09:18 PM  
  
 
Platelets Lab Results Component Value Date/Time PLATELET 794 80/98/3197 09:18 PM  
  
 
 
Thank you for the consult, 
Cristóbal Blake D, [address]

## 2018-12-03 NOTE — ED TRIAGE NOTES
Patient arrives via private vehicle from [facility] with family after being notified of positive blood culture results from previous visit. Patient continues with fevers per family. Patient warm to the touch.

## 2018-12-03 NOTE — PROGRESS NOTES
Spiritual Care Assessment/Progress Note [address] 
 
 
NAME: Zunilda Mcmahon. MRN: [id number] AGE: 80 y.o. SEX: male Gnosticism Affiliation: Hindu  
Language: English  
 
12/3/2018 Spiritual Assessment begun in [facility] 5M1 MED SURG 1 through conversation with: 
  
    []Patient        [x] Family    [] Friend(s) Reason for Consult: Initial/Spiritual assessment, patient floor Spiritual beliefs: (Please include comment if needed) 
   [] Identifies with a mary tradition:     
   [x] Supported by a mary community:        
   [] Claims no spiritual orientation:       
   [] Seeking spiritual identity:            
   [] Adheres to an individual form of spirituality:       
   [] Not able to assess:                   
 
    
Identified resources for coping:  
   [] Prayer                           
   [] Music                  [] Guided Imagery 
   [] Family/friends                 [] Pet visits [] Devotional reading                         [] Unknown 
   [] Other: not able to assess Interventions offered during this visit: (See comments for more details) Family/Friend(s): Iconic (affirming the presence of God/Higher Power), Prayer (assurance of), Affirmation of mary, Gnosticism beliefs/image of God discussed Plan of Care: 
 
 [] Support spiritual and/or cultural needs  
 [] Support AMD and/or advance care planning process    
 [] Support grieving process 
 [] Coordinate Rites and/or Rituals  
 [] Coordination with community clergy 
 [x] No spiritual needs identified at this time 
 [] Detailed Plan of Care below (See Comments)  [] Make referral to Music Therapy 
[] Make referral to Pet Therapy    
[] Make referral to Addiction services 
[] Make referral to [facility] 
[] Make referral to Spiritual Care Partner 
[] No future visits requested       
[] Follow up visits as needed Comments: Mr Granda Seen appeared to be sleeping throughout the visit. His wife and grandson were at bedside. Both expressed a great deal of support and concerned. They are active members of a local [employer]az[employer] and express strong reliance upon God and their mary. Advised of out continuing availability. Please page as needed/desired. Florentino Huerta., [address] [facility], ADOR [address] [facility]

## 2018-12-03 NOTE — PROGRESS NOTES
Problem: Self Care Deficits Care Plan (Adult) Goal: *Acute Goals and Plan of Care (Insert Text) Occupational Therapy Goals Initiated 12/3/2018 1. Patient will perform grooming with supervision/set-up within 7 day(s). 2.  Patient will perform upper body dressing with minimal assistance/contact guard assist within 7 day(s). 3.  Patient will perform lower body dressing with moderate assistance  within 7 day(s). 4.  Patient will perform toilet transfers with minimal assistance/contact guard assist within 7 day(s). 5.  Patient will perform all aspects of toileting with minimal assistance/contact guard assist within 7 day(s). 6.  Patient will participate in upper extremity therapeutic exercise/activities with supervision/set-up for 5 minutes within 7 day(s). Occupational Therapy EVALUATION Patient: Nadja Gauthier (80 y.o. male) Date: 12/3/2018 Primary Diagnosis: Gram-negative bacteremia [R78.81] Precautions:   Fall ASSESSMENT : 
Based on the objective data described below, the patient presents with hospital admission secondary to gram negative bacteremia. Patient with recent admission to hospital for hypercalcemia and discharged to [facility] for rehab. Patient grandson in room and reports patient required max assist x2 for sit to stand and extreme difficulty with walking in days prior. Today patient able to move to EOB with min assist and able to perform sit to stand and transfers with min assist x2. During activity, patient requesting need for voiding and required assist to manage/hold urinal in place as patient holding to RW for support. Patient left in chair in NAD, agreeable to remain in up right position and grandson present as needed. Will continue to follow patient to improve functional mobility, safety and activity tolerance with ADL tasks. Recommend return to SNF at discharge. Patient will benefit from skilled intervention to address the above impairments. Patients rehabilitation potential is considered to be Good Factors which may influence rehabilitation potential include:  
[]             None noted [x]             Mental ability/status []             Medical condition []             Home/family situation and support systems []             Safety awareness []             Pain tolerance/management 
[]             Other: PLAN : 
Recommendations and Planned Interventions: 
[x]               Self Care Training                  [x]        Therapeutic Activities [x]               Functional Mobility Training    []        Cognitive Retraining 
[x]               Therapeutic Exercises           [x]        Endurance Activities [x]               Balance Training                   []        Neuromuscular Re-Education []               Visual/Perceptual Training     [x]   Home Safety Training 
[x]               Patient Education                 [x]        Family Training/Education []               Other (comment): Frequency/Duration: Patient will be followed by occupational therapy 3 times a week to address goals. Discharge Recommendations: Kevin Dumont Further Equipment Recommendations for Discharge: none SUBJECTIVE:  
Patient stated I wont run away .  Saeid Camar[place] therapist attempting to place gait belt on patient OBJECTIVE DATA SUMMARY:  
HISTORY:  
Past Medical History:  
Diagnosis Date  Arthritis  CAD (coronary artery disease) Angina Jan 2011, PCI LAD with multilink stent 4.0x12mm;  also cath at [place] 7/13 -- no stents at that time  CKD (chronic kidney disease), stage III ([facility]  Cochlear implant in place 2/3/2017  Dementia  DM type 2 causing renal disease ([facility]  DM type 2 causing vascular disease ([address]  GERD (gastroesophageal reflux disease)  HTN (hypertension)  Hypothyroidism  SAMUEL (obstructive sleep apnea)   
 not using CPAP  
 PAF (paroxysmal atrial fibrillation) ([address]  [id number] score = 7  
  Prostate cancer ([facility]  Renal cell cancer ([facility] [address] Stage 4 Renal Cell Cancer. Partial omentectomy 11/2/2015 with Dr. Elle Saravia: omental mass-metastatic renal cell carcinoma  Renal mass, left [address] LEFT PARTIAL NEPHRECTOMY 12/19/13;   
 
Past Surgical History:  
Procedure Laterality Date  CARDIAC SURG PROCEDURE UNLIST  1/2011  
 stent to LAD  HX CHOLECYSTECTOMY  7/13/2012  HX CORONARY STENT PLACEMENT  2011 [address]  2011, 2013 [address]  
 stapedectomy  HX HERNIA REPAIR  1968  
 left inguinal  
 HX LITHOTRIPSY  8/2015  HX ORTHOPAEDIC  1994  
 rotator cuff repair  HX ORTHOPAEDIC  1994  
 cervical disc removed  HX PROSTATECTOMY  2000  
 surgery intervention [address]  HX UROLOGICAL  2001  
 valvular implant prevent incontinence  HX UROLOGICAL  2003  
 insert gel to help with incontinence  HX UROLOGICAL  12/2013  
 left partial nephrectomy  HX UROLOGICAL  9/2015 CT guided biopsy of abdominal lymph nodes Prior Level of Function/Environment/Context: recent hospital stay, rehab at discharge Occupations in which the patient is/was successful, what are the barriers preventing that success:  
Performance Patterns (routines, roles, habits, and rituals):  
Personal Interests and/or values:  
Expanded or extensive additional review of patient history:  
 
Home Situation Home Environment: Skilled nursing facility Care Facility Name: [facility] # Steps to Enter: 0 One/Two Story Residence: One story Living Alone: No 
Support Systems: Family member(s), Spouse/Significant Other/Partner Patient Expects to be Discharged to[de-identified] Skilled nursing facility Current DME Used/Available at Home: Walker, rolling Tub or Shower Type: Shower Hand dominance: RightEXAMINATION OF PERFORMANCE DEFICITS: 
Cognitive/Behavioral Status: 
Neurologic State: Alert Orientation Level: Oriented to place;Oriented to person Cognition: Decreased command following;Memory loss;Poor safety awareness Safety/Judgement: Decreased awareness of need for safety;Decreased awareness of environment Skin: intact as seen Edema: none noted Hearing: Auditory Auditory Impairment: Hard of hearing, bilateral, Hearing aid(s) Hearing Aids/Status: Does not own Vision/Perceptual:   
    
    
    
  
    
    
  
Range of Motion: 
AROM: Within functional limits Strength: 
 
Strength: Generally decreased, functional 
  
  
  
  
Coordination: Tone & Sensation: 
 
Tone: Normal 
Sensation: Intact Balance: 
Sitting: Intact; High guard Standing: Intact; With support Functional Mobility and Transfers for ADLs:Bed Mobility: 
Supine to Sit: Additional time;Minimum assistance Scooting: Modified independent Transfers: 
Sit to Stand: Assist x2;Minimum assistance Stand to Sit: Assist x2;Minimum assistance Bed to Chair: Assist x2;Minimum assistance Toilet Transfer : Assist x2;Minimum assistance ADL Assessment: 
Feeding: Setup Oral Facial Hygiene/Grooming: Minimum assistance Bathing: Moderate assistance Upper Body Dressing: Moderate assistance Lower Body Dressing: Maximum assistance Toileting: Moderate assistance ADL Intervention and task modifications: 
  
 
  
 
  
 
  
 
  
 
  
 
  
 
Cognitive Retraining Safety/Judgement: Decreased awareness of need for safety;Decreased awareness of environment Therapeutic Exercise: 
  
Functional Measure: 
Barthel Index: 
 
Bathin Bladder: 5 Bowels: 5 Groomin Dressin Feeding: 10 Mobility: 0 Stairs: 0 Toilet Use: 5 Transfer (Bed to Chair and Back): 5 Total: 35 Barthel and G-code impairment scale: 
Percentage of impairment CH 
0% CI 
1-19% CJ 
20-39% CK 
40-59% CL 
60-79% CM 
80-99% CN 
100% Barthel Score 0-100 100 99-80 79-60 59-40 20-39 1-19 
 0 Barthel Score 0-20 20 17-19 13-16 9-12 5-8 1-4 0 The Barthel ADL Index: Guidelines 1. The index should be used as a record of what a patient does, not as a record of what a patient could do. 2. The main aim is to establish degree of independence from any help, physical or verbal, however minor and for whatever reason. 3. The need for supervision renders the patient not independent. 4. A patient's performance should be established using the best available evidence. Asking the patient, friends/relatives and nurses are the usual sources, but direct observation and common sense are also important. However direct testing is not needed. 5. Usually the patient's performance over the preceding 24-48 hours is important, but occasionally longer periods will be relevant. 6. Middle categories imply that the patient supplies over 50 per cent of the effort. 7. Use of aids to be independent is allowed. Samuel Bal., Barthel, D.W. (5428). Functional evaluation: the Barthel Index. [facility] (14)2. RONALD Giordano, [facility]., Haroldo Musty., [place], [address] [facility] (1999). Measuring the change indisability after inpatient rehabilitation; comparison of the responsiveness of the Barthel Index and Functional [place] Measure. Journal of Neurology, Neurosurgery, and Psychiatry, 66(4), 091-075. Mircale Cardona, N.[place].A, ELEAZAR Gibbons, & Faviola Garcia MCOLTON. (2004.) Assessment of post-stroke quality of life in cost-effectiveness studies: The usefulness of the Barthel Index and the EuroQoL-5D. [facility], 13, 260-33 G codes: In compliance with CMSs Claims Based Outcome Reporting, the following G-code set was chosen for this patient based on their primary functional limitation being treated: The outcome measure chosen to determine the severity of the functional limitation was the Barthel Index with a score of 35/100 which was correlated with the impairment scale. ? Self Care:  - CURRENT STATUS: CL - 60%-79% impaired, limited or restricted  - GOAL STATUS: CK - 40%-59% impaired, limited or restricted  - D/C STATUS:  ---------------To be determined--------------- Occupational Therapy Evaluation Charge Determination History Examination Decision-Making LOW Complexity : Brief history review  LOW Complexity : 1-3 performance deficits relating to physical, cognitive , or psychosocial skils that result in activity limitations and / or participation restrictions  LOW Complexity : No comorbidities that affect functional and no verbal or physical assistance needed to complete eval tasks Based on the above components, the patient evaluation is determined to be of the following complexity level: LOW Pain: 
Pain Scale 1: Numeric (0 - 10) Pain Intensity 1: 0 Activity Tolerance: VSS Please refer to the flowsheet for vital signs taken during this treatment. After treatment:  
[x] Patient left in no apparent distress sitting up in chair 
[] Patient left in no apparent distress in bed 
[x] Call bell left within reach [x] Nursing notified 
[x] Caregiver present 
[] Bed alarm activated COMMUNICATION/EDUCATION:  
The patients plan of care was discussed with: Physical Therapist and Registered Nurse. [x] Home safety education was provided and the patient/caregiver indicated understanding. [x] Patient/family have participated as able in goal setting and plan of care. [x] Patient/family agree to work toward stated goals and plan of care. [] Patient understands intent and goals of therapy, but is neutral about his/her participation. [] Patient is unable to participate in goal setting and plan of care. This patients plan of care is appropriate for delegation to [facility]. Thank you for this referral. 
Jose Royal, OTR/L Time Calculation: 24 mins

## 2018-12-03 NOTE — PROGRESS NOTES
Was not able to reach RN, Oral contrast ordered, Call CT when pt starts drinking oral contrast. pt needs to be npo.  Call CT if need any instructions 4:13pm

## 2018-12-03 NOTE — ROUTINE PROCESS
Bedside and Verbal shift change report given to Jennifer Burr (oncoming nurse) by Luis Manuel England RN (offgoing nurse). Report included the following information SBAR, Kardex, MAR and Accordion.

## 2018-12-03 NOTE — H&P
[facility] Hospitalist Admission Note NAME:  Tano Rollins. :   1935 MRN:  [id number] PCP:  Levi Salguero MD  
 
Date/Time:  2018 10:44 PM 
 
  
  
Subjective:  
Barrier to history: Dementia CHIEF COMPLAINT: Fever HISTORY OF PRESENT ILLNESS:    
Mr. Lisa Cerrato is a 80 y.o.  male  CAD, PAF, CKD3, DM2, dementia, recent admission for hypercalcemia who presented to the Emergency Department complaining of fever. Patient was discharged from prolonged hospital stay for hypercalcemia and viral illness on  and sent to [facility] for rehab. Patient initially was doing well until 18 when patient developed fever and altered mental status/lethargy/weakness. Patient was evaluated in ED and infectious work-up unrevealing and sent back to [facility]. Today, patient continued to feel weaker, more lethargic, sleeping all day and developed fever again. At the same time, BCx from  had returned positive for 4/4 gram negative coccobacilli and patient was directed to come to ED. We will admit for further management. Past Medical History:  
Diagnosis Date  Arthritis  CAD (coronary artery disease) Angina 2011, PCI LAD with multilink stent 4.0x12mm;  also cath at [place]  -- no stents at that time  CKD (chronic kidney disease), stage III ([facility] [address]  Cochlear implant in place 2/3/2017  Dementia  DM type 2 causing renal disease ([facility] [address] [facility]  DM type 2 causing vascular disease ([address]  GERD (gastroesophageal reflux disease)  HTN (hypertension)  Hypothyroidism  SAMUEL (obstructive sleep apnea)   
 not using CPAP  
 PAF (paroxysmal atrial fibrillation) ([address] [id number] score = 7  
 Prostate cancer ([address]  Renal cell cancer ([address] Stage 4 Renal Cell Cancer. Partial omentectomy 2015 with Dr. Arthur Frye: omental mass-metastatic renal cell carcinoma  Renal mass, left  [facility] LEFT PARTIAL NEPHRECTOMY 13;   
  
 
 Past Surgical History:  
Procedure Laterality Date  CARDIAC SURG PROCEDURE UNLIST  2011  
 stent to LAD  HX CHOLECYSTECTOMY  2012  HX CORONARY STENT PLACEMENT   [address]  ,  [address]  
 stapedectomy  HX HERNIA REPAIR  1968  
 left inguinal  
 HX LITHOTRIPSY  2015  HX ORTHOPAEDIC  1994  
 rotator cuff repair  HX ORTHOPAEDIC  1994  
 cervical disc removed  HX PROSTATECTOMY  2000  
 surgery intervention [place]  HX UROLOGICAL    
 valvular implant prevent incontinence  HX UROLOGICAL    
 insert gel to help with incontinence  HX UROLOGICAL  2013  
 left partial nephrectomy  HX UROLOGICAL  2015 CT guided biopsy of abdominal lymph nodes Social History Tobacco Use  Smoking status: Former Smoker Packs/day: 0.10 Years: 23.00 Pack years: 2.30 Last attempt to quit: 12/3/1975 Years since quittin.0  Smokeless tobacco: Never Used Substance Use Topics  Alcohol use: No  
  Alcohol/week: 0.0 oz Family History Problem Relation Age of Onset  Stroke Father  Cancer Father   
     prostate  Diabetes Sister 2 sisters  Hypertension Sister 2 sisters  Diabetes Brother  Stroke Brother  Diabetes Brother   
     all brothers  Stroke Brother  Kidney Disease Brother  Heart Attack Brother  Hypertension Other Family hx cannot be fully assessed, due to the admitting conditions Allergies Allergen Reactions  Latex Rash, Swelling and Contact Dermatitis  Atorvastatin Unknown (comments) Per patient's PCP allergy list- No reaction specified  Ezetimibe Myalgia  Lisinopril Cough  Metoprolol Other (comments) Bradycardia  Morphine Other (comments) Hallucinations and Nausea  Nitrofurantoin Rash \"Blood blisters/rash\" per patient's family  Rosuvastatin Myalgia  Simvastatin Myalgia Prior to Admission medications Medication Sig Start Date End Date Taking? Authorizing Provider  
acetaminophen (TYLENOL) 325 mg tablet Take 650 mg by mouth every four (4) hours as needed for Pain or Fever. Yes Provider, Historical  
ibuprofen (MOTRIN) 600 mg tablet Take 1 Tab by mouth every six (6) hours as needed for Pain (or fevers > 101- if unresponsive to Tylenol). 12/1/18  Yes Nathan Donnelly NP  
aspirin delayed-release 81 mg tablet Take 81 mg by mouth nightly. Yes Provider, Historical  
amLODIPine (NORVASC) 10 mg tablet Take 5 mg by mouth daily. Yes Provider, Historical  
losartan (COZAAR) 25 mg tablet Take 25 mg by mouth nightly. Yes Provider, Historical  
nystatin (MYCOSTATIN) powder Apply  to affected area daily as needed for Other (Itching/Irritation- Groin area). To groin after showering and drying    Yes Provider, Historical  
docusate sodium (COLACE) 100 mg capsule Take 100 mg by mouth daily as needed. Yes Provider, Historical  
senna (SENNA) 8.6 mg tablet Take 1 Tab by mouth daily as needed for Constipation. Yes Provider, Historical  
isosorbide mononitrate ER (IMDUR) 120 mg CR tablet Take 1 Tab by mouth every morning. 2/4/17  Yes Sarthak Marquez MD  
melatonin 3 mg tablet Take 3 mg by mouth nightly. Yes Provider, Historical  
polyethylene glycol (MIRALAX) 17 gram packet Take 17 g by mouth daily as needed (Constipation). Yes Provider, Historical  
mirtazapine (REMERON) 15 mg tablet Take 7.5 mg by mouth nightly. Yes Provider, Historical  
apixaban (ELIQUIS) 2.5 mg tablet Take 2.5 mg by mouth two (2) times a day. Yes Provider, Historical  
omeprazole (PRILOSEC) 20 mg capsule Take 20 mg by mouth Daily (before breakfast). Yes Provider, Historical  
nitroglycerin (NITROSTAT) 0.4 mg SL tablet 0.4 mg by SubLINGual route every five (5) minutes as needed. Yes Chance Ornelas MD  
 
 
Review of Systems: 
Unable to obtain due to clinical condition Objective: VITALS:   
 Vital signs reviewed; most recent are: 
 
Visit Vitals /56 (BP 1 Location: Right arm, BP Patient Position: At rest) Pulse 94 Temp (!) 102.6 °F (39.2 °C) Resp 18 Wt 80.7 kg (178 lb) SpO2 98% BMI 27.88 kg/m² SpO2 Readings from Last 6 Encounters:  
12/02/18 98% 12/01/18 96% 11/27/18 95% 09/13/18 97% 08/26/17 92% 07/16/17 94% No intake or output data in the 24 hours ending 12/02/18 0618 Exam:  
 
Physical Exam: 
 
Gen:  Elderly, frail, in no acute distress HEENT:  Pink conjunctivae, PERRL, hearing intact to voice, dry mucous membranes Neck:  Supple, without masses, thyroid non-tender Resp:  No accessory muscle use, clear breath sounds without wheezes rales or rhonchi 
Card:  No murmurs, normal S1, S2 without thrills, bruits or peripheral edema Abd:  Soft, non-tender, non-distended, normoactive bowel sounds are present, no palpable organomegaly and no detectable hernias Lymph:  No cervical or inguinal adenopathy Musc:  No cyanosis or clubbing Skin:  No rashes or ulcers, skin turgor is good Neuro:  Cranial nerves are grossly intact, no focal motor weakness, follows commands appropriately Psych:  Poor insight, oriented to person, lethargic Labs: 
 
Recent Labs 12/02/18 2118 WBC 4.0* HGB 11.0*  
HCT 34.1*  
 Recent Labs 12/02/18 2118 * K 3.7  CO2 21 * BUN 14  
CREA 1.62* CA 9.0 ALB 2.4* TBILI 0.9 SGOT 11* ALT 23 Lab Results Component Value Date/Time Glucose (POC) 150 (H) 11/27/2018 11:16 AM  
 Glucose (POC) 162 (H) 11/27/2018 07:01 AM  
 
No results for input(s): PH, PCO2, PO2, HCO3, FIO2 in the last 72 hours. No results for input(s): INR in the last 72 hours. No lab exists for component: INREXT All Micro Results Procedure Component Value Units Date/Time CULTURE, BLOOD, PAIRED [[id number]] Collected:  12/02/18 2118 Order Status:  Completed Specimen:  Blood Updated:  12/02/18 2126 I have reviewed previous records Assessment and Plan: Active Problems: 
 
Gram-negative bacteremia / Fever. POA. NOT septic. Unclear source. Obvious concern for nosocomial microbe given recent admission/SNF. CXR nml, UA unremarkable, WBC stable. Admit to medicine. IV zosyn and vancomycin. IVFs. Serial BCx/lactic acids. Check UCx and TTE. May need ID assistance. Hypercalcemia (11/21/2018): Noted on last admission. Better now. Monitor closely and correct for low albumin. 
  
DM type 2 causing renal disease ([facility] [address]): appetite is poor. Diet as tolerated. SSI per protocol 
  
CAD (coronary artery disease) / Paroxysmal atrial fibrillation: Stable. Continue Asprin, Imdur, eliquis 
  
CKD (chronic kidney disease) stage 3, GFR 30-59 ml/min ([facility]) (12/10/2013): SCr stable. Continue IVFs and monitor renal function 
  
Dementia / acute metabolic encephalopathy. Supportive care. Re-direction. Avoid pharmacological intervention if possible 
  
Hx Renal cell carcinoma: Outpatient follow up and surveillance 
  
Debility: SNF for rehab once medically stable Telemetry reviewed:   normal sinus rhythm Risk of deterioration: high Total time spent with patient: 70 Minutes Care Plan discussed with: Patient, Family and Nursing Staff Discussed:  Code Status, Care Plan and D/C Planning      
___________________________________________________ Attending Physician: Flor Chaudhari DO

## 2018-12-03 NOTE — PROGRESS NOTES
Problem: Mobility Impaired (Adult and Pediatric) Goal: *Acute Goals and Plan of Care (Insert Text) Physical Therapy Goals Initiated 12/3/2018 1. Patient will move from supine to sit and sit to supine  in bed with modified independence/supervision within 7 day(s). 2.  Patient will transfer from bed to chair and chair to bed with supervision/set-up using the least restrictive device within 7 day(s). 3.  Patient will perform sit to stand with supervision/set-up within 7 day(s). 4.  Patient will ambulate with supervision/set-up for 75 feet with the least restrictive device within 7 day(s). physical Therapy EVALUATION Patient: Micheline Hernandez (80 y.o. male) Date: 12/3/2018 Primary Diagnosis: Gram-negative bacteremia [R78.81] Precautions:   Fall ASSESSMENT : 
Based on the objective data described below, the patient presents with generalized weakness, decreased bed mobility, transfers and gait, and decreased safety awareness following admission for gram-negative bacteremia. Patient was received in bed awake and able to state correct name of this facility and date. Grandson present. Patient sat on edge of bed with min assist and maintained good sitting balance with high guard. He stood and ambulated 20 feet with rolling walker +2 min assist. Patient requesting to urinate during ambulation and OT assisted with setup. Patient left up in chair with nursing aware and grandson present. Vitals stable. Patient had recent admission here (11/27) for hypercalcemia and was discharged to [facility] rehab. Patient was unable to recall how active he was there but grandson reports +2 max assist just to stand. Patient will benefit from continued therapy to increase strength, mobility and safety awareness. Recommend return to rehab prior to return home with wife. Patient will benefit from skilled intervention to address the above impairments. Patients rehabilitation potential is considered to be Good Factors which may influence rehabilitation potential include:  
[]         None noted 
[]         Mental ability/status [x]         Medical condition 
[]         Home/family situation and support systems 
[x]         Safety awareness 
[]         Pain tolerance/management 
[]         Other: PLAN : 
Recommendations and Planned Interventions: 
[x]           Bed Mobility Training             []    Neuromuscular Re-Education 
[x]           Transfer Training                   []    Orthotic/Prosthetic Training 
[x]           Gait Training                         []    Modalities []           Therapeutic Exercises           []    Edema Management/Control 
[]           Therapeutic Activities            []    Patient and Family Training/Education 
[]           Other (comment): Frequency/Duration: Patient will be followed by physical therapy  5 times a week to address goals. Discharge Recommendations: Rehab Further Equipment Recommendations for Discharge: none SUBJECTIVE:  
Patient stated Oh boy. I can't run! Дмитрий Dorman OBJECTIVE DATA SUMMARY:  
HISTORY:   
Past Medical History:  
Diagnosis Date  Arthritis  CAD (coronary artery disease) Angina Jan 2011, PCI LAD with multilink stent 4.0x12mm;  also cath at [place] 7/13 -- no stents at that time  CKD (chronic kidney disease), stage III ([facility]  Cochlear implant in place 2/3/2017  Dementia  DM type 2 causing renal disease ([facility]  DM type 2 causing vascular disease ([facility]  GERD (gastroesophageal reflux disease)  HTN (hypertension)  Hypothyroidism  SAMUEL (obstructive sleep apnea)   
 not using CPAP  
 PAF (paroxysmal atrial fibrillation) ([address] [id number] score = 7  
 Prostate cancer ([facility] [facility]  Renal cell cancer ([facility] [address] [facility] Stage 4 Renal Cell Cancer. Partial omentectomy 11/2/2015 with Dr. Levy Solis: omental mass-metastatic renal cell carcinoma  Renal mass, left  DAVINCI LEFT PARTIAL NEPHRECTOMY 12/19/13;   
 
 Past Surgical History:  
Procedure Laterality Date  CARDIAC SURG PROCEDURE UNLIST  1/2011  
 stent to LAD  HX CHOLECYSTECTOMY  7/13/2012  HX CORONARY STENT PLACEMENT  [address]  2011, 2013 [address]  
 stapedectomy  HX HERNIA REPAIR  1968  
 left inguinal  
 HX LITHOTRIPSY  8/2015  HX ORTHOPAEDIC  1994  
 rotator cuff repair  HX ORTHOPAEDIC  1994  
 cervical disc removed  HX PROSTATECTOMY  2000  
 surgery intervention [facility]  HX UROLOGICAL  2001  
 valvular implant prevent incontinence  HX UROLOGICAL  2003  
 insert gel to help with incontinence  HX UROLOGICAL  12/2013  
 left partial nephrectomy  HX UROLOGICAL  9/2015 CT guided biopsy of abdominal lymph nodes Prior Level of Function/Home Situation: unclear but was getting therapy at [facility] Personal factors and/or comorbidities impacting plan of care: confusion at times, decreased safety awareness, medical history Home Situation Home Environment: Skilled nursing facility Care Facility Name: [facility] # Steps to Enter: 0 One/Two Story Residence: One story Living Alone: No 
Support Systems: Family member(s), Spouse/Significant Other/Partner Patient Expects to be Discharged to[de-identified] Skilled nursing facility Current DME Used/Available at Home: Walker, rolling Tub or Shower Type: Shower EXAMINATION/PRESENTATION/DECISION MAKING: Critical Behavior: 
Neurologic State: Alert Orientation Level: Oriented to place, Oriented to person Cognition: Decreased command following, Memory loss, Poor safety awareness Safety/Judgement: Decreased awareness of need for safety, Decreased awareness of environment Hearing: Auditory Auditory Impairment: Hard of hearing, bilateral, Hearing aid(s) Hearing Aids/Status: Does not ownSkin:  Not fully observed Range Of Motion: 
AROM: Within functional limits Strength:   
Strength: Generally decreased, functional 
  
 Tone & Sensation:  
Tone: Normal 
  
  
  
  
Sensation: Intact Functional Mobility: 
Bed Mobility: 
  
Supine to Sit: Additional time;Minimum assistance Scooting: Modified independent Transfers: 
Sit to Stand: Assist x2;Minimum assistance Stand to Sit: Assist x2;Minimum assistance Bed to Chair: Assist x2;Minimum assistance Balance:  
Sitting: Intact; High guard Standing: Intact; With supportAmbulation/Gait Training:Distance (ft): 20 Feet (ft) Assistive Device: Walker, rolling;Gait belt Ambulation - Level of Assistance: Minimal assistance;Assist x2 Gait Abnormalities: Decreased step clearance Speed/Sabrina: Pace decreased (<100 feet/min) Step Length: Left shortened;Right shortened Functional Measure: 
Barthel Index: 
 
Bathin Bladder: 5 Bowels: 5 Groomin Dressin Feeding: 10 Mobility: 0 Stairs: 0 Toilet Use: 5 Transfer (Bed to Chair and Back): 5 Total: 35 Barthel and G-code impairment scale: 
Percentage of impairment CH 
0% CI 
1-19% CJ 
20-39% CK 
40-59% CL 
60-79% CM 
80-99% CN 
100% Barthel Score 0-100 100 99-80 79-60 59-40 20-39 1-19 
 0 Barthel Score 0-20 20 17-19 13-16 9-12 5-8 1-4 0 The Barthel ADL Index: Guidelines 1. The index should be used as a record of what a patient does, not as a record of what a patient could do. 2. The main aim is to establish degree of independence from any help, physical or verbal, however minor and for whatever reason. 3. The need for supervision renders the patient not independent. 4. A patient's performance should be established using the best available evidence. Asking the patient, friends/relatives and nurses are the usual sources, but direct observation and common sense are also important. However direct testing is not needed.  
5. Usually the patient's performance over the preceding 24-48 hours is important, but occasionally longer periods will be relevant. 6. Middle categories imply that the patient supplies over 50 per cent of the effort. 7. Use of aids to be independent is allowed. Hemanth Membreno., Barthel, D.W. (1548). Functional evaluation: the Barthel Index. [facility] (14)2. RONALD Moore, Rebecca Singh., Mardayana Bunj carlos., Seema, [address] (1999). Measuring the change indisability after inpatient rehabilitation; comparison of the responsiveness of the Barthel Index and Functional [place] Measure. Journal of Neurology, Neurosurgery, and Psychiatry, 66(4), 227-976. Eric Manuel, N.J.A, ELEAZAR Gibbons, & David Morrison M.A. (2004.) Assessment of post-stroke quality of life in cost-effectiveness studies: The usefulness of the Barthel Index and the EuroQoL-5D. [facility], 13, 251-24 Physical Therapy Evaluation Charge Determination History Examination Presentation Decision-Making MEDIUM  Complexity : 1-2 comorbidities / personal factors will impact the outcome/ POC  LOW Complexity : 1-2 Standardized tests and measures addressing body structure, function, activity limitation and / or participation in recreation  LOW Complexity : Stable, uncomplicated  Other outcome measures Barthel  MEDIUM Based on the above components, the patient evaluation is determined to be of the following complexity level: LOW Pain: 
Pain Scale 1: Numeric (0 - 10) Pain Intensity 1: 0 Activity Tolerance:  
Good. Please refer to the flowsheet for vital signs taken during this treatment. After treatment:  
[x]         Patient left in no apparent distress sitting up in chair 
[]         Patient left in no apparent distress in bed 
[x]         Call bell left within reach [x]         Nursing notified 
[x]         Caregiver present 
[]         Bed alarm activated COMMUNICATION/EDUCATION:  
The patients plan of care was discussed with: Occupational Therapist and Registered Nurse. [x]         Fall prevention education was provided and the patient/caregiver indicated understanding. []         Patient/family have participated as able in goal setting and plan of care. [x]         Patient/family agree to work toward stated goals and plan of care. []         Patient understands intent and goals of therapy, but is neutral about his/her participation. []         Patient is unable to participate in goal setting and plan of care. Thank you for this referral. 
Olegario Brewster, PT Time Calculation: 15 mins

## 2018-12-03 NOTE — ACP (ADVANCE CARE PLANNING)
Primary Decision Maker [facility] Agent):  Sourav Amaro  Relationship to patient:spouse  Phone number:[phone]  [x] Named in a scanned document   [] Legal Next of Kin  [] Guardian     Secondary Decision Maker ([address]): Nadeem Hill III  Relationship to patient:son  Phone number:  [x] Named in a scanned document   [] Legal Next of Kin  [] Guardian     ACP documents you current have include:  [x] Advance Directive or Living Will  [x] Durable Do Not Resuscitate-will complete before discharge  [] Physician Orders for Scope of Treatment (POST)  [] Medical Power of   [] Other

## 2018-12-03 NOTE — CONSULTS
Gwen Parker  MR#: [id number]  : 1935  ACCOUNT #: [de-identified]   DATE OF SERVICE: 2018    REQUESTING PHYSICIAN:  Joni Encarnacion MD    CHIEF COMPLAINT:  Fever. HISTORY OF PRESENT ILLNESS:  Patient is an 80-year-old male with a past medical history significant for dementia, diabetes mellitus, chronic kidney disease who was admitted to [facility] on  with the aforementioned complaint. Please note the patient is a poor historian. History is taken from discussion with the patient's family at bedside and review of chart records. Patient was discharged from prolonged hospital stay for hypercalcemia and viral illness on 2018. He was sent to [facility] for rehabilitation. He initially did well until  when he developed a fever, shaking chills and altered mental status. He was brought to [facility] for further evaluation and treatment. Workup at that time was unremarkable and he was sent back to [facility]. Blood cultures returned positive for gram-negative rods in 4/4 bottles and the patient was advised to return to the ED for admission. He has been started on piperacillin-tazobactam.  The lab has not yet identified the gram-negative rods. The infectious diseases service has been asked to assist with antibiotic management. PAST MEDICAL HISTORY:  Coronary artery disease, chronic kidney disease, cochlear implant, dementia, diabetes mellitus, gastroesophageal reflux disease, hypertension, hypothyroidism, obstructive sleep apnea, paroxysmal atrial fibrillation, prostate cancer, renal cell cancer. PAST SURGICAL HISTORY:  Coronary artery stents, cholecystectomy, left inguinal hernia repair, lithotripsy, rotator cuff repair, cervical disk removal, prostatectomy, tonsillectomy, urological procedure to prevent incontinence, left partial nephrectomy, lymph node biopsy.     SOCIAL HISTORY: No alcohol, tobacco or illicit drug use. FAMILY HISTORY:  Prostate cancer, diabetes mellitus. ALLERGIES:  NITROFURANTOIN.  THIS CAUSES BLOOD BLISTERS AND RASH. NO OTHER ANTIBIOTIC ALLERGIES. PLEASE SEE BODY OF CHART FOR DETAILS. OUTPATIENT MEDICATIONS:  Please see body of chart for details. He was not on antibiotics prior to admission. REVIEW OF SYSTEMS:  Unobtainable as the patient is a poor historian. LABORATORY DATA:  White blood cell count is 6000, platelet count 274,992. Urinalysis is bland. Creatinine is 1.54. ALT 20, AST 11, alkaline phosphatase is 276, total bilirubin is 1.1. Blood cultures from 12/01 are growing gram-negative rods in 4/4 bottles. Repeat blood culture reveals no growth at 9 hours. PHYSICAL EXAMINATION:  VITAL SIGNS:  Temperature 97.5 degrees Fahrenheit. Maximal temperature is 102.5 degrees Fahrenheit, heart rate 76 beats per minute, blood pressure 121/61, respiratory rate 16, oxygen saturation 97% on room air. GENERAL:  Alert, no acute distress. HEENT:  Normocephalic, atraumatic. Mucous membranes moist.  CARDIOVASCULAR:  Regular rate and rhythm. No murmurs, gallops or rubs. LUNGS:  Clear to auscultation anteriorly. ABDOMEN:  Soft, nontender, nondistended. EXTREMITIES:  Trace lower extremity edema. SKIN:  No rashes. NEUROLOGIC:  Cranial nerves II-XII grossly intact. PSYCHIATRIC:  Normal affect. ASSESSMENT AND PLAN:  1.  Gram-negative stanislaw bacteremia. The etiology is unclear at this point. Urinalysis is bland. The patient has no specific complaints other than cough. Of note, he was recently seen by GI last month for elevated bilirubin and jaundice. Workup at that time revealed a mildly elevated CA 19-9 at 45, raising concern for pancreatic cancer. Workup was unrevealing; however, and patient according to chart records, the patient's family elected not to undergo any further workup. We will repeat a CT scan of the abdomen and pelvis.   Agree with piperacillin-tazobactam pending further culture data. We will make further recommendations pending CT scan results and identification of the organism. Repeat blood cultures revealed no growth to date. 2.  Chronic kidney disease. This is stable. 3.  Diabetes mellitus. Management per primary team.    Thank you for allowing me to participate in the care of this patient.       Slick Raphael DO       MAG / LN  D: 12/03/2018 14:27     T: 12/03/2018 15:05  JOB #: [id number]

## 2018-12-03 NOTE — ED NOTES
5th floor charge nurse Sonali High called at this regarding room assignment received, this relayed to Johann Alvarado- ED primary nurse.

## 2018-12-03 NOTE — ED NOTES
TRANSFER - OUT REPORT: 
 
Verbal report given to KIMBERLY Marie(name) on Micheline Avendaño.  being transferred to 5th Floor(unit) for routine progression of care Report consisted of patients Situation, Background, Assessment and  
Recommendations(SBAR). Information from the following report(s) SBAR, Kardex, ED Summary, Intake/Output, MAR and Recent Results was reviewed with the receiving nurse. Lines:  
Peripheral IV 12/02/18 Right Antecubital (Active) Site Assessment Clean, dry, & intact 12/2/2018  9:25 PM  
Phlebitis Assessment 0 12/2/2018  9:25 PM  
Infiltration Assessment 0 12/2/2018  9:25 PM  
Dressing Status Clean, dry, & intact 12/2/2018  9:25 PM  
Dressing Type Tape;Transparent 12/2/2018  9:25 PM  
Hub Color/Line Status Pink;Flushed 12/2/2018  9:25 PM  
Action Taken Blood drawn 12/2/2018  9:25 PM  
Alcohol Cap Used Yes 12/2/2018  9:25 PM  
   
Peripheral IV 12/02/18 Left Hand (Active) Site Assessment Clean, dry, & intact 12/2/2018  9:20 PM  
Phlebitis Assessment 0 12/2/2018  9:20 PM  
Infiltration Assessment 0 12/2/2018  9:20 PM  
Dressing Status Clean, dry, & intact 12/2/2018  9:20 PM  
Dressing Type Tape;Transparent 12/2/2018  9:20 PM  
Hub Color/Line Status Pink;Flushed 12/2/2018  9:20 PM  
Action Taken Blood drawn 12/2/2018  9:20 PM  
Alcohol Cap Used Yes 12/2/2018  9:20 PM  
  
 
Opportunity for questions and clarification was provided. Patient transported with: 
 Registered Nurse

## 2018-12-03 NOTE — PROGRESS NOTES
Formerly [facility] Medical Progress Note NAME: Sony Armijo. :  1935 MRM:  [id number] Date/Time: 12/3/2018  1:16 PM 
 
  
Assessment and Plan:  
 
Gram-negative bacteremia / Fever - POA, no other SIRS criteria. Unclear source. No wound. Check ECHO. No focal back pain. UA unremarkable. Occult abscess? Consult ID. 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 SSI per protocol. Not on home meds. Check A1c. Dementia - POA, stable. Continue mirtazapine and melatonin. Palliative consult. Nearing end stage Debilitated patient / Arthritis / Fatigue -  PT/OT eval and may need SNF again, vs [facility] Malnutrition / Weight loss / FTT in adult / Low albumin / hypomagnesemia - Supplements and replete lytes. Likely related to dementia. 20+ lb weight loss in over 1 year. Nutrition consult. Anemia - Mild. Check labs. SAMUEL (obstructive sleep apnea) - not using CPAP. Oxygen if needed. CAD (coronary artery disease) / HTN (hypertension) / Paroxysmal atrial fibrillation - Rate stable. BP controlled on norvasc, IMDUR. Continue eliquis and ASA. 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 Follow. Cochlear implant in place - Cannot have MRI 
 
GERD (gastroesophageal reflux disease) - PPI Hypothyroidism - Not on synthroid. Check TSH Subjective: Chief Complaint:  Feels a bit better, though still weak. ROS: 
(bold if positive, if negative) Tolerating some PT  Tolerating some Diet Objective:  
 
Last 24hrs VS reviewed since prior progress note. Most recent are: 
 
Visit Vitals /61 (BP 1 Location: Left arm, BP Patient Position: Sitting) Pulse 76 Temp 97.5 °F (36.4 °C) Resp 16 Wt 80.7 kg (178 lb) SpO2 97% BMI 27.88 kg/m² SpO2 Readings from Last 6 Encounters:  
18 97% 18 96% 11/27/18 95% 09/13/18 97% 08/26/17 92% 07/16/17 94% Intake/Output Summary (Last 24 hours) at 12/3/2018 1316 Last data filed at 12/3/2018 1207 Gross per 24 hour Intake 580 ml Output 180 ml Net 400 ml Physical Exam: 
 
Gen:  Well-developed, well-nourished, in no acute distress HEENT:  Pink conjunctivae, PERRL, hearing intact to voice, moist mucous membranes Neck:  Supple, without masses, thyroid non-tender Resp:  No accessory muscle use, clear breath sounds without wheezes rales or rhonchi 
Card:  No murmurs, normal S1, S2 without thrills, bruits or peripheral edema Abd:  Soft, non-tender, non-distended, normoactive bowel sounds are present, no mass Lymph:  No cervical or inguinal adenopathy Musc:  No cyanosis or clubbing Skin:  No rashes or ulcers, skin turgor is good Neuro:  Cranial nerves are grossly intact, general motor weakness, follows commands vaguely Psych:  Poor insight, oriented to person, place Telemetry reviewed:   normal sinus rhythm 
__________________________________________________________________ Medications Reviewed: (see below) Medications:  
 
Current Facility-Administered Medications Medication Dose Route Frequency  sodium chloride (NS) flush 5-10 mL  5-10 mL IntraVENous Q8H  
 sodium chloride (NS) flush 5-10 mL  5-10 mL IntraVENous PRN  
 naloxone (NARCAN) injection 0.4 mg  0.4 mg IntraVENous PRN  
 0.9% sodium chloride infusion  125 mL/hr IntraVENous CONTINUOUS  
 acetaminophen (TYLENOL) tablet 650 mg  650 mg Oral Q4H PRN  
 ondansetron (ZOFRAN) injection 4 mg  4 mg IntraVENous Q4H PRN  
 bisacodyl (DULCOLAX) tablet 5 mg  5 mg Oral DAILY PRN  piperacillin-tazobactam (ZOSYN) 3.375 g in 0.9% sodium chloride (MBP/ADV) 100 mL  3.375 g IntraVENous Q8H  
 apixaban (ELIQUIS) tablet 2.5 mg  2.5 mg Oral BID  amLODIPine (NORVASC) tablet 5 mg  5 mg Oral DAILY  aspirin delayed-release tablet 81 mg  81 mg Oral QHS  isosorbide mononitrate ER (IMDUR) tablet 120 mg  120 mg Oral 7am  
 melatonin tablet 3 mg  3 mg Oral QHS  mirtazapine (REMERON) tablet 7.5 mg  7.5 mg Oral QHS  pantoprazole (PROTONIX) tablet 40 mg  40 mg Oral ACB  polyethylene glycol (MIRALAX) packet 17 g  17 g Oral DAILY PRN  
 glucose chewable tablet 16 g  4 Tab Oral PRN  
 dextrose (D50W) injection syrg 12.5-25 g  25-50 mL IntraVENous PRN  
 glucagon (GLUCAGEN) injection 1 mg  1 mg IntraMUSCular PRN  
 insulin lispro (HUMALOG) injection   SubCUTAneous AC&HS Lab Data Reviewed: (see below) Lab Review:  
 
Recent Labs 12/03/18 0536 12/02/18 2118 12/01/18 
1854 WBC 6.0 4.0* 6.2 HGB 11.0* 11.0* 11.0*  
HCT 34.5* 34.1* 33.6*  
* 152 180 Recent Labs 12/03/18 0536 12/02/18 2118 12/01/18 
1854  135* 136  
K 3.8 3.7 3.9  101 102 CO2 20* 21 23 * 219* 197* BUN 14 14 14 CREA 1.54* 1.62* 1.50* CA 8.7 9.0 8.6 MG 1.5*  --   --   
PHOS 2.5*  --   --   
ALB 2.1* 2.4* 2.4* TBILI 1.1* 0.9 0.8 SGOT 11* 11* 12* ALT 20 23 24 Lab Results Component Value Date/Time Glucose (POC) 150 (H) 11/27/2018 11:16 AM  
 Glucose (POC) 162 (H) 11/27/2018 07:01 AM  
 Glucose (POC) 196 (H) 11/26/2018 09:44 PM  
 Glucose (POC) 174 (H) 11/26/2018 04:29 PM  
 Glucose (POC) 195 (H) 11/26/2018 11:52 AM  
 
No results for input(s): PH, PCO2, PO2, HCO3, FIO2 in the last 72 hours. No results for input(s): INR in the last 72 hours. No lab exists for component: INREXT All Micro Results Procedure Component Value Units Date/Time CULTURE, BLOOD, PAIRED [[id number]] Collected:  12/02/18 2118 Order Status:  Completed Specimen:  Blood Updated:  12/03/18 1185 Special Requests: NO SPECIAL REQUESTS Culture result: NO GROWTH AFTER 9 HOURS     
 CULTURE, URINE [[id number]] Order Status:  Sent Specimen:  Urine from Clean catch I have reviewed notes of prior 24hr. Other pertinent lab: none Total time spent with patient: 45 Minutes Care Plan discussed with: Patient, Family, Nursing Staff, Consultant/Specialist and >50% of time spent in counseling and coordination of care Discussed:  Care Plan and D/C Planning Prophylaxis:  H2B/PPI Disposition:  SNF/LTC and HH PT, OT, RN 
        
___________________________________________________ Attending Physician: Anay Hess MD

## 2018-12-03 NOTE — ED PROVIDER NOTES
Aliya Wolf. is a 80 y.o. male  who presents by private vehicle to ER with c/o Patient presents with: 
Abnormal Lab Results. Patient presents with wife and grandson. Patient seen yesterday for fever and AMS, was discharged, Patient called back today and told to come to ED for positive blood cultures. Patient is currently living at [facility] for rehab after recent admission for hypercalcemia. Patients wife reports fever, chills and confusion starting yesterday. Reports mild cough that is not new. He specifically denies any  nausea, vomiting, chest pain, shortness of breath, headache, rash, diarrhea, abdominal pain, urinary/bowel changes, sweating or weight loss. PCP: Other, MD Chance  
PMHx significant for: Past Medical History: 
No date: Arthritis No date: CAD (coronary artery disease) Comment:  Angina Jan 2011, PCI LAD with multilink stent 4.0x12mm;  
             also cath at [place] 7/13 -- no stents at that time No date: CKD (chronic kidney disease), stage III ([facility] [address] 
2/3/2017: Cochlear implant in place No date: Dementia No date: DM type 2 causing renal disease ([facility] [address] No date: DM type 2 causing vascular disease ([address] No date: GERD (gastroesophageal reflux disease) No date: HTN (hypertension) No date: Hypothyroidism No date: SAMUEL (obstructive sleep apnea) Comment:  not using CPAP No date: PAF (paroxysmal atrial fibrillation) ([address] Comment:  [id number] score = 7 No date: Prostate cancer ([facility] [address] No date: Renal cell cancer ([facility] [address] Comment:  Stage 4 Renal Cell Cancer. Partial omentectomy 11/2/2015 with Dr. Deya Kiran: omental mass-metastatic  
             renal cell carcinoma No date: Renal mass, left Comment:  [facility] LEFT PARTIAL NEPHRECTOMY 12/19/13;   
PSHx significant for: Past Surgical History: 
1/2011: CARDIAC SURG PROCEDURE UNLIST Comment:  stent to LAD 
7/13/2012: HX CHOLECYSTECTOMY 
2011: [address] , : [address]: HX HEENT Comment:  stapedectomy 1968: [address] Comment:  left inguinal 
2015: HX LITHOTRIPSY 
: HX ORTHOPAEDIC Comment:  rotator cuff repair 1994: HX ORTHOPAEDIC Comment:  cervical disc removed 2000: HX PROSTATECTOMY Comment:  surgery intervention 1987: HX TONSILLECTOMY 
2001: HX UROLOGICAL Comment:  valvular implant prevent incontinence 2003: HX UROLOGICAL Comment:  insert gel to help with incontinence 2013: HX UROLOGICAL Comment:  left partial nephrectomy 2015: HX UROLOGICAL Comment:  CT guided biopsy of abdominal lymph nodes Social Hx: Tobacco use: Social History Tobacco Use Smoking status: Former Smoker Packs/day: 0.10 Years: 23.00 Pack years: 2.3 Quit date: 12/3/1975 Years since quittin.0 Smokeless tobacco: Never Used 
; EtOH use: The patient states he drinks 0 per week.; Illicit Drug use: Allergies: 
 -- Latex -- Rash, Swelling and Contact Dermatitis -- Atorvastatin -- Unknown (comments) --  Per patient's PCP allergy list- No reaction 
           specified 
 -- Ezetimibe -- Myalgia -- Lisinopril -- Cough 
 -- Metoprolol -- Other (comments) --  Bradycardia 
 -- Morphine -- Other (comments) --  Hallucinations and Nausea -- Nitrofurantoin -- Rash 
  --  \"Blood blisters/rash\" per patient's family -- Rosuvastatin -- Myalgia -- Simvastatin -- Myalgia There are no other complaints, changes or physical findings at this time. Abnormal Lab Results Pertinent negatives include no chest pain, no abdominal pain and no shortness of breath. Past Medical History:  
Diagnosis Date  Arthritis  CAD (coronary artery disease) Angina 2011, PCI LAD with multilink stent 4.0x12mm;  also cath at [place]  -- no stents at that time  CKD (chronic kidney disease), stage III ([facility] [address]  Cochlear implant in place 2/3/2017  Dementia  DM type 2 causing renal disease ([facility] [address]  DM type 2 causing vascular disease ([facility] [address]  GERD (gastroesophageal reflux disease)  HTN (hypertension)  Hypothyroidism  SAMUEL (obstructive sleep apnea)   
 not using CPAP  
 PAF (paroxysmal atrial fibrillation) ([facility] [address] [id number] score = 7  
 Prostate cancer ([facility] [address]  Renal cell cancer ([facility] [address] Stage 4 Renal Cell Cancer. Partial omentectomy 11/2/2015 with Dr. Sandra Bartholomew: omental mass-metastatic renal cell carcinoma  Renal mass, left [address] LEFT PARTIAL NEPHRECTOMY 12/19/13;   
 
 
Past Surgical History:  
Procedure Laterality Date  CARDIAC SURG PROCEDURE UNLIST  1/2011  
 stent to LAD  HX CHOLECYSTECTOMY  7/13/2012  HX CORONARY STENT PLACEMENT  2011 [address]  2011, [address]  
 stapedectomy  HX HERNIA REPAIR  1968  
 left inguinal  
 HX LITHOTRIPSY  8/2015  HX ORTHOPAEDIC  1994  
 rotator cuff repair  HX ORTHOPAEDIC  1994  
 cervical disc removed  HX PROSTATECTOMY  2000  
 surgery intervention [address]  HX UROLOGICAL  2001  
 valvular implant prevent incontinence  HX UROLOGICAL  2003  
 insert gel to help with incontinence  HX UROLOGICAL  12/2013  
 left partial nephrectomy  HX UROLOGICAL  9/2015 CT guided biopsy of abdominal lymph nodes Family History:  
Problem Relation Age of Onset  Stroke Father  Cancer Father   
     prostate  Diabetes Sister 2 sisters  Hypertension Sister 2 sisters  Diabetes Brother  Stroke Brother  Diabetes Brother   
     all brothers  Stroke Brother  Kidney Disease Brother  Heart Attack Brother  Hypertension Other Social History Socioeconomic History  Marital status:  Spouse name: Not on file  Number of children: Not on file  Years of education: Not on file  Highest education level: Not on file Social Needs  Financial resource strain: Not on file  Food insecurity - worry: Not on file  Food insecurity - inability: Not on file  Transportation needs - medical: Not on file  Transportation needs - non-medical: Not on file Occupational History  Not on file Tobacco Use  Smoking status: Former Smoker Packs/day: 0.10 Years: 23.00 Pack years: 2.30 Last attempt to quit: 12/3/1975 Years since quittin.0  Smokeless tobacco: Never Used Substance and Sexual Activity  Alcohol use: No  
  Alcohol/week: 0.0 oz  Drug use: No  
 Sexual activity: No  
  Birth control/protection: None Other Topics Concern  Not on file Social History Narrative  Not on file ALLERGIES: Latex; Atorvastatin; Ezetimibe; Lisinopril; Metoprolol; Morphine; Nitrofurantoin; Rosuvastatin; and Simvastatin Review of Systems Constitutional: Positive for chills, fatigue and fever. Negative for activity change and appetite change. HENT: Negative for congestion and sore throat. Respiratory: Negative for cough and shortness of breath. Cardiovascular: Negative for chest pain. Gastrointestinal: Negative for abdominal pain, diarrhea, nausea and vomiting. Genitourinary: Negative for dysuria. Musculoskeletal: Negative for arthralgias and myalgias. Skin: Negative for color change. Neurological: Negative for dizziness. Psychiatric/Behavioral: The patient is not nervous/anxious. All other systems reviewed and are negative. Vitals:  
 18 2058 BP: 148/56 Pulse: 94 Resp: 18 Temp: 98.4 °F (36.9 °C) SpO2: 98% Weight: 80.7 kg (178 lb) Physical Exam  
Constitutional: He appears well-developed and well-nourished. HENT:  
Head: Normocephalic and atraumatic. Right Ear: External ear normal.  
Left Ear: External ear normal.  
Mouth/Throat: Oropharynx is clear and moist. No oropharyngeal exudate. Eyes: Conjunctivae and EOM are normal. Pupils are equal, round, and reactive to light. Right eye exhibits no discharge. Left eye exhibits no discharge. No scleral icterus. Neck: Normal range of motion. Neck supple. No tracheal deviation present. No thyromegaly present. Cardiovascular: Normal rate, regular rhythm, normal heart sounds and intact distal pulses. No murmur heard. Pulmonary/Chest: Effort normal and breath sounds normal. No respiratory distress. He has no wheezes. He has no rales. Abdominal: Soft. Bowel sounds are normal. He exhibits no distension. There is no tenderness. There is no rebound and no guarding. Musculoskeletal: Normal range of motion. He exhibits no edema or tenderness. Lymphadenopathy:  
  He has no cervical adenopathy. Neurological: He is alert. No cranial nerve deficit. Coordination normal.  
Skin: Skin is warm. No rash noted. No erythema. Psychiatric: He has a normal mood and affect. His behavior is normal. Judgment and thought content normal.  
Nursing note and vitals reviewed. MDM Number of Diagnoses or Management Options Diagnosis management comments: 10:13 PM 
Patient is being admitted to the hospital.  The results of their tests and reasons for their admission have been discussed with them and/or available family. They convey agreement and understanding for the need to be admitted and for their admission diagnosis. Consultation has been made with the inpatient physician specialist for hospitalization. LABORATORY TESTS: 
Recent Results (from the past 12 hour(s)) -EKG, 12 LEAD, INITIAL Collection Time: 12/02/18  9:07 PM 
     Result                      Value             Ref Range      Ventricular Rate            91                BPM           
     Atrial Rate                 91                BPM           
     P-R Interval                236               ms            
     QRS Duration                138               ms            
 Q-T Interval                404               ms            
     QTC Calculation (Bezet)     496               ms            
     Calculated P Axis           37                degrees Calculated R Axis           -32               degrees Calculated T Axis           114               degrees Diagnosis Sinus rhythm with 1st degree AV block Left axis deviation Left bundle branch block Abnormal ECG When compared with ECG of 09-SEP-2018 18:23, No significant change was found -METABOLIC PANEL, COMPREHENSIVE Collection Time: 12/02/18  9:18 PM 
     Result                      Value             Ref Range Sodium                      135 (L)           136 - 145 mm* Potassium                   3.7               3.5 - 5.1 mm* Chloride                    101               97 - 108 mmo* CO2                         21                21 - 32 mmol* Anion gap                   13                5 - 15 mmol/L Glucose                     219 (H)           65 - 100 mg/* BUN                         14                6 - 20 MG/DL Creatinine                  1.62 (H)          0.70 - 1.30 * BUN/Creatinine ratio        9 (L)             12 - 20 GFR est AA                  50 (L)            >60 ml/min/1* GFR est non-AA              41 (L)            >60 ml/min/1* Calcium                     9.0               8.5 - 10.1 M* Bilirubin, total            0.9               0.2 - 1.0 MG* ALT (SGPT)                  23                12 - 78 U/L   
     AST (SGOT)                  11 (L)            15 - 37 U/L Alk. phosphatase            325 (H)           45 - 117 U/L Protein, total              6.0 (L)           6.4 - 8.2 g/*      Albumin                     2.4 (L)           3.5 - 5.0 g/* 
 Globulin                    3.6               2.0 - 4.0 g/* A-G Ratio                   0.7 (L)           1.1 - 2.2     
-CBC WITH AUTOMATED DIFF Collection Time: 12/02/18  9:18 PM 
     Result                      Value             Ref Range WBC                         4.0 (L)           4.1 - 11.1 K* 
     RBC                         3.97 (L)          4.10 - 5.70 * HGB                         11.0 (L)          12.1 - 17.0 * HCT                         34.1 (L)          36.6 - 50.3 % MCV                         85.9              80.0 - 99.0 * MCH                         27.7              26.0 - 34.0 * MCHC                        32.3              30.0 - 36.5 * RDW                         13.6              11.5 - 14.5 % PLATELET                    152               150 - 400 K/* MPV                         9.9               8.9 - 12.9 FL 
     NRBC                        0.0               0  WBC ABSOLUTE NRBC               0.00              0.00 - 0.01 * NEUTROPHILS                 80 (H)            32 - 75 % LYMPHOCYTES                 9 (L)             12 - 49 % MONOCYTES                   10                5 - 13 % EOSINOPHILS                 1                 0 - 7 % BASOPHILS                   0                 0 - 1 % IMMATURE GRANULOCYTES       0                 0.0 - 0.5 % ABS. NEUTROPHILS            3.2               1.8 - 8.0 K/* ABS. LYMPHOCYTES            0.4 (L)           0.8 - 3.5 K/* ABS. MONOCYTES              0.4               0.0 - 1.0 K/* ABS. EOSINOPHILS            0.0               0.0 - 0.4 K/* ABS. BASOPHILS              0.0               0.0 - 0.1 K/* ABS. IMM. GRANS.            0.0               0.00 - 0.04 *      DF                          SMEAR SCANNED                   
 RBC COMMENTS                                                
 NORMOCYTIC, NORMOCHROMIC 
-LACTIC ACID Collection Time: 12/02/18  9:18 PM 
     Result                      Value             Ref Range Lactic acid                 2.6 (HH)          0.4 - 2.0 MM* 
-SAMPLES BEING HELD Collection Time: 12/02/18  9:18 PM 
     Result                      Value             Ref Range SAMPLES BEING HELD          1RED,1BL,1SST                   
     COMMENT Add-on orders for these samples will be processed based on acceptable specimen integrity and analyte stability, which may vary by analyte. IMAGING RESULTS: 
none MEDICATIONS GIVEN: 
Medications 
piperacillin-tazobactam (ZOSYN) 3.375 g in 0.9% sodium chloride (MBP/ADV) 100 mL (3.375 g IntraVENous New Bag 12/2/18 2151) 
vancomycin (VANCOCIN) 2,000 mg in 0.9% sodium chloride 500 mL IVPB (2,000 mg IntraVENous New Bag 12/2/18 2152) piperacillin-tazobactam (ZOSYN) 3.375 g in 0.9% sodium chloride (MBP/ADV) 100 mL (not administered) 
apixaban (ELIQUIS) tablet 2.5 mg (not administered) amLODIPine (NORVASC) tablet 5 mg (not administered) aspirin delayed-release tablet 81 mg (not administered) 
isosorbide mononitrate ER (IMDUR) tablet 120 mg (not administered) 
melatonin tablet 3 mg (not administered) 
mirtazapine (REMERON) tablet 7.5 mg (not administered) omeprazole (PRILOSEC) capsule 20 mg (not administered) 
polyethylene glycol (MIRALAX) packet 17 g (not administered) IMPRESSION: 
Bacteremia PLAN: 
1. Admit to hospital 
 
 
  
Amount and/or Complexity of Data Reviewed Clinical lab tests: reviewed and ordered Tests in the radiology section of CPT®: reviewed and ordered Tests in the medicine section of CPT®: ordered and reviewed Procedures CONSULT NOTE:  
10:12 PM 
Mehran Knapp PA-C spoke with Dr. Roderick Barbosa, Specialty: Hospitalist 
 Discussed pt's hx, disposition, and available diagnostic and imaging results. Reviewed care plans. Consultant agrees with plans as outlined. Will see for admission.

## 2018-12-03 NOTE — ED NOTES
Pt arrived to the ED from [employer] in company of wife. Wife states pt's was called in for positive blood cultures from yesterday's visit. Pt is now in ED room with family at bedside, side rail up, bed to lowest position and call light within reach. VS in stable condition, will continue to monitor.

## 2018-12-03 NOTE — PROGRESS NOTES
12/3/2018 Reason for ReAdmission:   Bacteremia RRAT Score:   57 Resources/supports as identified by patient/family:   None voiced Top Challenges facing patient (as identified by patient/family and CM): Finances/Medication cost?   Pt has RX drug coverage Transportation? Relies on others Support system or lack thereof? Family to include his wife Living arrangements? Lives with wife Self-care/ADLs/Cognition? Pt has dx of dementia Current Advanced Directive/Advance Care Plan:  DNR Plan for utilizing home health:    No 
                   
Likelihood of readmission:  High/red Transition of Care Plan:   Return to [facility]            12/3/2018   CARE MANAGEMENT NOTE:  CM is following pt for READMISSION. EMR reviewed. Per chart hx, pt was hospitalized at [facility] from 11/14/18 - 11/27/18 with dx of viral illness. He discharged to [facility] for short term rehab. Initial Assessment:  Reportedly, pt resides with his wife (doesn't use cell phone) in a one story home with four front steps and a ramp to the back entrance. PTA, pt was ambulatory with a cane, or rolling walker and at times he walks without an assistive device. Pt is indepn with ADLs but does not drive. He is a  having served in the [employer]. [facility].  He does not have home health nor does he want any homecare. DME in the home includes a cane, rolling walker, transport chair, and shower chair. [facility]CP is [facility] AT [place]. 
Readmission Assessment:  Pt was sent from [facility] to the ER on 12/1 with cc: fever, chills, and confusion. Pt was not admitted and he returned to [facility] for continued rehab. Pt again presented from SNF to the ER and he was admitted on 12/2/18 with dx of bacteremia. Action Plan:  CM will confirm plan for pt to return to SN[facility] for further rehab then home. [place]

## 2018-12-03 NOTE — CONSULTS
Palliative Medicine Consult    Patient Name: Micheline Hernandez YOB: 1935    Date of Initial Consult: 12/3/18  Reason for Consult: Care decisions/end stage disease  Requesting Provider: Dr. Rachael Locke   Primary Care Physician: Chance Ornelas MD      SUMMARY:   Micheline Hernandez is a 80 y.o. with a past history of dementia, TIA, RCC, CAD, CKD, DM, HTN, who was admitted on 12/2/2018 from [facility] with a diagnosis of bacteremia. Current medical issues leading to Palliative Medicine involvement include: care decisions. Chart reviewed/HPI-patient seen by our team during last admission which was extended because of issues with hypercalcemia and elevated bilirubin. This resolved and he was discharged to [facility] for rehab. Was doing well until Saturday when started with chills and confusion. Came to ED on Saturday->workup negative and sent back to [facility]. Returned yesterday when cx showed 4/4 bc positive for GN bacteria. UA and CXR appear clear. ID to see    SH- with 4 children       PALLIATIVE DIAGNOSES:   1. GOC discussion  2. Debility  3. Bacteremia-unclear source  4. Dementia-not hospice appropriate at this time  5. DNR review       PLAN:   1. Met with patient,, grandson, and spouse. Reviewed current medical issues. Patient is feeling better today. Sitting in chair, walked with PT and eating/feeding himself his luch. Spouse without questions on current medical issues. She understands attempting to identify the bacteria and where is may have originated. ID to see patient. 2. GOC-continue full restorative measures for now. May or may not return to [facility] based on how he does with therapy in the hospital.  3. AMD-in the chart with spouse [place] as primary MPOA, Vernon(son) as secondary and Brad Regine as tertiary  4. Code status-reviewed resuscitation with spouse at last admissions and DDNR completed  5. Symptom management-no acute symptoms for us to manage  6.  Psychosocial-good support from family. Children live locally but [facility] apparently provides the most support. No spiritual issues. 7. Discussed with bedside nurse. Our team will continue to follow         GOALS OF CARE / TREATMENT PREFERENCES:   [====Goals of Care====]  GOALS OF CARE:  Patient/Health Care Proxy Stated Goals: Rehabilitation      TREATMENT PREFERENCES:   Code Status: DNR    Advance Care Planning:  Advance Care Planning 12/2/2018   Patient's Healthcare Decision Maker is: -   Primary Decision Maker Name -   Primary Decision Maker Phone Number -   Primary Decision Maker Relationship to Patient -   Confirm Advance Directive Yes, on file   Does the patient have other document types -       Medical Interventions: Limited additional interventions  Other Instructions:   Artificially Administered Nutrition: No feeding tube      The palliative care team has discussed with patient / health care proxy about goals of care / treatment preferences for patient.  [====Goals of Care====]         HISTORY:     History obtained from: chart, patient, spouse    CHIEF COMPLAINT: weakness/positive BC    HPI/SUBJECTIVE:    The patient is:   [x] Verbal and participatory  [] Non-participatory due to:   Patient is awake, alert, denies any pain. Hard of hearing but no distress.      Clinical Pain Assessment (nonverbal scale for severity on nonverbal patients):   Clinical Pain Assessment  Severity: 0            FUNCTIONAL ASSESSMENT:     Palliative Performance Scale (PPS):  PPS: 60       PSYCHOSOCIAL/SPIRITUAL SCREENING:     Advance Care Planning:  Advance Care Planning 12/2/2018   Patient's Healthcare Decision Maker is: -   Primary Decision Maker Name -   Primary Decision Maker Phone Number -   Primary Decision Maker Relationship to Patient -   Confirm Advance Directive Yes, on file   Does the patient have other document types -        Any spiritual / Mosque concerns:  [] Yes /  [x] No    Caregiver Burnout:  [] Yes /  [x] No /  [] No Caregiver Present Anticipatory grief assessment:   [x] Normal  / [] Maladaptive       ESAS Anxiety: Anxiety: 0    ESAS Depression: Depression: 0        REVIEW OF SYSTEMS:     Positive and pertinent negative findings in ROS are noted above in HPI. The following systems were [x] reviewed / [] unable to be reviewed as noted in HPI  Other findings are noted below. Systems: constitutional, ears/nose/mouth/throat, respiratory, gastrointestinal, genitourinary, musculoskeletal, integumentary, neurologic, psychiatric, endocrine. Positive findings noted below. Modified ESAS Completed by: provider   Fatigue: 2 Drowsiness: 1   Depression: 0 Pain: 0   Anxiety: 0 Nausea: 0   Anorexia: 0 Dyspnea: 0     Constipation: No     Stool Occurrence(s): 1        PHYSICAL EXAM:     From RN flowsheet:  Wt Readings from Last 3 Encounters:   12/02/18 178 lb (80.7 kg)   11/14/18 178 lb (80.7 kg)   09/12/18 180 lb (81.6 kg)     Blood pressure 121/61, pulse 76, temperature 97.5 °F (36.4 °C), resp. rate 16, weight 178 lb (80.7 kg), SpO2 97 %.     Pain Scale 1: Numeric (0 - 10)  Pain Intensity 1: 0                 Last bowel movement, if known:     Constitutional: alert to person only, hard of hearing  Eyes: pupils equal, anicteric  ENMT: no nasal discharge, moist mucous membranes  Cardiovascular: regular rhythm, distal pulses intact  Respiratory: breathing not labored, symmetric  Gastrointestinal: soft non-tender, +bowel sounds  Musculoskeletal: no deformity, no tenderness to palpation  Skin: warm, dry  Neurologic: following commands, moving all extremities  Psychiatric: confused but NAD  Other:       HISTORY:     Principal Problem:    Gram-negative bacteremia (12/2/2018)    Active Problems:    CAD (coronary artery disease) ()      Overview: Angina Jan 2011, PCI unknown vessel at [place], no MI      HTN (hypertension) ()      Paroxysmal atrial fibrillation ([facility] (5/6/2013)      CKD (chronic kidney disease) stage 3, GFR 30-59 ml/min ([facility]) (12/10/2013)      Kidney cancer, primary, 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 [facility]) (11/2/2015)      Renal cell cancer ([address] ()      Overview: Stage 4 Renal Cell Cancer. Partial omentectomy 11/2/2015 with Dr. Arthur Frye: omental mass-metastatic renal cell carcinoma      Cochlear implant in place (2/3/2017)      Overview: -- Cannot have MRI      Arthritis ()      Dementia ()      DM type 2 causing renal disease ([address] ()      DM type 2 causing vascular disease (HCC) ()      GERD (gastroesophageal reflux disease) ()      Hypothyroidism ()      SAMUEL (obstructive sleep apnea) ()      Overview: not using CPAP      Prostate cancer ([address] ()      Fatigue (9/9/2018)      Debilitated patient (9/9/2018)      Fever (12/2/2018)      Past Medical History:   Diagnosis Date    Arthritis     CAD (coronary artery disease)     Angina Jan 2011, PCI LAD with multilink stent 4.0x12mm;  also cath at [place] 7/13 -- no stents at that time    CKD (chronic kidney disease), stage III ([address]     Cochlear implant in place 2/3/2017    Dementia     DM type 2 causing renal disease ([address]     DM type 2 causing vascular disease ([address]     GERD (gastroesophageal reflux disease)     HTN (hypertension)     Hypothyroidism     SAMUEL (obstructive sleep apnea)     not using CPAP    PAF (paroxysmal atrial fibrillation) (HCC)     [id number] score = 7    Prostate cancer ([address]     Renal cell cancer ([address]     Stage 4 Renal Cell Cancer.     Partial omentectomy 11/2/2015 with Dr. Arthur Frye: omental mass-metastatic renal cell carcinoma    Renal mass, left     DAVINCI LEFT PARTIAL NEPHRECTOMY 12/19/13;       Past Surgical History:   Procedure Laterality Date    CARDIAC SURG PROCEDURE UNLIST  1/2011    stent to LAD    HX CHOLECYSTECTOMY  7/13/2012    HX CORONARY STENT PLACEMENT  2011   [facility] HEART CATHETERIZATION  2011, 2013    HX HEENT  1989    stapedectomy    HX HERNIA [address]    left inguinal    HX LITHOTRIPSY  8/2015    HX ORTHOPAEDIC  1994    rotator cuff repair    HX ORTHOPAEDIC      cervical disc removed    HX PROSTATECTOMY  2000    surgery intervention    HX TONSILLECTOMY      HX UROLOGICAL      valvular implant prevent incontinence    HX UROLOGICAL      insert gel to help with incontinence    HX UROLOGICAL  2013    left partial nephrectomy    HX UROLOGICAL  2015    CT guided biopsy of abdominal lymph nodes      Family History   Problem Relation Age of Onset    Stroke Father     Cancer Father         prostate    Diabetes Sister         2 sisters    Hypertension Sister         2 sisters    Diabetes Brother     Stroke Brother     Diabetes Brother         all brothers    Stroke Brother     Kidney Disease Brother     Heart Attack Brother     Hypertension Other       History reviewed, no pertinent family history.   Social History     Tobacco Use    Smoking status: Former Smoker     Packs/day: 0.10     Years: 23.00     Pack years: 2.30     Last attempt to quit: 12/3/1975     Years since quittin.0    Smokeless tobacco: Never Used   Substance Use Topics    Alcohol use: No     Alcohol/week: 0.0 oz     Allergies   Allergen Reactions    Latex Rash, Swelling and Contact Dermatitis    Atorvastatin Unknown (comments)     Per patient's PCP allergy list- No reaction specified    Ezetimibe Myalgia    Lisinopril Cough    Metoprolol Other (comments)     Bradycardia    Morphine Other (comments)     Hallucinations and Nausea    Nitrofurantoin Rash     \"Blood blisters/rash\" per patient's family    Rosuvastatin Myalgia    Simvastatin Myalgia      Current Facility-Administered Medications   Medication Dose Route Frequency    sodium chloride (NS) flush 5-10 mL  5-10 mL IntraVENous Q8H    sodium chloride (NS) flush 5-10 mL  5-10 mL IntraVENous PRN    naloxone (NARCAN) injection 0.4 mg  0.4 mg IntraVENous PRN    0.9% sodium chloride infusion  125 mL/hr IntraVENous CONTINUOUS    acetaminophen (TYLENOL) tablet 650 mg  650 mg Oral Q4H PRN    ondansetron (ZOFRAN) injection 4 mg  4 mg IntraVENous Q4H PRN    bisacodyl (DULCOLAX) tablet 5 mg  5 mg Oral DAILY PRN    piperacillin-tazobactam (ZOSYN) 3.375 g in 0.9% sodium chloride (MBP/ADV) 100 mL  3.375 g IntraVENous Q8H    apixaban (ELIQUIS) tablet 2.5 mg  2.5 mg Oral BID    amLODIPine (NORVASC) tablet 5 mg  5 mg Oral DAILY    aspirin delayed-release tablet 81 mg  81 mg Oral QHS    isosorbide mononitrate ER (IMDUR) tablet 120 mg  120 mg Oral 7am    melatonin tablet 3 mg  3 mg Oral QHS    mirtazapine (REMERON) tablet 7.5 mg  7.5 mg Oral QHS    pantoprazole (PROTONIX) tablet 40 mg  40 mg Oral ACB    polyethylene glycol (MIRALAX) packet 17 g  17 g Oral DAILY PRN    glucose chewable tablet 16 g  4 Tab Oral PRN    dextrose (D50W) injection syrg 12.5-25 g  25-50 mL IntraVENous PRN    glucagon (GLUCAGEN) injection 1 mg  1 mg IntraMUSCular PRN    insulin lispro (HUMALOG) injection   SubCUTAneous AC&HS          LAB AND IMAGING FINDINGS:     Lab Results   Component Value Date/Time    WBC 6.0 12/03/2018 05:36 AM    HGB 11.0 (L) 12/03/2018 05:36 AM    PLATELET 125 (L) 02/77/5872 05:36 AM     Lab Results   Component Value Date/Time    Sodium 139 12/03/2018 05:36 AM    Potassium 3.8 12/03/2018 05:36 AM    Chloride 106 12/03/2018 05:36 AM    CO2 20 (L) 12/03/2018 05:36 AM    BUN 14 12/03/2018 05:36 AM    Creatinine 1.54 (H) 12/03/2018 05:36 AM    Calcium 8.7 12/03/2018 05:36 AM    Magnesium 1.5 (L) 12/03/2018 05:36 AM    Phosphorus 2.5 (L) 12/03/2018 05:36 AM      Lab Results   Component Value Date/Time    AST (SGOT) 11 (L) 12/03/2018 05:36 AM    Alk.  phosphatase 276 (H) 12/03/2018 05:36 AM    Protein, total 5.5 (L) 12/03/2018 05:36 AM    Albumin 2.1 (L) 12/03/2018 05:36 AM    Globulin 3.4 12/03/2018 05:36 AM     Lab Results   Component Value Date/Time    INR 1.1 02/02/2017 04:20 PM    Prothrombin time 10.9 02/02/2017 04:20 PM    aPTT 27.4 02/02/2017 04:20 PM      No results found for: IRON, FE, TIBC, IBCT, PSAT, FERR   No results found for: PH, PCO2, PO2  No components found for: [place]   Lab Results   Component Value Date/Time     07/14/2012 03:00 AM    CK - MB 2.8 07/14/2012 03:00 AM                Total time: 50  Counseling / coordination time, spent as noted above: 45  > 50% counseling / coordination?: yes    Prolonged service was provided for  []30 min   []75 min in face to face time in the presence of the patient, spent as noted above. Time Start:   Time End:   Note: this can only be billed with 36371 (initial) or 27866 (follow up). If multiple start / stop times, list each separately.

## 2018-12-03 NOTE — PROGRESS NOTES
0600: Patient too lethargic to take oral medications; wife stated that when patient is lethargic, he will not swallow medication and will pocket it in side of mouth and can choke on it.

## 2018-12-04 LAB
ALBUMIN SERPL-MCNC: 1.9 G/DL (ref 3.5–5)
ALBUMIN/GLOB SERPL: 0.6 {RATIO} (ref 1.1–2.2)
ALP SERPL-CCNC: 212 U/L (ref 45–117)
ALT SERPL-CCNC: 14 U/L (ref 12–78)
ANION GAP SERPL CALC-SCNC: 11 MMOL/L (ref 5–15)
AST SERPL-CCNC: 10 U/L (ref 15–37)
BACTERIA SPEC CULT: ABNORMAL
BACTERIA SPEC CULT: ABNORMAL
BILIRUB SERPL-MCNC: 0.6 MG/DL (ref 0.2–1)
BUN SERPL-MCNC: 16 MG/DL (ref 6–20)
BUN/CREAT SERPL: 11 (ref 12–20)
CALCIUM SERPL-MCNC: 8.3 MG/DL (ref 8.5–10.1)
CHLORIDE SERPL-SCNC: 105 MMOL/L (ref 97–108)
CO2 SERPL-SCNC: 20 MMOL/L (ref 21–32)
CREAT SERPL-MCNC: 1.5 MG/DL (ref 0.7–1.3)
ERYTHROCYTE [DISTWIDTH] IN BLOOD BY AUTOMATED COUNT: 13.7 % (ref 11.5–14.5)
GLOBULIN SER CALC-MCNC: 3.3 G/DL (ref 2–4)
GLUCOSE BLD STRIP.AUTO-MCNC: 125 MG/DL (ref 65–100)
GLUCOSE BLD STRIP.AUTO-MCNC: 146 MG/DL (ref 65–100)
GLUCOSE BLD STRIP.AUTO-MCNC: 204 MG/DL (ref 65–100)
GLUCOSE BLD STRIP.AUTO-MCNC: 215 MG/DL (ref 65–100)
GLUCOSE BLD STRIP.AUTO-MCNC: 244 MG/DL (ref 65–100)
GLUCOSE SERPL-MCNC: 169 MG/DL (ref 65–100)
HCT VFR BLD AUTO: 30.3 % (ref 36.6–50.3)
HGB BLD-MCNC: 9.5 G/DL (ref 12.1–17)
MAGNESIUM SERPL-MCNC: 1.5 MG/DL (ref 1.6–2.4)
MCH RBC QN AUTO: 26.9 PG (ref 26–34)
MCHC RBC AUTO-ENTMCNC: 31.4 G/DL (ref 30–36.5)
MCV RBC AUTO: 85.8 FL (ref 80–99)
NRBC # BLD: 0 K/UL (ref 0–0.01)
NRBC BLD-RTO: 0 PER 100 WBC
PHOSPHATE SERPL-MCNC: 1.8 MG/DL (ref 2.6–4.7)
PLATELET # BLD AUTO: 150 K/UL (ref 150–400)
PMV BLD AUTO: 10.5 FL (ref 8.9–12.9)
POTASSIUM SERPL-SCNC: 3.6 MMOL/L (ref 3.5–5.1)
PROT SERPL-MCNC: 5.2 G/DL (ref 6.4–8.2)
RBC # BLD AUTO: 3.53 M/UL (ref 4.1–5.7)
SERVICE CMNT-IMP: ABNORMAL
SODIUM SERPL-SCNC: 136 MMOL/L (ref 136–145)
WBC # BLD AUTO: 4.8 K/UL (ref 4.1–11.1)

## 2018-12-04 PROCEDURE — 74011250637 HC RX REV CODE- 250/637: Performed by: INTERNAL MEDICINE

## 2018-12-04 PROCEDURE — 74011250636 HC RX REV CODE- 250/636: Performed by: INTERNAL MEDICINE

## 2018-12-04 PROCEDURE — 36415 COLL VENOUS BLD VENIPUNCTURE: CPT

## 2018-12-04 PROCEDURE — 74011000258 HC RX REV CODE- 258: Performed by: INTERNAL MEDICINE

## 2018-12-04 PROCEDURE — 74011636637 HC RX REV CODE- 636/637: Performed by: INTERNAL MEDICINE

## 2018-12-04 PROCEDURE — 51798 US URINE CAPACITY MEASURE: CPT

## 2018-12-04 PROCEDURE — 97530 THERAPEUTIC ACTIVITIES: CPT

## 2018-12-04 PROCEDURE — 97116 GAIT TRAINING THERAPY: CPT

## 2018-12-04 PROCEDURE — 85027 COMPLETE CBC AUTOMATED: CPT

## 2018-12-04 PROCEDURE — 65270000029 HC RM PRIVATE

## 2018-12-04 PROCEDURE — 80053 COMPREHEN METABOLIC PANEL: CPT

## 2018-12-04 PROCEDURE — 84100 ASSAY OF PHOSPHORUS: CPT

## 2018-12-04 PROCEDURE — 83735 ASSAY OF MAGNESIUM: CPT

## 2018-12-04 PROCEDURE — 94760 N-INVAS EAR/PLS OXIMETRY 1: CPT

## 2018-12-04 PROCEDURE — 82962 GLUCOSE BLOOD TEST: CPT

## 2018-12-04 RX ORDER — MAGNESIUM SULFATE 1 G/100ML
1 INJECTION INTRAVENOUS
Status: COMPLETED | OUTPATIENT
Start: 2018-12-04 | End: 2018-12-04

## 2018-12-04 RX ORDER — DEXTROSE, SODIUM CHLORIDE, AND POTASSIUM CHLORIDE 5; .45; .15 G/100ML; G/100ML; G/100ML
25 INJECTION INTRAVENOUS CONTINUOUS
Status: DISCONTINUED | OUTPATIENT
Start: 2018-12-04 | End: 2018-12-05

## 2018-12-04 RX ORDER — LANOLIN ALCOHOL/MO/W.PET/CERES
400 CREAM (GRAM) TOPICAL 2 TIMES DAILY
Status: DISCONTINUED | OUTPATIENT
Start: 2018-12-04 | End: 2018-12-05 | Stop reason: HOSPADM

## 2018-12-04 RX ADMIN — Medication 10 ML: at 06:42

## 2018-12-04 RX ADMIN — ISOSORBIDE MONONITRATE 120 MG: 60 TABLET ORAL at 06:41

## 2018-12-04 RX ADMIN — Medication 10 ML: at 22:09

## 2018-12-04 RX ADMIN — Medication 400 MG: at 17:48

## 2018-12-04 RX ADMIN — INSULIN LISPRO 2 UNITS: 100 INJECTION, SOLUTION INTRAVENOUS; SUBCUTANEOUS at 22:16

## 2018-12-04 RX ADMIN — PIPERACILLIN SODIUM,TAZOBACTAM SODIUM 3.38 G: 3; .375 INJECTION, POWDER, FOR SOLUTION INTRAVENOUS at 06:41

## 2018-12-04 RX ADMIN — MIRTAZAPINE 7.5 MG: 15 TABLET, FILM COATED ORAL at 22:08

## 2018-12-04 RX ADMIN — Medication 400 MG: at 12:34

## 2018-12-04 RX ADMIN — PANTOPRAZOLE SODIUM 40 MG: 40 TABLET, DELAYED RELEASE ORAL at 06:41

## 2018-12-04 RX ADMIN — APIXABAN 2.5 MG: 2.5 TABLET, FILM COATED ORAL at 08:40

## 2018-12-04 RX ADMIN — INSULIN LISPRO 3 UNITS: 100 INJECTION, SOLUTION INTRAVENOUS; SUBCUTANEOUS at 12:42

## 2018-12-04 RX ADMIN — DEXTROSE, SODIUM CHLORIDE, AND POTASSIUM CHLORIDE 25 ML/HR: 5; .45; .15 INJECTION INTRAVENOUS at 12:43

## 2018-12-04 RX ADMIN — INSULIN LISPRO 3 UNITS: 100 INJECTION, SOLUTION INTRAVENOUS; SUBCUTANEOUS at 17:47

## 2018-12-04 RX ADMIN — MELATONIN TAB 3 MG 3 MG: 3 TAB at 22:09

## 2018-12-04 RX ADMIN — MAGNESIUM SULFATE HEPTAHYDRATE 1 G: 1 INJECTION, SOLUTION INTRAVENOUS at 13:38

## 2018-12-04 RX ADMIN — CEFTRIAXONE SODIUM 2 G: 2 INJECTION, POWDER, FOR SOLUTION INTRAMUSCULAR; INTRAVENOUS at 12:36

## 2018-12-04 RX ADMIN — AMLODIPINE BESYLATE 5 MG: 5 TABLET ORAL at 08:40

## 2018-12-04 RX ADMIN — MAGNESIUM SULFATE HEPTAHYDRATE 1 G: 1 INJECTION, SOLUTION INTRAVENOUS at 12:00

## 2018-12-04 NOTE — CONSULTS
Requesting Provider: Betty Quiñonez MD  Reason for Consultation: bacteremia  Pre-existing [facility] Patient:   No                Patient: Wiliam Guzman. MRN: [id number]  SSN: [id number]    YOB: 1935  Age: 80 y.o. Sex: male     Location: 0/26       Code Status: DNR   PCP: Ceci, MD Chance  - None   Emergency Contact:  Primary Emergency Contact: Najma Hernandez, Home Phone: [phone]   Race/Christianity/Language: [facility] / [facility] / Cm Dubon   Payor: Payor: Chela Malone / Plan: [address] / Product Type: Medicare /    Prior Admission Data: 12/1/18 [facility] EMERGENCY DEPT     Hospitalized:  Hospital Day: 3 - Admitted 12/2/2018  8:55 PM   POD # * No surgery found *  by * Surgery not found * - Blood Loss: * No surgery found * * Surgery not found *     CONSULTANTS  IP CONSULT TO HOSPITALIST  IP CONSULT TO PALLIATIVE CARE - PROVIDER  IP CONSULT TO INFECTIOUS DISEASES  IP CONSULT TO [address] [place] [address]    ICD-10-CM ICD-9-CM   1. Fever, unspecified fever cause R50.9 780.60   2. Bacteremia R78.81 790.7   3. Goals of care, counseling/discussion Z71.89 V65.49   4. Debility R53.81 799.3   5. DNR (do not resuscitate) discussion Z71.89 V65.49   6. Advanced care planning/counseling discussion Z71.89 V65.49         Assessment/Plan:     · Bacteremis, unclear etiology  · Consider cysto some point to look at sphincter cuff, check bladder scan to make sure he is empty. · Will discuss with ID re aspiration of L cyst, I think this is low yeild but now may be good opportunity while off blood thinners. Wefeel holding off is best for now. · Plan outpatient follow up for now     CC: Abnormal Lab Results   HPI: He is a 80 y.o. male admitted with ecoli sepsis second time this fall. known to us. Please see last office note. Was on nitrofurantoin suppression. Came in with confusion. No uti sx.incontinent per wife. Urine clear this admission. 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 There is question concerning L renal cyst as a source of the bacteremia. Pt denies L flank paun. Ct reviewed, cyst appears uninflammaned and unchanged from previous CT. No hydro. Echo neg for vegetations although scan was suboptimal.    Pt wife states he is on anticoagulants, however none on present med list. Appears last taken . Temp (24hrs), Av.3 °F (36.8 °C), Min:97.5 °F (36.4 °C), Max:99.2 °F (37.3 °C)    Creatinine   Date/Time Value Ref Range Status   2018 02:59 AM 1.50 (H) 0.70 - 1.30 MG/DL Final   2018 05:36 AM 1.54 (H) 0.70 - 1.30 MG/DL Final   2018 09:18 PM 1.62 (H) 0.70 - 1.30 MG/DL Final   2018 06:54 PM 1.50 (H) 0.70 - 1.30 MG/DL Final   2018 03:38 AM 1.25 0.70 - 1.30 MG/DL Final     Current Antimicrobial Therapy (168h ago, onward)    Ordered     Start Stop    18 1006  cefTRIAXone (ROCEPHIN) 2 g in 0.9% sodium chloride (MBP/ADV) 50 mL  2 g,   IntraVENous,   EVERY 24 HOURS      18 1100 --        Diet: DIET CARDIAC Regular  DIET NUTRITIONAL SUPPLEMENTS All Meals;  Ensure High Protein - % Diet Eaten: 25 %  Urinary Status: Voiding     Labs     Lab Results   Component Value Date/Time    Lactic acid 2.6 (HH) 2018 09:18 PM    Lactic acid 1.0 2018 06:54 PM    Lactic acid 1.2 2018 12:47 PM    WBC 4.8 2018 02:59 AM    HCT 30.3 (L) 2018 02:59 AM    PLATELET 982  02:59 AM    Sodium 136 2018 02:59 AM    Potassium 3.6 2018 02:59 AM    Chloride 105 2018 02:59 AM    CO2 20 (L) 2018 02:59 AM    BUN 16 2018 02:59 AM    Creatinine 1.50 (H) 2018 02:59 AM    Glucose 169 (H) 2018 02:59 AM    Calcium 8.3 (L) 2018 02:59 AM    Magnesium 1.5 (L) 2018 02:59 AM    INR 1.1 2017 04:20 PM    Prostate Specific Ag 0.0 (L) 2018 11:02 AM     UA:   Lab Results   Component Value Date/Time    Color YELLOW/STRAW 2018 07:31 PM    Appearance CLEAR 2018 07:31 PM    Specific gravity 1.009 12/01/2018 07:31 PM    pH (UA) 6.0 12/01/2018 07:31 PM    Protein NEGATIVE  12/01/2018 07:31 PM    Glucose NEGATIVE  12/01/2018 07:31 PM    Ketone NEGATIVE  12/01/2018 07:31 PM    Bilirubin NEGATIVE  12/01/2018 07:31 PM    Urobilinogen 1.0 12/01/2018 07:31 PM    Nitrites NEGATIVE  12/01/2018 07:31 PM    Leukocyte Esterase NEGATIVE  12/01/2018 07:31 PM    Epithelial cells FEW 12/01/2018 07:31 PM    Bacteria NEGATIVE  12/01/2018 07:31 PM    WBC 0-4 12/01/2018 07:31 PM    RBC 0-5 12/01/2018 07:31 PM     Imaging     Results for orders placed during the hospital encounter of 12/02/18   CT ABD PELV WO CONT    Narrative EXAM:  CT ABD PELV WO CONT    INDICATION: Infection, abdomen-pelvis; GNR bacteremia  Etiology unknown      COMPARISON: November 15, 2018    CONTRAST:  None. TECHNIQUE:   Thin axial images were obtained through the abdomen and pelvis. Coronal and  sagittal reconstructions were generated. Oral contrast was not administered. CT  dose reduction was achieved through use of a standardized protocol tailored for  this examination and automatic exposure control for dose modulation. The absence of intravenous contrast material reduces the sensitivity for  evaluation of the solid parenchymal organs of the abdomen. FINDINGS:   LUNG BASES: Unchanged bilateral peripheral interstitial lung disease. Small  bilateral pleural effusions, not significantly changed. INCIDENTALLY IMAGED HEART AND MEDIASTINUM: Extensive coronary artery  calcifications. LIVER: No mass or biliary dilatation. GALLBLADDER: Surgically absent. SPLEEN: No mass. PANCREAS: No mass or ductal dilatation. ADRENALS: Unremarkable. KIDNEYS/URETERS: No calculus or hydronephrosis. Unchanged 4.4 cm left renal  cyst. Unchanged 1.2 cm solid mass in the left pararenal space. STOMACH: Unremarkable. SMALL BOWEL: No dilatation or wall thickening. COLON: No dilatation or wall thickening. APPENDIX: Unremarkable.   PERITONEUM: No ascites or pneumoperitoneum. RETROPERITONEUM: No lymphadenopathy or aortic aneurysm. REPRODUCTIVE ORGANS: Status post prostatectomy. Benna Him URINARY BLADDER: No mass or calculus. BONES: No destructive bone lesion. Degenerative changes are present in the  thoracolumbar spine. ADDITIONAL COMMENTS: Penile prosthesis reservoir in the left anterior pelvis. Impression IMPRESSION:  Unchanged interstitial lung disease with small bilateral pleural effusions. Extensive coronary artery calcifications. 1.2 cm solid mass in the left  pararenal space is not significantly changed but suspicious for neoplasm. Unchanged 4.4 similar left renal cyst.         US Results (most recent):  Results from Hospital Encounter encounter on 11/14/18   US THYROID/PARATHYROID/SOFT TISS    Narrative EXAM:  US THYROID/PARATHYROID/SOFT TISS     INDICATION: Hypercalcemia. COMPARISON: None. TECHNIQUE: Real-time sonography of the thyroid gland was performed with a high  frequency linear transducer. Multiple static images were obtained. FINDINGS:  The thyroid echotexture is normal. There are 2 hypoechoic nodules in the left  lobe which measure 5.7 and 5.4 mm. No nodules adjacent to the thyroid are seen. The right lobe is difficult to measure. This is due to limitations on mobility. The left measures 2.6 x 1.4 x 1.4 cm. The isthmus measures 4.5 mm. Impression IMPRESSION: Tiny left thyroid nodules. Limited for evaluation of parathyroid  adenoma due to exam limitations.          Cultures     All Micro Results     Procedure Component Value Units Date/Time    CULTURE, BLOOD, PAIRED [[id number]] Collected:  12/02/18 2118    Order Status:  Completed Specimen:  Blood Updated:  12/04/18 0712     Special Requests: NO SPECIAL REQUESTS        Culture result: NO GROWTH 2 DAYS       CULTURE, URINE [[id number]]     Order Status:  Sent Specimen:  Urine from Clean catch            Past History: (Complete 2+/3 areas)     Allergies   Allergen Reactions    Latex Rash, Swelling and Contact Dermatitis    Atorvastatin Unknown (comments)     Per patient's PCP allergy list- No reaction specified    Ezetimibe Myalgia    Lisinopril Cough    Metoprolol Other (comments)     Bradycardia    Morphine Other (comments)     Hallucinations and Nausea    Nitrofurantoin Rash     \"Blood blisters/rash\" per patient's family    Rosuvastatin Myalgia    Simvastatin Myalgia      Current Facility-Administered Medications   Medication Dose Route Frequency    cefTRIAXone (ROCEPHIN) 2 g in 0.9% sodium chloride (MBP/ADV) 50 mL  2 g IntraVENous Q24H    dextrose 5% - 0.45% NaCl with KCl 20 mEq/L infusion  25 mL/hr IntraVENous CONTINUOUS    magnesium oxide (MAG-OX) tablet 400 mg  400 mg Oral BID    sodium chloride (NS) flush 5-10 mL  5-10 mL IntraVENous Q8H    sodium chloride (NS) flush 5-10 mL  5-10 mL IntraVENous PRN    naloxone (NARCAN) injection 0.4 mg  0.4 mg IntraVENous PRN    acetaminophen (TYLENOL) tablet 650 mg  650 mg Oral Q4H PRN    ondansetron (ZOFRAN) injection 4 mg  4 mg IntraVENous Q4H PRN    bisacodyl (DULCOLAX) tablet 5 mg  5 mg Oral DAILY PRN    amLODIPine (NORVASC) tablet 5 mg  5 mg Oral DAILY    isosorbide mononitrate ER (IMDUR) tablet 120 mg  120 mg Oral 7am    melatonin tablet 3 mg  3 mg Oral QHS    mirtazapine (REMERON) tablet 7.5 mg  7.5 mg Oral QHS    pantoprazole (PROTONIX) tablet 40 mg  40 mg Oral ACB    glucose chewable tablet 16 g  4 Tab Oral PRN    dextrose (D50W) injection syrg 12.5-25 g  25-50 mL IntraVENous PRN    glucagon (GLUCAGEN) injection 1 mg  1 mg IntraMUSCular PRN    insulin lispro (HUMALOG) injection   SubCUTAneous AC&HS    Prior to Admission medications    Medication Sig Start Date End Date Taking? Authorizing Provider   acetaminophen (TYLENOL) 325 mg tablet Take 650 mg by mouth every four (4) hours as needed for Pain or Fever.    Yes Provider, Historical   ibuprofen (MOTRIN) 600 mg tablet Take 1 Tab by mouth every six (6) hours as needed for Pain (or fevers > 101- if unresponsive to Tylenol). 12/1/18  Yes Joelle Singh NP   aspirin delayed-release 81 mg tablet Take 81 mg by mouth nightly. Yes Provider, Historical   amLODIPine (NORVASC) 10 mg tablet Take 5 mg by mouth daily. Yes Provider, Historical   losartan (COZAAR) 25 mg tablet Take 25 mg by mouth nightly. Yes Provider, Historical   nystatin (MYCOSTATIN) powder Apply  to affected area daily as needed for Other (Itching/Irritation- Groin area). To groin after showering and drying    Yes Provider, Historical   docusate sodium (COLACE) 100 mg capsule Take 100 mg by mouth daily as needed. Yes Provider, Historical   senna (SENNA) 8.6 mg tablet Take 1 Tab by mouth daily as needed for Constipation. Yes Provider, Historical   isosorbide mononitrate ER (IMDUR) 120 mg CR tablet Take 1 Tab by mouth every morning. 2/4/17  Yes Leno Tavarez MD   melatonin 3 mg tablet Take 3 mg by mouth nightly. Yes Provider, Historical   polyethylene glycol (MIRALAX) 17 gram packet Take 17 g by mouth daily as needed (Constipation). Yes Provider, Historical   mirtazapine (REMERON) 15 mg tablet Take 7.5 mg by mouth nightly. Yes Provider, Historical   apixaban (ELIQUIS) 2.5 mg tablet Take 2.5 mg by mouth two (2) times a day. Yes Provider, Historical   omeprazole (PRILOSEC) 20 mg capsule Take 20 mg by mouth Daily (before breakfast). Yes Provider, Historical   nitroglycerin (NITROSTAT) 0.4 mg SL tablet 0.4 mg by SubLINGual route every five (5) minutes as needed.      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 PSurgHx:  has a past surgical history that includes hx coronary stent placement (2011); hx orthopaedic (1994); hx orthopaedic (1994); hx tonsillectomy (1987); hx heent (1989); hx prostatectomy (2000); pr cardiac surg procedure unlist (1/2011); hx heart catheterization (2011, 2013); hx urological (2001); hx lithotripsy (8/2015); hx urological (2003); hx urological (12/2013); hx urological (9/2015); hx cholecystectomy (7/13/2012); hx hernia repair (1968); ROBOTIC ASSISTED LEFT  CONVERTED LAPRAOSCOPIC HAND ASSISTED ABDOMINAL MASS EXCISION, (Left, 11/2/2015); DAVINCI LEFT PARTIAL NEPHRECTOMY (Left, 12/19/2013); COLONOSCOPY (N/A, 12/10/2012); ENDOSCOPIC POLYPECTOMY (N/A, 12/10/2012); and CHOLECYSTECTOMY LAPAROSCOPIC (N/A, 7/13/2012). PSocHx:  reports that he quit smoking about 43 years ago. He has a 2.30 pack-year smoking history. he has never used smokeless tobacco. He reports that he does not drink alcohol or use drugs.    ROS:  (Complete - 10 systems) - positives are bolded : Weightloss (Constitutional), Dry mouth (ENMT), Chest pain (CV), SOB (Respiratory), Constipation (GI), Weakness (MS), Pallor (Skin), TIA Sx (Neuro), Confusion (Psych), Easy bruising (Heme)    Physical Exam: (Comprehesive - 8+ 1995 Systems)     (1) Constitutional:  FIO2:   on SpO2: O2 Sat (%): 96 %  O2 Device: Room air    Patient Vitals for the past 24 hrs:   BP Temp Pulse Resp SpO2 Height   12/04/18 1241      5' 7\" (1.702 m)   12/04/18 1124 120/69 99.2 °F (37.3 °C) 74 16 96 %    12/04/18 0739 128/66 98.6 °F (37 °C) 73 18 96 %    12/04/18 0515 140/65 97.5 °F (36.4 °C) 75 18 96 %    12/04/18 0012 137/62 97.7 °F (36.5 °C) 78 18 96 %    12/03/18 2035 143/70 98.8 °F (37.1 °C) 77 19 95 %    12/03/18 1537 127/64 97.7 °F (36.5 °C) 74 17 96 %        Date 12/03/18 0700 - 12/04/18 0659 12/04/18 0700 - 12/05/18 0659   Shift 8073-2650 5938-8771 24 Hour Total 6471-4170 1804-8862 24 Hour Total   INTAKE P.O. 840  840 240  240     P. O. 840  840 240  240   I. V.(mL/kg/hr)  100(0.1) 100(0.1)        Volume (0.9% sodium chloride infusion)  0 0        Volume (piperacillin-tazobactam (ZOSYN) 3.375 g in 0.9% sodium chloride (MBP/ADV) 100 mL)  100 100      Shift Total(mL/kg) 840(10.4) 100(1.2) 940(11.6) 240(3)  240(3)   OUTPUT   Urine(mL/kg/hr) 230(0.2)  230(0.1)        Urine Voided 230  230        Urine Occurrence(s) 1 x 1 x 2 x      Stool           Stool Occurrence(s) 1 x 1 x 2 x      Shift Total(mL/kg) 230(2.8)  230(2.8)       100 710 240  240   Weight (kg) 80.7 80.7 80.7 80.7 80.7 80.7      (2) ENMT:   moist mucous membranes, confused   (3) Respiratory:  breathing easily   (4) GI:  no abdominal masses, tenderness, no hepatosplenomegaly, no ventral hernia, stool for occult blood not indicated as urologist.   (5) :   Normal, AUS intact. No induration.    (6) Lymphatic:  no adenopathy   (7) Muscloskeletal:  no gross deformity   (8) Skin:  no rash   (9) Neuro:  no focal deficits      Signed By: Zane Fabian MD  - December 4, 2018

## 2018-12-04 NOTE — PROGRESS NOTES
Nutrition Assessment: 
 
RECOMMENDATIONS/INTERVENTION(S):  
Continue Cardiac diet- consider adding No Conc. Sweets to diet for elevated BG. Pt with D5 1/2NS KCl 20 @ 25 mL/hr. - provides 102 kcal 
Monitor PO intakes, weight, BG, Continue Ensure HP with meals- change to Enlive if PO <25% for added kcal/protein.  
  
ASSESSMENT:  
12/4: 80 yr old male admitted for fever. PMHx: HTN, hypothyroidism, SAMUEL, left renal mass, PAF, CAD, prostate cancer, arthritis, GERD, renal cell cancer, DM, and CKD. Consult received for General nutrition management. Per chart pt has lost 2 lbs in the last 2 months. Pt down 17 lbs (8%) from 1.25 yr ago. Not significant for time frame. Intakes documented as 25-40%. Last BM 12/3. No n/v. BG elevated- consider adding No Concentrated Sweets to diet. A1C 6.3, Hgb 9.5. Phos 1.8, Mg 1.5, Codey 8.3. SUBJECTIVE/OBJECTIVE:  
Diet Order: Cardiac 
% Eaten:   
Patient Vitals for the past 168 hrs: 
 % Diet Eaten 12/04/18 1000 25 % 12/03/18 1257 40 % 12/03/18 0912 40 % Pertinent Medications: [x] Reviewed Labs reviewed:  [x] Anthropometrics: Height: 5' 7\" (170.2 cm) Weight: 80.7 kg (178 lb) IBW (%IBW): 67 kg (147 lb 11.3 oz) ( ) UBW (%UBW):   (  %) BMI: Body mass index is 27.88 kg/m². This BMI is indicative of: 
 
 [] Underweight    [x] Normal for age    [] Overweight    []  Obesity    []  Extreme Obesity (BMI>40) Estimated Nutrition Needs (Based on): 5995 Kcals/day(BMR(1461x1.3)) , 81 g(-97g/day(1.0-1.2g/kg)) Protein Carbohydrate: At Least 130 g/day  Fluids: 1750 mL/day (1mL/kg rounded to 50 mL) Last BM: 12/3   [x]Active     []Hyperactive  []Hypoactive       [] Absent   BS Skin:    [x] Intact   [] Incision  [] Breakdown   [] DTI   [] Tears/Excoriation/Abrasion  []Edema [] Other: Wt Readings from Last 30 Encounters:  
12/02/18 80.7 kg (178 lb)  
11/14/18 80.7 kg (178 lb)  
09/12/18 81.6 kg (180 lb) 08/25/17 88.5 kg (195 lb)  
07/16/17 88.5 kg (195 lb) 03/02/17 91.6 kg (202 lb)  
02/20/17 91.6 kg (202 lb) 02/04/17 89.1 kg (196 lb 6.4 oz) 03/24/16 87.2 kg (192 lb 4.8 oz) 02/17/16 90.7 kg (200 lb) 12/30/15 89 kg (196 lb 3.2 oz)  
11/24/15 92.4 kg (203 lb 9.6 oz) 11/02/15 93.4 kg (206 lb) 10/26/15 93.4 kg (206 lb) 10/09/15 92.7 kg (204 lb 6.4 oz) 09/30/15 92.3 kg (203 lb 6.4 oz) 08/12/15 94.8 kg (209 lb) 04/13/15 93.4 kg (206 lb) 12/08/14 95.5 kg (210 lb 9.6 oz) 08/06/14 92.1 kg (203 lb) 04/28/14 88 kg (194 lb)  
03/26/14 88.5 kg (195 lb) 12/19/13 88.5 kg (195 lb) 12/10/13 90.7 kg (199 lb 15.3 oz) 05/06/13 92.5 kg (204 lb) 12/10/12 90.7 kg (200 lb) 11/05/12 84.4 kg (186 lb) 08/13/12 88.8 kg (195 lb 12.8 oz) 08/03/12 79.4 kg (175 lb)  
07/30/12 88 kg (194 lb) NUTRITION DIAGNOSES:  
Problem:  Inadequate energy intake Etiology: related to decreased ability to consume sufficient energy Signs/Symptoms: as evidenced by low PO intakes, ONS needed, weight loss NUTRITION INTERVENTIONS: 
Meals/Snacks: General/healthful diet   Supplements: Commercial supplement GOAL:  
Pt will consume >50% of meals and ONS w/i 3-5 days Cultural, Methodist, or Ethnic Dietary Needs: None LEARNING NEEDS (Diet, Food/Nutrient-Drug Interaction):  
 [x] None Identified 
 [] Identified and Education Provided/Documented 
 [] Identified and Pt declined/was not appropriate [x] Interdisciplinary Care Plan Reviewed/Documented  
 [x] Discharge Needs:    TBD [] No Nutrition Related Discharge Needs NUTRITION RISK:  
Pt Is At Nutrition Risk  [x] No Nutrition Risk Identified  [] PT SEEN FOR:  
 [x]  MD Consult: []Calorie Count []Diabetic Diet Education []Diet Education []Electrolyte Management 
   [x]General Nutrition Management and Supplements []Management of Tube Feeding []TPN Recommendations []  RN Referral:  []MST score >=2 
   []Enteral/Parenteral Nutrition PTA []Pregnant: Gestational DM or Multigestation  
              [] Pressure Ulcer 
   
[]  Low BMI      []  Length of Stay       [] Dysphagia Diet     [] Ventilator      [] Follow-Up Previous Recommendations: 
 [] Implemented          [] Not Implemented          [x] Not Applicable Previous Goal: 
 [] Met              [] Progressing Towards Goal              [] Not Progressing Towards Goal   [x] Not Applicable Jhoan Edwards RD Pager: [phone] Office: [phone]

## 2018-12-04 NOTE — PROGRESS NOTES
Post Discharge PICC and Antibiotic Orders 1. Diagnosis:  Ecoli bacteremia 2. Antibiotic:  Ceftriaxone 2gm IV daily through 12/16/18 3. Routine PICC/ Avinash/ Portacath Care including PRN catheter flow management 4. Weekly labs: 
 _x__CBC/diff/platelets _x__BUN/Creatinine 
 ___CPK _x__AST/TotalBilirubin/AlkalinePhosphatase 
 ___CRP 
 ___Gentamicin level  ___gentamicin peak/trough Trough Vancomycin level ____goal 10-15 ___goal 15-20 5. Fax lab to [phone]  Call Critical Lab Values to [phone] 6. May send to IR for line evaluation or replacement [phone] 7. Home Health to pull PIC line at end of therapy or send to IR for Avinash removal 
8. Allergies: Allergies Allergen Reactions  Latex Rash, Swelling and Contact Dermatitis  Atorvastatin Unknown (comments) Per patient's PCP allergy list- No reaction specified  Ezetimibe Myalgia  Lisinopril Cough  Metoprolol Other (comments) Bradycardia  Morphine Other (comments) Hallucinations and Nausea  Nitrofurantoin Rash \"Blood blisters/rash\" per patient's family  Rosuvastatin Myalgia  Simvastatin Myalgia 9. Pharmacy Consult for Vancomycin dosing/gentamicin dosing.  
 
 
Cain Mcdermott DO

## 2018-12-04 NOTE — CONSULTS
Cancer [place] at [facility] 88  [address] [facility], [address] 1007 [facility] Nip: [phone]  F: [phone]      Reason for Visit:   Latha Perry is a 80 y.o. male who is seen in consultation at the request of  for \"evaluation of renal cancer, new mass, bacteremia, prognosis, plan/hospice. \"      Hematology / Oncology Treatment History:     · CT A/P wo contrast 11/26/2013: 6.8cm mass lower pole left kidney. 0.9cm nodule right lung base  · Left partial nephrectomy 12/19/2013 with Dr. 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 pT3a pNx  · CT A/P wo contrast 8/6/2015: Two new soft tissue nodular lesions adjacent to left surgical bed, up to 3.3cm  · CT guided biopsy of left retroperitoneal mass 9/10/2015: carcinoma, consistent with RCC  · PET CT 10/7/2015: Known recurrence 23 x 39 mm soft tissue density is not significantly hypermetabolic. No evidence of recurrent hypermetabolic lesion in the head, neck, chest, abdomen or pelvis. · Partial omentectomy 11/2/2015 with Dr. Sidney Peterson: omental mass-metastatic renal cell carcinoma  · Stage IV Renal cell carcinoma      History of Present Illness:     Mr Granda Seen was admitted on 12/02/2018 from the ED when he presented with c/o AMS, lethargy and weakness. Recent BCx (12/1) positive for gram negative coccobacilli. Therefore was admitted for further eval and management. Recent admission for hypercalcemia; discharged to [facility]     Mr Granda Seen states \"just dont' feel well\" Denies any pain of SOB. Denies N/V. Able to state Jeanette is president; knows it is Dec; thought it was 1; Wife at bedside states was seen in the ED on Sat; she noticed him having chills; was sent back to [facility] and then they called with blood showing infection and requesting he be brought back to the ED. Has been following with Dr Sidney Peterson; saw in back in Sept; do to follow up in 1 yr.    Ms Granda Seen shares that there has been an area on the scans for quite some time and an area in the liver that Dr Sandra Bartholomew has been watching. She states that if something needed to bx for treatment , her  has said over and over that he does not want any additional treatment but if there is something that can be fixed like infection then he would want that. Past Medical History:   Diagnosis Date    Arthritis     CAD (coronary artery disease)     Angina Jan 2011, PCI LAD with multilink stent 4.0x12mm;  also cath at [place] 7/13 -- no stents at that time    CKD (chronic kidney disease), stage III ([address]     Cochlear implant in place 2/3/2017    Dementia     DM type 2 causing renal disease ([address]     DM type 2 causing vascular disease ([address]     GERD (gastroesophageal reflux disease)     HTN (hypertension)     Hypothyroidism     SAMUEL (obstructive sleep apnea)     not using CPAP    PAF (paroxysmal atrial fibrillation) (HCC)     [id number] score = 7    Prostate cancer ([facility]     Renal cell cancer ([address]     Stage 4 Renal Cell Cancer.     Partial omentectomy 11/2/2015 with Dr. Sandra Bartholomew: omental mass-metastatic renal cell carcinoma    Renal mass, left     DAVINCI LEFT PARTIAL NEPHRECTOMY 12/19/13;       Past Surgical History:   Procedure Laterality Date    CARDIAC SURG PROCEDURE UNLIST  1/2011    stent to LAD    HX CHOLECYSTECTOMY  7/13/2012    HX CORONARY STENT PLACEMENT  2011    HX HEART CATHETERIZATION  2011, 2013    HX HEENT  1989    stapedectomy    [address]    left inguinal    HX LITHOTRIPSY  8/2015    HX ORTHOPAEDIC  1994    rotator cuff repair    HX ORTHOPAEDIC  1994    cervical disc removed    HX PROSTATECTOMY  2000    surgery intervention    HX TONSILLECTOMY  1987    HX UROLOGICAL  2001    valvular implant prevent incontinence    HX UROLOGICAL  2003    insert gel to help with incontinence    HX UROLOGICAL  12/2013    left partial nephrectomy    HX UROLOGICAL  9/2015    CT guided biopsy of abdominal lymph nodes Social History     Tobacco Use    Smoking status: Former Smoker     Packs/day: 0.10     Years: 23.00     Pack years: 2.30     Last attempt to quit: 12/3/1975     Years since quittin.0    Smokeless tobacco: Never Used   Substance Use Topics    Alcohol use: No     Alcohol/week: 0.0 oz      Family History   Problem Relation Age of Onset    Stroke Father     Cancer Father         prostate    Diabetes Sister         2 sisters    Hypertension Sister         2 sisters    Diabetes Brother     Stroke Brother     Diabetes Brother         all brothers    Stroke Brother     Kidney Disease Brother     Heart Attack Brother     Hypertension Other      Current Facility-Administered Medications   Medication Dose Route Frequency    cefTRIAXone (ROCEPHIN) 2 g in 0.9% sodium chloride (MBP/ADV) 50 mL  2 g IntraVENous Q24H    dextrose 5% - 0.45% NaCl with KCl 20 mEq/L infusion  25 mL/hr IntraVENous CONTINUOUS    magnesium oxide (MAG-OX) tablet 400 mg  400 mg Oral BID    sodium chloride (NS) flush 5-10 mL  5-10 mL IntraVENous Q8H    sodium chloride (NS) flush 5-10 mL  5-10 mL IntraVENous PRN    naloxone (NARCAN) injection 0.4 mg  0.4 mg IntraVENous PRN    acetaminophen (TYLENOL) tablet 650 mg  650 mg Oral Q4H PRN    ondansetron (ZOFRAN) injection 4 mg  4 mg IntraVENous Q4H PRN    bisacodyl (DULCOLAX) tablet 5 mg  5 mg Oral DAILY PRN    amLODIPine (NORVASC) tablet 5 mg  5 mg Oral DAILY    isosorbide mononitrate ER (IMDUR) tablet 120 mg  120 mg Oral 7am    melatonin tablet 3 mg  3 mg Oral QHS    mirtazapine (REMERON) tablet 7.5 mg  7.5 mg Oral QHS    pantoprazole (PROTONIX) tablet 40 mg  40 mg Oral ACB    glucose chewable tablet 16 g  4 Tab Oral PRN    dextrose (D50W) injection syrg 12.5-25 g  25-50 mL IntraVENous PRN    glucagon (GLUCAGEN) injection 1 mg  1 mg IntraMUSCular PRN    insulin lispro (HUMALOG) injection   SubCUTAneous AC&HS      Allergies   Allergen Reactions    Latex Rash, Swelling and Contact Dermatitis    Atorvastatin Unknown (comments)     Per patient's PCP allergy list- No reaction specified    Ezetimibe Myalgia    Lisinopril Cough    Metoprolol Other (comments)     Bradycardia    Morphine Other (comments)     Hallucinations and Nausea    Nitrofurantoin Rash     \"Blood blisters/rash\" per patient's family    Rosuvastatin Myalgia    Simvastatin Myalgia        Review of Systems: A complete review of systems was obtained, negative except as described above. Physical Exam:     Visit Vitals  /65 (BP 1 Location: Left arm, BP Patient Position: At rest)   Pulse 72   Temp 98.6 °F (37 °C)   Resp 16   Ht 5' 7\" (1.702 m)   Wt 178 lb (80.7 kg)   SpO2 95%   BMI 27.88 kg/m²     ECOG PS: 3  General: elderly, frail, No distress  Eyes: PERRLA, anicteric sclerae  HENT: Atraumatic with normal appearance of ears and nose; OP clear  Neck: Supple; no thyromegaly   Lymphatic: No cervical, supraclavicular, or axillary adenopathy  Respiratory: CTAB, normal respiratory effort  CV: Normal rate, regular rhythm, no murmurs, 2+ LE peripheral edema  GI: Soft, nontender, nondistended, no masses, no hepatomegaly, no splenomegaly  MS: Digits without clubbing or cyanosis. Skin: No rashes, ecchymoses, or petechiae. Normal temperature, turgor, and texture. Neuro/Psych: Alert, oriented to self and location, appropriate affect, fair judgment/insight      Results:     Lab Results   Component Value Date/Time    WBC 4.8 12/04/2018 02:59 AM    HGB 9.5 (L) 12/04/2018 02:59 AM    HCT 30.3 (L) 12/04/2018 02:59 AM    PLATELET 930 39/66/3186 02:59 AM    MCV 85.8 12/04/2018 02:59 AM    ABS.  NEUTROPHILS 4.4 12/03/2018 05:36 AM     Lab Results   Component Value Date/Time    Sodium 136 12/04/2018 02:59 AM    Potassium 3.6 12/04/2018 02:59 AM    Chloride 105 12/04/2018 02:59 AM    CO2 20 (L) 12/04/2018 02:59 AM    Glucose 169 (H) 12/04/2018 02:59 AM    BUN 16 12/04/2018 02:59 AM    Creatinine 1.50 (H) 12/04/2018 02:59 AM    GFR est AA 54 (L) 12/04/2018 02:59 AM    GFR est non-AA 45 (L) 12/04/2018 02:59 AM    Calcium 8.3 (L) 12/04/2018 02:59 AM    Glucose (POC) 204 (H) 12/04/2018 04:15 PM    Creatinine (POC) 1.5 (H) 11/26/2013 09:55 AM     Lab Results   Component Value Date/Time    Bilirubin, total 0.6 12/04/2018 02:59 AM    ALT (SGPT) 14 12/04/2018 02:59 AM    AST (SGOT) 10 (L) 12/04/2018 02:59 AM    Alk. phosphatase 212 (H) 12/04/2018 02:59 AM    Protein, total 5.2 (L) 12/04/2018 02:59 AM    Albumin 1.9 (L) 12/04/2018 02:59 AM    Globulin 3.3 12/04/2018 02:59 AM     Lab Results   Component Value Date/Time    Reticulocyte count 1.4 12/03/2018 03:02 PM    Iron % saturation 4 (L) 12/03/2018 03:02 PM    TIBC 187 (L) 12/03/2018 03:02 PM    Ferritin 246 12/03/2018 03:02 PM    Vitamin B12 898 12/03/2018 03:02 PM    Folate 11.6 12/03/2018 03:02 PM    Homocysteine, plasma 17.5 (H) 11/19/2018 03:55 AM    Methylmalonic acid 228 11/19/2018 03:55 AM    Haptoglobin 336 (H) 12/03/2018 03:02 PM     12/03/2018 03:02 PM    TSH 1.38 12/03/2018 03:02 PM    M-Juan 0.1 (H) 11/17/2018 11:02 AM    Lipase 132 12/01/2018 06:54 PM    Hep C  virus Ab Interp. NONREACTIVE 02/03/2017 06:15 AM     Lab Results   Component Value Date/Time    INR 1.1 02/02/2017 04:20 PM    aPTT 27.4 02/02/2017 04:20 PM    Fibrinogen 525 (H) 09/10/2015 08:35 AM     Lab Results   Component Value Date/Time    Carbohydrate Antigen 19-9, (CA 19-9) 45 (H) 11/17/2018 11:02 AM    Prostate Specific Ag 0.0 (L) 11/17/2018 11:02 AM     12/03/2018 03:02 PM     12/03/2018 CT ABD PELV WO CONT  . IMPRESSION:  Unchanged interstitial lung disease with small bilateral pleural effusions. Extensive coronary artery calcifications. 1.2 cm solid mass in the left  pararenal space is not significantly changed but suspicious for neoplasm. Unchanged 4.4 similar left renal cyst.        Assessment and Recommendations:     1.  Renal Cell Carcinoma  He is s/p partial nephrectomy and metastatectomy. He is at very high risk of recurrence. However, he is now about 3 years out from his omental resection and his scans remain overall stable, without clear evidence of progression/recurrence. There are a few indeterminate lesions noted on imaging which potentially could represent disease, but they all remain quite stable for several years. His kidney cancer does not appear to be an imminent threat to his life, prognosis from this diagnosis appears well over 6 months (and likely several years) and therefore this diagnosis does not qualify him for hospice at this time. More importantly, his wife is not interested in pursuing comfort measures at this time, and she requests continued treatment of his infection. I recommend continued surveillance with Dr. Sushant Frausto, and continue treatment of his active medical issues. 2. Bacteremia/ gram negative coccobacilli  ABX coverage  Urology consulted        Patient seen in conjunction with Gregoria Early NP.     Signed By: Dakota Hughes MD

## 2018-12-04 NOTE — PROGRESS NOTES
Problem: Pressure Injury - Risk of 
Goal: *Prevention of pressure injury Document Rogers Scale and appropriate interventions in the flowsheet. Outcome: Progressing Towards Goal 
Pressure Injury Interventions: 
Sensory Interventions: Assess changes in LOC, Float heels, Turn and reposition approx. every two hours (pillows and wedges if needed), Pad between skin to skin, Minimize linen layers, Monitor skin under medical devices Moisture Interventions: Absorbent underpads, Check for incontinence Q2 hours and as needed, Apply protective barrier, creams and emollients Activity Interventions: Increase time out of bed, PT/OT evaluation Mobility Interventions: Float heels, Turn and reposition approx. every two hours(pillow and wedges), PT/OT evaluation Nutrition Interventions: Document food/fluid/supplement intake, Offer support with meals,snacks and hydration Friction and Shear Interventions: HOB 30 degrees or less, Minimize layers Problem: Falls - Risk of 
Goal: *Absence of Falls Document Nallely Krause Fall Risk and appropriate interventions in the flowsheet. Outcome: Progressing Towards Goal 
Fall Risk Interventions: 
Mobility Interventions: Bed/chair exit alarm, OT consult for ADLs, Patient to call before getting OOB, PT Consult for mobility concerns, PT Consult for assist device competence Mentation Interventions: Bed/chair exit alarm, Door open when patient unattended Medication Interventions: Bed/chair exit alarm, Patient to call before getting OOB, Teach patient to arise slowly Elimination Interventions: Bed/chair exit alarm, Call light in reach

## 2018-12-04 NOTE — PROGRESS NOTES
[facility] Progress Note Patricia Montoya DO 
  [phone] Office [phone]  Fax 
 
2018 Assessment & Plan: 1. Ecoli bacteremia. The etiology is unclear. Urinalysis is bland. CT abd/pelvis does not show any acute disease. Narrow abx to ceftriaxone. Avoid quinolone abx due to elevated QTc.  IV abx orders in chart. Patient will need a Avinash catheter at discharge 2. 1.2 cm solid mass in the left pararenal space suspicious for neoplasm. Urology to see 3. Chronic kidney disease. This is stable. 4. Diabetes mellitus. Management per primary team.  
  
 
 
Subjective: No complaints Objective:  
 
Vitals:  
Visit Vitals /66 (BP 1 Location: Left arm, BP Patient Position: At rest) Pulse 73 Temp 98.6 °F (37 °C) Resp 18 Wt 80.7 kg (178 lb) SpO2 96% BMI 27.88 kg/m² Tmax:  Temp (24hrs), Av °F (36.7 °C), Min:97.5 °F (36.4 °C), Max:98.8 °F (37.1 °C) Exam:  
Patient is intubated:  no 
 
Physical Examination:  
General:  Alert, cooperative, no distress Head:  Normocephalic, atraumatic. Eyes:  Conjunctivae clear Neck: Supple Lungs:   No distress. CTA Chest wall:    
Heart:  Regular rate and rhythm Abdomen:     
Extremities: Moves all. Skin:   
Neurologic: CNII-XII intact Labs:     
 
No lab exists for component: ITNL No results for input(s): CPK, CKMB, TROIQ in the last 72 hours. Recent Labs 18 
0259 18 
0536 18 2118  139 135* K 3.6 3.8 3.7  106 101 CO2 20* 20* 21 BUN 16 14 14 CREA 1.50* 1.54* 1.62* * 184* 219* PHOS 1.8* 2.5*  --   
MG 1.5* 1.5*  --   
ALB 1.9* 2.1* 2.4* WBC 4.8 6.0 4.0* HGB 9.5* 11.0* 11.0*  
HCT 30.3* 34.5* 34.1*  
 144* 152 No results for input(s): INR, PTP, APTT in the last 72 hours. No lab exists for component: INREXT Needs: urine analysis, urine sodium, protein and creatinine No results found for: Lexis Burks Cultures:  
 
Lab Results Component Value Date/Time Specimen Description: CLEAN [facility] 12/19/2013 11:52 AM  
 Specimen Description: DM 07/13/2012 04:45 PM  
 
Lab Results Component Value Date/Time Culture result: NO GROWTH 2 DAYS 12/02/2018 09:18 PM  
 Culture result: (A) 12/01/2018 06:54 PM  
  ESCHERICHIA COLI GROWING IN ALL 4 BOTTLES DRAWN (SITES = L AC 1 AND L AC 2) Culture result: (A) 12/01/2018 06:54 PM  
  PRELIMINARY REPORT OF GRAM NEGATIVE RODS GROWING IN ALL 4 BOTTLES DRAWN CALLED TO AND READ BACK BY KIMBERLY SHEN 12/2/18 1308 SP Radiology:  
 
Medications Current Facility-Administered Medications Medication Dose Route Frequency Last Dose  cefTRIAXone (ROCEPHIN) 2 g in 0.9% sodium chloride (MBP/ADV) 50 mL  2 g IntraVENous Q24H    
 sodium chloride (NS) flush 5-10 mL  5-10 mL IntraVENous Q8H 10 mL at 12/04/18 4535  sodium chloride (NS) flush 5-10 mL  5-10 mL IntraVENous PRN 10 mL at 12/03/18 5791  naloxone [facility]) injection 0.4 mg  0.4 mg IntraVENous PRN    
 0.9% sodium chloride infusion  125 mL/hr IntraVENous CONTINUOUS 125 mL/hr at 12/03/18 1630  acetaminophen (TYLENOL) tablet 650 mg  650 mg Oral Q4H PRN    
 ondansetron (ZOFRAN) injection 4 mg  4 mg IntraVENous Q4H PRN    
 bisacodyl (DULCOLAX) tablet 5 mg  5 mg Oral DAILY PRN    
 apixaban (ELIQUIS) tablet 2.5 mg  2.5 mg Oral BID 2.5 mg at 12/04/18 0840  
 amLODIPine (NORVASC) tablet 5 mg  5 mg Oral DAILY 5 mg at 12/04/18 0840  aspirin delayed-release tablet 81 mg  81 mg Oral QHS 81 mg at 12/03/18 2226  isosorbide mononitrate ER (IMDUR) tablet 120 mg  120 mg Oral 7am 120 mg at 12/04/18 0641  
 melatonin tablet 3 mg  3 mg Oral QHS 3 mg at 12/03/18 2226  mirtazapine (REMERON) tablet 7.5 mg  7.5 mg Oral QHS 7.5 mg at 12/03/18 2226  pantoprazole (PROTONIX) tablet 40 mg  40 mg Oral ACB 40 mg at 12/04/18 0641  
 glucose chewable tablet 16 g  4 Tab Oral PRN    
  dextrose (D50W) injection syrg 12.5-25 g  25-50 mL IntraVENous PRN    
 glucagon (GLUCAGEN) injection 1 mg  1 mg IntraMUSCular PRN    
 insulin lispro (HUMALOG) injection   SubCUTAneous AC&HS Stopped at 12/03/18 1130 Case discussed with: Dr Red Slaughter DO

## 2018-12-04 NOTE — PROGRESS NOTES
UROLOGY RECENT OFFICE NOTE HISTORY: 
 
 
Donald Simms is an 80year old male who presents today for \"renal cell cancer\". He returns for follow-up. He has a history of prostate cancer treated with prostatectomy in 2000. He had an artificial urinary sphincter in 2003, which is non-functional.  He has a history of metastatic kidney cancer as detailed in my note from 3/20/18. He has met with Dr. Kaylan Quezada, but has declined treatment for what appears to be progressive metastatic kidney cancer. He was hospitalized last month at [address] [place] [address] for what they were told was a severe urinary tract infection. His symptoms were mostly confusion and weakness. After being hospitalized for several days he went to a rehab facility. Of note, he does remain incontinent but voids some throughout the day. His sphincter has reportedly been deactivated. He wears a condom catheter at night for his incontinence. His outside records were reviewed and he had a CT without contrast on 9/9/18. No mention is made of any liver lesions. There is an indeterminate lesion in the left kidney possibly representing a cyst, but characterization was not possible without IV contrast.   
 
 
PAST MEDICAL HISTORY: 
 
Allergies: MORPHINE (Critical) DENIES: Shellfish, X-Ray Dye, Iodine. Medications: MACRODANTIN 50 MG ORAL CAPSULE (NITROFURANTOIN MACROCRYSTAL) 1 po qd MIRTAZAPINE 15 MG ORAL TABLET DISINTEGRATING (MIRTAZAPINE) 1/2 Pill at Bedtime ELIQUIS 2.5 MG ORAL TABLET (APIXABAN) bid ASPIRIN 81 MG ORAL TABLET (ASPIRIN) daily METOPROLOL TARTRATE 25 MG ORAL TABLET (METOPROLOL TARTRATE) daily B-12 500 MCG ORAL TABLET (CYANOCOBALAMIN) daily ISOSORBIDE DINITRATE 30 MG ORAL TABLET (ISOSORBIDE DINITRATE) daily NOVOLIN R 100 UNIT/ML INJECTION SOLUTION (INSULIN REGULAR HUMAN) daily LOSARTAN POTASSIUM 50 MG ORAL TABLET (LOSARTAN POTASSIUM) daily OMEPRAZOLE 20 MG ORAL CAPSULE DELAYED RELEASE (OMEPRAZOLE) daily Problems: UTI (ICD-599.0) (BML92-Z09.0) Ureteral calculus (ICD-592.1) (RAG40-H34.1) Pulmonary nodule (ICD-518.89) (KOL06-L58.4) Renal Cell Carcinoma (ICD-189.0) (RZO77-A10.9) Personal history of malignant neoplasm of prostate (ICD-V10.46) (YOY73-G31.07) Incontinence unspecified (ICD-788.30) (DEC26-B34) Renal mass NOS (ICD-593.9) (IXM39-G55.9) 
593.9 Kidney Mass, Unspec (ICD-593.9) (ZJM57-G35.9) 
588. 9 Impaired renal function NOS (ICD-588.9) (CAQ68-J92.9) Illnesses: Heart Disease, Lung Disease, Diabetes, High Blood Pressure, Kidney Problems, and Cancer. DENIES: Pacemaker/Defibrillator, Bowel Problems, Stroke/Seizure, Bleeding Problems, HIV, or Hepatitis. Surgeries: Bladder Surgery, Prostate Biopsy, Prostatectomy, Heart Stent Procedure, Gallbladder Surgery, and Hernia Repair. Family History: Prostate Cancer and Kidney Stones. DENIES: Kidney cancer, Kidney disease. Social History: Retired - Retired . . Smoking status: former smoker. Does not drink alcohol. System Review: Admits to: Dry Eyes, Dry Mouth, Constipation, Involuntary Urine Loss, Lower Extremity Weakness, Difficulty Walking, Impaired Sex Drive, and Easy Bleeding. DENIES: Unexplained Weight Loss, Leg Swelling, Shortness of Breath, Dry Skin, Psychiatric Problems, Rash. EXAMINATION: Appearance: well-developed NAD  
 
 
URINALYSIS from Voided specimen Urine Dip: pH: 5.0, Bld: Neg, LE: Neg, Nit: Neg, Prot: Neg, Ket: Neg, Gluc: Neg 
Urine Micro not done PSA HISTORY 
<0.1 ng/ml on 01/21/2015 
<.1 ng/mL ng/ml on 12/23/2002 
<.1 ng/mL ng/ml on 08/10/2001 Prescription(s) Today: MACRODANTIN 50 MG ORAL CAPSULE (NITROFURANTOIN MACROCRYSTAL) 1 po qd  #30[Capsule] x 3,  10/09/2018, Ruthann Hartley RN IMPRESSION: 
 
1. RENAL CELL CARCINOMA (ICD-189.0): Metastatic Disease. 2. INCONTINENCE UNSPECIFIED (DMS60-P15) 3. UTI (GAW37-U43.0) 4. PERSONAL HISTORY OF MALIGNANT NEOPLASM OF PROSTATE (SSN02-G26.46) PLAN: Mr. Naun Anguiano has metastatic kidney cancer as detailed above. Once again, he has declined further intervention due to his poor overall condition. He remains incontinent and was reportedly hospitalized for a urinary tract infection. To reduce his risk of hospitalization in the future, given his debilitated state, we have elected a trial of suppressive low dose nitrofurantoin. We will see him back in three months for reassessment. cc: Peter Loredo MD 
Today's Services E&M Service, Urinalysis Dipstick Upcoming Orders Return Office Visit - with Rafael Almendarez MD in 3 months UA Electronically signed by Andreea Orantes on 10/11/2018 at 10:43 AM 
 
Electronically signed by Rafael Almendarez MD on 10/16/2018 at 12:48 PM 
________________________________________________________________________

## 2018-12-04 NOTE — PROGRESS NOTES
Problem: Patient Education: Go to Patient Education Activity Goal: Patient/Family Education 
physical Therapy TREATMENT Patient: Tano Saab (80 y.o. male) Date: 12/4/2018 Diagnosis: Gram-negative bacteremia [R78.81] Gram-negative bacteremia Precautions: Fall Chart, physical therapy assessment, plan of care and goals were reviewed. ASSESSMENT: 
Pt comes to sit with mod to max assist of 2. Pt to stand with min assist of 2. Pt ambulated 12ft with RW min assist of 2. Pt tends to shuffle feet. Pt became incontinent of urine before sitting in chair. Pt was assisted into dry gown and socks. Pt left sitting with family present. Pt progressing slowly. Continue goals. Progression toward goals: 
[]    Improving appropriately and progressing toward goals [x]    Improving slowly and progressing toward goals 
[]    Not making progress toward goals and plan of care will be adjusted PLAN: 
Patient continues to benefit from skilled intervention to address the above impairments. Continue treatment per established plan of care. Discharge Recommendations:Rehab Further Equipment Recommendations for Discharge:  rolling walker SUBJECTIVE:  
 
 
OBJECTIVE DATA SUMMARY:  
Critical Behavior: 
Neurologic State: Alert, Confused Orientation Level: Oriented to person, Oriented to place, Disoriented to time, Disoriented to situation Cognition: Decreased attention/concentration, Follows commands, Memory loss Safety/Judgement: Decreased awareness of need for safety, Decreased awareness of environment Functional Mobility Training: 
Bed Mobility: 
  
Supine to Sit: Moderate assistance;Maximum assistance Transfers: 
  
Stand to Sit: Minimum assistance;Assist x2 Balance: 
Sitting: Intact Standing: Intact; With supportAmbulation/Gait Training: 
Distance (ft): 12 Feet (ft) Assistive Device: Walker, rolling;Gait belt Ambulation - Level of Assistance: min assist of 2. Gait Abnormalities: Decreased step clearance Base of Support: Narrowed Speed/Sabrina: Pace decreased (<100 feet/min) Step Length: Right shortened;Left shortened Pain: 
Pain Scale 1: Numeric (0 - 10) Pain Intensity 1: 0 Activity Tolerance:  
Pt tolerated treatment fairly well. Please refer to the flowsheet for vital signs taken during this treatment. After treatment:  
[x]    Patient left in no apparent distress sitting up in chair 
[]    Patient left in no apparent distress in bed 
[]    Call bell left within reach 
[]    Nursing notified 
[]    Caregiver present 
[]    Bed alarm activated COMMUNICATION/COLLABORATION:  
The patients plan of care was discussed with: Physical Therapist 
 
Barron Dempsey PTA Time Calculation: 38 mins

## 2018-12-05 ENCOUNTER — APPOINTMENT (OUTPATIENT)
Dept: GENERAL RADIOLOGY | Age: 83
DRG: 871 | End: 2018-12-05
Attending: INTERNAL MEDICINE
Payer: MEDICARE

## 2018-12-05 VITALS
HEART RATE: 76 BPM | HEIGHT: 67 IN | RESPIRATION RATE: 16 BRPM | DIASTOLIC BLOOD PRESSURE: 67 MMHG | BODY MASS INDEX: 27.94 KG/M2 | OXYGEN SATURATION: 94 % | TEMPERATURE: 98.7 F | SYSTOLIC BLOOD PRESSURE: 139 MMHG | WEIGHT: 178 LBS

## 2018-12-05 LAB
ANION GAP SERPL CALC-SCNC: 10 MMOL/L (ref 5–15)
BUN SERPL-MCNC: 13 MG/DL (ref 6–20)
BUN/CREAT SERPL: 9 (ref 12–20)
CALCIUM SERPL-MCNC: 8.4 MG/DL (ref 8.5–10.1)
CHLORIDE SERPL-SCNC: 105 MMOL/L (ref 97–108)
CO2 SERPL-SCNC: 19 MMOL/L (ref 21–32)
CREAT SERPL-MCNC: 1.45 MG/DL (ref 0.7–1.3)
ERYTHROCYTE [DISTWIDTH] IN BLOOD BY AUTOMATED COUNT: 14.2 % (ref 11.5–14.5)
GLUCOSE BLD STRIP.AUTO-MCNC: 169 MG/DL (ref 65–100)
GLUCOSE BLD STRIP.AUTO-MCNC: 181 MG/DL (ref 65–100)
GLUCOSE SERPL-MCNC: 166 MG/DL (ref 65–100)
HCT VFR BLD AUTO: 32.2 % (ref 36.6–50.3)
HGB BLD-MCNC: 10.4 G/DL (ref 12.1–17)
MAGNESIUM SERPL-MCNC: 1.9 MG/DL (ref 1.6–2.4)
MCH RBC QN AUTO: 27.8 PG (ref 26–34)
MCHC RBC AUTO-ENTMCNC: 32.3 G/DL (ref 30–36.5)
MCV RBC AUTO: 86.1 FL (ref 80–99)
NRBC # BLD: 0 K/UL (ref 0–0.01)
NRBC BLD-RTO: 0 PER 100 WBC
PLATELET # BLD AUTO: 158 K/UL (ref 150–400)
PMV BLD AUTO: 10.2 FL (ref 8.9–12.9)
POTASSIUM SERPL-SCNC: 3.7 MMOL/L (ref 3.5–5.1)
RBC # BLD AUTO: 3.74 M/UL (ref 4.1–5.7)
SERVICE CMNT-IMP: ABNORMAL
SERVICE CMNT-IMP: ABNORMAL
SODIUM SERPL-SCNC: 134 MMOL/L (ref 136–145)
WBC # BLD AUTO: 5 K/UL (ref 4.1–11.1)

## 2018-12-05 PROCEDURE — 74011250637 HC RX REV CODE- 250/637: Performed by: INTERNAL MEDICINE

## 2018-12-05 PROCEDURE — 85027 COMPLETE CBC AUTOMATED: CPT

## 2018-12-05 PROCEDURE — 77030038269 HC DRN EXT URIN PURWCK BARD -A

## 2018-12-05 PROCEDURE — 71045 X-RAY EXAM CHEST 1 VIEW: CPT

## 2018-12-05 PROCEDURE — 80048 BASIC METABOLIC PNL TOTAL CA: CPT

## 2018-12-05 PROCEDURE — 36415 COLL VENOUS BLD VENIPUNCTURE: CPT

## 2018-12-05 PROCEDURE — 74011250636 HC RX REV CODE- 250/636: Performed by: INTERNAL MEDICINE

## 2018-12-05 PROCEDURE — 82962 GLUCOSE BLOOD TEST: CPT

## 2018-12-05 PROCEDURE — C1751 CATH, INF, PER/CENT/MIDLINE: HCPCS

## 2018-12-05 PROCEDURE — 74011636637 HC RX REV CODE- 636/637: Performed by: INTERNAL MEDICINE

## 2018-12-05 PROCEDURE — C1894 INTRO/SHEATH, NON-LASER: HCPCS

## 2018-12-05 PROCEDURE — 74011000258 HC RX REV CODE- 258: Performed by: INTERNAL MEDICINE

## 2018-12-05 PROCEDURE — 36569 INSJ PICC 5 YR+ W/O IMAGING: CPT | Performed by: INTERNAL MEDICINE

## 2018-12-05 PROCEDURE — 76937 US GUIDE VASCULAR ACCESS: CPT

## 2018-12-05 PROCEDURE — 77030018719 HC DRSG PTCH ANTIMIC J&J -A

## 2018-12-05 PROCEDURE — 83735 ASSAY OF MAGNESIUM: CPT

## 2018-12-05 PROCEDURE — 77030018786 HC NDL GD F/USND BARD -B

## 2018-12-05 PROCEDURE — 02HV33Z INSERTION OF INFUSION DEVICE INTO SUPERIOR VENA CAVA, PERCUTANEOUS APPROACH: ICD-10-PCS | Performed by: INTERNAL MEDICINE

## 2018-12-05 RX ORDER — SODIUM CHLORIDE 0.9 % (FLUSH) 0.9 %
10-30 SYRINGE (ML) INJECTION AS NEEDED
Status: DISCONTINUED | OUTPATIENT
Start: 2018-12-05 | End: 2018-12-05 | Stop reason: HOSPADM

## 2018-12-05 RX ORDER — SODIUM CHLORIDE 0.9 % (FLUSH) 0.9 %
10 SYRINGE (ML) INJECTION AS NEEDED
Status: DISCONTINUED | OUTPATIENT
Start: 2018-12-05 | End: 2018-12-05 | Stop reason: HOSPADM

## 2018-12-05 RX ORDER — LANOLIN ALCOHOL/MO/W.PET/CERES
400 CREAM (GRAM) TOPICAL 2 TIMES DAILY
Qty: 60 TAB | Refills: 0 | Status: SHIPPED | OUTPATIENT
Start: 2018-12-05 | End: 2019-01-04

## 2018-12-05 RX ORDER — SODIUM CHLORIDE 0.9 % (FLUSH) 0.9 %
10 SYRINGE (ML) INJECTION EVERY 24 HOURS
Status: DISCONTINUED | OUTPATIENT
Start: 2018-12-05 | End: 2018-12-05 | Stop reason: HOSPADM

## 2018-12-05 RX ORDER — SODIUM CHLORIDE 0.9 % (FLUSH) 0.9 %
10-40 SYRINGE (ML) INJECTION EVERY 8 HOURS
Status: DISCONTINUED | OUTPATIENT
Start: 2018-12-05 | End: 2018-12-05 | Stop reason: HOSPADM

## 2018-12-05 RX ORDER — SODIUM CHLORIDE 0.9 % (FLUSH) 0.9 %
20 SYRINGE (ML) INJECTION EVERY 24 HOURS
Status: DISCONTINUED | OUTPATIENT
Start: 2018-12-05 | End: 2018-12-05 | Stop reason: HOSPADM

## 2018-12-05 RX ADMIN — INSULIN LISPRO 2 UNITS: 100 INJECTION, SOLUTION INTRAVENOUS; SUBCUTANEOUS at 08:02

## 2018-12-05 RX ADMIN — ISOSORBIDE MONONITRATE 120 MG: 60 TABLET ORAL at 06:13

## 2018-12-05 RX ADMIN — Medication 20 ML: at 13:51

## 2018-12-05 RX ADMIN — Medication 10 ML: at 06:14

## 2018-12-05 RX ADMIN — Medication 10 ML: at 13:51

## 2018-12-05 RX ADMIN — Medication 400 MG: at 08:02

## 2018-12-05 RX ADMIN — CEFTRIAXONE SODIUM 2 G: 2 INJECTION, POWDER, FOR SOLUTION INTRAMUSCULAR; INTRAVENOUS at 13:47

## 2018-12-05 RX ADMIN — AMLODIPINE BESYLATE 5 MG: 5 TABLET ORAL at 08:02

## 2018-12-05 RX ADMIN — Medication 10 ML: at 13:52

## 2018-12-05 RX ADMIN — INSULIN LISPRO 2 UNITS: 100 INJECTION, SOLUTION INTRAVENOUS; SUBCUTANEOUS at 13:55

## 2018-12-05 RX ADMIN — PANTOPRAZOLE SODIUM 40 MG: 40 TABLET, DELAYED RELEASE ORAL at 06:14

## 2018-12-05 NOTE — PROGRESS NOTES
Bedside shift change report given to KIMBERLY Scott (oncoming nurse) by Татьяна Adams RN (offgoing nurse). Report included the following information SBAR, Intake/Output, MAR and Recent Results.

## 2018-12-05 NOTE — PROGRESS NOTES
[facility] Medical Progress Note NAME: Zunilda Mcmahon. :  1935 MRM:  [id number] Date/Time: 2018  9:36 AM 
 
  
Assessment and Plan:  
 
E coli bacteremia / Fever - POA, no other SIRS criteria. Unclear source. No wound. Unremarkable ECHO. No focal back pain. UA unremarkable. Occult abscess? Consulted ID. Plan 2wks outpatient IV ceftrizone. 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 SSI per protocol. Not on home meds. Check A1c. Dementia - POA, stable. Continue mirtazapine and melatonin. Palliative consult. Nearing end stage Debilitated patient / Arthritis / Fatigue -  PT/OT eval and can return to [address] [place] [address] again Malnutrition / Weight loss / FTT in adult / Low albumin / hypomagnesemia - Supplements and replete lytes. Likely related to dementia or cancer, and bodes poorly in eitehr case. 20+ lb weight loss in over 1 year. Nutrition consulted. Anemia - Mild. Check labs. SAMUEL (obstructive sleep apnea) - not using CPAP. Oxygen if needed. CAD (coronary artery disease) / HTN (hypertension) / Paroxysmal atrial fibrillation - Rate stable. BP controlled on norvasc, IMDUR. I would recommend stopping eliquis and ASA as he approaches end stage disease. 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 Follow. Cochlear implant in place - Cannot have MRI 
 
GERD (gastroesophageal reflux disease) - PPI Hypothyroidism - Not on synthroid. Check TSH Subjective: Chief Complaint:  Feels a bit better, still weak. ROS: 
(bold if positive, if negative) Tolerating some PT  Tolerating some Diet Objective:  
 
Last 24hrs VS reviewed since prior progress note. Most recent are: 
 
Visit Vitals /67 (BP 1 Location: Left arm, BP Patient Position: At rest) Pulse 76 Temp 98.7 °F (37.1 °C) Resp 16 Ht 5' 7\" (1.702 m) Wt 80.7 kg (178 lb) SpO2 94% BMI 27.88 kg/m² SpO2 Readings from Last 6 Encounters:  
12/05/18 94% 12/01/18 96% 11/27/18 95% 09/13/18 97% 08/26/17 92% 07/16/17 94% Intake/Output Summary (Last 24 hours) at 12/5/2018 1210 Last data filed at 12/5/2018 1882 Gross per 24 hour Intake 2949.67 ml Output 200 ml Net 2749.67 ml Physical Exam: 
 
Gen:  Frail, in no acute distress HEENT:  Pink conjunctivae, PERRL, hearing intact to voice, moist mucous membranes Neck:  Supple, without masses, thyroid non-tender Resp:  No accessory muscle use, clear breath sounds without wheezes rales or rhonchi 
Card:  No murmurs, normal S1, S2 without thrills, bruits or peripheral edema Abd:  Soft, non-tender, non-distended, normoactive bowel sounds are present, no mass Lymph:  No cervical or inguinal adenopathy Musc:  No cyanosis or clubbing Skin:  No rashes or ulcers, skin turgor is good Neuro:  Cranial nerves are grossly intact, general motor weakness, follows commands vaguely Psych:  Poor insight, oriented to person, place Telemetry reviewed:   normal sinus rhythm 
__________________________________________________________________ Medications Reviewed: (see below) Medications:  
 
Current Facility-Administered Medications Medication Dose Route Frequency  cefTRIAXone (ROCEPHIN) 2 g in 0.9% sodium chloride (MBP/ADV) 50 mL  2 g IntraVENous Q24H  
 dextrose 5% - 0.45% NaCl with KCl 20 mEq/L infusion  25 mL/hr IntraVENous CONTINUOUS  
 magnesium oxide (MAG-OX) tablet 400 mg  400 mg Oral BID  sodium chloride (NS) flush 5-10 mL  5-10 mL IntraVENous Q8H  
 sodium chloride (NS) flush 5-10 mL  5-10 mL IntraVENous PRN  
 naloxone (NARCAN) injection 0.4 mg  0.4 mg IntraVENous PRN  
 acetaminophen (TYLENOL) tablet 650 mg  650 mg Oral Q4H PRN  
 ondansetron (ZOFRAN) injection 4 mg  4 mg IntraVENous Q4H PRN  
  bisacodyl (DULCOLAX) tablet 5 mg  5 mg Oral DAILY PRN  
 amLODIPine (NORVASC) tablet 5 mg  5 mg Oral DAILY  isosorbide mononitrate ER (IMDUR) tablet 120 mg  120 mg Oral 7am  
 melatonin tablet 3 mg  3 mg Oral QHS  mirtazapine (REMERON) tablet 7.5 mg  7.5 mg Oral QHS  pantoprazole (PROTONIX) tablet 40 mg  40 mg Oral ACB  glucose chewable tablet 16 g  4 Tab Oral PRN  
 dextrose (D50W) injection syrg 12.5-25 g  25-50 mL IntraVENous PRN  
 glucagon (GLUCAGEN) injection 1 mg  1 mg IntraMUSCular PRN  
 insulin lispro (HUMALOG) injection   SubCUTAneous AC&HS Lab Data Reviewed: (see below) Lab Review:  
 
Recent Labs 12/05/18 
0340 12/04/18 
0259 12/03/18 
6102 WBC 5.0 4.8 6.0 HGB 10.4* 9.5* 11.0*  
HCT 32.2* 30.3* 34.5*  
 150 144* Recent Labs 12/05/18 
0340 12/04/18 
0259 12/03/18 
0536 12/02/18 2118 * 136 139 135* K 3.7 3.6 3.8 3.7  105 106 101 CO2 19* 20* 20* 21 * 169* 184* 219* BUN 13 16 14 14 CREA 1.45* 1.50* 1.54* 1.62* CA 8.4* 8.3* 8.7 9.0 MG 1.9 1.5* 1.5*  --   
PHOS  --  1.8* 2.5*  --   
ALB  --  1.9* 2.1* 2.4* TBILI  --  0.6 1.1* 0.9 SGOT  --  10* 11* 11* ALT  --  14 20 23 Lab Results Component Value Date/Time Glucose (POC) 169 (H) 12/05/2018 07:01 AM  
 Glucose (POC) 244 (H) 12/04/2018 10:01 PM  
 Glucose (POC) 204 (H) 12/04/2018 04:15 PM  
 Glucose (POC) 215 (H) 12/04/2018 11:22 AM  
 Glucose (POC) 125 (H) 12/04/2018 08:36 AM  
 
No results for input(s): PH, PCO2, PO2, HCO3, FIO2 in the last 72 hours. No results for input(s): INR in the last 72 hours. No lab exists for component: INREXT, INREXT All Micro Results Procedure Component Value Units Date/Time CULTURE, BLOOD, PAIRED [[id number]] Collected:  12/02/18 2118 Order Status:  Completed Specimen:  Blood Updated:  12/05/18 0552 Special Requests: NO SPECIAL REQUESTS Culture result: NO GROWTH 3 DAYS Mary Perry [[id number]] Collected:  12/02/18 2315 Order Status:  Canceled Specimen:  Urine from Clean catch I have reviewed notes of prior 24hr. Other pertinent lab: none Total time spent with patient: 45 Minutes Care Plan discussed with: Patient, Family, Nursing Staff, Consultant/Specialist and >50% of time spent in counseling and coordination of care Discussed:  Care Plan and D/C Planning Prophylaxis:  H2B/PPI Disposition:  SNF/LTC and HH PT, OT, RN 
        
___________________________________________________ Attending Physician: Junior Marc MD

## 2018-12-05 NOTE — PALLIATIVE CARE DISCHARGE
Goals of Care/Treatment Preferences The Palliative Medicine team was consulted as part of your/your loved one's care in the hospital. Our team is a supportive service; we strive to relieve suffering and improve quality of life. You met with Dr. Curtis Pop. We reviewed advance care planning information, which includes the following: 
Confirm Advance Directive: Yes, on file Patient/Health Care Proxy Stated Goals: Rehabilitation We reviewed / discussed your code status as: DNR 
   Full Code means perform CPR in the event of cardiac arrest. 
    DNR means do NOT perform CPR in the event of cardiac arrest. 
    Partial Code means you have specific preferences, please discuss with your healthcare team. 
    Haroldo Meigs means this issue was not addressed / resolved during your stay Medical Interventions: Limited additional interventions Artificially Administered Nutrition: No feeding tube Because of the importance of this information, we are providing you with a printed copy to share with other healthcare providers after this hospitalization is complete.

## 2018-12-05 NOTE — PROGRESS NOTES
Patient's wife given discharge instructions and packet for [facility], as patient is being transported by wife. Patient's PIV x2 removed. Patient's wife verbalizes understand of discharge instructions and plan of care to go to [facility]. Patient out of unit in wheelchair, patient's wife driving patient to [facility].

## 2018-12-05 NOTE — DISCHARGE SUMMARY
Physician Discharge Summary     Patient ID:  Lakeshia Delgado  [id number]  79 y.o.  1935    Admit date: 12/2/2018    Discharge date and time: 12/5/2018    Admission Diagnoses: Gram-negative bacteremia [R78.81]    Discharge Diagnoses:    Principal Diagnosis   E coli bacteremia                                             Other Diagnoses    CAD (coronary artery disease) ()    HTN (hypertension) ()    Paroxysmal atrial fibrillation ([facility] (5/6/2013)    CKD (chronic kidney disease) stage 3, GFR 30-59 ml/min (HCC) (12/10/2013)    Kidney cancer, primary, 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 [facility]) (11/2/2015)    Renal cell cancer (HCC) ()    Cochlear implant in place (2/3/2017)    Arthritis ()    Dementia ()    DM type 2 causing renal disease ([facility] ()    DM type 2 causing vascular disease (HCC) ()    GERD (gastroesophageal reflux disease) ()    Hypothyroidism ()    SAMUEL (obstructive sleep apnea) ()    Prostate cancer ([facility] ()    Fatigue (9/9/2018)    Debilitated patient (9/9/2018)    Fever (12/2/2018)     Hospital Course:   E coli bacteremia / Fever - POA, no other SIRS criteria. Unclear source. No wound. Unremarkable ECHO. No focal back pain. UA unremarkable. Occult abscess? Consulted ID. Plan 2wks outpatient IV ceftrizone. 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 SSI per protocol. Not on home meds. Check A1c. Dementia - POA, stable. Continue mirtazapine and melatonin. Palliative consult. Nearing end stage     Debilitated patient / Arthritis / Fatigue -  PT/OT eval and can return to [address] [place] [address],[address] SNF again     Malnutrition / Weight loss / FTT in adult / Low albumin / hypomagnesemia - Supplements and replete lytes. Likely related to dementia or cancer, and bodes poorly in eitehr case. 20+ lb weight loss in over 1 year. Nutrition consulted.     Anemia - Mild. Check labs.     SAMUEL (obstructive sleep apnea) - not using CPAP.   Oxygen if needed.     CAD (coronary artery disease) / HTN (hypertension) / Paroxysmal atrial fibrillation - Rate stable. BP controlled on norvasc, IMDUR. I would recommend stopping eliquis and ASA as he approaches end stage disease.     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 Follow.     Cochlear implant in place - Cannot have MRI     GERD (gastroesophageal reflux disease) - PPI     Hypothyroidism - Not on synthroid. Check TSH     PCP: Other, Chance, MD    Consults: ID and Urology    Significant Diagnostic Studies: See Hospital Course    Discharged home in improved condition. Discharge Exam:  /67 (BP 1 Location: Left arm, BP Patient Position: At rest)   Pulse 76   Temp 98.7 °F (37.1 °C)   Resp 16   Ht 5' 7\" (1.702 m)   Wt 80.7 kg (178 lb)   SpO2 94%   BMI 27.88 kg/m²      Gen:  Frail, in no acute distress  HEENT:  Pink conjunctivae, PERRL, hearing intact to voice, moist mucous membranes  Neck:  Supple, without masses, thyroid non-tender  Resp:  No accessory muscle use, clear breath sounds without wheezes rales or rhonchi  Card:  No murmurs, normal S1, S2 without thrills, bruits or peripheral edema  Abd:  Soft, non-tender, non-distended, normoactive bowel sounds are present, no mass  Lymph:  No cervical or inguinal adenopathy  Musc:  No cyanosis or clubbing  Skin:  No rashes or ulcers, skin turgor is good  Neuro:  Cranial nerves are grossly intact, general motor weakness, follows commands vaguely  Psych:  Poor insight, oriented to person, place     Patient Instructions:   Current Discharge Medication List      START taking these medications    Details   cefTRIAXone 2 gram 2 g, ADDaptor 1 Device IVPB 2 g by IntraVENous route every twenty-four (24) hours for 14 days. Qty: 14 Dose, Refills: 0      magnesium oxide (MAG-OX) 400 mg tablet Take 1 Tab by mouth two (2) times a day for 30 days.   Qty: 60 Tab, Refills: 0         CONTINUE these medications which have NOT CHANGED    Details   acetaminophen (TYLENOL) 325 mg tablet Take 650 mg by mouth every four (4) hours as needed for Pain or Fever. aspirin delayed-release 81 mg tablet Take 81 mg by mouth nightly. amLODIPine (NORVASC) 10 mg tablet Take 5 mg by mouth daily. losartan (COZAAR) 25 mg tablet Take 25 mg by mouth nightly. nystatin (MYCOSTATIN) powder Apply  to affected area daily as needed for Other (Itching/Irritation- Groin area). To groin after showering and drying       docusate sodium (COLACE) 100 mg capsule Take 100 mg by mouth daily as needed. senna (SENNA) 8.6 mg tablet Take 1 Tab by mouth daily as needed for Constipation. isosorbide mononitrate ER (IMDUR) 120 mg CR tablet Take 1 Tab by mouth every morning. Qty: 30 Tab, Refills: 0      melatonin 3 mg tablet Take 3 mg by mouth nightly. polyethylene glycol (MIRALAX) 17 gram packet Take 17 g by mouth daily as needed (Constipation). mirtazapine (REMERON) 15 mg tablet Take 7.5 mg by mouth nightly. apixaban (ELIQUIS) 2.5 mg tablet Take 2.5 mg by mouth two (2) times a day. omeprazole (PRILOSEC) 20 mg capsule Take 20 mg by mouth Daily (before breakfast). nitroglycerin (NITROSTAT) 0.4 mg SL tablet 0.4 mg by SubLINGual route every five (5) minutes as needed. STOP taking these medications       ibuprofen (MOTRIN) 600 mg tablet Comments:   Reason for Stopping:             Activity: Activity as tolerated and PT/OT Eval and Treat  Diet: Cardiac Diet and Diabetic Diet  Wound Care: None needed    Follow-up with your PCP in a few weeks.   Follow-up tests/labs - per ID orders    Signed:  Junior Marc MD  12/5/2018  9:45 AM

## 2018-12-05 NOTE — DISCHARGE INSTRUCTIONS
Patient Discharge Instructions    Bernice Cortes. / [id number] : 1935    Admitted 2018 Discharged: 2018     Primary Diagnoses  Problem List as of 2018 Date Reviewed: 2018           Fever   * (Principal) Gram-negative bacteremia   UTI (urinary tract infection)   Arthritis   Dementia (Chronic)   DM type 2 causing renal disease (HCC) (Chronic)   DM type 2 causing vascular disease (HCC)   GERD (gastroesophageal reflux disease)   Hypothyroidism   SAMUEL (obstructive sleep apnea)   Prostate cancer ([facility]   Acute encephalopathy   Fatigue   Debilitated patient   Cochlear implant in place   [facility]   Kidney cancer, primary, 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 [facility])   CKD (chronic kidney disease) stage 3, GFR 30-59 ml/min (HCC) (Chronic)   Paroxysmal atrial fibrillation (HCC)   CAD (coronary artery disease) (Chronic)   HTN (hypertension)          Take Home Medications     · It is important that you take the medication exactly as they are prescribed. · Keep your medication in the bottles provided by the pharmacist and keep a list of the medication names, dosages, and times to be taken in your wallet. · Do not take other medications without consulting your doctor. What to do at Home    Recommended diet: Cardiac Diet and Diabetic Diet    Recommended activity: Activity as tolerated and PT/OT Eval and Treat    If you experience worse symptoms, please follow up with your PCP. Follow-up with your PCP in a few weeks        Information obtained by :  I understand that if any problems occur once I am at home I am to contact my physician. I understand and acknowledge receipt of the instructions indicated above.                                                                                                                                            Physician's or R.N.'s Signature                                                                  Date/Time Patient or Representative Signature                                                          Date/Time

## 2018-12-05 NOTE — PROGRESS NOTES
Reji 24 Report called to [facility] at [facility]. Notified of transport by wife within the hour. Terrance Ramirez 79 from [facility] team stated it was okay to start abx.  
1300 PICC line is placed. Updated the family on discharge plan. 1115 PICC team in to place line. [address] team to get an estimated time. Ramin Castano stated in about 30-45 minutes they should have a spot open up. He needs to have a family member to sign consent, will call the family. 0800 Patient resting with no complaints. Family at the bedside.

## 2018-12-05 NOTE — PROGRESS NOTES
12/5/2018   CARE MANAGEMENT NOTE:  CM confirmed skilled bed availability today at [facility] where pt had been prior to admission. He is there for short term rehab with plan to return home following rehab. CM faxed AVS, today's MAR, RX, IV ABX order. A hard copy of aforementioned clinicals along with the PICC report will accompany pt via family who will transport pt. Pt will get hospital dose of IV ABX before discharge today. RN, please call report to [employer] at [phone]. [place]

## 2018-12-05 NOTE — PROGRESS NOTES
[facility] Progress Note Alf Retana DO 
  [phone] Office [phone]  Fax 
 
2018 Assessment & Plan: 1. Ecoli bacteremia. The etiology is unclear. Urinalysis is bland. CT abd/pelvis does not show any acute disease. Narrow abx to ceftriaxone. Avoid quinolone abx due to elevated QTc.  IV abx orders in chart.  
2. 1.2 cm solid mass in the left pararenal space suspicious for neoplasm. Urology and oncology following 3. Chronic kidney disease. This is stable. 4. Diabetes mellitus. Management per primary team.  
  
 
 
Subjective: No complaints Objective:  
 
Vitals:  
Visit Vitals /67 (BP 1 Location: Left arm, BP Patient Position: At rest) Pulse 76 Temp 98.7 °F (37.1 °C) Resp 16 Ht 5' 7\" (1.702 m) Wt 80.7 kg (178 lb) SpO2 94% BMI 27.88 kg/m² Tmax:  Temp (24hrs), Av.4 °F (36.9 °C), Min:97.6 °F (36.4 °C), Max:99.2 °F (37.3 °C) Exam:  
Patient is intubated:  no 
 
Physical Examination:  
General:  Alert, cooperative, no distress Head:  Normocephalic, atraumatic. Eyes:  Conjunctivae clear Neck: Supple Lungs:   No distress. CTA Chest wall:    
Heart:  Regular rate and rhythm Abdomen:     
Extremities: Moves all. Skin:   
Neurologic: CNII-XII intact Labs:     
 
No lab exists for component: ITNL No results for input(s): CPK, CKMB, TROIQ in the last 72 hours. Recent Labs 18 
0340 18 
0259 18 
0536 18 2118 * 136 139 135* K 3.7 3.6 3.8 3.7  105 106 101 CO2 19* 20* 20* 21 BUN 13 16 14 14 CREA 1.45* 1.50* 1.54* 1.62* * 169* 184* 219* PHOS  --  1.8* 2.5*  --   
MG 1.9 1.5* 1.5*  --   
ALB  --  1.9* 2.1* 2.4* WBC 5.0 4.8 6.0 4.0* HGB 10.4* 9.5* 11.0* 11.0*  
HCT 32.2* 30.3* 34.5* 34.1*  
 150 144* 152 No results for input(s): INR, PTP, APTT in the last 72 hours.  
 
No lab exists for component: INREXT, INREXT 
 Needs: urine analysis, urine sodium, protein and creatinine No results found for: Catie Morocho Cultures:  
 
Lab Results Component Value Date/Time Specimen Description: CLEAN [facility] 12/19/2013 11:52 AM  
 Specimen Description: DM 07/13/2012 04:45 PM  
 
Lab Results Component Value Date/Time Culture result: NO GROWTH 3 DAYS 12/02/2018 09:18 PM  
 Culture result: (A) 12/01/2018 06:54 PM  
  ESCHERICHIA COLI GROWING IN ALL 4 BOTTLES DRAWN (SITES = L AC 1 AND L AC 2) Culture result: (A) 12/01/2018 06:54 PM  
  PRELIMINARY REPORT OF GRAM NEGATIVE RODS GROWING IN ALL 4 BOTTLES DRAWN CALLED TO AND READ BACK BY KIMBERLY SHEN 12/2/18 1308 SP Radiology:  
 
Medications Current Facility-Administered Medications Medication Dose Route Frequency Last Dose  cefTRIAXone (ROCEPHIN) 2 g in 0.9% sodium chloride (MBP/ADV) 50 mL  2 g IntraVENous Q24H 2 g at 12/04/18 1236  dextrose 5% - 0.45% NaCl with KCl 20 mEq/L infusion  25 mL/hr IntraVENous CONTINUOUS 25 mL/hr at 12/04/18 1243  magnesium oxide (MAG-OX) tablet 400 mg  400 mg Oral  mg at 12/05/18 3003  sodium chloride (NS) flush 5-10 mL  5-10 mL IntraVENous Q8H 10 mL at 12/05/18 2628  sodium chloride (NS) flush 5-10 mL  5-10 mL IntraVENous PRN 10 mL at 12/03/18 4448  naloxone (NARCAN) injection 0.4 mg  0.4 mg IntraVENous PRN    
 acetaminophen (TYLENOL) tablet 650 mg  650 mg Oral Q4H PRN    
 ondansetron (ZOFRAN) injection 4 mg  4 mg IntraVENous Q4H PRN    
 bisacodyl (DULCOLAX) tablet 5 mg  5 mg Oral DAILY PRN    
 amLODIPine (NORVASC) tablet 5 mg  5 mg Oral DAILY 5 mg at 12/05/18 8791  isosorbide mononitrate ER (IMDUR) tablet 120 mg  120 mg Oral 7am 120 mg at 12/05/18 0613  
 melatonin tablet 3 mg  3 mg Oral QHS 3 mg at 12/04/18 2209  mirtazapine (REMERON) tablet 7.5 mg  7.5 mg Oral QHS 7.5 mg at 12/04/18 2208  pantoprazole (PROTONIX) tablet 40 mg  40 mg Oral ACB 40 mg at 12/05/18 8954  glucose chewable tablet 16 g  4 Tab Oral PRN    
 dextrose (D50W) injection syrg 12.5-25 g  25-50 mL IntraVENous PRN    
 glucagon (GLUCAGEN) injection 1 mg  1 mg IntraMUSCular PRN    
 insulin lispro (HUMALOG) injection   SubCUTAneous AC&HS 2 Units at 12/05/18 1998 Case discussed with: Dr Melissa Franco, DO

## 2018-12-05 NOTE — PROGRESS NOTES
PICC Placement Note PRE-PROCEDURE VERIFICATION Correct Procedure: yes Correct Site:  yes Temperature: Temp: 98.7 °F (37.1 °C), Temperature Source: Temp Source: Oral 
Recent Labs 12/05/18 
0340 BUN 13  
CREA 1.45*  WBC 5.0 Allergies: Latex; Atorvastatin; Ezetimibe; Lisinopril; Metoprolol; Morphine; Nitrofurantoin; Rosuvastatin; and Simvastatin Education materials for PICC Care given: yes. See Patient Education activity for further details. PICC Booklet placed at bedside: yes Closed Ended PICC Catheters: 
Flush Lumens as Follows: 
Intermittent Medication:   Flush before and after each medication with 10 ml NS. Unused Ports:  Flush every 8 hours with 10 ml NS. 
TPN Ports:  Flush every 24 hours with 20 ml NS prior to hanging new bag. Blood Draws: Stop infusion, draw off and waste 10 ml of blood. Draw sample with 10cc syringe or greater. DO NOT USE VACUTAINER . Transfer with appropriate device to lab  tubes. Flush with 20 ml NS. Dressing Change:  Every 7 days, and PRN using sterile technique if integrity of dressing is compromised. Initial dressing change for central line 24-48 hours post insertion if gauze is used. Apply new dressing per policy. PROCEDURE DETAIL Consent was obtained and all questions were answered related to risks and benefits. A single lumen PICC line was inserted, as a sterile procedure using ultrasound and modified Seldinger technique for antibiotic therapy. The following documentation is in addition to the PICC properties in the lines/airways flowsheet : 
Lot #: [id number] Lidocaine 1% administered intradermally :yes Internal Catheter Total Length: 42 (cm) Vein Selection for PICC:left basilic Central Line Bundle followed yes Complication Related to Insertion:no X-ray: yes. The x-ray results state the tip location is on the left side and the tip overlies the superior vena cava. Line is okay to use: yes Fifi Farias RN

## 2018-12-05 NOTE — PROGRESS NOTES
Bedside and Verbal shift change report given to [address] [place] [address] (oncoming nurse) by Hermes Norman RN (offgoing nurse). Report included the following information SBAR, Kardex, Intake/Output, MAR, Accordion and Recent Results.

## 2018-12-05 NOTE — PROGRESS NOTES
Pharmacist Discharge Medication Reconciliation Discharge Provider:  Dr. Jonathan Tyler Comments/recommendations: 
? Patient discharge to SNF on IV abx for 14 days for bacteremia via PICC line- Plan is for discharge after PICC placement. Discharge Medications: My Medications START taking these medications Instructions Each Dose to Equal Morning Noon Evening Bedtime  
cefTRIAXone 2 gram 2 g, ADDaptor 1 Device IVPB Your last dose was: Your next dose is:   
 
  
 2 g by IntraVENous route every twenty-four (24) hours for 14 days. 2 g 
  
  
  
  
  
magnesium oxide 400 mg tablet Commonly known as:  MAG-OX Your last dose was: Your next dose is: Take 1 Tab by mouth two (2) times a day for 30 days. 400 mg CONTINUE taking these medications Instructions Each Dose to Equal Morning Noon Evening Bedtime  
acetaminophen 325 mg tablet Commonly known as:  TYLENOL Your last dose was: Your next dose is: Take 650 mg by mouth every four (4) hours as needed for Pain or Fever. 650 mg 
  
  
  
  
  
amLODIPine 10 mg tablet Commonly known as:  Romero Delatorre Your last dose was: Your next dose is: Take 5 mg by mouth daily. 5 mg 
  
  
  
  
  
aspirin delayed-release 81 mg tablet Your last dose was: Your next dose is: Take 81 mg by mouth nightly. 81 mg 
  
  
  
  
  
docusate sodium 100 mg capsule Commonly known as:  Monica Argue Your last dose was: Your next dose is: Take 100 mg by mouth daily as needed. 100 mg 
  
  
  
  
  
ELIQUIS 2.5 mg tablet Generic drug:  apixaban Your last dose was: Your next dose is: Take 2.5 mg by mouth two (2) times a day. 2.5 mg 
  
  
  
  
  
isosorbide mononitrate  mg CR tablet Commonly known as:  IMDUR Your last dose was: Your next dose is: Take 1 Tab by mouth every morning.  
 120 mg 
  
  
  
  
  
 losartan 25 mg tablet Commonly known as:  COZAAR Your last dose was: Your next dose is: Take 25 mg by mouth nightly. 25 mg 
  
  
  
  
  
melatonin 3 mg tablet Your last dose was: Your next dose is: Take 3 mg by mouth nightly. 3 mg MIRALAX 17 gram packet Generic drug:  polyethylene glycol Your last dose was: Your next dose is: Take 17 g by mouth daily as needed (Constipation). 17 g 
  
  
  
  
  
nitroglycerin 0.4 mg SL tablet Commonly known as:  NITROSTAT Your last dose was: Your next dose is: 0.4 mg by SubLINGual route every five (5) minutes as needed. 0.4 mg 
  
  
  
  
  
nystatin powder Commonly known as:  MYCOSTATIN Your last dose was: Your next dose is:   
 
  
 Apply  to affected area daily as needed for Other (Itching/Irritation- Groin area). To groin after showering and drying PriLOSEC 20 mg capsule Generic drug:  omeprazole Your last dose was: Your next dose is: Take 20 mg by mouth Daily (before breakfast). 20 mg 
  
  
  
  
  
REMERON 15 mg tablet Generic drug:  mirtazapine Your last dose was: Your next dose is: Take 7.5 mg by mouth nightly. 7.5 mg Senna 8.6 mg tablet Generic drug:  senna Your last dose was: Your next dose is: Take 1 Tab by mouth daily as needed for Constipation. 1 Tab STOP taking these medications   
 
ibuprofen 600 mg tablet Commonly known as:  MOTRIN Where to Get Your Medications Information on where to get these meds will be given to you by the nurse or doctor. Ask your nurse or doctor about these medications · cefTRIAXone 2 gram 2 g, ADDaptor 1 Device IVPB · magnesium oxide 400 mg tablet The patient's chart, MAR, and AVS were reviewed by 
 Rhiannon Lnida, PHARMD, Contact: [phone]

## 2018-12-07 LAB
BACTERIA SPEC CULT: NORMAL
SERVICE CMNT-IMP: NORMAL

## 2019-01-06 NOTE — PROGRESS NOTES
- See long term immunosuppression.      Problem: Falls - Risk of 
Goal: *Absence of Falls Document Armida Reardon Fall Risk and appropriate interventions in the flowsheet. Outcome: Progressing Towards Goal 
Fall Risk Interventions: 
Mobility Interventions: Bed/chair exit alarm Mentation Interventions: Adequate sleep, hydration, pain control Elimination Interventions: Call light in reach History of Falls Interventions: Bed/chair exit alarm

## 2019-02-09 ENCOUNTER — APPOINTMENT (OUTPATIENT)
Dept: CT IMAGING | Age: 84
DRG: 071 | End: 2019-02-09
Attending: EMERGENCY MEDICINE
Payer: MEDICARE

## 2019-02-09 ENCOUNTER — HOSPITAL ENCOUNTER (INPATIENT)
Age: 84
LOS: 3 days | Discharge: HOME | DRG: 071 | End: 2019-02-12
Attending: EMERGENCY MEDICINE | Admitting: INTERNAL MEDICINE
Payer: MEDICARE

## 2019-02-09 ENCOUNTER — APPOINTMENT (OUTPATIENT)
Dept: GENERAL RADIOLOGY | Age: 84
DRG: 071 | End: 2019-02-09
Attending: EMERGENCY MEDICINE
Payer: MEDICARE

## 2019-02-09 DIAGNOSIS — R53.83 MALAISE AND FATIGUE: ICD-10-CM

## 2019-02-09 DIAGNOSIS — R53.81 MALAISE AND FATIGUE: ICD-10-CM

## 2019-02-09 DIAGNOSIS — F03.91 DEMENTIA WITH BEHAVIORAL DISTURBANCE, UNSPECIFIED DEMENTIA TYPE: ICD-10-CM

## 2019-02-09 DIAGNOSIS — R26.2 UNABLE TO WALK: Primary | ICD-10-CM

## 2019-02-09 PROBLEM — G93.41 ACUTE METABOLIC ENCEPHALOPATHY: Status: ACTIVE | Noted: 2019-02-09

## 2019-02-09 LAB
ALBUMIN SERPL-MCNC: 3.4 G/DL (ref 3.5–5)
ALBUMIN/GLOB SERPL: 0.9 {RATIO} (ref 1.1–2.2)
ALP SERPL-CCNC: 183 U/L (ref 45–117)
ALT SERPL-CCNC: 124 U/L (ref 12–78)
ANION GAP SERPL CALC-SCNC: 10 MMOL/L (ref 5–15)
APPEARANCE UR: CLEAR
AST SERPL-CCNC: 98 U/L (ref 15–37)
BACTERIA URNS QL MICRO: NEGATIVE /HPF
BASOPHILS # BLD: 0 K/UL (ref 0–0.1)
BASOPHILS NFR BLD: 0 % (ref 0–1)
BILIRUB SERPL-MCNC: 2.4 MG/DL (ref 0.2–1)
BILIRUB UR QL CFM: POSITIVE
BUN SERPL-MCNC: 16 MG/DL (ref 6–20)
BUN/CREAT SERPL: 9 (ref 12–20)
CALCIUM SERPL-MCNC: 11.2 MG/DL (ref 8.5–10.1)
CHLORIDE SERPL-SCNC: 102 MMOL/L (ref 97–108)
CO2 SERPL-SCNC: 26 MMOL/L (ref 21–32)
COLOR UR: ABNORMAL
COMMENT, HOLDF: NORMAL
CREAT SERPL-MCNC: 1.75 MG/DL (ref 0.7–1.3)
DIFFERENTIAL METHOD BLD: ABNORMAL
EOSINOPHIL # BLD: 0.1 K/UL (ref 0–0.4)
EOSINOPHIL NFR BLD: 2 % (ref 0–7)
EPITH CASTS URNS QL MICRO: ABNORMAL /LPF
ERYTHROCYTE [DISTWIDTH] IN BLOOD BY AUTOMATED COUNT: 14.6 % (ref 11.5–14.5)
FLUAV AG NPH QL IA: NEGATIVE
FLUBV AG NOSE QL IA: NEGATIVE
GLOBULIN SER CALC-MCNC: 3.6 G/DL (ref 2–4)
GLUCOSE BLD STRIP.AUTO-MCNC: 153 MG/DL (ref 65–100)
GLUCOSE SERPL-MCNC: 170 MG/DL (ref 65–100)
GLUCOSE UR STRIP.AUTO-MCNC: NEGATIVE MG/DL
HCT VFR BLD AUTO: 42 % (ref 36.6–50.3)
HGB BLD-MCNC: 13.5 G/DL (ref 12.1–17)
HGB UR QL STRIP: NEGATIVE
HYALINE CASTS URNS QL MICRO: ABNORMAL /LPF (ref 0–5)
IMM GRANULOCYTES # BLD AUTO: 0.1 K/UL (ref 0–0.04)
IMM GRANULOCYTES NFR BLD AUTO: 1 % (ref 0–0.5)
KETONES UR QL STRIP.AUTO: ABNORMAL MG/DL
LACTATE SERPL-SCNC: 1.3 MMOL/L (ref 0.4–2)
LEUKOCYTE ESTERASE UR QL STRIP.AUTO: ABNORMAL
LYMPHOCYTES # BLD: 0.6 K/UL (ref 0.8–3.5)
LYMPHOCYTES NFR BLD: 9 % (ref 12–49)
MAGNESIUM SERPL-MCNC: 1.9 MG/DL (ref 1.6–2.4)
MCH RBC QN AUTO: 27.8 PG (ref 26–34)
MCHC RBC AUTO-ENTMCNC: 32.1 G/DL (ref 30–36.5)
MCV RBC AUTO: 86.6 FL (ref 80–99)
MONOCYTES # BLD: 0.7 K/UL (ref 0–1)
MONOCYTES NFR BLD: 11 % (ref 5–13)
NEUTS SEG # BLD: 4.9 K/UL (ref 1.8–8)
NEUTS SEG NFR BLD: 77 % (ref 32–75)
NITRITE UR QL STRIP.AUTO: NEGATIVE
NRBC # BLD: 0 K/UL (ref 0–0.01)
NRBC BLD-RTO: 0 PER 100 WBC
PH UR STRIP: 5.5 [PH] (ref 5–8)
PLATELET # BLD AUTO: 125 K/UL (ref 150–400)
PLATELET COMMENTS,PCOM: ABNORMAL
PMV BLD AUTO: 9.7 FL (ref 8.9–12.9)
POTASSIUM SERPL-SCNC: 4.1 MMOL/L (ref 3.5–5.1)
PROT SERPL-MCNC: 7 G/DL (ref 6.4–8.2)
PROT UR STRIP-MCNC: ABNORMAL MG/DL
RBC # BLD AUTO: 4.85 M/UL (ref 4.1–5.7)
RBC #/AREA URNS HPF: ABNORMAL /HPF (ref 0–5)
RBC MORPH BLD: ABNORMAL
SAMPLES BEING HELD,HOLD: NORMAL
SERVICE CMNT-IMP: ABNORMAL
SODIUM SERPL-SCNC: 138 MMOL/L (ref 136–145)
SP GR UR REFRACTOMETRY: 1.02 (ref 1–1.03)
UR CULT HOLD, URHOLD: NORMAL
UROBILINOGEN UR QL STRIP.AUTO: 1 EU/DL (ref 0.2–1)
WBC # BLD AUTO: 6.4 K/UL (ref 4.1–11.1)
WBC URNS QL MICRO: ABNORMAL /HPF (ref 0–4)

## 2019-02-09 PROCEDURE — 36415 COLL VENOUS BLD VENIPUNCTURE: CPT

## 2019-02-09 PROCEDURE — 87077 CULTURE AEROBIC IDENTIFY: CPT

## 2019-02-09 PROCEDURE — 83735 ASSAY OF MAGNESIUM: CPT

## 2019-02-09 PROCEDURE — 96360 HYDRATION IV INFUSION INIT: CPT

## 2019-02-09 PROCEDURE — 85025 COMPLETE CBC W/AUTO DIFF WBC: CPT

## 2019-02-09 PROCEDURE — 99283 EMERGENCY DEPT VISIT LOW MDM: CPT

## 2019-02-09 PROCEDURE — 71046 X-RAY EXAM CHEST 2 VIEWS: CPT

## 2019-02-09 PROCEDURE — 87804 INFLUENZA ASSAY W/OPTIC: CPT

## 2019-02-09 PROCEDURE — 82962 GLUCOSE BLOOD TEST: CPT

## 2019-02-09 PROCEDURE — 74011250636 HC RX REV CODE- 250/636: Performed by: EMERGENCY MEDICINE

## 2019-02-09 PROCEDURE — 70450 CT HEAD/BRAIN W/O DYE: CPT

## 2019-02-09 PROCEDURE — 83605 ASSAY OF LACTIC ACID: CPT

## 2019-02-09 PROCEDURE — 80053 COMPREHEN METABOLIC PANEL: CPT

## 2019-02-09 PROCEDURE — 74011250636 HC RX REV CODE- 250/636: Performed by: INTERNAL MEDICINE

## 2019-02-09 PROCEDURE — 87186 SC STD MICRODIL/AGAR DIL: CPT

## 2019-02-09 PROCEDURE — 65270000029 HC RM PRIVATE

## 2019-02-09 PROCEDURE — 81001 URINALYSIS AUTO W/SCOPE: CPT

## 2019-02-09 PROCEDURE — 74011000250 HC RX REV CODE- 250: Performed by: INTERNAL MEDICINE

## 2019-02-09 PROCEDURE — 87040 BLOOD CULTURE FOR BACTERIA: CPT

## 2019-02-09 PROCEDURE — 99284 EMERGENCY DEPT VISIT MOD MDM: CPT

## 2019-02-09 PROCEDURE — 74011250637 HC RX REV CODE- 250/637: Performed by: INTERNAL MEDICINE

## 2019-02-09 PROCEDURE — 87086 URINE CULTURE/COLONY COUNT: CPT

## 2019-02-09 RX ORDER — AMLODIPINE BESYLATE 5 MG/1
5 TABLET ORAL DAILY
Status: DISCONTINUED | OUTPATIENT
Start: 2019-02-10 | End: 2019-02-12 | Stop reason: HOSPADM

## 2019-02-09 RX ORDER — ONDANSETRON 2 MG/ML
4 INJECTION INTRAMUSCULAR; INTRAVENOUS
Status: DISCONTINUED | OUTPATIENT
Start: 2019-02-09 | End: 2019-02-12 | Stop reason: HOSPADM

## 2019-02-09 RX ORDER — ISOSORBIDE MONONITRATE 30 MG/1
120 TABLET, EXTENDED RELEASE ORAL
Status: DISCONTINUED | OUTPATIENT
Start: 2019-02-10 | End: 2019-02-12 | Stop reason: HOSPADM

## 2019-02-09 RX ORDER — ASPIRIN 81 MG/1
81 TABLET ORAL
Status: DISCONTINUED | OUTPATIENT
Start: 2019-02-09 | End: 2019-02-12 | Stop reason: HOSPADM

## 2019-02-09 RX ORDER — SODIUM CHLORIDE 9 MG/ML
75 INJECTION, SOLUTION INTRAVENOUS CONTINUOUS
Status: DISCONTINUED | OUTPATIENT
Start: 2019-02-09 | End: 2019-02-12

## 2019-02-09 RX ORDER — NALOXONE HYDROCHLORIDE 0.4 MG/ML
0.4 INJECTION, SOLUTION INTRAMUSCULAR; INTRAVENOUS; SUBCUTANEOUS AS NEEDED
Status: DISCONTINUED | OUTPATIENT
Start: 2019-02-09 | End: 2019-02-12 | Stop reason: HOSPADM

## 2019-02-09 RX ORDER — LANOLIN ALCOHOL/MO/W.PET/CERES
3 CREAM (GRAM) TOPICAL
Status: DISCONTINUED | OUTPATIENT
Start: 2019-02-09 | End: 2019-02-12 | Stop reason: HOSPADM

## 2019-02-09 RX ORDER — SODIUM CHLORIDE 0.9 % (FLUSH) 0.9 %
5-40 SYRINGE (ML) INJECTION AS NEEDED
Status: DISCONTINUED | OUTPATIENT
Start: 2019-02-09 | End: 2019-02-12 | Stop reason: HOSPADM

## 2019-02-09 RX ORDER — MAGNESIUM SULFATE 100 %
4 CRYSTALS MISCELLANEOUS AS NEEDED
Status: DISCONTINUED | OUTPATIENT
Start: 2019-02-09 | End: 2019-02-12 | Stop reason: HOSPADM

## 2019-02-09 RX ORDER — ACETAMINOPHEN 325 MG/1
650 TABLET ORAL
Status: DISCONTINUED | OUTPATIENT
Start: 2019-02-09 | End: 2019-02-12 | Stop reason: HOSPADM

## 2019-02-09 RX ORDER — DOCUSATE SODIUM 100 MG/1
100 CAPSULE, LIQUID FILLED ORAL 2 TIMES DAILY
Status: DISCONTINUED | OUTPATIENT
Start: 2019-02-09 | End: 2019-02-12 | Stop reason: HOSPADM

## 2019-02-09 RX ORDER — MIRTAZAPINE 15 MG/1
7.5 TABLET, FILM COATED ORAL
Status: DISCONTINUED | OUTPATIENT
Start: 2019-02-09 | End: 2019-02-12 | Stop reason: HOSPADM

## 2019-02-09 RX ORDER — SODIUM CHLORIDE 0.9 % (FLUSH) 0.9 %
5-40 SYRINGE (ML) INJECTION EVERY 8 HOURS
Status: DISCONTINUED | OUTPATIENT
Start: 2019-02-09 | End: 2019-02-12 | Stop reason: HOSPADM

## 2019-02-09 RX ORDER — DIPHENHYDRAMINE HYDROCHLORIDE 50 MG/ML
12.5 INJECTION, SOLUTION INTRAMUSCULAR; INTRAVENOUS
Status: DISCONTINUED | OUTPATIENT
Start: 2019-02-09 | End: 2019-02-12 | Stop reason: HOSPADM

## 2019-02-09 RX ORDER — PANTOPRAZOLE SODIUM 40 MG/1
40 TABLET, DELAYED RELEASE ORAL
Status: DISCONTINUED | OUTPATIENT
Start: 2019-02-10 | End: 2019-02-12 | Stop reason: HOSPADM

## 2019-02-09 RX ORDER — POLYETHYLENE GLYCOL 3350 17 G/17G
17 POWDER, FOR SOLUTION ORAL
Status: DISCONTINUED | OUTPATIENT
Start: 2019-02-09 | End: 2019-02-12 | Stop reason: HOSPADM

## 2019-02-09 RX ORDER — OMEPRAZOLE 20 MG/1
20 CAPSULE, DELAYED RELEASE ORAL
Status: DISCONTINUED | OUTPATIENT
Start: 2019-02-10 | End: 2019-02-09 | Stop reason: CLARIF

## 2019-02-09 RX ORDER — SENNOSIDES 8.6 MG/1
1 TABLET ORAL
Status: DISCONTINUED | OUTPATIENT
Start: 2019-02-09 | End: 2019-02-12 | Stop reason: HOSPADM

## 2019-02-09 RX ORDER — DEXTROSE 50 % IN WATER (D50W) INTRAVENOUS SYRINGE
12.5-25 AS NEEDED
Status: DISCONTINUED | OUTPATIENT
Start: 2019-02-09 | End: 2019-02-12 | Stop reason: HOSPADM

## 2019-02-09 RX ADMIN — DOCUSATE SODIUM 100 MG: 100 CAPSULE, LIQUID FILLED ORAL at 22:18

## 2019-02-09 RX ADMIN — ASPIRIN 81 MG: 81 TABLET, COATED ORAL at 22:18

## 2019-02-09 RX ADMIN — MELATONIN TAB 3 MG 3 MG: 3 TAB at 22:18

## 2019-02-09 RX ADMIN — SODIUM CHLORIDE 75 ML/HR: 900 INJECTION, SOLUTION INTRAVENOUS at 22:18

## 2019-02-09 RX ADMIN — SODIUM CHLORIDE 1000 ML: 900 INJECTION, SOLUTION INTRAVENOUS at 16:01

## 2019-02-09 RX ADMIN — Medication 10 ML: at 22:19

## 2019-02-09 RX ADMIN — CEFTRIAXONE 2 G: 2 INJECTION, POWDER, FOR SOLUTION INTRAMUSCULAR; INTRAVENOUS at 22:18

## 2019-02-09 NOTE — ED PROVIDER NOTES
Tmax 99, generalized weakness, blood glucose 202, no chest pain, cough, or shortness of breath, no urinary frequency (uses condom catheter at night). Denies pain. 4:11 PM 
Janiya Gordillo. is a 80 y.o. male with Hx of OA, CAD, CKD, Nikolai (Cochlear implant), dementia, DMII, GERD, HTN, hypothyroidism, SAMUEL, PAF, CVA (on Eliquis), renal and prostate cancers who presents with his family to [facility] ED with cc of weakness. Wife states that pt is normally ambulatory with a cane and at times a walker. She reports the pt woke up this morning, he was unable to walk and even hold himself up to swallow his oral medications. Wife states that pt had a fever (reports his normal body temperature as 97) up to 99 today. Wife reports concern because pt has also been more confused x1w. She states that he has been having increased difficulty \"finding words\" that is he is normally able to speak. She states the pt was last admitted 12/2018 for sepsis, dx w/ E. Coli bacteremia. Wife has concerns for the same because pt had similar s/sx this morning. Wife also notes that pt \"cheeks his food\" and states that he has difficulty swallowing. She reports pt was given pureed food during his last admission at [facility], however, \"he eats just fine\" when at home. Denies any hx of recent falls, injuries, or traumas. Denies any recent medication changes. No chills, body aches, N/V/D, syncope,  cough, congestion, CP, SOB, abdominal pain, dysuria, wounds, urinary frequency/hesitancy, flank pain. Does use a condom catheter for urination. Medical Record Review: Admission 12/2-12/5/18 for bacteremia. Discharged to [facility] SN[facility] on IV Abx. Wife states pt returned home 12/22/18 and has been living w/ her since. No OT/PT/ Speech therapy since returning home. PCP: Other, MD Chance 
 
There are no other complaints, changes or physical findings at this time. Fatigue This is a new problem. The current episode started 6 to 12 hours ago. Primary symptoms include speech difficulty. Pertinent negatives include no mental status change and no unresponsiveness. There has been no fever. Associated symptoms include altered mental status and confusion. Pertinent negatives include no shortness of breath, no chest pain, no vomiting, no choking and no bladder incontinence. Associated medical issues include dementia. Fever Pertinent negatives include no chest pain, no diarrhea, no vomiting, no shortness of breath and no mental status change. Past Medical History:  
Diagnosis Date  Arthritis  CAD (coronary artery disease) Angina Jan 2011, PCI LAD with multilink stent 4.0x12mm;  also cath at [place] 7/13 -- no stents at that time  CKD (chronic kidney disease), stage III ([address]  Cochlear implant in place 2/3/2017  Dementia  DM type 2 causing renal disease ([address]  DM type 2 causing vascular disease ([address]  GERD (gastroesophageal reflux disease)  History of vascular access device 12/05/2018  
 4 Fr PICC L basilic, 42 cm long term abx  
 HTN (hypertension)  Hypothyroidism  SAMUEL (obstructive sleep apnea)   
 not using CPAP  
 PAF (paroxysmal atrial fibrillation) ([address] [id number] score = 7  
 Prostate cancer ([address]  Renal cell cancer ([address] Stage 4 Renal Cell Cancer. Partial omentectomy 11/2/2015 with Dr. Ailin Lema: omental mass-metastatic renal cell carcinoma  Renal mass, left [address] LEFT PARTIAL NEPHRECTOMY 12/19/13;   
 
 
Past Surgical History:  
Procedure Laterality Date  CARDIAC SURG PROCEDURE UNLIST  1/2011  
 stent to LAD  HX CHOLECYSTECTOMY  7/13/2012  HX CORONARY STENT PLACEMENT  2011 [address]  2011, 2013 [address]  
 stapedectomy  HX HERNIA REPAIR  1968  
 left inguinal  
 HX LITHOTRIPSY  8/2015  HX ORTHOPAEDIC  1994  
 rotator cuff repair  HX ORTHOPAEDIC  1994  
 cervical disc removed  HX PROSTATECTOMY  2000  
 surgery intervention [address] [place]  HX UROLOGICAL    
 valvular implant prevent incontinence  HX UROLOGICAL    
 insert gel to help with incontinence  HX UROLOGICAL  2013  
 left partial nephrectomy  HX UROLOGICAL  2015 CT guided biopsy of abdominal lymph nodes Family History:  
Problem Relation Age of Onset  Stroke Father  Cancer Father   
     prostate  Diabetes Sister 2 sisters  Hypertension Sister 2 sisters  Diabetes Brother  Stroke Brother  Diabetes Brother   
     all brothers  Stroke Brother  Kidney Disease Brother  Heart Attack Brother  Hypertension Other Social History Socioeconomic History  Marital status:  Spouse name: Not on file  Number of children: Not on file  Years of education: Not on file  Highest education level: Not on file Social Needs  Financial resource strain: Not on file  Food insecurity - worry: Not on file  Food insecurity - inability: Not on file  Transportation needs - medical: Not on file  Transportation needs - non-medical: Not on file Occupational History  Not on file Tobacco Use  Smoking status: Former Smoker Packs/day: 0.10 Years: 23.00 Pack years: 2.30 Last attempt to quit: 12/3/1975 Years since quittin.2  Smokeless tobacco: Never Used Substance and Sexual Activity  Alcohol use: No  
  Alcohol/week: 0.0 oz  Drug use: No  
 Sexual activity: No  
  Birth control/protection: None Other Topics Concern [address] Service Not Asked  Blood Transfusions Not Asked  Caffeine Concern Not Asked  Occupational Exposure Not Asked [place] [employer] Hazards Not Asked  Sleep Concern Not Asked  Stress Concern Not Asked  Weight Concern Not Asked  Special Diet Not Asked  Back Care Not Asked  Exercise Not Asked  Bike Helmet Not Asked  Seat Belt Not Asked  Self-Exams Not Asked Social History Narrative  Not on file ALLERGIES: Latex; Atorvastatin; Ezetimibe; Lisinopril; Metoprolol; Morphine; Nitrofurantoin; Rosuvastatin; and Simvastatin Review of Systems Unable to perform ROS: Dementia Constitutional: Positive for activity change, fatigue and fever. Negative for chills. HENT: Positive for trouble swallowing. Negative for drooling. Respiratory: Negative for choking and shortness of breath. Cardiovascular: Negative for chest pain. Gastrointestinal: Negative for abdominal pain, constipation, diarrhea and vomiting. Genitourinary: Negative for bladder incontinence. Neurological: Positive for speech difficulty and weakness. Negative for dizziness and light-headedness. Psychiatric/Behavioral: Positive for confusion. All other systems reviewed and are negative. Vitals:  
 02/09/19 1346 BP: 116/61 Pulse: 77 Resp: 14 Temp: 99.2 °F (37.3 °C) SpO2: 97% Weight: 80.7 kg (178 lb) Height: 5' 7\" (1.702 m) Physical Exam  
Constitutional: He appears well-developed and well-nourished. No distress. HENT:  
Head: Normocephalic and atraumatic. Eyes: Conjunctivae are normal. Pupils are equal, round, and reactive to light. Neck: Normal range of motion. Cardiovascular: Normal rate and regular rhythm. Pulmonary/Chest: Effort normal and breath sounds normal.  
Abdominal: Soft. He exhibits no distension. There is no tenderness. There is no guarding. Musculoskeletal: Normal range of motion. Neurological: He is alert. GCS eye subscore is 4. GCS verbal subscore is 5. GCS motor subscore is 6. Oriented to person and place Skin: Skin is warm and dry. Capillary refill takes less than 2 seconds. Psychiatric: His affect is inappropriate. His speech is delayed. Cognition and memory are impaired. He exhibits abnormal remote memory. He exhibits normal recent memory. Nursing note and vitals reviewed.  
  
 
GERRY 
 Number of Diagnoses or Management Options Dementia with behavioral disturbance, unspecified dementia type:  
Malaise and fatigue: Unable to walk:  
Diagnosis management comments: DDx: CVA, TIA, bacteremia, UTI, worsening dementia w/ behavioral disturbances Amount and/or Complexity of Data Reviewed Clinical lab tests: ordered and reviewed Tests in the radiology section of CPT®: ordered and reviewed Review and summarize past medical records: yes Discuss the patient with other providers: yes Procedures 65 Spoke with Dr. Carrington Gordon, unable to take report for admission 2/2 large # of admits/ acuity of admissions. 1800 Family updated. Spoke with Dr. Carrington Gordon, unable to take report for admission 2/2 large # of admits/ acuity of admissions. LABORATORY TESTS: 
Recent Results (from the past 12 hour(s)) CBC WITH AUTOMATED DIFF Collection Time: 02/09/19  2:17 PM  
Result Value Ref Range WBC 6.4 4.1 - 11.1 K/uL  
 RBC 4.85 4.10 - 5.70 M/uL  
 HGB 13.5 12.1 - 17.0 g/dL HCT 42.0 36.6 - 50.3 % MCV 86.6 80.0 - 99.0 FL  
 MCH 27.8 26.0 - 34.0 PG  
 MCHC 32.1 30.0 - 36.5 g/dL  
 RDW 14.6 (H) 11.5 - 14.5 % PLATELET 166 (L) 962 - 400 K/uL MPV 9.7 8.9 - 12.9 FL  
 NRBC 0.0 0  WBC ABSOLUTE NRBC 0.00 0.00 - 0.01 K/uL NEUTROPHILS 77 (H) 32 - 75 % LYMPHOCYTES 9 (L) 12 - 49 % MONOCYTES 11 5 - 13 % EOSINOPHILS 2 0 - 7 % BASOPHILS 0 0 - 1 % IMMATURE GRANULOCYTES 1 (H) 0.0 - 0.5 % ABS. NEUTROPHILS 4.9 1.8 - 8.0 K/UL  
 ABS. LYMPHOCYTES 0.6 (L) 0.8 - 3.5 K/UL  
 ABS. MONOCYTES 0.7 0.0 - 1.0 K/UL  
 ABS. EOSINOPHILS 0.1 0.0 - 0.4 K/UL  
 ABS. BASOPHILS 0.0 0.0 - 0.1 K/UL  
 ABS. IMM. GRANS. 0.1 (H) 0.00 - 0.04 K/UL  
 DF SMEAR SCANNED    
 PLATELET COMMENTS Large Platelets RBC COMMENTS NORMOCYTIC, NORMOCHROMIC METABOLIC PANEL, COMPREHENSIVE Collection Time: 02/09/19  2:17 PM  
Result Value Ref Range  Sodium 138 136 - 145 mmol/L  
 Potassium 4.1 3.5 - 5.1 mmol/L Chloride 102 97 - 108 mmol/L  
 CO2 26 21 - 32 mmol/L Anion gap 10 5 - 15 mmol/L Glucose 170 (H) 65 - 100 mg/dL BUN 16 6 - 20 MG/DL Creatinine 1.75 (H) 0.70 - 1.30 MG/DL  
 BUN/Creatinine ratio 9 (L) 12 - 20 GFR est AA 45 (L) >60 ml/min/1.73m2 GFR est non-AA 37 (L) >60 ml/min/1.73m2 Calcium 11.2 (H) 8.5 - 10.1 MG/DL Bilirubin, total 2.4 (H) 0.2 - 1.0 MG/DL  
 ALT (SGPT) 124 (H) 12 - 78 U/L  
 AST (SGOT) 98 (H) 15 - 37 U/L Alk. phosphatase 183 (H) 45 - 117 U/L Protein, total 7.0 6.4 - 8.2 g/dL Albumin 3.4 (L) 3.5 - 5.0 g/dL Globulin 3.6 2.0 - 4.0 g/dL A-G Ratio 0.9 (L) 1.1 - 2.2 MAGNESIUM Collection Time: 02/09/19  2:17 PM  
Result Value Ref Range Magnesium 1.9 1.6 - 2.4 mg/dL INFLUENZA A & B AG (RAPID TEST) Collection Time: 02/09/19  2:17 PM  
Result Value Ref Range Influenza A Antigen NEGATIVE  NEG Influenza B Antigen NEGATIVE  NEG    
SAMPLES BEING HELD Collection Time: 02/09/19  2:18 PM  
Result Value Ref Range SAMPLES BEING HELD 1RED,1BL,1BLDCS   
 COMMENT Add-on orders for these samples will be processed based on acceptable specimen integrity and analyte stability, which may vary by analyte. GLUCOSE, POC Collection Time: 02/09/19  2:22 PM  
Result Value Ref Range Glucose (POC) 153 (H) 65 - 100 mg/dL Performed by Romina León (PCT) URINALYSIS W/ RFLX MICROSCOPIC Collection Time: 02/09/19  4:30 PM  
Result Value Ref Range Color DARK YELLOW Appearance CLEAR CLEAR Specific gravity 1.018 1.003 - 1.030    
 pH (UA) 5.5 5.0 - 8.0 Protein TRACE (A) NEG mg/dL Glucose NEGATIVE  NEG mg/dL Ketone TRACE (A) NEG mg/dL Blood NEGATIVE  NEG Urobilinogen 1.0 0.2 - 1.0 EU/dL Nitrites NEGATIVE  NEG Leukocyte Esterase TRACE (A) NEG    
 WBC 0-4 0 - 4 /hpf  
 RBC 0-5 0 - 5 /hpf Epithelial cells FEW FEW /lpf  Bacteria NEGATIVE  NEG /hpf  
 Hyaline cast 0-2 0 - 5 /lpf LACTIC ACID Collection Time: 02/09/19  4:30 PM  
Result Value Ref Range Lactic acid 1.3 0.4 - 2.0 MMOL/L  
URINE CULTURE HOLD SAMPLE Collection Time: 02/09/19  4:30 PM  
Result Value Ref Range Urine culture hold URINE ON HOLD IN MICROBIOLOGY DEPT FOR 3 DAYS. IF UNPRESERVED URINE IS SUBMITTED, IT CANNOT BE USED FOR ADDITIONAL TESTING AFTER 24 HRS, RECOLLECTION WILL BE REQUIRED. BILIRUBIN, CONFIRM Collection Time: 02/09/19  4:30 PM  
Result Value Ref Range Bilirubin UA, confirm POSITIVE (A) NEG    
 
 
IMAGING RESULTS: 
XR CHEST PA LAT Final Result IMPRESSION:  
  
No acute process. CT HEAD WO CONT Final Result IMPRESSION:   
No acute intracranial abnormality. MEDICATIONS GIVEN: 
Medications  
sodium chloride 0.9 % bolus infusion 1,000 mL (1,000 mL IntraVENous New Bag 2/9/19 [id number]) IMPRESSION: 
1. Unable to walk 2. Malaise and fatigue 3. Dementia with behavioral disturbance, unspecified dementia type PLAN: 
Admit Note: 
7:30 PM 
Patient is being admitted to the hospital by Dr. Bell/ Hospitalist group. The results of their tests and reasons for their admission have been discussed with them and/or available family. They convey agreement and understanding for the need to be admitted and for their admission diagnosis. Consultation has been made with the inpatient physician specialist for hospitalization.  
Shirley Canada NP

## 2019-02-09 NOTE — ED TRIAGE NOTES
Generalized weakness, had trouble sitting up this morning. Has been running a low grade fever and blood sugars a little more elevated than normal. Sits with eyes closed and yawning in triage, little verbal interaction. Oriented to person and states  correctly.

## 2019-02-10 LAB
ALBUMIN SERPL-MCNC: 2.6 G/DL (ref 3.5–5)
ALBUMIN/GLOB SERPL: 0.8 {RATIO} (ref 1.1–2.2)
ALP SERPL-CCNC: 139 U/L (ref 45–117)
ALT SERPL-CCNC: 78 U/L (ref 12–78)
AMMONIA PLAS-SCNC: 14 UMOL/L
ANION GAP SERPL CALC-SCNC: 8 MMOL/L (ref 5–15)
AST SERPL-CCNC: 55 U/L (ref 15–37)
B PERT DNA SPEC QL NAA+PROBE: NOT DETECTED
BILIRUB DIRECT SERPL-MCNC: 0.2 MG/DL (ref 0–0.2)
BILIRUB SERPL-MCNC: 1.2 MG/DL (ref 0.2–1)
BUN SERPL-MCNC: 17 MG/DL (ref 6–20)
BUN/CREAT SERPL: 11 (ref 12–20)
C PNEUM DNA SPEC QL NAA+PROBE: NOT DETECTED
CALCIUM SERPL-MCNC: 10.2 MG/DL (ref 8.5–10.1)
CHLORIDE SERPL-SCNC: 107 MMOL/L (ref 97–108)
CO2 SERPL-SCNC: 26 MMOL/L (ref 21–32)
CREAT SERPL-MCNC: 1.52 MG/DL (ref 0.7–1.3)
ERYTHROCYTE [DISTWIDTH] IN BLOOD BY AUTOMATED COUNT: 14.8 % (ref 11.5–14.5)
EST. AVERAGE GLUCOSE BLD GHB EST-MCNC: 166 MG/DL
FLUAV H1 2009 PAND RNA SPEC QL NAA+PROBE: NOT DETECTED
FLUAV H1 RNA SPEC QL NAA+PROBE: NOT DETECTED
FLUAV H3 RNA SPEC QL NAA+PROBE: NOT DETECTED
FLUAV SUBTYP SPEC NAA+PROBE: NOT DETECTED
FLUBV RNA SPEC QL NAA+PROBE: NOT DETECTED
GLOBULIN SER CALC-MCNC: 3.3 G/DL (ref 2–4)
GLUCOSE BLD STRIP.AUTO-MCNC: 122 MG/DL (ref 65–100)
GLUCOSE BLD STRIP.AUTO-MCNC: 130 MG/DL (ref 65–100)
GLUCOSE BLD STRIP.AUTO-MCNC: 184 MG/DL (ref 65–100)
GLUCOSE BLD STRIP.AUTO-MCNC: 97 MG/DL (ref 65–100)
GLUCOSE SERPL-MCNC: 97 MG/DL (ref 65–100)
HADV DNA SPEC QL NAA+PROBE: NOT DETECTED
HBA1C MFR BLD: 7.4 % (ref 4.2–6.3)
HCOV 229E RNA SPEC QL NAA+PROBE: NOT DETECTED
HCOV HKU1 RNA SPEC QL NAA+PROBE: NOT DETECTED
HCOV NL63 RNA SPEC QL NAA+PROBE: NOT DETECTED
HCOV OC43 RNA SPEC QL NAA+PROBE: NOT DETECTED
HCT VFR BLD AUTO: 36.1 % (ref 36.6–50.3)
HGB BLD-MCNC: 11.4 G/DL (ref 12.1–17)
HMPV RNA SPEC QL NAA+PROBE: NOT DETECTED
HPIV1 RNA SPEC QL NAA+PROBE: NOT DETECTED
HPIV2 RNA SPEC QL NAA+PROBE: NOT DETECTED
HPIV3 RNA SPEC QL NAA+PROBE: NOT DETECTED
HPIV4 RNA SPEC QL NAA+PROBE: NOT DETECTED
M PNEUMO DNA SPEC QL NAA+PROBE: NOT DETECTED
MAGNESIUM SERPL-MCNC: 1.9 MG/DL (ref 1.6–2.4)
MCH RBC QN AUTO: 27.3 PG (ref 26–34)
MCHC RBC AUTO-ENTMCNC: 31.6 G/DL (ref 30–36.5)
MCV RBC AUTO: 86.6 FL (ref 80–99)
NRBC # BLD: 0 K/UL (ref 0–0.01)
NRBC BLD-RTO: 0 PER 100 WBC
PHOSPHATE SERPL-MCNC: 2.5 MG/DL (ref 2.6–4.7)
PLATELET # BLD AUTO: 90 K/UL (ref 150–400)
PMV BLD AUTO: 10.9 FL (ref 8.9–12.9)
POTASSIUM SERPL-SCNC: 4.3 MMOL/L (ref 3.5–5.1)
PROT SERPL-MCNC: 5.9 G/DL (ref 6.4–8.2)
RBC # BLD AUTO: 4.17 M/UL (ref 4.1–5.7)
RSV RNA SPEC QL NAA+PROBE: NOT DETECTED
RV+EV RNA SPEC QL NAA+PROBE: NOT DETECTED
SERVICE CMNT-IMP: ABNORMAL
SERVICE CMNT-IMP: NORMAL
SODIUM SERPL-SCNC: 141 MMOL/L (ref 136–145)
TSH SERPL DL<=0.05 MIU/L-ACNC: 0.95 UIU/ML (ref 0.36–3.74)
WBC # BLD AUTO: 3.1 K/UL (ref 4.1–11.1)

## 2019-02-10 PROCEDURE — 36415 COLL VENOUS BLD VENIPUNCTURE: CPT

## 2019-02-10 PROCEDURE — 82140 ASSAY OF AMMONIA: CPT

## 2019-02-10 PROCEDURE — 87040 BLOOD CULTURE FOR BACTERIA: CPT

## 2019-02-10 PROCEDURE — 84100 ASSAY OF PHOSPHORUS: CPT

## 2019-02-10 PROCEDURE — 74011250637 HC RX REV CODE- 250/637: Performed by: INTERNAL MEDICINE

## 2019-02-10 PROCEDURE — 87633 RESP VIRUS 12-25 TARGETS: CPT

## 2019-02-10 PROCEDURE — 83036 HEMOGLOBIN GLYCOSYLATED A1C: CPT

## 2019-02-10 PROCEDURE — 84443 ASSAY THYROID STIM HORMONE: CPT

## 2019-02-10 PROCEDURE — 85027 COMPLETE CBC AUTOMATED: CPT

## 2019-02-10 PROCEDURE — 80048 BASIC METABOLIC PNL TOTAL CA: CPT

## 2019-02-10 PROCEDURE — 80076 HEPATIC FUNCTION PANEL: CPT

## 2019-02-10 PROCEDURE — 65270000029 HC RM PRIVATE

## 2019-02-10 PROCEDURE — 74011250636 HC RX REV CODE- 250/636: Performed by: INTERNAL MEDICINE

## 2019-02-10 PROCEDURE — 94760 N-INVAS EAR/PLS OXIMETRY 1: CPT

## 2019-02-10 PROCEDURE — 83735 ASSAY OF MAGNESIUM: CPT

## 2019-02-10 PROCEDURE — 82962 GLUCOSE BLOOD TEST: CPT

## 2019-02-10 RX ORDER — HYDROCODONE BITARTRATE AND ACETAMINOPHEN 5; 325 MG/1; MG/1
1 TABLET ORAL
Status: DISCONTINUED | OUTPATIENT
Start: 2019-02-10 | End: 2019-02-12 | Stop reason: HOSPADM

## 2019-02-10 RX ORDER — INSULIN LISPRO 100 [IU]/ML
INJECTION, SOLUTION INTRAVENOUS; SUBCUTANEOUS
Status: DISCONTINUED | OUTPATIENT
Start: 2019-02-10 | End: 2019-02-12 | Stop reason: HOSPADM

## 2019-02-10 RX ADMIN — DOCUSATE SODIUM 100 MG: 100 CAPSULE, LIQUID FILLED ORAL at 09:11

## 2019-02-10 RX ADMIN — APIXABAN 2.5 MG: 2.5 TABLET, FILM COATED ORAL at 17:35

## 2019-02-10 RX ADMIN — DOCUSATE SODIUM 100 MG: 100 CAPSULE, LIQUID FILLED ORAL at 17:35

## 2019-02-10 RX ADMIN — PANTOPRAZOLE SODIUM 40 MG: 40 TABLET, DELAYED RELEASE ORAL at 06:54

## 2019-02-10 RX ADMIN — VANCOMYCIN HYDROCHLORIDE 1750 MG: 10 INJECTION, POWDER, LYOPHILIZED, FOR SOLUTION INTRAVENOUS at 17:35

## 2019-02-10 RX ADMIN — MELATONIN TAB 3 MG 3 MG: 3 TAB at 21:57

## 2019-02-10 RX ADMIN — APIXABAN 2.5 MG: 2.5 TABLET, FILM COATED ORAL at 09:11

## 2019-02-10 RX ADMIN — ISOSORBIDE MONONITRATE 120 MG: 30 TABLET, EXTENDED RELEASE ORAL at 06:54

## 2019-02-10 RX ADMIN — Medication 10 ML: at 22:02

## 2019-02-10 RX ADMIN — ASPIRIN 81 MG: 81 TABLET, COATED ORAL at 21:57

## 2019-02-10 RX ADMIN — MIRTAZAPINE 7.5 MG: 15 TABLET, FILM COATED ORAL at 21:57

## 2019-02-10 RX ADMIN — Medication 10 ML: at 06:53

## 2019-02-10 RX ADMIN — AMLODIPINE BESYLATE 5 MG: 5 TABLET ORAL at 09:11

## 2019-02-10 NOTE — ACP (ADVANCE CARE PLANNING)
Advance Care Planning (ACP) Provider Note - Comprehensive     Date of ACP Conversation: 02/09/19  Persons included in Conversation:  patient and family  Diagnosis: dementia  Length of ACP Conversation in minutes:  16 minutes    Authorized Decision Maker (if patient is incapable of making informed decisions):    This person is:  wife            General ACP for ALL Patients with Decision Making Capacity:   Exploration of values, goals, and preferences if recovery is not expected, even with continued medical treatment in the event of: Imminent death    Review of Existing Advance Directive:  DNR, has durable DNR, advance directivs    For Serious or Chronic Illness:  Understanding of medical condition      Interventions Provided:  Recommended communicating the plan and making copies for the healthcare agent, personal physician, and others as appropriate (e.g., health system)

## 2019-02-10 NOTE — ED NOTES
Pt Throughput: Receiving 5th floor Charge RN made aware of patient's bed placement.   
 
Albert Paulino RN

## 2019-02-10 NOTE — PROGRESS NOTES
Bedside shift change report given to KIMBERLY Leyva (oncoming nurse) by Maegan Chung (offgoing nurse). Report included the following information SBAR, Kardex, ED Summary, Intake/Output and MAR.

## 2019-02-10 NOTE — PROGRESS NOTES
Problem: Falls - Risk of 
Goal: *Absence of Falls Document Marie Tinajero Fall Risk and appropriate interventions in the flowsheet. Outcome: Progressing Towards Goal 
Fall Risk Interventions: 
Mobility Interventions: Communicate number of staff needed for ambulation/transfer, Patient to call before getting OOB Mentation Interventions: Evaluate medications/consider consulting pharmacy Medication Interventions: Evaluate medications/consider consulting pharmacy Elimination Interventions: Call light in reach, Urinal in reach History of Falls Interventions: Evaluate medications/consider consulting pharmacy, Room close to nurse's station

## 2019-02-10 NOTE — H&P
[address], [address] [place] [address] 
[phone] Admission History and Physical 
 
 
NAME:  Yvon Rajan. :   1935 MRN:  [id number] PCP:  Lincoln Carrero MD  
 
Date/Time:  2019 Subjective: CHIEF COMPLAINT: confusion HISTORY OF PRESENT ILLNESS:    
The patient is a 81 yo hx of Pafib, CAD, TIA, DM, CKD 3, hypothyroid, renal cell CA, dementia, presented w/ fevers, AMS. The patient is a poor historian. His wife stated that the patient has good ADLs, but today, he developed a fever, associated with confusion and weakness. Denied chest pain, cough, nausea, vomiting, diarrhea. The patient's wife also said that his symptoms were similar to when the patient developed E. Coli bacteremia in December. In the ED, WBC was 6.4. U/A pending. Allergies Allergen Reactions  Latex Rash, Swelling and Contact Dermatitis  Atorvastatin Unknown (comments) Per patient's PCP allergy list- No reaction specified  Ezetimibe Myalgia  Lisinopril Cough  Metoprolol Other (comments) Bradycardia  Morphine Other (comments) Hallucinations and Nausea  Nitrofurantoin Rash \"Blood blisters/rash\" per patient's family  Rosuvastatin Myalgia  Simvastatin Myalgia Prior to Admission medications Medication Sig Start Date End Date Taking? Authorizing Provider  
acetaminophen (TYLENOL) 325 mg tablet Take 650 mg by mouth every four (4) hours as needed for Pain or Fever. Yes Provider, Historical  
aspirin delayed-release 81 mg tablet Take 81 mg by mouth nightly. Yes Provider, Historical  
amLODIPine (NORVASC) 10 mg tablet Take 5 mg by mouth daily. Yes Provider, Historical  
losartan (COZAAR) 25 mg tablet Take 25 mg by mouth nightly. Yes Provider, Historical  
nystatin (MYCOSTATIN) powder Apply  to affected area daily as needed for Other (Itching/Irritation- Groin area).  To groin after showering and drying    Yes Provider, Historical  
docusate sodium (COLACE) 100 mg capsule Take 100 mg by mouth daily as needed. Yes Provider, Historical  
senna (SENNA) 8.6 mg tablet Take 1 Tab by mouth daily as needed for Constipation. Yes Provider, Historical  
isosorbide mononitrate ER (IMDUR) 120 mg CR tablet Take 1 Tab by mouth every morning. 2/4/17  Yes Nicko Wilson MD  
melatonin 3 mg tablet Take 3 mg by mouth nightly. Yes Provider, Historical  
polyethylene glycol (MIRALAX) 17 gram packet Take 17 g by mouth daily as needed (Constipation). Yes Provider, Historical  
mirtazapine (REMERON) 15 mg tablet Take 7.5 mg by mouth nightly. Yes Provider, Historical  
apixaban (ELIQUIS) 2.5 mg tablet Take 2.5 mg by mouth two (2) times a day. Yes Provider, Historical  
omeprazole (PRILOSEC) 20 mg capsule Take 20 mg by mouth Daily (before breakfast). Yes Provider, Historical  
nitroglycerin (NITROSTAT) 0.4 mg SL tablet 0.4 mg by SubLINGual route every five (5) minutes as needed. Yes Other, MD Chance  
 
 
Past Medical History:  
Diagnosis Date  Arthritis  CAD (coronary artery disease) Angina Jan 2011, PCI LAD with multilink stent 4.0x12mm;  also cath at [place] 7/13 -- no stents at that time  CKD (chronic kidney disease), stage III ([facility]  Cochlear implant in place 2/3/2017  Dementia  DM type 2 causing renal disease ([facility]  DM type 2 causing vascular disease ([address]  GERD (gastroesophageal reflux disease)  History of vascular access device 12/05/2018  
 4 Fr PICC L basilic, 42 cm long term abx  
 HTN (hypertension)  Hypothyroidism  SAMUEL (obstructive sleep apnea)   
 not using CPAP  
 PAF (paroxysmal atrial fibrillation) ([address] [id number] score = 7  
 Prostate cancer ([address]  Renal cell cancer ([facility] Stage 4 Renal Cell Cancer. Partial omentectomy 11/2/2015 with Dr. Steph Murillo: omental mass-metastatic renal cell carcinoma  Renal mass, left [address] LEFT PARTIAL NEPHRECTOMY 13;   
  
 
Past Surgical History:  
Procedure Laterality Date  CARDIAC SURG PROCEDURE UNLIST  2011  
 stent to LAD  HX CHOLECYSTECTOMY  2012  HX CORONARY STENT PLACEMENT   [address]  ,  [address]  
 stapedectomy  HX HERNIA REPAIR  1968  
 left inguinal  
 HX LITHOTRIPSY  2015  HX ORTHOPAEDIC  1994  
 rotator cuff repair  HX ORTHOPAEDIC    
 cervical disc removed  HX PROSTATECTOMY    
 surgery intervention [address] [place]  HX UROLOGICAL    
 valvular implant prevent incontinence  HX UROLOGICAL    
 insert gel to help with incontinence  HX UROLOGICAL  2013  
 left partial nephrectomy  HX UROLOGICAL  2015 CT guided biopsy of abdominal lymph nodes Social History Tobacco Use  Smoking status: Former Smoker Packs/day: 0.10 Years: 23.00 Pack years: 2.30 Last attempt to quit: 12/3/1975 Years since quittin.2  Smokeless tobacco: Never Used Substance Use Topics  Alcohol use: No  
  Alcohol/week: 0.0 oz Family History Problem Relation Age of Onset  Stroke Father  Cancer Father   
     prostate  Diabetes Sister 2 sisters  Hypertension Sister 2 sisters  Diabetes Brother  Stroke Brother  Diabetes Brother   
     all brothers  Stroke Brother  Kidney Disease Brother  Heart Attack Brother  Hypertension Other Review of Systems: 
(bold if positive, if negative) Gen:   fever, chills,Eyes:  ENT:  CVS:  Pulm:  GI:   
:   
MS:  Skin:  Psych:  Endo:   
Hem:  Renal:   
Neuro:    
 
  
Objective: VITALS:   
Vital signs reviewed; most recent are: 
 
Visit Vitals /61 (BP 1 Location: Left arm, BP Patient Position: At rest) Pulse 77 Temp 99.2 °F (37.3 °C) Resp 14 Ht 5' 7\" (1.702 m) Wt 80.7 kg (178 lb) SpO2 97% BMI 27.88 kg/m² SpO2 Readings from Last 6 Encounters:  
02/09/19 97% 12/05/18 94% 12/01/18 96% 11/27/18 95% 09/13/18 97% 08/26/17 92% Intake/Output Summary (Last 24 hours) at 2/9/2019 2030 Last data filed at 2/9/2019 1701 Gross per 24 hour Intake 1000 ml Output  Net 1000 ml Exam:  
 
Physical Exam: 
 
Gen:  Elderly, disheveled, obese, NAD HEENT:  Pink conjunctivae, PERRL, hearing intact to voice, moist mucous membranes Neck:  Supple, without masses, thyroid non-tender Resp:  No accessory muscle use, clear breath sounds without wheezes rales or rhonchi 
Card:  No murmurs, normal S1, S2 without thrills, trace edema Abd:  Soft, non-tender, non-distended, normoactive bowel sounds are present Lymph:  No cervical adenopathy Musc:  No cyanosis or clubbing Skin:  No rashes Neuro:  Cranial nerves 3-12 are grossly intact, follows commands appropriately Psych:  Alert with poor insight. Oriented to person Labs: 
 
Recent Labs 02/09/19 [address] WBC 6.4 HGB 13.5 HCT 42.0 * Recent Labs 02/09/19 [address]   
K 4.1  CO2 26 * BUN 16  
CREA 1.75* CA 11.2*  
MG 1.9 ALB 3.4* TBILI 2.4* SGOT 98* * Lab Results Component Value Date/Time Glucose (POC) 153 (H) 02/09/2019 02:22 PM  
 Glucose (POC) 181 (H) 12/05/2018 01:35 PM  
 
No results for input(s): PH, PCO2, PO2, HCO3, FIO2 in the last 72 hours. No results for input(s): INR in the last 72 hours. No lab exists for component: INREXT Radiology and EKG reviewed:   pending **Old Records reviewed in [facility]** Assessment/Plan:   
  
Principal Problem: 
 
79 yo hx of Pafib, CAD, TIA, DM, CKD 3, hypothyroid, renal cell CA, dementia, presented w/ fevers, AMS 1) Acute metabolic encephalopathy: concern for underlying infection, dementia. Will also check TSH, ammonia. Will monitor for agitation 2) Fevers: unclear etiology. Not septic. No leukocytosis. Hx of prior E. Coli bacteremia w/o source; was on 2 weeks of IV abx in December. Will repeat CXR, U/A, blood Cx, viral panel. Empirically start on IV CTX 3) CAD/Pafib/HTN: cont norvasc, imdur, eliquis, ASA. Hold losartan due to BRITANY 4) BRITANY/CKD 3: likely from dehydration. Will start low dose IVF, monitor BMP 5) Hx of TIA: cont eliquis, ASA 6) DM type 2 w/ renal complications: check X4P, not on meds. Start SSI 7) Dementia: lives at home. Will monitor for agitation Code: DNR, discussed with wife Risk of deterioration: high Total time spent with patient: 70 Minutes Care Plan discussed with: Patient, wife, nursing Discussed:  Care Plan Prophylaxis:  eliquis Probable Disposition:   PT, OT, RN 
        
___________________________________________________ Attending Physician: Gina Acosta MD

## 2019-02-10 NOTE — ROUTINE PROCESS
TRANSFER - OUT REPORT: 
 
Verbal report given to [facility]) on [id number] [facility].  being transferred to 5th floor(unit) for routine progression of care Report consisted of patients Situation, Background, Assessment and  
Recommendations(SBAR). Information from the following report(s) ED Summary was reviewed with the receiving nurse. Lines:  
Peripheral IV 02/09/19 Right Antecubital (Active) Site Assessment Clean, dry, & intact 2/9/2019  2:24 PM  
Phlebitis Assessment 0 2/9/2019  2:24 PM  
Infiltration Assessment 0 2/9/2019  2:24 PM  
Dressing Status Clean, dry, & intact 2/9/2019  2:24 PM  
Dressing Type Tape;Transparent 2/9/2019  2:24 PM  
Hub Color/Line Status Pink 2/9/2019  2:24 PM  
   
Peripheral IV 02/09/19 Left Forearm (Active) Site Assessment Clean, dry, & intact 2/9/2019  4:29 PM  
Phlebitis Assessment 0 2/9/2019  4:29 PM  
Infiltration Assessment 0 2/9/2019  4:29 PM  
Dressing Status Clean, dry, & intact 2/9/2019  4:29 PM  
Hub Color/Line Status Pink 2/9/2019  4:29 PM  
Action Taken Catheter retaped 2/9/2019  4:29 PM  
  
 
Opportunity for questions and clarification was provided. Patient transported with: 
 [employer]

## 2019-02-10 NOTE — PROGRESS NOTES
Aristeo Tanner [facility] 
[address] [facility] [place], [place], [address] [place] [address] 
[phone] Medical Progress Note NAME: Sita Moore. :  1935 MRM:  [id number] Date/Time: 2/10/2019  11:42 AM 
 
  
Assessment and Plan: 1. Acute metabolic encephalopathy: unclear cause. TSH and ammonia are normal.  Will monitor for agitation 
  
2.  ? Viral infection/ Leukopenia/ thrombocytopenia/ abnormal LFTs. Check cmv, ebv. Acute respiratory viral panel is negative. UA and CXR are unremarkable. Continue hydration 
  
3. CAD/Pafib/HTN: cont norvasc, imdur, eliquis, ASA. Hold losartan due to BRITANY 
  
4. BRITANY/CKD 3: likely from dehydration. Continue IVF, monitor BMP 
  
5. Hx of TIA: cont eliquis, ASA 
  
6.  DM type 2 w/ renal complications: check H8A, not on meds. Start SSI 7. Hypercalcemia. likely due to volume depletion. Improving with hydration.  
  
8. Dementia: lives at home. 9.  Poor PO intake. Likely from acute 10. Debility. PT/OT evaluation  
  
Code: DNR Subjective: Chief Complaint:  Follow up of pt who was admitted with AMS. Family stated that he is the same. ROS: 
(bold if positive, if negative) Tolerating PT  Tolerating Diet Objective:  
 
Last 24hrs VS reviewed since prior progress note. Most recent are: 
 
Visit Vitals /65 (BP 1 Location: Right arm, BP Patient Position: At rest) Pulse 65 Temp 98 °F (36.7 °C) Resp 18 Ht 5' 7\" (1.702 m) Wt 80.7 kg (178 lb) SpO2 95% BMI 27.88 kg/m² SpO2 Readings from Last 6 Encounters:  
02/10/19 95% 18 94% 18 96% 18 95% 18 97% 17 92% Intake/Output Summary (Last 24 hours) at 2/10/2019 1142 Last data filed at 2/10/2019 1042 Gross per 24 hour Intake 1240 ml Output 0 ml Net 1240 ml Physical Exam: 
 
Gen:  Well-developed, well-nourished, in no acute distress HEENT:  Pink conjunctivae, PERRL, hearing intact to voice, moist mucous membranes Neck:  Supple, without masses, thyroid non-tender Resp:  No accessory muscle use, clear breath sounds without wheezes rales or rhonchi 
Card:  No murmurs, normal S1, S2 without thrills, bruits or peripheral edema Abd:  Soft, non-tender, non-distended, normoactive bowel sounds are present, no palpable organomegaly and no detectable hernias Lymph:  No cervical or inguinal adenopathy Musc:  No cyanosis or clubbing Skin:  No rashes or ulcers, skin turgor is good Neuro:  Cranial nerves are grossly intact, no focal motor weakness, follows commands appropriately Psych:  poor insight  
__________________________________________________________________ Medications Reviewed: (see below) Medications:  
 
Current Facility-Administered Medications Medication Dose Route Frequency  HYDROcodone-acetaminophen (NORCO) 5-325 mg per tablet 1 Tab  1 Tab Oral Q6H PRN  
 insulin lispro (HUMALOG) injection   SubCUTAneous AC&HS  amLODIPine (NORVASC) tablet 5 mg  5 mg Oral DAILY  apixaban (ELIQUIS) tablet 2.5 mg  2.5 mg Oral BID  aspirin delayed-release tablet 81 mg  81 mg Oral QHS  isosorbide mononitrate ER (IMDUR) tablet 120 mg  120 mg Oral 7am  
 melatonin tablet 3 mg  3 mg Oral QHS  mirtazapine (REMERON) tablet 7.5 mg  7.5 mg Oral QHS  polyethylene glycol (MIRALAX) packet 17 g  17 g Oral DAILY PRN  
 senna (SENOKOT) tablet 8.6 mg  1 Tab Oral DAILY PRN  
 0.9% sodium chloride infusion  75 mL/hr IntraVENous CONTINUOUS  
 sodium chloride (NS) flush 5-40 mL  5-40 mL IntraVENous Q8H  
 sodium chloride (NS) flush 5-40 mL  5-40 mL IntraVENous PRN  
 acetaminophen (TYLENOL) tablet 650 mg  650 mg Oral Q4H PRN  
 naloxone (NARCAN) injection 0.4 mg  0.4 mg IntraVENous PRN  
 diphenhydrAMINE (BENADRYL) injection 12.5 mg  12.5 mg IntraVENous Q4H PRN  
 ondansetron (ZOFRAN) injection 4 mg  4 mg IntraVENous Q6H PRN  
  docusate sodium (COLACE) capsule 100 mg  100 mg Oral BID  
 glucose chewable tablet 16 g  4 Tab Oral PRN  
 dextrose (D50W) injection syrg 12.5-25 g  12.5-25 g IntraVENous PRN  
 glucagon (GLUCAGEN) injection 1 mg  1 mg IntraMUSCular PRN  pantoprazole (PROTONIX) tablet 40 mg  40 mg Oral ACB  cefTRIAXone (ROCEPHIN) 2 g in 0.9% sodium chloride (MBP/ADV) 50 mL  2 g IntraVENous Q24H Lab Data Reviewed: (see below) Lab Review:  
 
Recent Labs  
  02/10/19 
0422 02/09/19 [address] WBC 3.1* 6.4 HGB 11.4* 13.5 HCT 36.1* 42.0 PLT 90* 125* Recent Labs  
  02/10/19 
0422 02/09/19 [address]  138  
K 4.3 4.1  102 CO2 26 26 GLU 97 170* BUN 17 16 CREA 1.52* 1.75* CA 10.2* 11.2*  
MG 1.9 1.9 PHOS 2.5*  --   
ALB 2.6* 3.4* TBILI 1.2* 2.4* SGOT 55* 98* ALT 78 124* Lab Results Component Value Date/Time Glucose (POC) 130 (H) 02/10/2019 11:11 AM  
 Glucose (POC) 97 02/10/2019 06:46 AM  
 Glucose (POC) 153 (H) 02/09/2019 02:22 PM  
 Glucose (POC) 181 (H) 12/05/2018 01:35 PM  
 Glucose (POC) 169 (H) 12/05/2018 07:01 AM  
 
No results for input(s): PH, PCO2, PO2, HCO3, FIO2 in the last 72 hours. No results for input(s): INR in the last 72 hours. No lab exists for component: INREXT All Micro Results Procedure Component Value Units Date/Time CULTURE, BLOOD, PAIRED [[id number]] Collected:  02/09/19 1418 Order Status:  Completed Specimen:  Blood Updated:  02/10/19 1128 Special Requests: NO SPECIAL REQUESTS Culture result:    
  ONE OF FOUR BOTTLES HAS BEEN FLAGGED POSITIVE BY INSTRUMENT. BOTTLE HAS BEEN SENT TO [facility] TO ASSESS FOR POSSIBLE GROWTH. REMAINING BOTTLE(S) HAS/HAVE NO GROWTH SO FAR  
     
 RESPIRATORY PANEL,PCR,NASOPHARYNGEAL [[id number]] Collected:  02/10/19 0801 Order Status:  Completed Specimen:  Nasopharyngeal Updated:  02/10/19 1107 Adenovirus NOT DETECTED   Coronavirus 229E NOT DETECTED     
 Coronavirus HKU1 NOT DETECTED Coronavirus CVNL63 NOT DETECTED Coronavirus OC43 NOT DETECTED Metapneumovirus NOT DETECTED Rhinovirus and Enterovirus NOT DETECTED Influenza A NOT DETECTED Influenza A, subtype H1 NOT DETECTED Influenza A, subtype H3 NOT DETECTED INFLUENZA A H1N1 PCR NOT DETECTED Influenza B NOT DETECTED Parainfluenza 1 NOT DETECTED Parainfluenza 2 NOT DETECTED Parainfluenza 3 NOT DETECTED Parainfluenza virus 4 NOT DETECTED     
  RSV by PCR NOT DETECTED Bordetella pertussis - PCR NOT DETECTED Chlamydophila pneumoniae DNA, QL, PCR NOT DETECTED Mycoplasma pneumoniae DNA, QL, PCR NOT DETECTED     
 CULTURE, URINE [[id number]] Collected:  02/09/19 2202 Order Status:  Completed Specimen:  Urine from [place] Specimen Updated:  02/10/19 0109 URINE CULTURE HOLD SAMPLE [[id number]] Collected:  02/09/19 1630 Order Status:  Completed Specimen:  Serum Updated:  02/09/19 1652 Urine culture hold URINE ON HOLD IN MICROBIOLOGY DEPT FOR 3 DAYS. IF UNPRESERVED URINE IS SUBMITTED, IT CANNOT BE USED FOR ADDITIONAL TESTING AFTER 24 HRS, RECOLLECTION WILL BE REQUIRED. INFLUENZA A & B AG (RAPID TEST) [[id number]] Collected:  02/09/19 1417 Order Status:  Completed Specimen:  Nasopharyngeal from Nasal washing Updated:  02/09/19 1441 Influenza A Antigen NEGATIVE Influenza B Antigen NEGATIVE I have reviewed notes of prior 24hr. Other pertinent lab: Total time spent with patient: 28 Care Plan discussed with: Patient, Nursing Staff and >50% of time spent in counseling and coordination of care Discussed:  Care Plan Prophylaxis:  eliquis Disposition:  SNF/LTC 
        
___________________________________________________ Attending Physician: Cherrie Izaguirre MD

## 2019-02-10 NOTE — PROGRESS NOTES
[facility]: MED RECONCILIATION Comments/Recommendations:  
? Patient recently discharged from [facility] on 12/5/18. His family reports no changes in his outpatient medications since hospital discharge. Family confirmed home medications, patient took all of his meds this AM. Information obtained from: Patient and family at bedside, [address], Recent hospital discharge summary Allergies: Latex; Atorvastatin; Ezetimibe; Lisinopril; Metoprolol; Morphine; Nitrofurantoin; Rosuvastatin; and Simvastatin Prior to Admission Medications:  
 
Medication Documentation Review Audit Reviewed by KRIS CotterD (Pharmacist) on 02/09/19 at 9937 Medication Sig Documenting Provider Last Dose Status Taking?  
acetaminophen (TYLENOL) 325 mg tablet Take 650 mg by mouth every four (4) hours as needed for Pain or Fever. Provider, Historical  Active Yes  
amLODIPine (NORVASC) 10 mg tablet Take 5 mg by mouth daily. Provider, Historical 2/9/2019 AM Active Yes  
apixaban (ELIQUIS) 2.5 mg tablet Take 2.5 mg by mouth two (2) times a day. Provider, Historical 2/9/2019 AM Active Yes Med Note Aprosa Lozano Feb 2, 2017  6:40 PM) . aspirin delayed-release 81 mg tablet Take 81 mg by mouth nightly. Provider, Historical 2/8/2019 PM Active Yes  
docusate sodium (COLACE) 100 mg capsule Take 100 mg by mouth daily as needed. Provider, Historical  Active Yes  
isosorbide mononitrate ER (IMDUR) 120 mg CR tablet Take 1 Tab by mouth every morning. Denys Mejia MD 2/9/2019 AM Active Yes  
losartan (COZAAR) 25 mg tablet Take 25 mg by mouth nightly. Provider, Historical 2/8/2019 PM Active Yes  
melatonin 3 mg tablet Take 3 mg by mouth nightly. Provider, Historical 2/8/2019 PM Active Yes  
mirtazapine (REMERON) 15 mg tablet Take 7.5 mg by mouth nightly.  Provider, Historical 2/8/2019 PM Active Yes  
nitroglycerin (NITROSTAT) 0.4 mg SL tablet 0.4 mg by SubLINGual route every five (5) minutes as needed. Other, MD Chance  Active Yes Med Penelope Blanca Feb 2, 2017  6:42 PM) . nystatin (MYCOSTATIN) powder Apply  to affected area daily as needed for Other (Itching/Irritation- Groin area). To groin after showering and drying  Provider, Historical  Active Yes  
omeprazole (PRILOSEC) 20 mg capsule Take 20 mg by mouth Daily (before breakfast). Provider, Historical 2/9/2019 AM Active Yes Med Penelope Blanca Feb 2, 2017  6:42 PM) . polyethylene glycol (MIRALAX) 17 gram packet Take 17 g by mouth daily as needed (Constipation). Provider, Historical  Active Yes  
senna (SENNA) 8.6 mg tablet Take 1 Tab by mouth daily as needed for Constipation. Provider, Historical  Active Yes Erica El, PHARMD   Contact: [phone]

## 2019-02-10 NOTE — PROGRESS NOTES
[address] Dosing Services: Antimicrobial Stewardship Progress Note Consult for antibiotic dosing of Vancomycin by Dr. Fawn Ramirez Pharmacist reviewed antibiotic appropriateness for 80year old , male  for indication of Bloodstream infection. Patient with history of E. Coli bacteremia (unclear source). Now with Gm+ cocci on blood Cx. Day of Therapy 1 Patient is s/p 2 weeks of IV ceftriaxone in 12/2018 Plan: 
Vancomycin therapy: 
? Start Vancomycin therapy, with loading dose of 1750 (mg) at 2/10/19 (time/date). ? Scr trending down, appears at baseline. Follow with maintenance dose of 1250 (mg) every 24 hours for anticipated trough of ~16 mcg/ml based on pharmacokinetic dosing. ? Last trough level / Plan for level: Initial dosing. Trough prior to 3rd dose due to renal dysfunction and advanced age. ? Pharmacy to follow daily and will make changes to dose and/or frequency based on clinical status. ? Daily Scr per protocol Date Dose & Interval Measured (mcg/mL) Extrapolated (mcg/mL) ? ? ? ?  
? ? ? ?  
? ? ? ? Other Antimicrobial 
(not dosed by pharmacist) None Cultures 2/10/19 Blood Cx: GPC 1/4 bottles, Prelim 2/10 Resp viral panel: Negative for respiratory viruses, bacteria 2/9/19 CXR: No acute process 2/9/19 UA: Clear Serum Creatinine Lab Results Component Value Date/Time Creatinine 1.52 (H) 02/10/2019 04:22 AM  
 Creatinine (POC) 1.5 (H) 11/26/2013 09:55 AM  
   
Creatinine Clearance Estimated Creatinine Clearance: 37.4 mL/min (A) (based on SCr of 1.52 mg/dL (H)). Temp 98 °F (36.7 °C) WBC Lab Results Component Value Date/Time WBC 3.1 (L) 02/10/2019 04:22 AM  
   
H/H Lab Results Component Value Date/Time HGB 11.4 (L) 02/10/2019 04:22 AM  
  
 
Platelets Lab Results Component Value Date/Time PLATELET 90 (L) 05/43/5667 04:22 AM  
  
 
 
Pharmacist: Signed Melvin Tan PHARMD Contact information: [phone]

## 2019-02-11 LAB
ALBUMIN SERPL-MCNC: 2.5 G/DL (ref 3.5–5)
ALBUMIN/GLOB SERPL: 0.8 {RATIO} (ref 1.1–2.2)
ALP SERPL-CCNC: 136 U/L (ref 45–117)
ALT SERPL-CCNC: 50 U/L (ref 12–78)
ANION GAP SERPL CALC-SCNC: 10 MMOL/L (ref 5–15)
AST SERPL-CCNC: 20 U/L (ref 15–37)
BASOPHILS # BLD: 0 K/UL (ref 0–0.1)
BASOPHILS NFR BLD: 1 % (ref 0–1)
BILIRUB SERPL-MCNC: 0.5 MG/DL (ref 0.2–1)
BUN SERPL-MCNC: 14 MG/DL (ref 6–20)
BUN/CREAT SERPL: 9 (ref 12–20)
CALCIUM SERPL-MCNC: 10.2 MG/DL (ref 8.5–10.1)
CHLORIDE SERPL-SCNC: 108 MMOL/L (ref 97–108)
CO2 SERPL-SCNC: 24 MMOL/L (ref 21–32)
CREAT SERPL-MCNC: 1.49 MG/DL (ref 0.7–1.3)
DIFFERENTIAL METHOD BLD: ABNORMAL
EOSINOPHIL # BLD: 0.3 K/UL (ref 0–0.4)
EOSINOPHIL NFR BLD: 9 % (ref 0–7)
ERYTHROCYTE [DISTWIDTH] IN BLOOD BY AUTOMATED COUNT: 14.6 % (ref 11.5–14.5)
GLOBULIN SER CALC-MCNC: 3.2 G/DL (ref 2–4)
GLUCOSE BLD STRIP.AUTO-MCNC: 103 MG/DL (ref 65–100)
GLUCOSE BLD STRIP.AUTO-MCNC: 159 MG/DL (ref 65–100)
GLUCOSE BLD STRIP.AUTO-MCNC: 172 MG/DL (ref 65–100)
GLUCOSE BLD STRIP.AUTO-MCNC: 179 MG/DL (ref 65–100)
GLUCOSE SERPL-MCNC: 115 MG/DL (ref 65–100)
HCT VFR BLD AUTO: 38.3 % (ref 36.6–50.3)
HGB BLD-MCNC: 12 G/DL (ref 12.1–17)
IMM GRANULOCYTES # BLD AUTO: 0 K/UL (ref 0–0.04)
IMM GRANULOCYTES NFR BLD AUTO: 0 % (ref 0–0.5)
LYMPHOCYTES # BLD: 0.6 K/UL (ref 0.8–3.5)
LYMPHOCYTES NFR BLD: 23 % (ref 12–49)
MAGNESIUM SERPL-MCNC: 1.7 MG/DL (ref 1.6–2.4)
MCH RBC QN AUTO: 27.1 PG (ref 26–34)
MCHC RBC AUTO-ENTMCNC: 31.3 G/DL (ref 30–36.5)
MCV RBC AUTO: 86.7 FL (ref 80–99)
MONOCYTES # BLD: 0.3 K/UL (ref 0–1)
MONOCYTES NFR BLD: 12 % (ref 5–13)
NEUTS SEG # BLD: 1.6 K/UL (ref 1.8–8)
NEUTS SEG NFR BLD: 55 % (ref 32–75)
NRBC # BLD: 0 K/UL (ref 0–0.01)
NRBC BLD-RTO: 0 PER 100 WBC
PLATELET # BLD AUTO: 95 K/UL (ref 150–400)
PMV BLD AUTO: 10.6 FL (ref 8.9–12.9)
POTASSIUM SERPL-SCNC: 3.9 MMOL/L (ref 3.5–5.1)
PROT SERPL-MCNC: 5.7 G/DL (ref 6.4–8.2)
RBC # BLD AUTO: 4.42 M/UL (ref 4.1–5.7)
RBC MORPH BLD: ABNORMAL
SERVICE CMNT-IMP: ABNORMAL
SODIUM SERPL-SCNC: 142 MMOL/L (ref 136–145)
WBC # BLD AUTO: 2.8 K/UL (ref 4.1–11.1)

## 2019-02-11 PROCEDURE — 36415 COLL VENOUS BLD VENIPUNCTURE: CPT

## 2019-02-11 PROCEDURE — 80053 COMPREHEN METABOLIC PANEL: CPT

## 2019-02-11 PROCEDURE — 94760 N-INVAS EAR/PLS OXIMETRY 1: CPT

## 2019-02-11 PROCEDURE — 85025 COMPLETE CBC W/AUTO DIFF WBC: CPT

## 2019-02-11 PROCEDURE — 74011250636 HC RX REV CODE- 250/636: Performed by: INTERNAL MEDICINE

## 2019-02-11 PROCEDURE — 86664 EPSTEIN-BARR NUCLEAR ANTIGEN: CPT

## 2019-02-11 PROCEDURE — 97165 OT EVAL LOW COMPLEX 30 MIN: CPT

## 2019-02-11 PROCEDURE — 82962 GLUCOSE BLOOD TEST: CPT

## 2019-02-11 PROCEDURE — 65270000029 HC RM PRIVATE

## 2019-02-11 PROCEDURE — 74011250637 HC RX REV CODE- 250/637: Performed by: INTERNAL MEDICINE

## 2019-02-11 PROCEDURE — 97161 PT EVAL LOW COMPLEX 20 MIN: CPT

## 2019-02-11 PROCEDURE — 97530 THERAPEUTIC ACTIVITIES: CPT

## 2019-02-11 PROCEDURE — 97116 GAIT TRAINING THERAPY: CPT

## 2019-02-11 PROCEDURE — 83735 ASSAY OF MAGNESIUM: CPT

## 2019-02-11 PROCEDURE — 86644 CMV ANTIBODY: CPT

## 2019-02-11 RX ADMIN — PANTOPRAZOLE SODIUM 40 MG: 40 TABLET, DELAYED RELEASE ORAL at 06:39

## 2019-02-11 RX ADMIN — ISOSORBIDE MONONITRATE 120 MG: 30 TABLET, EXTENDED RELEASE ORAL at 06:39

## 2019-02-11 RX ADMIN — AMLODIPINE BESYLATE 5 MG: 5 TABLET ORAL at 09:21

## 2019-02-11 RX ADMIN — Medication 10 ML: at 06:00

## 2019-02-11 RX ADMIN — APIXABAN 2.5 MG: 2.5 TABLET, FILM COATED ORAL at 09:21

## 2019-02-11 RX ADMIN — MELATONIN TAB 3 MG 3 MG: 3 TAB at 21:20

## 2019-02-11 RX ADMIN — SODIUM CHLORIDE 75 ML/HR: 900 INJECTION, SOLUTION INTRAVENOUS at 04:08

## 2019-02-11 RX ADMIN — SODIUM CHLORIDE 75 ML/HR: 900 INJECTION, SOLUTION INTRAVENOUS at 21:21

## 2019-02-11 RX ADMIN — MIRTAZAPINE 7.5 MG: 15 TABLET, FILM COATED ORAL at 21:20

## 2019-02-11 RX ADMIN — DOCUSATE SODIUM 100 MG: 100 CAPSULE, LIQUID FILLED ORAL at 09:21

## 2019-02-11 RX ADMIN — Medication 10 ML: at 21:21

## 2019-02-11 RX ADMIN — ASPIRIN 81 MG: 81 TABLET, COATED ORAL at 21:20

## 2019-02-11 RX ADMIN — VANCOMYCIN HYDROCHLORIDE 1250 MG: 10 INJECTION, POWDER, LYOPHILIZED, FOR SOLUTION INTRAVENOUS at 16:42

## 2019-02-11 RX ADMIN — APIXABAN 2.5 MG: 2.5 TABLET, FILM COATED ORAL at 17:56

## 2019-02-11 RX ADMIN — DOCUSATE SODIUM 100 MG: 100 CAPSULE, LIQUID FILLED ORAL at 17:55

## 2019-02-11 NOTE — PROGRESS NOTES
Problem: Self Care Deficits Care Plan (Adult) Goal: *Acute Goals and Plan of Care (Insert Text) Occupational Therapy Goals Initiated 2/11/2019 1. Patient will perform bathing with minimal assistance/contact guard assist within 7 day(s). 2.  Patient will perform lower body dressing with minimal assistance/contact guard assist within 7 day(s). 3.  Patient will perform grooming with independence within 7 day(s). 4.  Patient will perform toilet transfers with supervision/set-up within 7 day(s). 5.  Patient will perform all aspects of toileting with supervision/set-up within 7 day(s). Occupational Therapy EVALUATION Patient: Micheline Hernandez (80 y.o. male) Date: 2/11/2019 Primary Diagnosis: Acute metabolic encephalopathy [V74.11] Precautions: Falls ASSESSMENT : 
Based on the objective data described below, the patient presents with decrease balance, decrease safety awareness, and decrease balance to safely perform self-care and mobility. Pt presents at min to mod assist level with LB self-care, toileting and ambulating with RW. Supportive wife present in the room. Per wife, pt was able to dress/bathe self at home. Pt occasionally needed cues to initiate a task, but once on a task, able to remain focused. According to wife and observation, pt is not at baseline. Wife would like to bring her  home if he is close to baseline, however, feel pt may need further therapy before returning home. Will con't to follow and will make further recommendations. Patient will benefit from skilled intervention to address the above impairments. Patients rehabilitation potential is considered to be Good Factors which may influence rehabilitation potential include:  
[x]             None noted []             Mental ability/status []             Medical condition []             Home/family situation and support systems []             Safety awareness []             Pain tolerance/management 
[]             Other: PLAN : 
Recommendations and Planned Interventions: 
[x]               Self Care Training                  [x]        Therapeutic Activities [x]               Functional Mobility Training    []        Cognitive Retraining 
[]               Therapeutic Exercises           [x]        Endurance Activities [x]               Balance Training                   []        Neuromuscular Re-Education []               Visual/Perceptual Training     [x]   Home Safety Training 
[x]               Patient Education                 []        Family Training/Education []               Other (comment): Frequency/Duration: Patient will be followed by occupational therapy 5 times a week to address goals. Discharge Recommendations: Rehab Further Equipment Recommendations for Discharge: TBD SUBJECTIVE:  
Patient stated Thanks for your help.  OBJECTIVE DATA SUMMARY:  
HISTORY:  
Past Medical History:  
Diagnosis Date  Arthritis  CAD (coronary artery disease) Angina Jan 2011, PCI LAD with multilink stent 4.0x12mm;  also cath at [place] 7/13 -- no stents at that time  CKD (chronic kidney disease), stage III ([facility]  Cochlear implant in place 2/3/2017  Dementia  DM type 2 causing renal disease ([facility]  DM type 2 causing vascular disease ([facility]  GERD (gastroesophageal reflux disease)  History of vascular access device 12/05/2018  
 4 Fr PICC L basilic, 42 cm long term abx  
 HTN (hypertension)  Hypothyroidism  SAMUEL (obstructive sleep apnea)   
 not using CPAP  
 PAF (paroxysmal atrial fibrillation) ([address] [id number] score = 7  
 Prostate cancer ([facility]  Renal cell cancer ([facility] [address] [facility] Stage 4 Renal Cell Cancer. Partial omentectomy 11/2/2015 with Dr. Frye Manner: omental mass-metastatic renal cell carcinoma  Renal mass, left [address] LEFT PARTIAL NEPHRECTOMY 12/19/13;   
 
Past Surgical History: Procedure Laterality Date  CARDIAC SURG PROCEDURE UNLIST  1/2011  
 stent to LAD  HX CHOLECYSTECTOMY  7/13/2012  [facility], 2013 [address]  
 stapedectomy  HX HERNIA REPAIR  1968  
 left inguinal  
 HX LITHOTRIPSY  8/2015  HX ORTHOPAEDIC  1994  
 rotator cuff repair  HX ORTHOPAEDIC  1994  
 cervical disc removed  HX PROSTATECTOMY  2000  
 surgery intervention [address]  HX UROLOGICAL  2001  
 valvular implant prevent incontinence  HX UROLOGICAL  2003  
 insert gel to help with incontinence  HX UROLOGICAL  12/2013  
 left partial nephrectomy  HX UROLOGICAL  9/2015 CT guided biopsy of abdominal lymph nodes Prior Level of Function/Environment/Context: see above Occupations in which the patient is/was successful, what are the barriers preventing that success:  
Performance Patterns (routines, roles, habits, and rituals):  
Personal Interests and/or values:  
Expanded or extensive additional review of patient history:  
 
Home Situation Home Environment: Private residence # Steps to Enter: 4 One/Two Story Residence: Two story Living Alone: No 
Support Systems: Family member(s), Spouse/Significant Other/Partner Patient Expects to be Discharged to[de-identified] Private residence Current DME Used/Available at Home: Other (comment) EXAMINATION OF PERFORMANCE DEFICITS: 
Cognitive/Behavioral Status: 
Neurologic State: Alert Orientation Level: Oriented to person;Disoriented to place Cognition: Impaired decision making; Follows commands Skin: intact Edema:  None noted Hearing: Auditory Auditory Impairment: Hard of hearing, left side, Hard of hearing, right side Vision/Perceptual:   
    
    
    
  
    
    
  
Range of Motion: 
 
AROM: Within functional limits PROM: Within functional limits Strength: 
 
Strength: Within functional limits Coordination: 
Coordination: Within functional limits Fine Motor Skills-Upper: Left Intact; Right Intact Gross Motor Skills-Upper: Left Intact; Right Intact Tone & Sensation: 
 
Tone: Normal 
  
  
  
  
  
  
  
Balance: 
Sitting: Intact Standing: Intact; With support Functional Mobility and Transfers for ADLs:Bed Mobility: 
Sit to Supine: Contact guard assistance Transfers: 
Sit to Stand: Minimum assistance;Assist x1 Stand to Sit: Minimum assistance;Assist x1(verbal cues for safety) Bed to Chair: Minimum assistance;Assist x1 Bathroom Mobility: Minimum assistance Toilet Transfer : Minimum assistance ADL Assessment: 
Feeding: Setup Oral Facial Hygiene/Grooming: Setup Bathing: Moderate assistance Upper Body Dressing: Stand-by assistance Lower Body Dressing: Moderate assistance Toileting: Minimum assistance ADL Intervention and task modifications: 
  
 
  
 
  
 
  
 
 
 
Functional Measure: 
Barthel Index: 
 
Bathin Bladder: 5 Bowels: 10 
Groomin Dressin Feeding: 10 Mobility: 10 Stairs: 0 Toilet Use: 5 Transfer (Bed to Chair and Back): 10 Total: 60 The Barthel ADL Index: Guidelines 1. The index should be used as a record of what a patient does, not as a record of what a patient could do. 2. The main aim is to establish degree of independence from any help, physical or verbal, however minor and for whatever reason. 3. The need for supervision renders the patient not independent. 4. A patient's performance should be established using the best available evidence. Asking the patient, friends/relatives and nurses are the usual sources, but direct observation and common sense are also important. However direct testing is not needed. 5. Usually the patient's performance over the preceding 24-48 hours is important, but occasionally longer periods will be relevant. 6. Middle categories imply that the patient supplies over 50 per cent of the effort. 7. Use of aids to be independent is allowed. Hannah Benavides., Barthel, D.W. (1772). Functional evaluation: the Barthel Index. [facility] (14)2. RONALD Sheldon, Kimberly Ponce., Roxy Johnson., [facility], [facility] (1999). Measuring the change indisability after inpatient rehabilitation; comparison of the responsiveness of the Barthel Index and Functional [place] Measure. Journal of Neurology, Neurosurgery, and Psychiatry, 66(4), 705-890. DELMY Christian.A, ELEAZAR Gibbons, & Shavonne Sifuentes M.A. (2004.) Assessment of post-stroke quality of life in cost-effectiveness studies: The usefulness of the Barthel Index and the EuroQoL-5D. [facility], 13, 139-54 Occupational Therapy Evaluation Charge Determination History Examination Decision-Making LOW Complexity : Brief history review  MEDIUM Complexity : 3-5 performance deficits relating to physical, cognitive , or psychosocial skils that result in activity limitations and / or participation restrictions LOW Complexity : No comorbidities that affect functional and no verbal or physical assistance needed to complete eval tasks Based on the above components, the patient evaluation is determined to be of the following complexity level: LOW Pain: 
Pain Scale 1: Numeric (0 - 10) Pain Intensity 1: 0 Activity Tolerance:  
Good Please refer to the flowsheet for vital signs taken during this treatment. After treatment:  
[] Patient left in no apparent distress sitting up in chair 
[x] Patient left in no apparent distress in bed 
[x] Call bell left within reach 
[] Nursing notified 
[x] Caregiver present [x] Bed alarm activated COMMUNICATION/EDUCATION:  
The patients plan of care was discussed with: Physical Therapist and Registered Nurse. [x] Home safety education was provided and the patient/caregiver indicated understanding. [x] Patient/family have participated as able in goal setting and plan of care. [x] Patient/family agree to work toward stated goals and plan of care. [] Patient understands intent and goals of therapy, but is neutral about his/her participation. [] Patient is unable to participate in goal setting and plan of care. This patients plan of care is appropriate for delegation to [facility]. Thank you for this referral. 
Pamela Mchugh, OTR/L Time Calculation: 25 mins

## 2019-02-11 NOTE — PROGRESS NOTES
2/11/2019 Reason for Admission: RRAT Score:     52 Resources/supports as identified by patient/family:   Failing health Top Challenges facing patient (as identified by patient/family and CM): Failing health, dementia Finances/Medication cost?    Pt has RX drug coverage Transportation? Pt's family Support system or lack thereof? Supportive wife Living arrangements? Lives with wife Self-care/ADLs/Cognition? Pt requires assistance Current Advanced Directive/Advance Care Plan:  DNR Plan for utilizing home health:   TBD Likelihood of readmission: High/red Transition of Care Plan:  Await recs 2/11/2019  CARE MANAGEMENT NOTE:  CM is following pt for initial discharge planning. Pt is known to me from previous hospital admissions.  Reportedly, pt resides with his wife (doesn't use cell phone) in a one story home with four front steps and a ramp to the back entrance.  PTA, pt was ambulatory with a cane, or rolling walker and at times he walks without an assistive device.  Pt is indepn with ADLs but does not drive.  He is a  having served in the [employer].  Pt was at [facility] for short term rehab until 12/22/18 when he returned home. He may have home health but this will need to be verified with his wife. Pt obtains his medications from the [address] [place] [address].  DME in the home includes a cane, rolling walker, transport chair, and shower chair.  [facility]. Meche Romero

## 2019-02-11 NOTE — PROGRESS NOTES
Problem: Falls - Risk of 
Goal: *Absence of Falls Document Junior Dye Fall Risk and appropriate interventions in the flowsheet. Outcome: Progressing Towards Goal 
Fall Risk Interventions: 
Mobility Interventions: Bed/chair exit alarm, Communicate number of staff needed for ambulation/transfer, OT consult for ADLs, Patient to call before getting OOB, PT Consult for mobility concerns Mentation Interventions: Bed/chair exit alarm, Evaluate medications/consider consulting pharmacy, More frequent rounding, Reorient patient, Toileting rounds, Update white board Medication Interventions: Bed/chair exit alarm, Evaluate medications/consider consulting pharmacy, Teach patient to arise slowly, Patient to call before getting OOB Elimination Interventions: Bed/chair exit alarm, Call light in reach, Patient to call for help with toileting needs, Urinal in reach, Toileting schedule/hourly rounds History of Falls Interventions: Bed/chair exit alarm

## 2019-02-11 NOTE — PROGRESS NOTES
Nutrition Assessment: 
 
RECOMMENDATIONS/INTERVENTION(S):  
Continue CCD diet Monitor PO intakes, BG, weight Add Ensure Clear with meals - monitor BG 2/2 elevated CHO from Ensure clear ASSESSMENT:  
2/11: 80 yr old male admitted for fever, confusion. PMHX: DM, CAD, TIA, CKD3, renal cell cancer, dementia. Pt sleeping. Spouse gave diet hx. Wife states pt usually around 165 lbs but then he loses weight when he doesn't eat. Pt currently 178 lbs. Documented intakes as 100%. Wife state pt ate \"1 tiny little bite\" of eat item and didn't want any more. Pt ate <50% of lunch today. Pt doesn't like \"the milky\" Ensure but does like Clear. Added Clear with meals for protein. Watch BG 2/2 high CHO content in Clear. Pt previously had issues swallowing and was on Pureed diet, wife states that [place] turned him off food for life. \" Monitor for s/s aspiration. Labs reveiwed. , 103, 184, 122 mg/dL. Phos 2.5 A1C 7.4. SUBJECTIVE/OBJECTIVE:  
Diet Order: Consistent carb 
% Eaten:   
Patient Vitals for the past 168 hrs: 
 % Diet Eaten 02/10/19 1042 100 % Pertinent Medications: [x] Reviewed Labs reviewed:  [x] Anthropometrics: Height: 5' 7\" (170.2 cm) Weight: 80.7 kg (178 lb) IBW (%IBW):   ( ) UBW (%UBW):   (  %) BMI: Body mass index is 27.88 kg/m². This BMI is indicative of: 
 
 [] Underweight    [x] Normal    [] Overweight    []  Obesity    []  Extreme Obesity (BMI>40) Estimated Nutrition Needs (Based on): 1899 Kcals/day(BMR(1461x1.3)) , 81 g(-97g/day(1.0-1.2g/kg)) Protein Carbohydrate: At Least 130 g/day  Fluids: 1900 mL/day (1mL/kg rounded to 50 mL) Last BM:    []Active     []Hyperactive  []Hypoactive       [] Absent   BS Skin:    [] Intact   [] Incision  [] Breakdown   [] DTI   [] Tears/Excoriation/Abrasion  []Edema [] Other: Wt Readings from Last 30 Encounters:  
02/09/19 80.7 kg (178 lb) 12/02/18 80.7 kg (178 lb)  
11/14/18 80.7 kg (178 lb)  
09/12/18 81.6 kg (180 lb) 08/25/17 88.5 kg (195 lb)  
07/16/17 88.5 kg (195 lb) 03/02/17 91.6 kg (202 lb)  
02/20/17 91.6 kg (202 lb) 02/04/17 89.1 kg (196 lb 6.4 oz) 03/24/16 87.2 kg (192 lb 4.8 oz) 02/17/16 90.7 kg (200 lb) 12/30/15 89 kg (196 lb 3.2 oz)  
11/24/15 92.4 kg (203 lb 9.6 oz) 11/02/15 93.4 kg (206 lb) 10/26/15 93.4 kg (206 lb) 10/09/15 92.7 kg (204 lb 6.4 oz) 09/30/15 92.3 kg (203 lb 6.4 oz) 08/12/15 94.8 kg (209 lb) 04/13/15 93.4 kg (206 lb) 12/08/14 95.5 kg (210 lb 9.6 oz) 08/06/14 92.1 kg (203 lb) 04/28/14 88 kg (194 lb)  
03/26/14 88.5 kg (195 lb) 12/19/13 88.5 kg (195 lb) 12/10/13 90.7 kg (199 lb 15.3 oz) 05/06/13 92.5 kg (204 lb) 12/10/12 90.7 kg (200 lb) 11/05/12 84.4 kg (186 lb) 08/13/12 88.8 kg (195 lb 12.8 oz) 08/03/12 79.4 kg (175 lb) NUTRITION DIAGNOSES:  
Problem:  Inadequate energy intake Etiology: related to decreased ability to consume sufficient energy Signs/Symptoms: as evidenced by poor PO, low appetite, low intakes, NUTRITION INTERVENTIONS: 
Meals/Snacks: General/healthful diet   Supplements: Commercial supplement GOAL:  
Pt will consume >50% of meals and ONS within 3-5 days Cultural, Jewish, or Ethnic Dietary Needs: None LEARNING NEEDS (Diet, Food/Nutrient-Drug Interaction):  
 [x] None Identified 
 [] Identified and Education Provided/Documented 
 [] Identified and Pt declined/was not appropriate [x] Interdisciplinary Care Plan Reviewed/Documented  
 [x] Discharge Needs: CCD- SLP? [] No Nutrition Related Discharge Needs NUTRITION RISK:  
Pt Is At Nutrition Risk  [x] No Nutrition Risk Identified  [] PT SEEN FOR:  
 [x]  MD Consult: []Calorie Count []Diabetic Diet Education []Diet Education []Electrolyte Management 
   [x]General Nutrition Management and Supplements []Management of Tube Feeding []TPN Recommendations  []  RN Referral:  []MST score >=2 
 []Enteral/Parenteral Nutrition PTA []Pregnant: Gestational DM or Multigestation  
              [] Pressure Ulcer 
   
[]  Low BMI      []  Length of Stay       [] Dysphagia Diet     [] Ventilator      [] Follow-Up Previous Recommendations: 
 [] Implemented          [] Not Implemented          [x] Not Applicable Previous Goal: 
 [] Met              [] Progressing Towards Goal              [] Not Progressing Towards Goal   [x] Not Applicable Humberto Story RD Pager: [phone] Office: [phone]

## 2019-02-11 NOTE — PROGRESS NOTES
Bedside and Verbal shift change report given to KIMBERLY Rosas (oncoming nurse) by Zaida Stevens RN (offgoing nurse). Report included the following information SBAR, Kardex, Intake/Output, MAR, Accordion, Recent Results, Med Rec Status and Cardiac Rhythm NSR.

## 2019-02-11 NOTE — PROGRESS NOTES
Problem: Mobility Impaired (Adult and Pediatric) Goal: *Acute Goals and Plan of Care (Insert Text) Physical Therapy Goals Initiated 2/11/2019 1. Patient will move from supine to sit and sit to supine  in bed with contact guard assist within 7 day(s). 2.  Patient will transfer from bed to chair and chair to bed with supervision/set-up using the least restrictive device within 7 day(s). 3.  Patient will perform sit to stand with supervision/set-up within 7 day(s). 4.  Patient will ambulate with supervision/set-up for 150 feet with the least restrictive device within 7 day(s). 5.  Patient will ascend/descend 4 stairs with handrail(s) with minimal assistance/contact guard assist within 7 day(s). physical Therapy EVALUATION Patient: Nadja Gauthier (80 y.o. male) Date: 2/11/2019 Primary Diagnosis: Acute metabolic encephalopathy [P23.05] Precautions: Universal    
 
ASSESSMENT : 
Based on the objective data described below, the patient presents with general weakness and decreased functional mobility. He had a recent admit (12/2-12/5 for bacteremia and then dc'd to [facility] until 12/22). He lives with his wife and he has baseline dementia. Oriented to name, but states birthday is 4/24/1939 initially. Performed sit to stand from a low chair with min assist of one. Performed gait training with the rolling walker in the hallway with a slow steady gait, no loss of balance and good walker management. He needs cues to improve technique with sit <> stand to/from the bedside chair. Performed toilet transfer with contact guard of one twice, using the wall rail to assist.  Stood and washed his hands at the sink with contact guard. Patient below his baseline of mobility. At this point recommend rehab at discharge but with continued improvement he may be able to return home with wife assisting. Patient will benefit from skilled intervention to address the above impairments. Patients rehabilitation potential is considered to be Good Factors which may influence rehabilitation potential include:  
[]         None noted 
[x]         Mental ability/status [x]         Medical condition 
[x]         Home/family situation and support systems 
[]         Safety awareness 
[]         Pain tolerance/management 
[]         Other: PLAN : 
Recommendations and Planned Interventions: 
[x]           Bed Mobility Training             []    Neuromuscular Re-Education 
[x]           Transfer Training                   []    Orthotic/Prosthetic Training 
[x]           Gait Training                         []    Modalities [x]           Therapeutic Exercises           []    Edema Management/Control 
[x]           Therapeutic Activities            [x]    Patient and Family Training/Education 
[]           Other (comment): Frequency/Duration: Patient will be followed by physical therapy  5 times a week to address goals. Discharge Recommendations: Rehab Further Equipment Recommendations for Discharge: has a cane and rolling walker SUBJECTIVE:  
Patient stated My legs hurt when I walk, I don't have any pain when I'm still in the bed.  OBJECTIVE DATA SUMMARY:  
HISTORY:   
Past Medical History:  
Diagnosis Date  Arthritis  CAD (coronary artery disease) Angina Jan 2011, PCI LAD with multilink stent 4.0x12mm;  also cath at [place] 7/13 -- no stents at that time  CKD (chronic kidney disease), stage III ([facility] [address]  Cochlear implant in place 2/3/2017  Dementia  DM type 2 causing renal disease ([facility] [address]  DM type 2 causing vascular disease ([facility] [address]  GERD (gastroesophageal reflux disease)  History of vascular access device 12/05/2018  
 4 Fr PICC L basilic, 42 cm long term abx  
 HTN (hypertension)  Hypothyroidism  SAMUEL (obstructive sleep apnea)   
 not using CPAP  
 PAF (paroxysmal atrial fibrillation) ([facility] [address] [id number] score = 7  
 Prostate cancer ([facility] [address]  Renal cell cancer ([facility] [address] Stage 4 Renal Cell Cancer. Partial omentectomy 11/2/2015 with Dr. Esequiel Madden: omental mass-metastatic renal cell carcinoma  Renal mass, left [address] LEFT PARTIAL NEPHRECTOMY 12/19/13;   
 
Past Surgical History:  
Procedure Laterality Date  CARDIAC SURG PROCEDURE UNLIST  1/2011  
 stent to LAD  HX CHOLECYSTECTOMY  7/13/2012  HX CORONARY STENT PLACEMENT  2011 [address]  2011, 2013 [address]  
 stapedectomy  HX HERNIA REPAIR  1968  
 left inguinal  
 HX LITHOTRIPSY  8/2015  HX ORTHOPAEDIC  1994  
 rotator cuff repair  HX ORTHOPAEDIC  1994  
 cervical disc removed  HX PROSTATECTOMY  2000  
 surgery intervention [address]  HX UROLOGICAL  2001  
 valvular implant prevent incontinence  HX UROLOGICAL  2003  
 insert gel to help with incontinence  HX UROLOGICAL  12/2013  
 left partial nephrectomy  HX UROLOGICAL  9/2015 CT guided biopsy of abdominal lymph nodes Prior Level of Function/Home Situation: Per patient and chart :Lives with his wife in a rancher with a few steps into the house with a rail. He uses a cane frequently and occasionally uses his walker. He states his last fall was over a year ago. Personal factors and/or comorbidities impacting plan of care:  
 
Home Situation Home Environment: Private residence # Steps to Enter: 4 One/Two Story Residence: Two story Living Alone: No 
Support Systems: Family member(s), Spouse/Significant Other/Partner Patient Expects to be Discharged to[de-identified] Private residence Current DME Used/Available at Home: Other (comment) EXAMINATION/PRESENTATION/DECISION MAKING: Critical Behavior: 
Neurologic State: Alert, Confused Orientation Level: Oriented to person, Oriented to place, Disoriented to situation, Disoriented to time Cognition: Follows commands, Appropriate decision making, Appropriate for age attention/concentration, Appropriate safety awareness Hearing: Auditory Auditory Impairment: Hard of hearing, left side, Hard of hearing, right side Range Of Motion: 
AROM: Within functional limits PROM: Within functional limits Strength:   
Strength: Generally decreased, functional 
  
  
  
  
  
  
Tone & Sensation:  
Tone: Normal 
  
  
  
  
  
  
  
  
   
Coordination: 
Coordination: Within functional limits Vision:  
  
Functional Mobility: 
Bed Mobility: 
  
  
  
  
Transfers: 
Sit to Stand: Minimum assistance;Assist x1;Additional time(from low chair, Contact guard from toilet using wall rail) Stand to Sit: Minimum assistance;Assist x1;Additional time Balance:  
Sitting: Intact Standing: Intact; With supportAmbulation/Gait Training:Distance (ft): 120 Feet (ft) Assistive Device: Walker, rolling;Gait belt Ambulation - Level of Assistance: Minimal assistance;Assist x1;Additional time Speed/Sabrina: Slow Stairs: 
  
  
   
Functional Measure: 
Tinetti test: 
 
Sitting Balance: 1 Arises: 1 Attempts to Rise: 2 Immediate Standing Balance: 1 Standing Balance: 2 Nudged: 2 Eyes Closed: 1 Turn 360 Degrees - Continuous/Discontinuous: 0 Turn 360 Degrees - Steady/Unsteady: 1 Sitting Down: 0 Balance Score: 11 Indication of Gait: 1 
R Step Length/Height: 1 L Step Length/Height: 1 
R Foot Clearance: 1 L Foot Clearance: 1 Step Symmetry: 1 Step Continuity: 1 Path: 1 Trunk: 0 Walking Time: 1 Gait Score: 9 Total Score: 20 Tinetti Tool Score Risk of Falls 
<19 = High Fall Risk 19-24 = Moderate Fall Risk 25-28 = Low Fall Risk Tinetti ME. Performance-Oriented Assessment of Mobility Problems in Elderly Patients. [address]; [id number]. (Scoring Description: PT Bulletin Feb. 10, 1993) Older adults: Andi Sellers et al, 2009; n = [address] elderly evaluated with ABC, RODERICK, ADL, and IADL) · Mean RODERICK score for males aged 69-68 years = 26.21(3.40) · Mean RODERICK score for females age 69-68 years = 25.16(4.30) · Mean RODERICK score for males over 80 years = 23.29(6.02) · Mean RODERICK score for females over 80 years = 17.20(8.32) Physical Therapy Evaluation Charge Determination History Examination Presentation Decision-Making MEDIUM  Complexity : 1-2 comorbidities / personal factors will impact the outcome/ POC  LOW Complexity : 1-2 Standardized tests and measures addressing body structure, function, activity limitation and / or participation in recreation  LOW Complexity : Stable, uncomplicated  LOW Complexity : FOTO score of  Based on the above components, the patient evaluation is determined to be of the following complexity level: LOW Pain: 
Pain Scale 1: Numeric (0 - 10) Pain Intensity 1: 0 Activity Tolerance:  
Limited by general weakness Please refer to the flowsheet for vital signs taken during this treatment. After treatment:  
[x]         Patient left in no apparent distress sitting up in chair 
[]         Patient left in no apparent distress in bed 
[x]         Call bell left within reach [x]         Nursing notified 
[]         Caregiver present [x]         Chair alarm activated COMMUNICATION/EDUCATION:  
The patients plan of care was discussed with: Registered Nurse. [x]         Fall prevention education was provided and the patient/caregiver indicated understanding. [x]         Patient/family have participated as able in goal setting and plan of care. [x]         Patient/family agree to work toward stated goals and plan of care. []         Patient understands intent and goals of therapy, but is neutral about his/her participation. []         Patient is unable to participate in goal setting and plan of care.  
 
Thank you for this referral. 
Ananda Wood, PT

## 2019-02-11 NOTE — PROGRESS NOTES
[facility] [address] 
[address] [facility], [place], [id number] [place] [address] 
[phone] Medical Progress Note NAME: Claudetta Evans. :  1935 MRM:  [id number] Date/Time: 2019  11:42 AM 
 
  
Assessment and Plan: 1. Acute metabolic encephalopathy: unclear cause. TSH and ammonia are normal.  
  
2.  ? Viral infection/ Leukopenia/ thrombocytopenia/ abnormal LFTs. Check cmv, ebv. Acute respiratory viral panel is negative. UA and CXR are unremarkable. Continue hydration. BC:  gram positive cocci, possible contamination. Repeat BC and continue vanc.  
  
3. CAD/Pafib/HTN: cont norvasc, imdur, eliquis, ASA. Hold losartan due to BRITANY 
  
4. BRITANY/CKD 3: likely from dehydration. Continue IVF, monitor BMP 
  
5. Hx of TIA: cont eliquis, ASA 
  
6.  DM type 2 w/ renal complications: check D5L, not on meds. Start SSI 7. Hypercalcemia. likely due to volume depletion. Improving with hydration.  
  
8. Dementia: lives at home. 9.  Poor PO intake. Likely from acute 10. Debility. PT/OT evaluation  
  
Code: DNR Subjective: Chief Complaint:  Follow up of pt who was admitted with AMS. Family stated that he is the same. ROS: 
(bold if positive, if negative) Tolerating PT  Tolerating Diet Objective:  
 
Last 24hrs VS reviewed since prior progress note. Most recent are: 
 
Visit Vitals /67 (BP 1 Location: Right arm, BP Patient Position: At rest) Pulse 63 Temp 98.6 °F (37 °C) Resp 17 Ht 5' 7\" (1.702 m) Wt 80.7 kg (178 lb) SpO2 98% BMI 27.88 kg/m² SpO2 Readings from Last 6 Encounters:  
19 98% 18 94% 18 96% 18 95% 18 97% 17 92% Intake/Output Summary (Last 24 hours) at 2019 0754 Last data filed at 2019 1120 Gross per 24 hour Intake 240 ml Output 700 ml Net -460 ml Physical Exam: 
 
Gen:  Well-developed, well-nourished, in no acute distress HEENT:  Pink conjunctivae, PERRL, hearing intact to voice, moist mucous membranes Neck:  Supple, without masses, thyroid non-tender Resp:  No accessory muscle use, clear breath sounds without wheezes rales or rhonchi 
Card:  No murmurs, normal S1, S2 without thrills, bruits or peripheral edema Abd:  Soft, non-tender, non-distended, normoactive bowel sounds are present, no palpable organomegaly and no detectable hernias Lymph:  No cervical or inguinal adenopathy Musc:  No cyanosis or clubbing Skin:  No rashes or ulcers, skin turgor is good Neuro:  Cranial nerves are grossly intact, no focal motor weakness, follows commands appropriately Psych:  poor insight  
__________________________________________________________________ Medications Reviewed: (see below) Medications:  
 
Current Facility-Administered Medications Medication Dose Route Frequency  HYDROcodone-acetaminophen (NORCO) 5-325 mg per tablet 1 Tab  1 Tab Oral Q6H PRN  
 insulin lispro (HUMALOG) injection   SubCUTAneous AC&HS  vancomycin (VANCOCIN) 1,250 mg in 0.9% sodium chloride 250 mL IVPB  1,250 mg IntraVENous Q24H  
 amLODIPine (NORVASC) tablet 5 mg  5 mg Oral DAILY  apixaban (ELIQUIS) tablet 2.5 mg  2.5 mg Oral BID  aspirin delayed-release tablet 81 mg  81 mg Oral QHS  isosorbide mononitrate ER (IMDUR) tablet 120 mg  120 mg Oral 7am  
 melatonin tablet 3 mg  3 mg Oral QHS  mirtazapine (REMERON) tablet 7.5 mg  7.5 mg Oral QHS  polyethylene glycol (MIRALAX) packet 17 g  17 g Oral DAILY PRN  
 senna (SENOKOT) tablet 8.6 mg  1 Tab Oral DAILY PRN  
 0.9% sodium chloride infusion  75 mL/hr IntraVENous CONTINUOUS  
 sodium chloride (NS) flush 5-40 mL  5-40 mL IntraVENous Q8H  
 sodium chloride (NS) flush 5-40 mL  5-40 mL IntraVENous PRN  
 acetaminophen (TYLENOL) tablet 650 mg  650 mg Oral Q4H PRN  
 naloxone (NARCAN) injection 0.4 mg  0.4 mg IntraVENous PRN  
  diphenhydrAMINE (BENADRYL) injection 12.5 mg  12.5 mg IntraVENous Q4H PRN  
 ondansetron (ZOFRAN) injection 4 mg  4 mg IntraVENous Q6H PRN  
 docusate sodium (COLACE) capsule 100 mg  100 mg Oral BID  
 glucose chewable tablet 16 g  4 Tab Oral PRN  
 dextrose (D50W) injection syrg 12.5-25 g  12.5-25 g IntraVENous PRN  
 glucagon (GLUCAGEN) injection 1 mg  1 mg IntraMUSCular PRN  pantoprazole (PROTONIX) tablet 40 mg  40 mg Oral ACB Lab Data Reviewed: (see below) Lab Review:  
 
Recent Labs  
  02/11/19 
0330 02/10/19 
0422 02/09/19 [address] WBC 2.8* 3.1* 6.4 HGB 12.0* 11.4* 13.5 HCT 38.3 36.1* 42.0 PLT 95* 90* 125* Recent Labs  
  02/11/19 
0330 02/10/19 
0422 02/09/19 [address]  141 138  
K 3.9 4.3 4.1  107 102 CO2 24 26 26 * 97 170* BUN 14 17 16 CREA 1.49* 1.52* 1.75* CA 10.2* 10.2* 11.2*  
MG 1.7 1.9 1.9 PHOS  --  2.5*  --   
ALB 2.5* 2.6* 3.4* TBILI 0.5 1.2* 2.4* SGOT 20 55* 98* ALT 50 78 124* Lab Results Component Value Date/Time Glucose (POC) 103 (H) 02/11/2019 06:07 AM  
 Glucose (POC) 184 (H) 02/10/2019 07:54 PM  
 Glucose (POC) 122 (H) 02/10/2019 04:35 PM  
 Glucose (POC) 130 (H) 02/10/2019 11:11 AM  
 Glucose (POC) 97 02/10/2019 06:46 AM  
 
No results for input(s): PH, PCO2, PO2, HCO3, FIO2 in the last 72 hours. No results for input(s): INR in the last 72 hours. No lab exists for component: INREXT, INREXT All Micro Results Procedure Component Value Units Date/Time CULTURE, BLOOD [[id number]] Collected:  02/10/19 1444 Order Status:  Completed Specimen:  Blood Updated:  02/11/19 0734 Special Requests: NO SPECIAL REQUESTS Culture result: NO GROWTH AFTER 16 HOURS     
 CULTURE, BLOOD, PAIRED [[id number]]  (Abnormal) Collected:  02/09/19 1418 Order Status:  Completed Specimen:  Blood Updated:  02/10/19 1328 Special Requests: NO SPECIAL REQUESTS Culture result: GRAM POSITIVE COCCI IN CLUSTERS GROWING IN 1 OF 4 BOTTLES DRAWN (SITE=L FA) PRELIMINARY REPORT OF GRAM POSITIVE COCCI IN CLUSTERS GROWING IN 1 OF 4 BOTTLES DRAWN CALLED TO AND READ BACK BY ALBAN FOFANA RN AT 4648 2/10/19. LWY  
     
      
  REMAINING BOTTLE(S) HAS/HAVE NO GROWTH SO FAR  
     
 RESPIRATORY PANEL,PCR,NASOPHARYNGEAL [[id number]] Collected:  02/10/19 0801 Order Status:  Completed Specimen:  Nasopharyngeal Updated:  02/10/19 1107 Adenovirus NOT DETECTED Coronavirus 229E NOT DETECTED Coronavirus HKU1 NOT DETECTED Coronavirus CVNL63 NOT DETECTED Coronavirus OC43 NOT DETECTED Metapneumovirus NOT DETECTED Rhinovirus and Enterovirus NOT DETECTED Influenza A NOT DETECTED Influenza A, subtype H1 NOT DETECTED Influenza A, subtype H3 NOT DETECTED INFLUENZA A H1N1 PCR NOT DETECTED Influenza B NOT DETECTED Parainfluenza 1 NOT DETECTED Parainfluenza 2 NOT DETECTED Parainfluenza 3 NOT DETECTED Parainfluenza virus 4 NOT DETECTED     
  RSV by PCR NOT DETECTED Bordetella pertussis - PCR NOT DETECTED Chlamydophila pneumoniae DNA, QL, PCR NOT DETECTED Mycoplasma pneumoniae DNA, QL, PCR NOT DETECTED     
 CULTURE, URINE [[id number]] Collected:  02/09/19 2202 Order Status:  Completed Specimen:  Urine from [place] Specimen Updated:  02/10/19 0109 URINE CULTURE HOLD SAMPLE [[id number]] Collected:  02/09/19 1630 Order Status:  Completed Specimen:  Serum Updated:  02/09/19 1652 Urine culture hold URINE ON HOLD IN MICROBIOLOGY DEPT FOR 3 DAYS. IF UNPRESERVED URINE IS SUBMITTED, IT CANNOT BE USED FOR ADDITIONAL TESTING AFTER 24 HRS, RECOLLECTION WILL BE REQUIRED. INFLUENZA A & B AG (RAPID TEST) [[id number]] Collected:  02/09/19 1417 Order Status:  Completed Specimen:  Nasopharyngeal from Nasal washing Updated:  02/09/19 1441 Influenza A Antigen NEGATIVE Influenza B Antigen NEGATIVE I have reviewed notes of prior 24hr. Other pertinent lab: Total time spent with patient: 28 Care Plan discussed with: Patient, Nursing Staff and >50% of time spent in counseling and coordination of care Discussed:  Care Plan Prophylaxis:  eliquis Disposition:  SNF/LTC 
        
___________________________________________________ Attending Physician: Anaid Hoang MD

## 2019-02-11 NOTE — ROUTINE PROCESS
Bedside and Verbal shift change report given to Laura Vanessa (oncoming nurse) by Zuri Bains RN (offgoing nurse). Report included the following information SBAR and Kardex.

## 2019-02-12 VITALS
HEIGHT: 67 IN | RESPIRATION RATE: 18 BRPM | SYSTOLIC BLOOD PRESSURE: 159 MMHG | HEART RATE: 68 BPM | TEMPERATURE: 97.4 F | DIASTOLIC BLOOD PRESSURE: 59 MMHG | OXYGEN SATURATION: 99 % | BODY MASS INDEX: 27.94 KG/M2 | WEIGHT: 178 LBS

## 2019-02-12 PROBLEM — G93.41 ACUTE METABOLIC ENCEPHALOPATHY: Status: RESOLVED | Noted: 2019-02-09 | Resolved: 2019-02-12

## 2019-02-12 LAB
ANION GAP SERPL CALC-SCNC: 11 MMOL/L (ref 5–15)
BACTERIA SPEC CULT: ABNORMAL
BACTERIA SPEC CULT: ABNORMAL
BASOPHILS # BLD: 0 K/UL (ref 0–0.1)
BASOPHILS NFR BLD: 1 % (ref 0–1)
BUN SERPL-MCNC: 12 MG/DL (ref 6–20)
BUN/CREAT SERPL: 9 (ref 12–20)
CALCIUM SERPL-MCNC: 10.8 MG/DL (ref 8.5–10.1)
CC UR VC: ABNORMAL
CHLORIDE SERPL-SCNC: 108 MMOL/L (ref 97–108)
CO2 SERPL-SCNC: 23 MMOL/L (ref 21–32)
CREAT SERPL-MCNC: 1.39 MG/DL (ref 0.7–1.3)
DIFFERENTIAL METHOD BLD: ABNORMAL
EOSINOPHIL # BLD: 0.3 K/UL (ref 0–0.4)
EOSINOPHIL NFR BLD: 10 % (ref 0–7)
ERYTHROCYTE [DISTWIDTH] IN BLOOD BY AUTOMATED COUNT: 14.6 % (ref 11.5–14.5)
GLUCOSE BLD STRIP.AUTO-MCNC: 114 MG/DL (ref 65–100)
GLUCOSE BLD STRIP.AUTO-MCNC: 161 MG/DL (ref 65–100)
GLUCOSE SERPL-MCNC: 120 MG/DL (ref 65–100)
HCT VFR BLD AUTO: 39.7 % (ref 36.6–50.3)
HGB BLD-MCNC: 12.2 G/DL (ref 12.1–17)
IMM GRANULOCYTES # BLD AUTO: 0 K/UL (ref 0–0.04)
IMM GRANULOCYTES NFR BLD AUTO: 0 % (ref 0–0.5)
LYMPHOCYTES # BLD: 0.6 K/UL (ref 0.8–3.5)
LYMPHOCYTES NFR BLD: 21 % (ref 12–49)
MCH RBC QN AUTO: 26.9 PG (ref 26–34)
MCHC RBC AUTO-ENTMCNC: 30.7 G/DL (ref 30–36.5)
MCV RBC AUTO: 87.4 FL (ref 80–99)
MONOCYTES # BLD: 0.2 K/UL (ref 0–1)
MONOCYTES NFR BLD: 9 % (ref 5–13)
NEUTS SEG # BLD: 1.6 K/UL (ref 1.8–8)
NEUTS SEG NFR BLD: 59 % (ref 32–75)
NRBC # BLD: 0 K/UL (ref 0–0.01)
NRBC BLD-RTO: 0 PER 100 WBC
PLATELET # BLD AUTO: 92 K/UL (ref 150–400)
PMV BLD AUTO: 10.2 FL (ref 8.9–12.9)
POTASSIUM SERPL-SCNC: 4 MMOL/L (ref 3.5–5.1)
RBC # BLD AUTO: 4.54 M/UL (ref 4.1–5.7)
SERVICE CMNT-IMP: ABNORMAL
SODIUM SERPL-SCNC: 142 MMOL/L (ref 136–145)
WBC # BLD AUTO: 2.7 K/UL (ref 4.1–11.1)

## 2019-02-12 PROCEDURE — 80048 BASIC METABOLIC PNL TOTAL CA: CPT

## 2019-02-12 PROCEDURE — 36415 COLL VENOUS BLD VENIPUNCTURE: CPT

## 2019-02-12 PROCEDURE — 97116 GAIT TRAINING THERAPY: CPT

## 2019-02-12 PROCEDURE — 74011250637 HC RX REV CODE- 250/637: Performed by: INTERNAL MEDICINE

## 2019-02-12 PROCEDURE — 97530 THERAPEUTIC ACTIVITIES: CPT

## 2019-02-12 PROCEDURE — 85025 COMPLETE CBC W/AUTO DIFF WBC: CPT

## 2019-02-12 PROCEDURE — 94760 N-INVAS EAR/PLS OXIMETRY 1: CPT

## 2019-02-12 PROCEDURE — 82962 GLUCOSE BLOOD TEST: CPT

## 2019-02-12 RX ORDER — LINEZOLID 600 MG/1
600 TABLET, FILM COATED ORAL 2 TIMES DAILY
Qty: 6 TAB | Refills: 0 | Status: SHIPPED | OUTPATIENT
Start: 2019-02-12 | End: 2019-02-24

## 2019-02-12 RX ADMIN — Medication 10 ML: at 06:36

## 2019-02-12 RX ADMIN — PANTOPRAZOLE SODIUM 40 MG: 40 TABLET, DELAYED RELEASE ORAL at 06:35

## 2019-02-12 RX ADMIN — AMLODIPINE BESYLATE 5 MG: 5 TABLET ORAL at 09:31

## 2019-02-12 RX ADMIN — SENNOSIDES 8.6 MG: 8.6 TABLET, FILM COATED ORAL at 06:44

## 2019-02-12 RX ADMIN — APIXABAN 2.5 MG: 2.5 TABLET, FILM COATED ORAL at 09:31

## 2019-02-12 RX ADMIN — ISOSORBIDE MONONITRATE 120 MG: 30 TABLET, EXTENDED RELEASE ORAL at 06:35

## 2019-02-12 RX ADMIN — DOCUSATE SODIUM 100 MG: 100 CAPSULE, LIQUID FILLED ORAL at 09:32

## 2019-02-12 NOTE — DISCHARGE SUMMARY
Hospitalist Discharge Summary     Patient ID:    Charles Hernandez  [id number]  80 y.o.  1935    Admit date: 2/9/2019    Discharge date and time: 2/12/2019    Admission Diagnoses: Acute metabolic encephalopathy [O79.27]    Chronic Diagnoses:    Problem List as of 2/12/2019 Date Reviewed: 2/9/2019          Codes Class Noted - Resolved    Fever ICD-10-CM: R50.9  ICD-9-CM: 780.60  12/2/2018 - Present        Gram-negative bacteremia ICD-10-CM: R78.81  ICD-9-CM: 790.7, 041.85  12/2/2018 - Present        UTI (urinary tract infection) ICD-10-CM: N39.0  ICD-9-CM: 599.0  9/9/2018 - Present        Arthritis ICD-10-CM: M19.90  ICD-9-CM: 716.90  Unknown - Present        Dementia (Chronic) ICD-10-CM: F03.90  ICD-9-CM: 294.20  Unknown - Present        DM type 2 causing renal disease ([facility] (Chronic) ICD-10-CM: E11.29  ICD-9-CM: 250.40  Unknown - Present        DM type 2 causing vascular disease ([facility] ICD-10-CM: E11.59  ICD-9-CM: 250.70, 443.81  Unknown - Present        GERD (gastroesophageal reflux disease) ICD-10-CM: K21.9  ICD-9-CM: 530.81  Unknown - Present        Hypothyroidism ICD-10-CM: E03.9  ICD-9-CM: 244.9  Unknown - Present        SAMUEL (obstructive sleep apnea) ICD-10-CM: G47.33  ICD-9-CM: 327.23  Unknown - Present    Overview Signed 9/9/2018  9:34 PM by Jovana Palmer MD     not using CPAP             [facility] ICD-10-CM: Scarlet Matas  ICD-9-CM: 185  Unknown - Present        Acute encephalopathy ICD-10-CM: G93.40  ICD-9-CM: 348.30  9/9/2018 - Present        Fatigue ICD-10-CM: R53.83  ICD-9-CM: 780.79  9/9/2018 - Present        Debilitated patient ICD-10-CM: R53.81  ICD-9-CM: 799.3  9/9/2018 - Present        Cochlear implant in place ICD-10-CM: Z96.21  ICD-9-CM: V45.89  2/3/2017 - Present    Overview Signed 2/3/2017  9:25 AM by Daryle Mingle     -- Cannot have MRI             Renal cell cancer ([facility] ICD-10-CM: C64.9  ICD-9-CM: 189.0  Unknown - Present    Overview Signed 2/17/2016 11:50 AM by Nasrin Reese, MD     Stage 4 Renal Cell Cancer. Partial omentectomy 11/2/2015 with Dr. 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) ICD-10-CM: C64.9  ICD-9-CM: 189.0  11/2/2015 - Present        CKD (chronic kidney disease) stage 3, GFR 30-59 ml/min (HCC) (Chronic) ICD-10-CM: N18.3  ICD-9-CM: 585.3  12/10/2013 - Present        Paroxysmal atrial fibrillation ([facility] ICD-10-CM: I48.0  ICD-9-CM: 427.31  5/6/2013 - Present        CAD (coronary artery disease) (Chronic) ICD-10-CM: I25.10  ICD-9-CM: 414.00  Unknown - Present    Overview Signed 7/13/2012  9:24 AM by Tram Buck MD     Angina Jan 2011, PCI unknown vessel at [place], no MI             HTN (hypertension) ICD-10-CM: I10  ICD-9-CM: 401.9  Unknown - Present        * (Principal) RESOLVED: Acute metabolic encephalopathy ZPZ-80-KG: G93.41  ICD-9-CM: 348.31  2/9/2019 - 2/12/2019        RESOLVED: Hypercalcemia ICD-10-CM: W06.16  ICD-9-CM: 275.42  11/21/2018 - 12/3/2018        RESOLVED: Jaundice ICD-10-CM: R17  ICD-9-CM: 782.4  11/21/2018 - 12/3/2018        RESOLVED: CKD (chronic kidney disease), stage III ([facility] ICD-10-CM: N18.3  ICD-9-CM: 806. 3  Unknown - 12/3/2018        RESOLVED: PAF (paroxysmal atrial fibrillation) ([facility] ICD-10-CM: I48.0  ICD-9-CM: 427.31  Unknown - 9/9/2018    Overview Signed 9/9/2018  9:34 PM by Oneal Nicole MD     [id number] score = 7             RESOLVED: Renal mass, left ICD-10-CM: N28.89  ICD-9-CM: 593.9  Unknown - 9/9/2018    Overview Signed 9/9/2018  9:34 PM by MD FRANCISCO JAVIER Aceves LEFT PARTIAL NEPHRECTOMY 12/19/13;              RESOLVED: Expressive aphasia ICD-10-CM: R47.01  ICD-9-CM: 784.3  2/3/2017 - 9/9/2018        RESOLVED: Renal cell carcinoma ([facility] ICD-10-CM: C64.9  ICD-9-CM: 189.0  9/30/2015 - 9/9/2018        RESOLVED: Left renal mass ICD-10-CM: J99.62  ICD-9-CM: 593.9  12/11/2013 - 9/9/2018        RESOLVED: LBBB (left bundle branch block) ICD-10-CM: I44.7  ICD-9-CM: 426.3  12/11/2013 - 9/9/2018        RESOLVED: Leg weakness ICD-10-CM: H41.504  ICD-9-CM: 729.89  12/10/2013 - 9/9/2018        RESOLVED: Atrial fibrillation or flutter ICD-9-CM: Gisell Kyrgyz  7/15/2012 - 8/3/2012    Overview Signed 7/15/2012 10:02 AM by Daisy Longoria MD     7/14-15             RESOLVED: Thrombocytopenia, unspecified ([facility] ICD-10-CM: D69.6  ICD-9-CM: 287.5  7/14/2012 - 9/9/2018        RESOLVED: CKD (chronic kidney disease) ICD-10-CM: N18.9  ICD-9-CM: 887. 9  Unknown - 9/9/2018        RESOLVED: Abdominal pain, acute, right upper quadrant ICD-10-CM: R10.11  ICD-9-CM: 789.01, 338.19  7/12/2012 - 7/14/2012        RESOLVED: Biliary colic OPX-85-PL: Q54.63  ICD-9-CM: 574.20  7/12/2012 - 5/6/2013        RESOLVED: Cholelithiasis ICD-10-CM: K80.20  ICD-9-CM: 574.20  7/12/2012 - 7/14/2012        RESOLVED: Nausea & vomiting ICD-10-CM: R11.2  ICD-9-CM: 787.01  7/12/2012 - 7/14/2012        RESOLVED: Diabetes mellitus ([facility] (Chronic) ICD-10-CM: E11.9  ICD-9-CM: 250.00  7/12/2012 - 9/9/2018        RESOLVED: Acute renal failure (ARF) ([facility] ICD-10-CM: N17.9  ICD-9-CM: 584.9  7/12/2012 - 8/3/2012              Discharge Medications:   Current Discharge Medication List      START taking these medications    Details   linezolid (ZYVOX) 600 mg tablet Take 1 Tab by mouth two (2) times a day. Qty: 6 Tab, Refills: 0         CONTINUE these medications which have NOT CHANGED    Details   acetaminophen (TYLENOL) 325 mg tablet Take 650 mg by mouth every four (4) hours as needed for Pain or Fever. aspirin delayed-release 81 mg tablet Take 81 mg by mouth nightly. amLODIPine (NORVASC) 10 mg tablet Take 5 mg by mouth daily. losartan (COZAAR) 25 mg tablet Take 25 mg by mouth nightly. nystatin (MYCOSTATIN) powder Apply  to affected area daily as needed for Other (Itching/Irritation- Groin area).  To groin after showering and drying       docusate sodium (COLACE) 100 mg capsule Take 100 mg by mouth daily as needed. senna (SENNA) 8.6 mg tablet Take 1 Tab by mouth daily as needed for Constipation. isosorbide mononitrate ER (IMDUR) 120 mg CR tablet Take 1 Tab by mouth every morning. Qty: 30 Tab, Refills: 0      melatonin 3 mg tablet Take 3 mg by mouth nightly. polyethylene glycol (MIRALAX) 17 gram packet Take 17 g by mouth daily as needed (Constipation). mirtazapine (REMERON) 15 mg tablet Take 7.5 mg by mouth nightly. apixaban (ELIQUIS) 2.5 mg tablet Take 2.5 mg by mouth two (2) times a day. omeprazole (PRILOSEC) 20 mg capsule Take 20 mg by mouth Daily (before breakfast). nitroglycerin (NITROSTAT) 0.4 mg SL tablet 0.4 mg by SubLINGual route every five (5) minutes as needed. Follow up Care:    1. Other, MD Chance in 1-2 weeks  2. Diet:  Cardiac Diet    Disposition:  Home. Advanced Directive:    Discharge Exam:  See today's note. CONSULTATIONS: None    Significant Diagnostic Studies:   Recent Labs     02/12/19 0610 02/11/19 0330   WBC 2.7* 2.8*   HGB 12.2 12.0*   HCT 39.7 38.3   PLT 92* 95*     Recent Labs     02/12/19  0610 02/11/19  0330 02/10/19  0422 02/09/19  1417    142 141 138   K 4.0 3.9 4.3 4.1    108 107 102   CO2 23 24 26 26   BUN 12 14 17 16   CREA 1.39* 1.49* 1.52* 1.75*   * 115* 97 170*   CA 10.8* 10.2* 10.2* 11.2*   MG  --  1.7 1.9 1.9   PHOS  --   --  2.5*  --      Recent Labs     02/11/19  0330 02/10/19  0422 02/09/19  1417   SGOT 20 55* 98*   ALT 50 78 124*   * 139* 183*   TBILI 0.5 1.2* 2.4*   TP 5.7* 5.9* 7.0   ALB 2.5* 2.6* 3.4*   GLOB 3.2 3.3 3.6     No results for input(s): INR, PTP, APTT in the last 72 hours. No lab exists for component: INREXT   No results for input(s): FE, TIBC, PSAT, FERR in the last 72 hours. No results for input(s): PH, PCO2, PO2 in the last 72 hours. No results for input(s): CPK, CKMB in the last 72 hours.     No lab exists for component: TROPONINI  Lab Results Component Value Date/Time    Glucose (POC) 161 (H) 02/12/2019 12:13 PM    Glucose (POC) 114 (H) 02/12/2019 07:08 AM    Glucose (POC) 179 (H) 02/11/2019 09:16 PM    Glucose (POC) 172 (H) 02/11/2019 04:03 PM    Glucose (POC) 159 (H) 02/11/2019 10:59 AM             HOSPITAL COURSE & TREATMENT RENDERED:   1. Acute metabolic encephalopathy: unclear cause. TSH and ammonia are normal. Now resolved. back to baseline      2.  ? Viral infection/ Leukopenia/ thrombocytopenia/ abnormal LFTs. cmv IgG is high, check ebv. Acute respiratory viral panel is negative. UA and CXR are unremarkable, but UC showed enterococcus. BC: 1/4 gram positive cocci, possible contamination. Repeat BC is so far negative. Treated with vanc empirically. UC showed possible enterococcus and staph haemolyticus oxacillin resistant.      3. CAD/Pafib/HTN: cont norvasc, imdur, eliquis, ASA.  Hold losartan due to BRITANY     4. BRITANY/CKD 3: likely from dehydration. Monitor BMP     5. Hx of TIA: cont eliquis, ASA     6.  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 Cover with SSI     7. Hypercalcemia. likely due to volume depletion. Improving with hydration.      8. Dementia: lives at home.      9. Poor PO intake. Likely from acute infection      10. Debility.  PT/OT evaluation      Code: DNR         Discharged in improved condition     Signed:  Camila Rodriguez MD  2/12/2019  12:31 PM

## 2019-02-12 NOTE — PROGRESS NOTES
CM met with Pt and wife. Cm discussed recommendation for rehab. Wife politely declined. Pt's wife stated that Pt will likely not participate. She stated that Pt had been to [facility] previously and Pt does not participates. CM attempted to explain other options of discharge placement such as inpatient rehab and home health. Pt's wife continue to decline. 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 CM notified attending. Mis 45, BS, [facility]

## 2019-02-12 NOTE — DISCHARGE INSTRUCTIONS
ACUTE DIAGNOSES:  Acute metabolic encephalopathy [O31.22]    CHRONIC MEDICAL DIAGNOSES:  Problem List as of 2/12/2019 Date Reviewed: 2/9/2019          Codes Class Noted - Resolved    Fever ICD-10-CM: R50.9  ICD-9-CM: 780.60  12/2/2018 - Present        Gram-negative bacteremia ICD-10-CM: R78.81  ICD-9-CM: 790.7, 041.85  12/2/2018 - Present        UTI (urinary tract infection) ICD-10-CM: N39.0  ICD-9-CM: 599.0  9/9/2018 - Present        Arthritis ICD-10-CM: M19.90  ICD-9-CM: 716.90  Unknown - Present        Dementia (Chronic) ICD-10-CM: F03.90  ICD-9-CM: 294.20  Unknown - Present        DM type 2 causing renal disease ([email or web address] (Chronic) ICD-10-CM: E11.29  ICD-9-CM: 250.40  Unknown - Present        DM type 2 causing vascular disease ([email or web address] ICD-10-CM: E11.59  ICD-9-CM: 250.70, 443.81  Unknown - Present        GERD (gastroesophageal reflux disease) ICD-10-CM: K21.9  ICD-9-CM: 530.81  Unknown - Present        Hypothyroidism ICD-10-CM: E03.9  ICD-9-CM: 244.9  Unknown - Present        SAMUEL (obstructive sleep apnea) ICD-10-CM: G47.33  ICD-9-CM: 327.23  Unknown - Present    Overview Signed 9/9/2018  9:34 PM by Cynthia Reyna MD     not using CPAP             Prostate cancer [facility]) ICD-10-CM: Davonna Moles  ICD-9-CM: 185  Unknown - Present        Acute encephalopathy ICD-10-CM: G93.40  ICD-9-CM: 348.30  9/9/2018 - Present        Fatigue ICD-10-CM: R53.83  ICD-9-CM: 780.79  9/9/2018 - Present        Debilitated patient ICD-10-CM: R53.81  ICD-9-CM: 799.3  9/9/2018 - Present        Cochlear implant in place ICD-10-CM: Z96.21  ICD-9-CM: V45.89  2/3/2017 - Present    Overview Signed 2/3/2017  9:25 AM by Ed All have MRI             Renal cell cancer ([facility] ICD-10-CM: C64.9  ICD-9-CM: 189.0  Unknown - Present    Overview Signed 2/17/2016 11:50 AM by Diane Kumari MD     Stage 4 Renal Cell Cancer.     Partial omentectomy 11/2/2015 with Dr. 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 [facility]) ICD-10-CM: C64.9  ICD-9-CM: 189.0  11/2/2015 - Present        CKD (chronic kidney disease) stage 3, GFR 30-59 ml/min (HCC) (Chronic) ICD-10-CM: N18.3  ICD-9-CM: 585.3  12/10/2013 - Present        Paroxysmal atrial fibrillation (HCC) ICD-10-CM: I48.0  ICD-9-CM: 427.31  5/6/2013 - Present        CAD (coronary artery disease) (Chronic) ICD-10-CM: I25.10  ICD-9-CM: 414.00  Unknown - Present    Overview Signed 7/13/2012  9:24 AM by Uriel Finney MD     Angina Jan 2011, PCI unknown vessel at [place], no MI             HTN (hypertension) ICD-10-CM: I10  ICD-9-CM: 401.9  Unknown - Present        * (Principal) RESOLVED: Acute metabolic encephalopathy [id number]-[id number]: G93.41  ICD-9-CM: 348.31  2/9/2019 - 2/12/2019        RESOLVED: Hypercalcemia ICD-10-CM: D41.61  ICD-9-CM: 275.42  11/21/2018 - 12/3/2018        RESOLVED: Jaundice ICD-10-CM: R17  ICD-9-CM: 782.4  11/21/2018 - 12/3/2018        RESOLVED: CKD (chronic kidney disease), stage III ([facility] ICD-10-CM: N18.3  ICD-9-CM: 984. 3  Unknown - 12/3/2018        RESOLVED: PAF (paroxysmal atrial fibrillation) ([facility] ICD-10-CM: I48.0  ICD-9-CM: 427.31  Unknown - 9/9/2018    Overview Signed 9/9/2018  9:34 PM by Nata Pandya MD     [id number] score = 7             RESOLVED: Renal mass, left ICD-10-CM: N28.89  ICD-9-CM: 593.9  Unknown - 9/9/2018    Overview Signed 9/9/2018  9:34 PM by Nata Pandya MD     DAVINCI LEFT PARTIAL NEPHRECTOMY 12/19/13;              RESOLVED: Expressive aphasia ICD-10-CM: R47.01  ICD-9-CM: 784.3  2/3/2017 - 9/9/2018        RESOLVED: Renal cell carcinoma ([facility] ICD-10-CM: C64.9  ICD-9-CM: 189.0  9/30/2015 - 9/9/2018        RESOLVED: Left renal mass ICD-10-CM: W87.34  ICD-9-CM: 593.9  12/11/2013 - 9/9/2018        RESOLVED: LBBB (left bundle branch block) ICD-10-CM: I44.7  ICD-9-CM: 426.3  12/11/2013 - 9/9/2018        RESOLVED: Leg weakness ICD-10-CM: N11.330  ICD-9-CM: 729.89  12/10/2013 - 9/9/2018 RESOLVED: Atrial fibrillation or flutter ICD-9-CM: [id number]  7/15/2012 - 8/3/2012    Overview Signed 7/15/2012 10:02 AM by Meliton Smith MD     7/14-15             RESOLVED: Thrombocytopenia, unspecified ([facility] ICD-10-CM: D69.6  ICD-9-CM: 287.5  7/14/2012 - 9/9/2018        RESOLVED: CKD (chronic kidney disease) ICD-10-CM: N18.9  ICD-9-CM: 416. 9  Unknown - 9/9/2018        RESOLVED: Abdominal pain, acute, right upper quadrant ICD-10-CM: R10.11  ICD-9-CM: 789.01, 338.19  7/12/2012 - 7/14/2012        RESOLVED: Biliary colic KQH-19-RV: W33.51  ICD-9-CM: 574.20  7/12/2012 - 5/6/2013        RESOLVED: Cholelithiasis ICD-10-CM: K80.20  ICD-9-CM: 574.20  7/12/2012 - 7/14/2012        RESOLVED: Nausea & vomiting ICD-10-CM: R11.2  ICD-9-CM: 787.01  7/12/2012 - 7/14/2012        RESOLVED: Diabetes mellitus ([facility] (Chronic) ICD-10-CM: E11.9  ICD-9-CM: 250.00  7/12/2012 - 9/9/2018        RESOLVED: Acute renal failure (ARF) ([facility] ICD-10-CM: N17.9  ICD-9-CM: 584.9  7/12/2012 - 8/3/2012              DISCHARGE MEDICATIONS:          · It is important that you take the medication exactly as they are prescribed. · Keep your medication in the bottles provided by the pharmacist and keep a list of the medication names, dosages, and times to be taken in your wallet. · Do not take other medications without consulting your doctor. DIET:  Cardiac Diet    ACTIVITY: Activity as tolerated    ADDITIONAL INFORMATION: If you experience any of the following symptoms then please call your primary care physician or return to the emergency room if you cannot get hold of your doctor: Fever, chills, nausea, vomiting, diarrhea, change in mentation, falling, bleeding, shortness of breath. FOLLOW UP CARE:  Primary care physician. you are to call and set up an appointment to see them in 2 weeks. Information obtained by :  I understand that if any problems occur once I am at home I am to contact my physician.     I understand and acknowledge receipt of the instructions indicated above.                                                                                                                                            Physician's or R.N.'s Signature                                                                  Date/Time                                                                                                                                              Patient or Representative Signature                                                          Date/Time

## 2019-02-12 NOTE — PROGRESS NOTES
Aristeo Plummerelsen [facility] 
[address], [place], [address] [facility] 
[phone] Medical Progress Note NAME: David Ritchie. :  1935 MRM:  [id number] Date/Time: 2019  11:42 AM 
 
  
Assessment and Plan: 1. Acute metabolic encephalopathy: unclear cause. TSH and ammonia are normal. Now resolved.  
  
2.  ? Viral infection/ Leukopenia/ thrombocytopenia/ abnormal LFTs. cmv IgG is high, check ebv. Acute respiratory viral panel is negative. UA and CXR are unremarkable, but UC showed enterococcus. BC:  gram positive cocci, possible contamination. Repeat BC is so far negative. On vanc empirically, but will stop it.  
  
3. CAD/Pafib/HTN: cont norvasc, imdur, eliquis, ASA. Hold losartan due to BRITANY 
  
4. BRITANY/CKD 3: likely from dehydration. Monitor BMP 
  
5. Hx of TIA: cont eliquis, ASA 
  
6.  DM type 2 w/ renal complications: Q9O is 7.4, not on meds. Cover with SSI 7. Hypercalcemia. likely due to volume depletion. Improving with hydration.  
  
8. Dementia: lives at home. 9.  Poor PO intake. Likely from acute infection 10. Debility. PT/OT evaluation  
  
Code: DNR Subjective: Chief Complaint:  Follow up of pt who was admitted with AMS. Family stated that he is the same. ROS: 
(bold if positive, if negative) Tolerating PT  Tolerating Diet Objective:  
 
Last 24hrs VS reviewed since prior progress note. Most recent are: 
 
Visit Vitals /69 (BP 1 Location: Right arm, BP Patient Position: At rest) Pulse 61 Temp 97.5 °F (36.4 °C) Resp 18 Ht 5' 7\" (1.702 m) Wt 80.7 kg (178 lb) SpO2 98% BMI 27.88 kg/m² SpO2 Readings from Last 6 Encounters:  
19 98% 18 94% 18 96% 18 95% 18 97% 17 92% Intake/Output Summary (Last 24 hours) at 2019 0825 Last data filed at 2019 6592 Gross per 24 hour Intake  Output 1000 ml Net -1000 ml Physical Exam: 
 
Gen:  Well-developed, well-nourished, in no acute distress HEENT:  Pink conjunctivae, PERRL, hearing intact to voice, moist mucous membranes Neck:  Supple, without masses, thyroid non-tender Resp:  No accessory muscle use, clear breath sounds without wheezes rales or rhonchi 
Card:  No murmurs, normal S1, S2 without thrills, bruits or peripheral edema Abd:  Soft, non-tender, non-distended, normoactive bowel sounds are present, no palpable organomegaly and no detectable hernias Lymph:  No cervical or inguinal adenopathy Musc:  No cyanosis or clubbing Skin:  No rashes or ulcers, skin turgor is good Neuro:  Cranial nerves are grossly intact, no focal motor weakness, follows commands appropriately Psych:  poor insight  
__________________________________________________________________ Medications Reviewed: (see below) Medications:  
 
Current Facility-Administered Medications Medication Dose Route Frequency  HYDROcodone-acetaminophen (NORCO) 5-325 mg per tablet 1 Tab  1 Tab Oral Q6H PRN  
 insulin lispro (HUMALOG) injection   SubCUTAneous AC&HS  vancomycin (VANCOCIN) 1,250 mg in 0.9% sodium chloride 250 mL IVPB  1,250 mg IntraVENous Q24H  
 amLODIPine (NORVASC) tablet 5 mg  5 mg Oral DAILY  apixaban (ELIQUIS) tablet 2.5 mg  2.5 mg Oral BID  aspirin delayed-release tablet 81 mg  81 mg Oral QHS  isosorbide mononitrate ER (IMDUR) tablet 120 mg  120 mg Oral 7am  
 melatonin tablet 3 mg  3 mg Oral QHS  mirtazapine (REMERON) tablet 7.5 mg  7.5 mg Oral QHS  polyethylene glycol (MIRALAX) packet 17 g  17 g Oral DAILY PRN  
 senna (SENOKOT) tablet 8.6 mg  1 Tab Oral DAILY PRN  
 sodium chloride (NS) flush 5-40 mL  5-40 mL IntraVENous Q8H  
 sodium chloride (NS) flush 5-40 mL  5-40 mL IntraVENous PRN  
 acetaminophen (TYLENOL) tablet 650 mg  650 mg Oral Q4H PRN  
 naloxone (NARCAN) injection 0.4 mg  0.4 mg IntraVENous PRN  
  diphenhydrAMINE (BENADRYL) injection 12.5 mg  12.5 mg IntraVENous Q4H PRN  
 ondansetron (ZOFRAN) injection 4 mg  4 mg IntraVENous Q6H PRN  
 docusate sodium (COLACE) capsule 100 mg  100 mg Oral BID  
 glucose chewable tablet 16 g  4 Tab Oral PRN  
 dextrose (D50W) injection syrg 12.5-25 g  12.5-25 g IntraVENous PRN  
 glucagon (GLUCAGEN) injection 1 mg  1 mg IntraMUSCular PRN  pantoprazole (PROTONIX) tablet 40 mg  40 mg Oral ACB Lab Data Reviewed: (see below) Lab Review:  
 
Recent Labs  
  02/12/19 
0610 02/11/19 
0330 02/10/19 
0422 WBC 2.7* 2.8* 3.1* HGB 12.2 12.0* 11.4* HCT 39.7 38.3 36.1*  
PLT 92* 95* 90* Recent Labs  
  02/12/19 
0610 02/11/19 
0330 02/10/19 
0422 02/09/19 [address]  142 141 138  
K 4.0 3.9 4.3 4.1  108 107 102 CO2 23 24 26 26 * 115* 97 170* BUN 12 14 17 16 CREA 1.39* 1.49* 1.52* 1.75* CA 10.8* 10.2* 10.2* 11.2*  
MG  --  1.7 1.9 1.9 PHOS  --   --  2.5*  --   
ALB  --  2.5* 2.6* 3.4* TBILI  --  0.5 1.2* 2.4* SGOT  --  20 55* 98* ALT  --  50 78 124* Lab Results Component Value Date/Time Glucose (POC) 114 (H) 02/12/2019 07:08 AM  
 Glucose (POC) 179 (H) 02/11/2019 09:16 PM  
 Glucose (POC) 172 (H) 02/11/2019 04:03 PM  
 Glucose (POC) 159 (H) 02/11/2019 10:59 AM  
 Glucose (POC) 103 (H) 02/11/2019 06:07 AM  
 
No results for input(s): PH, PCO2, PO2, HCO3, FIO2 in the last 72 hours. No results for input(s): INR in the last 72 hours. No lab exists for component: INREXT, INREXT All Micro Results Procedure Component Value Units Date/Time CULTURE, BLOOD [[id number]] Collected:  02/10/19 1444 Order Status:  Completed Specimen:  Blood Updated:  02/12/19 1569 Special Requests: NO SPECIAL REQUESTS Culture result: NO GROWTH 2 DAYS     
 CULTURE, URINE [[id number]]  (Abnormal) Collected:  02/09/19 2202 Order Status:  Completed Specimen:  Urine from [place] Specimen Updated:  02/11/19 7014 Special Requests: NO SPECIAL REQUESTS [place] Count --     
  >100,000 COLONIES/mL Culture result: PROBABLE STAPHYLOCOCCUS SPECIES (PREDOMINATING) PROBABLE ENTEROCOCCUS SPECIES  
     
 CULTURE, BLOOD, PAIRED [[id number]]  (Abnormal) Collected:  02/09/19 1418 Order Status:  Completed Specimen:  Blood Updated:  02/11/19 5051 Special Requests: NO SPECIAL REQUESTS Culture result: STAPHYLOCOCCUS SPECIES, COAGULASE NEGATIVE GROWING IN 1 OF 4 BOTTLES DRAWN (SITE=L FA) PRELIMINARY REPORT OF GRAM POSITIVE COCCI IN CLUSTERS GROWING IN 1 OF 4 BOTTLES DRAWN CALLED TO AND READ BACK BY ALBAN FOFANA RN AT 6356 2/10/19. LWY  
     
      
  REMAINING BOTTLE(S) HAS/HAVE NO GROWTH SO FAR  
     
 RESPIRATORY PANEL,PCR,NASOPHARYNGEAL [[id number]] Collected:  02/10/19 0801 Order Status:  Completed Specimen:  Nasopharyngeal Updated:  02/10/19 1107 Adenovirus NOT DETECTED Coronavirus 229E NOT DETECTED Coronavirus HKU1 NOT DETECTED Coronavirus CVNL63 NOT DETECTED Coronavirus OC43 NOT DETECTED Metapneumovirus NOT DETECTED Rhinovirus and Enterovirus NOT DETECTED Influenza A NOT DETECTED Influenza A, subtype H1 NOT DETECTED Influenza A, subtype H3 NOT DETECTED INFLUENZA A H1N1 PCR NOT DETECTED Influenza B NOT DETECTED Parainfluenza 1 NOT DETECTED Parainfluenza 2 NOT DETECTED Parainfluenza 3 NOT DETECTED Parainfluenza virus 4 NOT DETECTED     
  RSV by PCR NOT DETECTED Bordetella pertussis - PCR NOT DETECTED Chlamydophila pneumoniae DNA, QL, PCR NOT DETECTED Mycoplasma pneumoniae DNA, QL, PCR NOT DETECTED URINE CULTURE HOLD SAMPLE [[id number]] Collected:  02/09/19 1630 Order Status:  Completed Specimen:  Serum Updated:  02/09/19 1652 Urine culture hold URINE ON HOLD IN MICROBIOLOGY DEPT FOR 3 DAYS.  IF UNPRESERVED URINE IS SUBMITTED, IT CANNOT BE USED FOR ADDITIONAL TESTING AFTER 24 HRS, RECOLLECTION WILL BE REQUIRED. INFLUENZA A & B AG (RAPID TEST) [[id number]] Collected:  02/09/19 1417 Order Status:  Completed Specimen:  Nasopharyngeal from Nasal washing Updated:  02/09/19 1441 Influenza A Antigen NEGATIVE Influenza B Antigen NEGATIVE I have reviewed notes of prior 24hr. Other pertinent lab: Total time spent with patient: 28 Care Plan discussed with: Patient, Nursing Staff and >50% of time spent in counseling and coordination of care Discussed:  Care Plan Prophylaxis:  eliquis Disposition:  SNF/LTC 
        
___________________________________________________ Attending Physician: Donaldo Ray MD

## 2019-02-12 NOTE — PROGRESS NOTES
Bedside and Verbal shift change report given to KIMBERLY Baker (oncoming nurse) by KIMBERLY Cabezas (offgoing nurse). Report included the following information SBAR, Kardex, Intake/Output, MAR and Recent Results.

## 2019-02-12 NOTE — PROGRESS NOTES
Patient's wife signed discharge instructions and prescription. Patient unable to sign due to dementia. Patient's wife verbalizes understanding of discharge instructions, medication and follow up. PIV removed. Patient out of unit in wheelchair, wife and grandson transporting patient home.

## 2019-02-12 NOTE — ROUTINE PROCESS
Bedside and Verbal shift change report given to Daniel Hoover RN (oncoming nurse) by Santino Huntley RN (offgoing nurse). Report included the following information SBAR and Kardex.

## 2019-02-13 LAB
CMV IGG SERPL IA-ACNC: >10 U/ML (ref 0–0.59)
CMV IGM SERPL IA-ACNC: <30 AU/ML (ref 0–29.9)
EBV EA IGG SER-ACNC: 42 U/ML (ref 0–8.9)
EBV NA IGG SER-ACNC: >600 U/ML (ref 0–17.9)
EBV VCA IGG SER-ACNC: 79.1 U/ML (ref 0–17.9)
EBV VCA IGM SER-ACNC: <36 U/ML (ref 0–35.9)
INTERPRETATION, 169995: ABNORMAL

## 2019-02-15 LAB
BACTERIA SPEC CULT: ABNORMAL
SERVICE CMNT-IMP: ABNORMAL

## 2019-02-16 LAB
BACTERIA SPEC CULT: NORMAL
SERVICE CMNT-IMP: NORMAL

## 2019-02-24 ENCOUNTER — HOSPITAL ENCOUNTER (INPATIENT)
Age: 84
LOS: 1 days | Discharge: HOME | DRG: 056 | End: 2019-02-27
Attending: STUDENT IN AN ORGANIZED HEALTH CARE EDUCATION/TRAINING PROGRAM | Admitting: INTERNAL MEDICINE
Payer: MEDICARE

## 2019-02-24 ENCOUNTER — APPOINTMENT (OUTPATIENT)
Dept: CT IMAGING | Age: 84
DRG: 056 | End: 2019-02-24
Attending: STUDENT IN AN ORGANIZED HEALTH CARE EDUCATION/TRAINING PROGRAM
Payer: MEDICARE

## 2019-02-24 ENCOUNTER — APPOINTMENT (OUTPATIENT)
Dept: GENERAL RADIOLOGY | Age: 84
DRG: 056 | End: 2019-02-24
Attending: NURSE PRACTITIONER
Payer: MEDICARE

## 2019-02-24 DIAGNOSIS — R55 SYNCOPE AND COLLAPSE: Primary | ICD-10-CM

## 2019-02-24 DIAGNOSIS — R53.81 DEBILITY: ICD-10-CM

## 2019-02-24 DIAGNOSIS — Z71.89 GOALS OF CARE, COUNSELING/DISCUSSION: ICD-10-CM

## 2019-02-24 DIAGNOSIS — F03.90 DEMENTIA WITHOUT BEHAVIORAL DISTURBANCE, UNSPECIFIED DEMENTIA TYPE: Chronic | ICD-10-CM

## 2019-02-24 PROBLEM — R78.81 GRAM-NEGATIVE BACTEREMIA: Status: RESOLVED | Noted: 2018-12-02 | Resolved: 2019-02-24

## 2019-02-24 PROBLEM — R50.9 FEVER: Status: RESOLVED | Noted: 2018-12-02 | Resolved: 2019-02-24

## 2019-02-24 PROBLEM — N39.0 UTI (URINARY TRACT INFECTION): Status: RESOLVED | Noted: 2018-09-09 | Resolved: 2019-02-24

## 2019-02-24 PROBLEM — Z96.21 COCHLEAR IMPLANT IN PLACE: Status: RESOLVED | Noted: 2017-02-03 | Resolved: 2019-02-24

## 2019-02-24 LAB
ALBUMIN SERPL-MCNC: 3.2 G/DL (ref 3.5–5)
ALBUMIN/GLOB SERPL: 1 {RATIO} (ref 1.1–2.2)
ALP SERPL-CCNC: 124 U/L (ref 45–117)
ALT SERPL-CCNC: 20 U/L (ref 12–78)
AMMONIA PLAS-SCNC: <10 UMOL/L
ANION GAP SERPL CALC-SCNC: 5 MMOL/L (ref 5–15)
APPEARANCE UR: CLEAR
AST SERPL-CCNC: 15 U/L (ref 15–37)
BACTERIA URNS QL MICRO: NEGATIVE /HPF
BASOPHILS # BLD: 0 K/UL (ref 0–0.1)
BASOPHILS NFR BLD: 1 % (ref 0–1)
BILIRUB SERPL-MCNC: 0.5 MG/DL (ref 0.2–1)
BILIRUB UR QL: NEGATIVE
BNP SERPL-MCNC: 520 PG/ML (ref 0–450)
BUN SERPL-MCNC: 14 MG/DL (ref 6–20)
BUN/CREAT SERPL: 9 (ref 12–20)
CALCIUM SERPL-MCNC: 11.8 MG/DL (ref 8.5–10.1)
CHLORIDE SERPL-SCNC: 105 MMOL/L (ref 97–108)
CO2 SERPL-SCNC: 29 MMOL/L (ref 21–32)
COLOR UR: NORMAL
CREAT SERPL-MCNC: 1.61 MG/DL (ref 0.7–1.3)
DIFFERENTIAL METHOD BLD: ABNORMAL
EOSINOPHIL # BLD: 0.4 K/UL (ref 0–0.4)
EOSINOPHIL NFR BLD: 8 % (ref 0–7)
EPITH CASTS URNS QL MICRO: NORMAL /LPF
ERYTHROCYTE [DISTWIDTH] IN BLOOD BY AUTOMATED COUNT: 14.3 % (ref 11.5–14.5)
GLOBULIN SER CALC-MCNC: 3.2 G/DL (ref 2–4)
GLUCOSE SERPL-MCNC: 153 MG/DL (ref 65–100)
GLUCOSE UR STRIP.AUTO-MCNC: NEGATIVE MG/DL
HCT VFR BLD AUTO: 39.4 % (ref 36.6–50.3)
HGB BLD-MCNC: 12.5 G/DL (ref 12.1–17)
HGB UR QL STRIP: NEGATIVE
HYALINE CASTS URNS QL MICRO: NORMAL /LPF (ref 0–5)
IMM GRANULOCYTES # BLD AUTO: 0 K/UL (ref 0–0.04)
IMM GRANULOCYTES NFR BLD AUTO: 1 % (ref 0–0.5)
KETONES UR QL STRIP.AUTO: NEGATIVE MG/DL
LEUKOCYTE ESTERASE UR QL STRIP.AUTO: NEGATIVE
LYMPHOCYTES # BLD: 0.7 K/UL (ref 0.8–3.5)
LYMPHOCYTES NFR BLD: 17 % (ref 12–49)
MCH RBC QN AUTO: 27.8 PG (ref 26–34)
MCHC RBC AUTO-ENTMCNC: 31.7 G/DL (ref 30–36.5)
MCV RBC AUTO: 87.6 FL (ref 80–99)
MONOCYTES # BLD: 0.4 K/UL (ref 0–1)
MONOCYTES NFR BLD: 8 % (ref 5–13)
NEUTS SEG # BLD: 2.9 K/UL (ref 1.8–8)
NEUTS SEG NFR BLD: 65 % (ref 32–75)
NITRITE UR QL STRIP.AUTO: NEGATIVE
NRBC # BLD: 0 K/UL (ref 0–0.01)
NRBC BLD-RTO: 0 PER 100 WBC
PH UR STRIP: 6 [PH] (ref 5–8)
PLATELET # BLD AUTO: 114 K/UL (ref 150–400)
PMV BLD AUTO: 10.8 FL (ref 8.9–12.9)
POTASSIUM SERPL-SCNC: 4.4 MMOL/L (ref 3.5–5.1)
PROT SERPL-MCNC: 6.4 G/DL (ref 6.4–8.2)
PROT UR STRIP-MCNC: NEGATIVE MG/DL
RBC # BLD AUTO: 4.5 M/UL (ref 4.1–5.7)
RBC #/AREA URNS HPF: NORMAL /HPF (ref 0–5)
RBC MORPH BLD: ABNORMAL
SODIUM SERPL-SCNC: 139 MMOL/L (ref 136–145)
SP GR UR REFRACTOMETRY: 1.01 (ref 1–1.03)
TROPONIN I SERPL-MCNC: <0.05 NG/ML
UR CULT HOLD, URHOLD: NORMAL
UROBILINOGEN UR QL STRIP.AUTO: 0.2 EU/DL (ref 0.2–1)
WBC # BLD AUTO: 4.4 K/UL (ref 4.1–11.1)
WBC URNS QL MICRO: NORMAL /HPF (ref 0–4)

## 2019-02-24 PROCEDURE — 94761 N-INVAS EAR/PLS OXIMETRY MLT: CPT

## 2019-02-24 PROCEDURE — 93005 ELECTROCARDIOGRAM TRACING: CPT

## 2019-02-24 PROCEDURE — 85025 COMPLETE CBC W/AUTO DIFF WBC: CPT

## 2019-02-24 PROCEDURE — 70450 CT HEAD/BRAIN W/O DYE: CPT

## 2019-02-24 PROCEDURE — 71045 X-RAY EXAM CHEST 1 VIEW: CPT

## 2019-02-24 PROCEDURE — 74011250637 HC RX REV CODE- 250/637: Performed by: INTERNAL MEDICINE

## 2019-02-24 PROCEDURE — 82140 ASSAY OF AMMONIA: CPT

## 2019-02-24 PROCEDURE — 99218 HC RM OBSERVATION: CPT

## 2019-02-24 PROCEDURE — 83880 ASSAY OF NATRIURETIC PEPTIDE: CPT

## 2019-02-24 PROCEDURE — 80053 COMPREHEN METABOLIC PANEL: CPT

## 2019-02-24 PROCEDURE — 77030010545

## 2019-02-24 PROCEDURE — 81001 URINALYSIS AUTO W/SCOPE: CPT

## 2019-02-24 PROCEDURE — 36415 COLL VENOUS BLD VENIPUNCTURE: CPT

## 2019-02-24 PROCEDURE — 84484 ASSAY OF TROPONIN QUANT: CPT

## 2019-02-24 PROCEDURE — 99285 EMERGENCY DEPT VISIT HI MDM: CPT

## 2019-02-24 PROCEDURE — 77030011943

## 2019-02-24 RX ORDER — ACETAMINOPHEN 325 MG/1
650 TABLET ORAL
Status: DISCONTINUED | OUTPATIENT
Start: 2019-02-24 | End: 2019-02-27 | Stop reason: HOSPADM

## 2019-02-24 RX ORDER — DIPHENHYDRAMINE HCL 25 MG
25 CAPSULE ORAL
Status: DISCONTINUED | OUTPATIENT
Start: 2019-02-24 | End: 2019-02-27 | Stop reason: HOSPADM

## 2019-02-24 RX ORDER — SODIUM CHLORIDE 0.9 % (FLUSH) 0.9 %
5-40 SYRINGE (ML) INJECTION EVERY 8 HOURS
Status: DISCONTINUED | OUTPATIENT
Start: 2019-02-24 | End: 2019-02-27 | Stop reason: HOSPADM

## 2019-02-24 RX ORDER — SODIUM CHLORIDE 0.9 % (FLUSH) 0.9 %
5-40 SYRINGE (ML) INJECTION AS NEEDED
Status: DISCONTINUED | OUTPATIENT
Start: 2019-02-24 | End: 2019-02-27 | Stop reason: HOSPADM

## 2019-02-24 RX ORDER — ZOLPIDEM TARTRATE 5 MG/1
5 TABLET ORAL
Status: DISCONTINUED | OUTPATIENT
Start: 2019-02-24 | End: 2019-02-25

## 2019-02-24 RX ORDER — ASPIRIN 81 MG/1
81 TABLET ORAL
Status: DISCONTINUED | OUTPATIENT
Start: 2019-02-24 | End: 2019-02-27 | Stop reason: HOSPADM

## 2019-02-24 RX ORDER — LOSARTAN POTASSIUM 50 MG/1
25 TABLET ORAL
Status: DISCONTINUED | OUTPATIENT
Start: 2019-02-24 | End: 2019-02-27 | Stop reason: HOSPADM

## 2019-02-24 RX ORDER — ISOSORBIDE MONONITRATE 60 MG/1
120 TABLET, EXTENDED RELEASE ORAL
Status: DISCONTINUED | OUTPATIENT
Start: 2019-02-25 | End: 2019-02-27 | Stop reason: HOSPADM

## 2019-02-24 RX ORDER — POLYETHYLENE GLYCOL 3350 17 G/17G
17 POWDER, FOR SOLUTION ORAL
Status: DISCONTINUED | OUTPATIENT
Start: 2019-02-24 | End: 2019-02-27 | Stop reason: HOSPADM

## 2019-02-24 RX ORDER — PANTOPRAZOLE SODIUM 40 MG/1
40 TABLET, DELAYED RELEASE ORAL
Status: DISCONTINUED | OUTPATIENT
Start: 2019-02-25 | End: 2019-02-27 | Stop reason: HOSPADM

## 2019-02-24 RX ORDER — AMLODIPINE BESYLATE 5 MG/1
5 TABLET ORAL DAILY
Status: DISCONTINUED | OUTPATIENT
Start: 2019-02-25 | End: 2019-02-27 | Stop reason: HOSPADM

## 2019-02-24 RX ADMIN — APIXABAN 2.5 MG: 2.5 TABLET, FILM COATED ORAL at 18:22

## 2019-02-24 RX ADMIN — Medication 10 ML: at 14:23

## 2019-02-24 RX ADMIN — ASPIRIN 81 MG: 81 TABLET ORAL at 21:10

## 2019-02-24 RX ADMIN — LOSARTAN POTASSIUM 25 MG: 50 TABLET, FILM COATED ORAL at 21:10

## 2019-02-24 NOTE — PROGRESS NOTES
[facility]: MED RECONCILIATION Comments/Recommendations:  
Interview with the spouse at the bedside Verifies preferred pharmacy listed Denies any changes to allergies Medications added:  
 
· None Medications removed: · Nitroglycerin · linezolid Medications adjusted: 
 
· None Allergies: Latex; Atorvastatin; Ezetimibe; Lisinopril; Metoprolol; Morphine; Nitrofurantoin; Rosuvastatin; and Simvastatin Prior to Admission Medications:  
 
Prior to Admission Medications Prescriptions Last Dose Informant Patient Reported? Taking?  
acetaminophen (TYLENOL) 325 mg tablet  Significant Other Yes Yes Sig: Take 650 mg by mouth every four (4) hours as needed for Pain or Fever. amLODIPine (NORVASC) 10 mg tablet 2/24/2019 at Unknown time Significant Other Yes Yes Sig: Take 5 mg by mouth daily. apixaban (ELIQUIS) 2.5 mg tablet 2/24/2019 at Unknown time Significant Other Yes Yes Sig: Take 2.5 mg by mouth two (2) times a day. aspirin delayed-release 81 mg tablet 2/23/2019 at Unknown time Significant Other Yes Yes Sig: Take 81 mg by mouth nightly. docusate sodium (COLACE) 100 mg capsule 2/23/2019 Significant Other Yes Yes Sig: Take 100 mg by mouth daily as needed. isosorbide mononitrate ER (IMDUR) 120 mg CR tablet 2/24/2019 at Unknown time Significant Other No Yes Sig: Take 1 Tab by mouth every morning. losartan (COZAAR) 25 mg tablet 2/23/2019 at Unknown time Significant Other Yes Yes Sig: Take 25 mg by mouth nightly. melatonin 3 mg tablet 2/23/2019 at Unknown time Significant Other Yes Yes Sig: Take 3 mg by mouth nightly. mirtazapine (REMERON) 15 mg tablet 2/23/2019 at Unknown time Significant Other Yes Yes Sig: Take 7.5 mg by mouth nightly. nystatin (MYCOSTATIN) powder  Significant Other Yes Yes Sig: Apply  to affected area daily as needed for Other (Itching/Irritation- Groin area). To groin after showering and drying omeprazole (PRILOSEC) 20 mg capsule 2/24/2019 at Unknown time Significant Other Yes Yes Sig: Take 20 mg by mouth Daily (before breakfast). polyethylene glycol (MIRALAX) 17 gram packet  Significant Other Yes Yes Sig: Take 17 g by mouth daily as needed (Constipation). senna (SENNA) 8.6 mg tablet  Significant Other Yes Yes Sig: Take 1 Tab by mouth daily as needed for Constipation. Facility-Administered Medications: None Thank you, Danyell Noyola, PharmD, BCPS

## 2019-02-24 NOTE — H&P
[facility] Hospitalist Admission Note NAME:  Mitchell Phillips. :   1935 MRN:  [id number] PCP:  John Lugo MD  
 
Date/Time:  2019 2:52 PM 
 
  
  
Subjective: CHIEF COMPLAINT: weak HISTORY OF PRESENT ILLNESS:    
Mr. Rebeca Tariq is a 80 y.o.  male who presented to the Emergency Department complaining of weakness. He gives no reliable hx due to dementia. Wife provides a tangential hx without focus. He is chronically weak, and worse for weeks. More confused, depressed, less appetite. Recent discharge 12 days ago after non-specific illness with confusion and weakness. ER workup finds nothing acute. We will admit him for observation. Past Medical History:  
Diagnosis Date  Arthritis  CAD (coronary artery disease) Angina 2011, PCI LAD with multilink stent 4.0x12mm;  also cath at [place]  -- no stents at that time  CKD (chronic kidney disease), stage III ([address]  Cochlear implant in place 2/3/2017  Dementia  DM type 2 causing renal disease ([address]  DM type 2 causing vascular disease ([address]  GERD (gastroesophageal reflux disease)  History of vascular access device 2018  
 4 Fr PICC L basilic, 42 cm long term abx  
 HTN (hypertension)  Hypothyroidism  SAMUEL (obstructive sleep apnea)   
 not using CPAP  
 PAF (paroxysmal atrial fibrillation) ([address] [id number] score = 7  
 Prostate cancer ([address]  Renal cell cancer ([address] Stage 4 Renal Cell Cancer. Partial omentectomy 2015 with Dr. Davida Hunt: omental mass-metastatic renal cell carcinoma  Renal mass, left [address] LEFT PARTIAL NEPHRECTOMY 13;   
  
 
Past Surgical History:  
Procedure Laterality Date  CARDIAC SURG PROCEDURE UNLIST  2011  
 stent to LAD  HX CHOLECYSTECTOMY  2012  HX CORONARY STENT PLACEMENT   [address]  ,  [address]  
 stapedectomy [address] left inguinal  
 HX LITHOTRIPSY  2015  HX ORTHOPAEDIC    
 rotator cuff repair  HX ORTHOPAEDIC    
 cervical disc removed  HX PROSTATECTOMY  2000  
 surgery intervention [address]  HX UROLOGICAL    
 valvular implant prevent incontinence  HX UROLOGICAL    
 insert gel to help with incontinence  HX UROLOGICAL  2013  
 left partial nephrectomy  HX UROLOGICAL  2015 CT guided biopsy of abdominal lymph nodes Social History Tobacco Use  Smoking status: Former Smoker Packs/day: 0.10 Years: 23.00 Pack years: 2.30 Last attempt to quit: 12/3/1975 Years since quittin.2  Smokeless tobacco: Never Used Substance Use Topics  Alcohol use: No  
  Alcohol/week: 0.0 oz Family History Problem Relation Age of Onset  Stroke Father  Cancer Father   
     prostate  Diabetes Sister 2 sisters  Hypertension Sister 2 sisters  Diabetes Brother  Stroke Brother  Diabetes Brother   
     all brothers  Stroke Brother  Kidney Disease Brother  Heart Attack Brother  Hypertension Other Allergies Allergen Reactions  Latex Rash, Swelling and Contact Dermatitis  Atorvastatin Unknown (comments) Per patient's PCP allergy list- No reaction specified  Ezetimibe Myalgia  Lisinopril Cough  Metoprolol Other (comments) Bradycardia  Morphine Other (comments) Hallucinations and Nausea  Nitrofurantoin Rash \"Blood blisters/rash\" per patient's family  Rosuvastatin Myalgia  Simvastatin Myalgia Prior to Admission medications Medication Sig Start Date End Date Taking? Authorizing Provider  
linezolid (ZYVOX) 600 mg tablet Take 1 Tab by mouth two (2) times a day. 19   Kodi Gallagher MD  
acetaminophen (TYLENOL) 325 mg tablet Take 650 mg by mouth every four (4) hours as needed for Pain or Fever.     Provider, Historical  
 aspirin delayed-release 81 mg tablet Take 81 mg by mouth nightly. Provider, Historical  
amLODIPine (NORVASC) 10 mg tablet Take 5 mg by mouth daily. Provider, Historical  
losartan (COZAAR) 25 mg tablet Take 25 mg by mouth nightly. Provider, Historical  
nystatin (MYCOSTATIN) powder Apply  to affected area daily as needed for Other (Itching/Irritation- Groin area). To groin after showering and drying     Provider, Historical  
docusate sodium (COLACE) 100 mg capsule Take 100 mg by mouth daily as needed. Provider, Historical  
senna (SENNA) 8.6 mg tablet Take 1 Tab by mouth daily as needed for Constipation. Provider, Historical  
isosorbide mononitrate ER (IMDUR) 120 mg CR tablet Take 1 Tab by mouth every morning. 2/4/17   Jeanette Velez MD  
melatonin 3 mg tablet Take 3 mg by mouth nightly. Provider, Historical  
polyethylene glycol (MIRALAX) 17 gram packet Take 17 g by mouth daily as needed (Constipation). Provider, Historical  
mirtazapine (REMERON) 15 mg tablet Take 7.5 mg by mouth nightly. Provider, Historical  
apixaban (ELIQUIS) 2.5 mg tablet Take 2.5 mg by mouth two (2) times a day. Provider, Historical  
omeprazole (PRILOSEC) 20 mg capsule Take 20 mg by mouth Daily (before breakfast). Provider, Historical  
nitroglycerin (NITROSTAT) 0.4 mg SL tablet 0.4 mg by SubLINGual route every five (5) minutes as needed. Other, MD Chance  
 
 
Review of Systems:  Vague 
(bold if positive, if negative) Gen:  fatigueEyes:  ENT:  CVS:  dizzinessPulm:  GI:   
:   
MS:  Skin:  Psych: Insomnia, depression,Endo:   
Hem:  Renal:   
Neuro:  weakness Objective: VITALS:   
Vital signs reviewed; most recent are: 
 
Visit Vitals /57 (BP 1 Location: Right arm, BP Patient Position: At rest) Pulse 65 Temp 98.5 °F (36.9 °C) Resp 14 Wt 80.7 kg (178 lb) SpO2 96% BMI 27.88 kg/m² SpO2 Readings from Last 6 Encounters:  
02/24/19 96% 02/12/19 99% 12/05/18 94% 12/01/18 96% 11/27/18 95% 09/13/18 97% No intake or output data in the 24 hours ending 02/24/19 2002 Exam:  
 
Physical Exam: 
 
Gen:  Well-developed, well-nourished, in no acute distress HEENT:  Pink conjunctivae, PERRL, hearing intact to voice, moist mucous membranes Neck:  Supple, without masses, thyroid non-tender Resp:  No accessory muscle use, clear breath sounds without wheezes rales or rhonchi 
Card:  No murmurs, normal S1, S2 without thrills, bruits or peripheral edema Abd:  Soft, non-tender, non-distended, normoactive bowel sounds are present, no mass Lymph:  No cervical or inguinal adenopathy Musc:  No cyanosis or clubbing Skin:  No rashes or ulcers, skin turgor is good Neuro:  Cranial nerves are grossly intact, general motor weakness, follows commands vaguely Psych:  Poor insight, oriented to person, place Labs: 
 
Recent Labs  
  02/24/19 
1158 WBC 4.4 HGB 12.5 HCT 39.4 * Recent Labs  
  02/24/19 
1158   
K 4.4  
 CO2 29 * BUN 14  
CREA 1.61* CA 11.8* ALB 3.2* TBILI 0.5 SGOT 15 ALT 20 Lab Results Component Value Date/Time Glucose (POC) 161 (H) 02/12/2019 12:13 PM  
 Glucose (POC) 114 (H) 02/12/2019 07:08 AM  
 
No results for input(s): PH, PCO2, PO2, HCO3, FIO2 in the last 72 hours. No results for input(s): INR in the last 72 hours. No lab exists for component: INREXT All Micro Results Procedure Component Value Units Date/Time URINE CULTURE HOLD SAMPLE [[id number]] Order Status:  Sent Specimen:  Urine URINE CULTURE HOLD SAMPLE [[id number]] Order Status:  Canceled Specimen:  Urine I have reviewed previous records Assessment and Plan:  
  
Fatigue / Debilitated patient - POA, chronic. Likely just a chronic issue, deconditioning, depression, dementia. Fall precautions. PT/OT/speech. Neurology and psych consult to contribute suggestions. Palliative consult. Acute encephalopathy / Dementia - POA, likely not far from baseline. Worsened by melatonin and remeron perhaps. Not on specific dementia or depression meds. Consult neuro and psych. CAD (coronary artery disease) / HTN (hypertension) - No sign of ACS. Continue ASA, norvasc, losartan, IMDUR. He is DNR. 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 SSI per protocol. Not on home meds. Check A1c. Paroxysmal atrial fibrillation - Stable SN now. Not on anticoagulation due to prior bleed, not on rate control CKD (chronic kidney disease) stage 3 / Hx Renal cell cancer / Hx Prostate cancer - Stable. Takes ARB Arthritis - tylenol prn. No narcotics GERD (gastroesophageal reflux disease) - PPI Hypothyroidism - not listing meds. Recent normal TSH 
 
SAMUEL (obstructive sleep apnea) - Not using CPAP. Advised compliance. This contributes to his fatigue and weakness Telemetry reviewed:   normal sinus rhythm Risk of deterioration: high Total time spent with patient: 70 Minutes Care Plan discussed with: Patient, Family, Nursing Staff and >50% of time spent in counseling and coordination of care Discussed:  Care Plan      
___________________________________________________ Attending Physician: Marcus Sears MD

## 2019-02-24 NOTE — PROGRESS NOTES
[facility]: MED RECONCILIATION Comments/Recommendations:  
 
Patient and the visitor present request pharmacist return when the patient's spouse returns to the hospital in a few minutes. Thank you, Jvoana Frank, PharmD, BCPS

## 2019-02-24 NOTE — ED PROVIDER NOTES
80 y.o. male with past medical history significant for dementia, DMT2, CKD, h/o TIA x 2, HTN, hypothyroidism, SAMUEL, CAD, PAF, prostate CA, renal cell CA, arthritis, h/o PICC line, s/p coronary stent placement, s/p heart cath, s/p stapedectomy, s/p left partial nephrectomy, s/p cholecystectomy,who presents to the ED with assistance of a wheelchair accompanied by spouse, with chief complaint of falls. Spouse reports that patient has been lethargic and fatigued for ~ 8 days, stating that \"all he wants to do is sleep\". She also note that the patient has had three episodes of \"crumpling down to the ground\" in the past 5 days, where the patient ambulates \"but starts shaking and collapses\". Spouse reports that she has heard these falls, but has not seen them. She states that the patient was able to get up on his own on Monday and Tuesday of this week, but was found on Friday and needed help to get up. Pt denies hitting his head. Spouse reports that patient is on Eliquis, and notes that patient is usually ambulatory but is in wheelchair here in the ED. She also reports that the patient has additional h/o TIA x 2. Pt specifically denies nausea, vomiting, diarrhea, constipations, fever or any pain. There are no other acute medical concerns at this time. Full history, physical exam, and ROS unable to be obtained due to:  dementia. Social hx: Tobacco use (former smoker, quit date: 12/3/75); Negative for EtOH use; Negative for Illicit Drug use PCP: Chance Ornelas MD 
 
Note written by Ana Luisa Ragland, as dictated by Stephanie Silveira MD 11:34 AM 
 
 
The history is provided by the patient, the spouse and medical records. The history is limited by the condition of the patient (Dementia). No  was used. Past Medical History:  
Diagnosis Date  Arthritis  CAD (coronary artery disease)  Angina Jan 2011, PCI LAD with multilink stent 4.0x12mm;  also cath at [place] 7/13 -- no stents at that time  CKD (chronic kidney disease), stage III ([address]  Cochlear implant in place 2/3/2017  Dementia  DM type 2 causing renal disease ([address]  DM type 2 causing vascular disease ([address]  GERD (gastroesophageal reflux disease)  History of vascular access device 12/05/2018  
 4 Fr PICC L basilic, 42 cm long term abx  
 HTN (hypertension)  Hypothyroidism  SAMUEL (obstructive sleep apnea)   
 not using CPAP  
 PAF (paroxysmal atrial fibrillation) ([address] [id number] score = 7  
 Prostate cancer ([address]  Renal cell cancer ([address] Stage 4 Renal Cell Cancer. Partial omentectomy 11/2/2015 with Dr. Jordon Dumont: omental mass-metastatic renal cell carcinoma  Renal mass, left [address] LEFT PARTIAL NEPHRECTOMY 12/19/13;   
 
Past Surgical History:  
Procedure Laterality Date  CARDIAC SURG PROCEDURE UNLIST  1/2011  
 stent to LAD  HX CHOLECYSTECTOMY  7/13/2012  HX CORONARY STENT PLACEMENT  2011 [address]  2011, 2013 [address]  
 stapedectomy  HX HERNIA REPAIR  1968  
 left inguinal  
 HX LITHOTRIPSY  8/2015  HX ORTHOPAEDIC  1994  
 rotator cuff repair  HX ORTHOPAEDIC  1994  
 cervical disc removed  HX PROSTATECTOMY  2000  
 surgery intervention [place]  HX UROLOGICAL  2001  
 valvular implant prevent incontinence  HX UROLOGICAL  2003  
 insert gel to help with incontinence  HX UROLOGICAL  12/2013  
 left partial nephrectomy  HX UROLOGICAL  9/2015 CT guided biopsy of abdominal lymph nodes Family History:  
Problem Relation Age of Onset  Stroke Father  Cancer Father   
     prostate  Diabetes Sister 2 sisters  Hypertension Sister 2 sisters  Diabetes Brother  Stroke Brother  Diabetes Brother   
     all brothers  Stroke Brother  Kidney Disease Brother  Heart Attack Brother  Hypertension Other Social History Socioeconomic History  Marital status:  Spouse name: Not on file  Number of children: Not on file  Years of education: Not on file  Highest education level: Not on file Social Needs  Financial resource strain: Not on file  Food insecurity - worry: Not on file  Food insecurity - inability: Not on file  Transportation needs - medical: Not on file  Transportation needs - non-medical: Not on file Occupational History  Not on file Tobacco Use  Smoking status: Former Smoker Packs/day: 0.10 Years: 23.00 Pack years: 2.30 Last attempt to quit: 12/3/1975 Years since quittin.2  Smokeless tobacco: Never Used Substance and Sexual Activity  Alcohol use: No  
  Alcohol/week: 0.0 oz  Drug use: No  
 Sexual activity: No  
  Birth control/protection: None Other Topics Concern [address] Service Not Asked  Blood Transfusions Not Asked  Caffeine Concern Not Asked  Occupational Exposure Not Asked [place] Hazards Not Asked  Sleep Concern Not Asked  Stress Concern Not Asked  Weight Concern Not Asked  Special Diet Not Asked  Back Care Not Asked  Exercise Not Asked  Bike Helmet Not Asked  Seat Belt Not Asked  Self-Exams Not Asked Social History Narrative  Not on file ALLERGIES: Latex; Atorvastatin; Ezetimibe; Lisinopril; Metoprolol; Morphine; Nitrofurantoin; Rosuvastatin; and Simvastatin Review of Systems Unable to perform ROS: Dementia Vitals:  
 19 1137 BP: 132/57 Pulse: 65 Resp: 14 Temp: 98.5 °F (36.9 °C) SpO2: 96% Weight: 80.7 kg (178 lb) Physical Exam  
Constitutional: He is oriented to person, place, and time. He appears well-developed and well-nourished. HENT:  
Head: Normocephalic and atraumatic. Neck: Normal range of motion. Neck supple. Cardiovascular: Normal rate, regular rhythm, normal heart sounds and intact distal pulses. Pulmonary/Chest: Effort normal and breath sounds normal. No respiratory distress. He has no wheezes. He has no rales. He exhibits no tenderness. Abdominal: Soft. Bowel sounds are normal. There is no tenderness. There is no guarding. Musculoskeletal: Normal range of motion. Neurological: He is alert and oriented to person, place, and time. He has normal strength. No sensory deficit. Non-focal neuro Skin: Skin is warm and dry. No erythema. Psychiatric: He has a normal mood and affect. His behavior is normal. Judgment and thought content normal.  
Nursing note and vitals reviewed. MDM Procedures ED EKG interpretation: 11:47 PM 
Rhythm: normal sinus rhythm and 1st degree block and LBBB; and regular . Rate (approx.): 66; Axis: left axis deviation; P wave: normal; QRS interval: normal ; ST/T wave: normal; Other findings: abnormal ekg. This EKG was interpreted by Edmond Kang MD,ED Provider. Assessment & Plan:  
 
Orders Placed This Encounter  URINE CULTURE HOLD SAMPLE  
 CT HEAD WITHOUT CONTRAST  XR CHEST PORTABLE  CBC WITH AUTOMATED DIFF  
 COMPREHENSIVE METABOLIC PANEL  
 TROPONIN I  
 URINALYSIS W/MICROSCOPIC  
 NT-PRO BNP  AMMONIA  
 EKG, 12 LEAD, INITIAL  
 IP CONSULT TO HOSPITALIST  INITIAL PHYSICIAN ORDER: OBSERVATION/OUTPATIENT IN A BED OBSERVATION; Medical; pallaitive Seen in triage by & Dissussed with Edmond Kang MD,ED Provider Elin Gibbs NP 
02/24/19 
12:27 PM 
 
Patient is likely having syncopal evenats. 2:18 PM 
Patient is being admitted to the hospital.  The results of their tests and reasons for their admission have been discussed with them and/or available family. They convey agreement and understanding for the need to be admitted and for their admission diagnosis. Consultation has been made with the inpatient physician specialist for hospitalization. LABORATORY TESTS: 
Recent Results (from the past 12 hour(s)) EKG, 12 LEAD, INITIAL Collection Time: 02/24/19 11:47 AM  
Result Value Ref Range Ventricular Rate 66 BPM  
 Atrial Rate 66 BPM  
 P-R Interval 260 ms QRS Duration 148 ms Q-T Interval 430 ms QTC Calculation (Bezet) 450 ms Calculated P Axis 62 degrees Calculated R Axis -43 degrees Calculated T Axis 108 degrees Diagnosis Sinus rhythm with 1st degree AV block Left axis deviation Left bundle branch block Abnormal ECG When compared with ECG of 02-DEC-2018 21:07, No significant change was found CBC WITH AUTOMATED DIFF Collection Time: 02/24/19 11:58 AM  
Result Value Ref Range WBC 4.4 4.1 - 11.1 K/uL  
 RBC 4.50 4. 10 - 5.70 M/uL  
 HGB 12.5 12.1 - 17.0 g/dL HCT 39.4 36.6 - 50.3 % MCV 87.6 80.0 - 99.0 FL  
 MCH 27.8 26.0 - 34.0 PG  
 MCHC 31.7 30.0 - 36.5 g/dL  
 RDW 14.3 11.5 - 14.5 % PLATELET 997 (L) 074 - 400 K/uL MPV 10.8 8.9 - 12.9 FL  
 NRBC 0.0 0  WBC ABSOLUTE NRBC 0.00 0.00 - 0.01 K/uL NEUTROPHILS 65 32 - 75 % LYMPHOCYTES 17 12 - 49 % MONOCYTES 8 5 - 13 % EOSINOPHILS 8 (H) 0 - 7 % BASOPHILS 1 0 - 1 % IMMATURE GRANULOCYTES 1 (H) 0.0 - 0.5 % ABS. NEUTROPHILS 2.9 1.8 - 8.0 K/UL  
 ABS. LYMPHOCYTES 0.7 (L) 0.8 - 3.5 K/UL  
 ABS. MONOCYTES 0.4 0.0 - 1.0 K/UL  
 ABS. EOSINOPHILS 0.4 0.0 - 0.4 K/UL  
 ABS. BASOPHILS 0.0 0.0 - 0.1 K/UL  
 ABS. IMM. GRANS. 0.0 0.00 - 0.04 K/UL  
 DF SMEAR SCANNED    
 RBC COMMENTS NORMOCYTIC, NORMOCHROMIC METABOLIC PANEL, COMPREHENSIVE Collection Time: 02/24/19 11:58 AM  
Result Value Ref Range Sodium 139 136 - 145 mmol/L Potassium 4.4 3.5 - 5.1 mmol/L Chloride 105 97 - 108 mmol/L  
 CO2 29 21 - 32 mmol/L Anion gap 5 5 - 15 mmol/L Glucose 153 (H) 65 - 100 mg/dL BUN 14 6 - 20 MG/DL Creatinine 1.61 (H) 0.70 - 1.30 MG/DL  
 BUN/Creatinine ratio 9 (L) 12 - 20 GFR est AA 50 (L) >60 ml/min/1.73m2 GFR est non-AA 41 (L) >60 ml/min/1.73m2 Calcium 11.8 (H) 8.5 - 10.1 MG/DL Bilirubin, total 0.5 0.2 - 1.0 MG/DL  
 ALT (SGPT) 20 12 - 78 U/L  
 AST (SGOT) 15 15 - 37 U/L Alk. phosphatase 124 (H) 45 - 117 U/L Protein, total 6.4 6.4 - 8.2 g/dL Albumin 3.2 (L) 3.5 - 5.0 g/dL Globulin 3.2 2.0 - 4.0 g/dL A-G Ratio 1.0 (L) 1.1 - 2.2    
TROPONIN I Collection Time: 02/24/19 11:58 AM  
Result Value Ref Range Troponin-I, Qt. <0.05 <0.05 ng/mL NT-PRO BNP Collection Time: 02/24/19 11:58 AM  
Result Value Ref Range NT pro- (H) 0 - 450 PG/ML  
AMMONIA Collection Time: 02/24/19 12:40 PM  
Result Value Ref Range Ammonia <10 <32 UMOL/L IMAGING RESULTS: 
XR CHEST PORT Final Result Impression: 1. Chronic appearing interstitial changes predominantly at the lung bases with  
elevation of left hemidiaphragm unchanged. No interval change CT HEAD WO CONT Final Result IMPRESSION:  
  
  
No change. No acute abnormality Ct Head Wo Cont Result Date: 2/24/2019 EXAM: CT HEAD WO CONT INDICATION: syncope x 3 times unwitnessed eval for ICH COMPARISON: 2/9/2019. CONTRAST: None. TECHNIQUE: Unenhanced CT of the head was performed using 5 mm images. Brain and bone windows were generated. CT dose reduction was achieved through use of a standardized protocol tailored for this examination and automatic exposure control for dose modulation. FINDINGS: Ventricles and cisterns mildly prominent unchanged with the overall volume. Moderate low density within the periventricular white matter unchanged. There is no intracranial hemorrhage, extra-axial collection, mass, mass effect or midline shift. The basilar cisterns are open. No acute infarct is identified. The bone windows demonstrate no abnormalities. The visualized portions of the paranasal sinuses and mastoid air cells are clear. IMPRESSION: No change. No acute abnormality Xr Chest [facility] Result Date: 2/24/2019 INDICATION:  Fatigue and weakness. Recent hospitalization Exam: Portable chest 1231. Comparison: 2/9/2013. Findings: Cardiomediastinal silhouette is within normal limits. Pulmonary vasculature is not engorged. Chronic appearing interstitial markings again noted most prominent at the left base laterally. Left hemidiaphragm remains elevated. No pneumothorax or effusion. No new parenchymal opacity Impression: 1. Chronic appearing interstitial changes predominantly at the lung bases with elevation of left hemidiaphragm unchanged. No interval change MEDICATIONS GIVEN: 
Medications - No data to display IMPRESSION: 
1. Syncope and collapse PLAN: 
1.  Admit to hospitalist 
 
 
Hank Bansal NP

## 2019-02-25 LAB
ANION GAP SERPL CALC-SCNC: 7 MMOL/L (ref 5–15)
ATRIAL RATE: 66 BPM
BUN SERPL-MCNC: 16 MG/DL (ref 6–20)
BUN/CREAT SERPL: 10 (ref 12–20)
CALCIUM SERPL-MCNC: 10.9 MG/DL (ref 8.5–10.1)
CALCIUM SERPL-MCNC: 11.6 MG/DL (ref 8.5–10.1)
CALCIUM UR-MCNC: 13 MG/DL
CALCULATED P AXIS, ECG09: 62 DEGREES
CALCULATED R AXIS, ECG10: -43 DEGREES
CALCULATED T AXIS, ECG11: 108 DEGREES
CHLORIDE SERPL-SCNC: 105 MMOL/L (ref 97–108)
CO2 SERPL-SCNC: 26 MMOL/L (ref 21–32)
CREAT SERPL-MCNC: 1.56 MG/DL (ref 0.7–1.3)
DIAGNOSIS, 93000: NORMAL
ERYTHROCYTE [DISTWIDTH] IN BLOOD BY AUTOMATED COUNT: 14.1 % (ref 11.5–14.5)
GLUCOSE SERPL-MCNC: 132 MG/DL (ref 65–100)
HCT VFR BLD AUTO: 39 % (ref 36.6–50.3)
HGB BLD-MCNC: 12.6 G/DL (ref 12.1–17)
MAGNESIUM SERPL-MCNC: 1.9 MG/DL (ref 1.6–2.4)
MCH RBC QN AUTO: 28.3 PG (ref 26–34)
MCHC RBC AUTO-ENTMCNC: 32.3 G/DL (ref 30–36.5)
MCV RBC AUTO: 87.4 FL (ref 80–99)
NRBC # BLD: 0 K/UL (ref 0–0.01)
NRBC BLD-RTO: 0 PER 100 WBC
P-R INTERVAL, ECG05: 260 MS
PLATELET # BLD AUTO: 94 K/UL (ref 150–400)
PMV BLD AUTO: 10.2 FL (ref 8.9–12.9)
POTASSIUM SERPL-SCNC: 4.2 MMOL/L (ref 3.5–5.1)
PTH-INTACT SERPL-MCNC: 35 PG/ML (ref 18.4–88)
Q-T INTERVAL, ECG07: 430 MS
QRS DURATION, ECG06: 148 MS
QTC CALCULATION (BEZET), ECG08: 450 MS
RBC # BLD AUTO: 4.46 M/UL (ref 4.1–5.7)
SODIUM SERPL-SCNC: 138 MMOL/L (ref 136–145)
VENTRICULAR RATE, ECG03: 66 BPM
WBC # BLD AUTO: 3.7 K/UL (ref 4.1–11.1)

## 2019-02-25 PROCEDURE — 82310 ASSAY OF CALCIUM: CPT

## 2019-02-25 PROCEDURE — 74011250637 HC RX REV CODE- 250/637: Performed by: INTERNAL MEDICINE

## 2019-02-25 PROCEDURE — 94760 N-INVAS EAR/PLS OXIMETRY 1: CPT

## 2019-02-25 PROCEDURE — 82397 CHEMILUMINESCENT ASSAY: CPT

## 2019-02-25 PROCEDURE — 85027 COMPLETE CBC AUTOMATED: CPT

## 2019-02-25 PROCEDURE — 99218 HC RM OBSERVATION: CPT

## 2019-02-25 PROCEDURE — 74011250636 HC RX REV CODE- 250/636: Performed by: INTERNAL MEDICINE

## 2019-02-25 PROCEDURE — 97116 GAIT TRAINING THERAPY: CPT

## 2019-02-25 PROCEDURE — 97161 PT EVAL LOW COMPLEX 20 MIN: CPT

## 2019-02-25 PROCEDURE — 77030010545

## 2019-02-25 PROCEDURE — 83970 ASSAY OF PARATHORMONE: CPT

## 2019-02-25 PROCEDURE — 92610 EVALUATE SWALLOWING FUNCTION: CPT | Performed by: SPEECH-LANGUAGE PATHOLOGIST

## 2019-02-25 PROCEDURE — 36415 COLL VENOUS BLD VENIPUNCTURE: CPT

## 2019-02-25 PROCEDURE — 83735 ASSAY OF MAGNESIUM: CPT

## 2019-02-25 PROCEDURE — C1758 CATHETER, URETERAL: HCPCS

## 2019-02-25 PROCEDURE — 97535 SELF CARE MNGMENT TRAINING: CPT

## 2019-02-25 PROCEDURE — 80048 BASIC METABOLIC PNL TOTAL CA: CPT

## 2019-02-25 PROCEDURE — 76450000000

## 2019-02-25 PROCEDURE — 97165 OT EVAL LOW COMPLEX 30 MIN: CPT

## 2019-02-25 RX ORDER — SERTRALINE HYDROCHLORIDE 25 MG/1
25 TABLET, FILM COATED ORAL DAILY
Status: DISCONTINUED | OUTPATIENT
Start: 2019-02-26 | End: 2019-02-27 | Stop reason: HOSPADM

## 2019-02-25 RX ORDER — SODIUM CHLORIDE 9 MG/ML
100 INJECTION, SOLUTION INTRAVENOUS CONTINUOUS
Status: DISCONTINUED | OUTPATIENT
Start: 2019-02-25 | End: 2019-02-27 | Stop reason: HOSPADM

## 2019-02-25 RX ADMIN — PANTOPRAZOLE SODIUM 40 MG: 40 TABLET, DELAYED RELEASE ORAL at 07:04

## 2019-02-25 RX ADMIN — APIXABAN 2.5 MG: 2.5 TABLET, FILM COATED ORAL at 18:45

## 2019-02-25 RX ADMIN — ISOSORBIDE MONONITRATE 120 MG: 60 TABLET ORAL at 07:04

## 2019-02-25 RX ADMIN — LOSARTAN POTASSIUM 25 MG: 50 TABLET, FILM COATED ORAL at 21:25

## 2019-02-25 RX ADMIN — Medication 10 ML: at 14:18

## 2019-02-25 RX ADMIN — APIXABAN 2.5 MG: 2.5 TABLET, FILM COATED ORAL at 08:00

## 2019-02-25 RX ADMIN — AMLODIPINE BESYLATE 5 MG: 5 TABLET ORAL at 08:00

## 2019-02-25 RX ADMIN — SODIUM CHLORIDE 100 ML/HR: 900 INJECTION, SOLUTION INTRAVENOUS at 08:05

## 2019-02-25 RX ADMIN — SODIUM CHLORIDE 100 ML/HR: 900 INJECTION, SOLUTION INTRAVENOUS at 21:26

## 2019-02-25 RX ADMIN — ASPIRIN 81 MG: 81 TABLET ORAL at 21:25

## 2019-02-25 RX ADMIN — Medication 10 ML: at 00:01

## 2019-02-25 RX ADMIN — Medication 10 ML: at 05:39

## 2019-02-25 NOTE — PROGRESS NOTES
Problem: Mobility Impaired (Adult and Pediatric) Goal: *Acute Goals and Plan of Care (Insert Text) Physical Therapy Goals Initiated 2/25/2019 1. Patient will move from supine to sit and sit to supine  in bed with supervision/set-up within 7 day(s). 2.  Patient will transfer from bed to chair and chair to bed with supervision/set-up using the least restrictive device within 7 day(s). 3.  Patient will perform sit to stand with supervision/set-up within 7 day(s). 4.  Patient will ambulate with minimal/CGA for 100 feet with the least restrictive device within 7 day(s). physical Therapy EVALUATION Patient: Tano Saab (80 y.o. male) Date: 2/25/2019 Primary Diagnosis: Fatigue [R53.83] Precautions:   Fall ASSESSMENT : 
Based on the objective data described below, the patient presents with generalized weakness, decreased safety awareness, decreased transfers and functional ambulation following admission for fatigue, syncope and collapse at home. Patient came to ER on 2/24/19 with wife giving history of increased lethargy and unwitnessed frequent falls. Chest xray and head CT negative for acute changes. Patient with history of dementia, TIA x2 and renal cancer and more as noted below. Today he was received in bed alert but confused. Wife present. Patient was able to get up and ambulate 40 feet with rolling walker and minimal assist. BP checked with changes of position and found to be stable (see doc flow sheets). Patient needs cues to stay close to walker, practice safety and avoid obstacles. Wife states these are common problems and he is close to baseline. She admits to leaving him alone at home occasionally for 45 minutes max at a time while she runs an errand. PT advised her that patient should have assistance at all times  due to cognitive deficits. Recommend  HHPT upon discharge home and 24/7 assistance . Patient will benefit from skilled intervention to address the above impairments. Patients rehabilitation potential is considered to be Guarded/Fair Factors which may influence rehabilitation potential include:  
[]         None noted 
[x]         Mental ability/status [x]         Medical condition 
[]         Home/family situation and support systems 
[x]         Safety awareness 
[]         Pain tolerance/management 
[]         Other: PLAN : 
Recommendations and Planned Interventions: 
[x]           Bed Mobility Training             []    Neuromuscular Re-Education 
[x]           Transfer Training                   []    Orthotic/Prosthetic Training 
[x]           Gait Training                         []    Modalities []           Therapeutic Exercises           []    Edema Management/Control 
[]           Therapeutic Activities            []    Patient and Family Training/Education 
[]           Other (comment): Frequency/Duration: Patient will be followed by physical therapy  5 times a week to address goals. Discharge Recommendations: Home Health Further Equipment Recommendations for Discharge: none SUBJECTIVE:  
Patient stated I'm ok.  OBJECTIVE DATA SUMMARY:  
HISTORY:   
Past Medical History:  
Diagnosis Date  Arthritis  CAD (coronary artery disease) Angina Jan 2011, PCI LAD with multilink stent 4.0x12mm;  also cath at [place] 7/13 -- no stents at that time  CKD (chronic kidney disease), stage III ([facility]  Cochlear implant in place 2/3/2017  Dementia  DM type 2 causing renal disease ([facility]  DM type 2 causing vascular disease ([facility]  GERD (gastroesophageal reflux disease)  History of vascular access device 12/05/2018  
 4 Fr PICC L basilic, 42 cm long term abx  
 HTN (hypertension)  Hypothyroidism  SAMUEL (obstructive sleep apnea)   
 not using CPAP  
 PAF (paroxysmal atrial fibrillation) ([facility] [id number] score = 7  
 Prostate cancer ([facility]  Renal cell cancer ([facility] Stage 4 Renal Cell Cancer. Partial omentectomy 11/2/2015 with Dr. Cuenca Sink: omental mass-metastatic renal cell carcinoma  Renal mass, left [address] LEFT PARTIAL NEPHRECTOMY 12/19/13;   
 
Past Surgical History:  
Procedure Laterality Date  CARDIAC SURG PROCEDURE UNLIST  1/2011  
 stent to LAD  HX CHOLECYSTECTOMY  7/13/2012  HX CORONARY STENT PLACEMENT  2011 [address]  2011, 2013 [address]  
 stapedectomy  HX HERNIA REPAIR  1968  
 left inguinal  
 HX LITHOTRIPSY  8/2015  HX ORTHOPAEDIC  1994  
 rotator cuff repair  HX ORTHOPAEDIC  1994  
 cervical disc removed  HX PROSTATECTOMY  2000  
 surgery intervention [id number] [place]  HX UROLOGICAL  2001  
 valvular implant prevent incontinence  HX UROLOGICAL  2003  
 insert gel to help with incontinence  HX UROLOGICAL  12/2013  
 left partial nephrectomy  HX UROLOGICAL  9/2015 CT guided biopsy of abdominal lymph nodes Prior Level of Function/Home Situation: independent with rolling walker Personal factors and/or comorbidities impacting plan of care: history of dementia and frequent falls Home Situation Home Environment: Private residence Wheelchair Ramp: Yes One/Two Story Residence: One story Living Alone: No 
Support Systems: Spouse/Significant Other/Partner Patient Expects to be Discharged to[de-identified] Private residence Current DME Used/Available at Home: Walker, rolling, Tub transfer bench, Raised toilet seat, Grab bars EXAMINATION/PRESENTATION/DECISION MAKING: Critical Behavior: 
Neurologic State: Alert, Confused Orientation Level: Oriented to person, Disoriented to time, Disoriented to place(siad \"march\" ) Cognition: Decreased attention/concentration, Decreased command following, Poor safety awareness, Recognition of people/places Safety/Judgement: Decreased awareness of environment, Decreased awareness of need for safety, Decreased awareness of need for assistance Hearing: Auditory Auditory Impairment: Hard of hearing, left sideSkin:  Not fully observed Range Of Motion: 
AROM: Within functional limits PROM: Within functional limits Strength:   
Strength: Generally decreased, functional 
  
  
  
  
  
  
Tone & Sensation:  
Tone: Normal 
  
  
  
  
Sensation: Intact Coordination: 
Coordination: Generally decreased, functional 
Vision:  
Diplopia: No 
Corrective Lenses: Reading glasses Functional Mobility: 
Bed Mobility: 
  
Supine to Sit: Stand-by assistance; Additional time;Assist x1 Sit to Supine: (Patient remained seated at end of session) Scooting: Stand-by assistance Transfers: 
Sit to Stand: Minimum assistance; Moderate assistance;Assist x1;Additional time Stand to Sit: Minimum assistance Bed to Chair: Contact guard assistance;Minimum assistance;Assist x2; Additional time Balance:  
Sitting: Intact; Without support Standing: With support; Intact Standing - Static: Good Standing - Dynamic : FairAmbulation/Gait Training:Distance (ft): 40 Feet (ft) Assistive Device: Walker, rolling;Gait belt Ambulation - Level of Assistance: Minimal assistance Gait Abnormalities: Decreased step clearance;Shuffling gait Base of Support: Narrowed Speed/Sabrina: Pace decreased (<100 feet/min) Functional Measure: 
Barthel Index: 
 
Bathin Bladder: 0 Bowels: 5 Groomin Dressin Feedin Mobility: 5 Stairs: 0 Toilet Use: 5 Transfer (Bed to Chair and Back): 10 Total: 35 The Barthel ADL Index: Guidelines 1. The index should be used as a record of what a patient does, not as a record of what a patient could do. 2. The main aim is to establish degree of independence from any help, physical or verbal, however minor and for whatever reason. 3. The need for supervision renders the patient not independent. 4. A patient's performance should be established using the best available evidence. Asking the patient, friends/relatives and nurses are the usual sources, but direct observation and common sense are also important. However direct testing is not needed. 5. Usually the patient's performance over the preceding 24-48 hours is important, but occasionally longer periods will be relevant. 6. Middle categories imply that the patient supplies over 50 per cent of the effort. 7. Use of aids to be independent is allowed. Tresa Brooks, Barthel, DMahadW. (0158). Functional evaluation: the Barthel Index. [address] [employer] (14)2. Elvi Jane valeria PATIENCE StahlF, [employer]., Candi Mercer., [place], [address] (1999). Measuring the change indisability after inpatient rehabilitation; comparison of the responsiveness of the Barthel Index and Functional [place] Measure. Journal of Neurology, Neurosurgery, and Psychiatry, 66(4), 197-309. Ralph Bains, N.[place].A, ROGELIO GibbonsJ.EMELY, & LISY HammondA. (2004.) Assessment of post-stroke quality of life in cost-effectiveness studies: The usefulness of the Barthel Index and the EuroQoL-5D. [facility], 13, 138-39 Physical Therapy Evaluation Charge Determination History Examination Presentation Decision-Making MEDIUM  Complexity : 1-2 comorbidities / personal factors will impact the outcome/ POC  LOW Complexity : 1-2 Standardized tests and measures addressing body structure, function, activity limitation and / or participation in recreation  LOW Complexity : Stable, uncomplicated  Other outcome measures Barthel  MEDIUM Based on the above components, the patient evaluation is determined to be of the following complexity level: LOW Pain: 
Pain Scale 1: Numeric (0 - 10) Pain Intensity 1: 0 Activity Tolerance:  
Good. Please refer to the flowsheet for vital signs taken during this treatment. After treatment: [x]         Patient left in no apparent distress sitting up in chair 
[]         Patient left in no apparent distress in bed 
[x]         Call bell left within reach [x]         Nursing notified 
[x]         Caregiver present [x]         Chair alarm activated COMMUNICATION/EDUCATION:  
The patients plan of care was discussed with: Occupational Therapist, Registered Nurse, Physician and . [x]         Fall prevention education was provided and the patient/caregiver indicated understanding. []         Patient/family have participated as able in goal setting and plan of care. [x]         Patient/family agree to work toward stated goals and plan of care. []         Patient understands intent and goals of therapy, but is neutral about his/her participation. []         Patient is unable to participate in goal setting and plan of care. Thank you for this referral. 
Garcia Manley, PT Time Calculation: 29 mins

## 2019-02-25 NOTE — PROGRESS NOTES
Problem: Self Care Deficits Care Plan (Adult) Goal: *Acute Goals and Plan of Care (Insert Text) Occupational Therapy Goals Initiated 2/25/2019 1. Patient will perform lower body dressing with supervision/set-up within 7 day(s). 2.  Patient will perform toilet transfers with supervision/set-up within 7 day(s). 3.  Patient will perform all aspects of toileting with supervision/set-up within 7 day(s). 4.  Patient will participate in upper extremity therapeutic exercise/activities with supervision/set-up for 5 minutes within 7 day(s). 5.  Patient will utilize energy conservation techniques during functional activities with verbal cues within 7 day(s). Occupational Therapy EVALUATION Patient: Perla Quiroz (80 y.o. male) Date: 2/25/2019 Primary Diagnosis: Fatigue [R53.83] Precautions: Fall ASSESSMENT : 
Based on the objective data described below, the patient presents with confusion and decreased safety awareness following admission for fatigue after fall at home. Patient does have past medical history that includes renal cancer, dementia, CAD, and more. Patient demonstrated ability to perform mobility into bathroom, however demonstrates decreased awareness of environment, noted to run into obstacles with rolling walker. Patient's wife present during session and reports that she will leave patient for max 45 minutes at a time to run errands when at home. Patient currently requires min assist to SBA/supervision for ADLs, which is close to baseline, but requires constant supervision for safety at this time. Patient will benefit from skilled intervention to address the above impairments. Patients rehabilitation potential is considered to be Fair Factors which may influence rehabilitation potential include:  
[]             None noted [x]             Mental ability/status [x]             Medical condition []             Home/family situation and support systems [x]             Safety awareness []             Pain tolerance/management 
[]             Other: PLAN : 
Recommendations and Planned Interventions: 
[x]               Self Care Training                  [x]        Therapeutic Activities [x]               Functional Mobility Training    [x]        Cognitive Retraining 
[x]               Therapeutic Exercises           [x]        Endurance Activities [x]               Balance Training                   []        Neuromuscular Re-Education [x]               Visual/Perceptual Training     [x]   Home Safety Training 
[x]               Patient Education                 [x]        Family Training/Education []               Other (comment): Frequency/Duration: Patient will be followed by occupational therapy 4 times a week to address goals. Discharge Recommendations: Home Health Further Equipment Recommendations for Discharge: None for OT SUBJECTIVE:  
Patient stated I don't think I have to go to the bathroom right now.  RN cleared patient for therapy. Patient is pleasantly confused and alert and agreeable to participate. OBJECTIVE DATA SUMMARY:  
HISTORY:  
Past Medical History:  
Diagnosis Date  Arthritis  CAD (coronary artery disease) Angina Jan 2011, PCI LAD with multilink stent 4.0x12mm;  also cath at [place] 7/13 -- no stents at that time  CKD (chronic kidney disease), stage III ([facility]  Cochlear implant in place 2/3/2017  Dementia  DM type 2 causing renal disease ([facility]  DM type 2 causing vascular disease ([facility] [facility]  GERD (gastroesophageal reflux disease)  History of vascular access device 12/05/2018  
 4 Fr PICC L basilic, 42 cm long term abx  
 HTN (hypertension)  Hypothyroidism  SAMUEL (obstructive sleep apnea)   
 not using CPAP  
 PAF (paroxysmal atrial fibrillation) ([facility] [address] [facility] [id number] score = 7  
 Prostate cancer ([facility] [address] [facility]  Renal cell cancer ([facility] [address] [facility] Stage 4 Renal Cell Cancer. Partial omentectomy 11/2/2015 with Dr. Cuba Fail: omental mass-metastatic renal cell carcinoma  Renal mass, left [address] LEFT PARTIAL NEPHRECTOMY 12/19/13;   
 
Past Surgical History:  
Procedure Laterality Date  CARDIAC SURG PROCEDURE UNLIST  1/2011  
 stent to LAD  HX CHOLECYSTECTOMY  7/13/2012  HX CORONARY STENT PLACEMENT  2011 [address]  2011, 2013 [address]  
 stapedectomy  HX HERNIA REPAIR  1968  
 left inguinal  
 HX LITHOTRIPSY  8/2015  HX ORTHOPAEDIC  1994  
 rotator cuff repair  HX ORTHOPAEDIC  1994  
 cervical disc removed  HX PROSTATECTOMY  2000  
 surgery intervention [address]  HX UROLOGICAL  2001  
 valvular implant prevent incontinence  HX UROLOGICAL  2003  
 insert gel to help with incontinence  HX UROLOGICAL  12/2013  
 left partial nephrectomy  HX UROLOGICAL  9/2015 CT guided biopsy of abdominal lymph nodes Prior Level of Function/Environment/Context: Patient requires supervision for ADLs at home, with occasional min assist for grooming tasks (e.g. Shaving) for completeness. Patient's wife reports that she leaves him for max 45 minutes at a time to run errands. Expanded or extensive additional review of patient history:  
 
Home Situation Home Environment: Private residence Wheelchair Ramp: Yes One/Two Story Residence: One story Living Alone: No 
Support Systems: Spouse/Significant Other/Partner Patient Expects to be Discharged to[de-identified] Private residence Current DME Used/Available at Home: Walker, rolling, Tub transfer bench, Raised toilet seat, Grab bars Hand dominance: RightEXAMINATION OF PERFORMANCE DEFICITS: 
Cognitive/Behavioral Status: 
Neurologic State: Alert;Confused Orientation Level: Oriented to person;Disoriented to time;Disoriented to place(siad \"march\" ) Cognition: Decreased attention/concentration;Decreased command following;Poor safety awareness;Recognition of people/places Perception: Appears intact Perseveration: No perseveration noted Safety/Judgement: Decreased awareness of environment;Decreased awareness of need for safety;Decreased awareness of need for assistance Skin: observed skin intact Edema: no abnormal swelling noted. Hearing: Auditory Auditory Impairment: Hard of hearing, left side Vision/Perceptual:   
Diplopia: No   
Corrective Lenses: Reading glasses Range of Motion: 
AROM: Within functional limits In bilateral UEs PROM: Within functional limits In bilateral UEs Strength: 
Strength: Generally decreased, functional In bilateral UEs Coordination: 
Coordination: Generally decreased, functional In bilateral UEs Fine Motor Skills-Upper: Left Intact; Right Intact Gross Motor Skills-Upper: Left Intact; Right Intact Tone & Sensation: 
Tone: Normal In bilateral UEs Sensation: Intact In bilateral UEs Balance: 
Sitting: Intact; Without support Standing: With support; Intact Standing - Static: Good Standing - Dynamic : Fair Functional Mobility and Transfers for ADLs:Bed Mobility: 
Supine to Sit: Stand-by assistance; Additional time;Assist x1 Sit to Supine: (Patient remained seated at end of session) Scooting: Stand-by assistance Transfers: 
Sit to Stand: Minimum assistance; Moderate assistance;Assist x1;Additional time Stand to Sit: Minimum assistance Bed to Chair: Contact guard assistance;Minimum assistance;Assist x2; Additional time Bathroom Mobility: Minimum assistance Toilet Transfer : Minimum assistance;Assist x1 ADL Assessment: 
Feeding: Supervision;Setup Oral Facial Hygiene/Grooming: Minimum assistance Bathing: Minimum assistance Upper Body Dressing: Stand-by assistance Lower Body Dressing: Stand-by assistance Toileting: Minimum assistance ADL Intervention and task modifications: 
Grooming Grooming Assistance: Minimum assistance Washing Hands: Minimum assistance Lower Body Dressing Assistance Dressing Assistance: Stand-by assistance Socks: Stand-by assistance Leg Crossed Method Used: Yes Position Performed: Seated in chair Toileting Toileting Assistance: Minimum assistance Clothing Management: Minimum assistance Cognitive Retraining Safety/Judgement: Decreased awareness of environment;Decreased awareness of need for safety;Decreased awareness of need for assistance Functional Measure: 
Barthel Index: 
 
Bathin Bladder: 0 Bowels: 5 Groomin Dressin Feedin Mobility: 5 Stairs: 0 Toilet Use: 5 Transfer (Bed to Chair and Back): 10 Total: 35 The Barthel ADL Index: Guidelines 1. The index should be used as a record of what a patient does, not as a record of what a patient could do. 2. The main aim is to establish degree of independence from any help, physical or verbal, however minor and for whatever reason. 3. The need for supervision renders the patient not independent. 4. A patient's performance should be established using the best available evidence. Asking the patient, friends/relatives and nurses are the usual sources, but direct observation and common sense are also important. However direct testing is not needed. 5. Usually the patient's performance over the preceding 24-48 hours is important, but occasionally longer periods will be relevant. 6. Middle categories imply that the patient supplies over 50 per cent of the effort. 7. Use of aids to be independent is allowed. Rubie Osler., Barthel, D.W. (0152). Functional evaluation: the Barthel Index. [facility] (14)2. RONALD Melton, Beckie Laird., [facility] [place], [address] [place] [address] Ave (). Measuring the change indisability after inpatient rehabilitation; comparison of the responsiveness of the Barthel Index and Functional Erwin Measure. Journal of Neurology, Neurosurgery, and Psychiatry, 66(4), 490-572. FRANCIE Head, ELEAZAR Gibbons, & Tram Mitchell M.A. (2004.) Assessment of post-stroke quality of life in cost-effectiveness studies: The usefulness of the Barthel Index and the EuroQoL-5D. [facility], 13, 656-35 Occupational Therapy Evaluation Charge Determination History Examination Decision-Making LOW Complexity : Brief history review  MEDIUM Complexity : 3-5 performance deficits relating to physical, cognitive , or psychosocial skils that result in activity limitations and / or participation restrictions MEDIUM Complexity : Patient may present with comorbidities that affect occupational performnce. Miniml to moderate modification of tasks or assistance (eg, physical or verbal ) with assesment(s) is necessary to enable patient to complete evaluation Based on the above components, the patient evaluation is determined to be of the following complexity level: LOW Pain: 
Pain Scale 1: Numeric (0 - 10) Pain Intensity 1: 0 Activity Tolerance:  
Patient tolerates activity well, with no reported dizziness/lightheadedness in standing. Vitals stable throughout session. Please refer to the flowsheet for vital signs taken during this treatment. After treatment:  
[x] Patient left in no apparent distress sitting up in chair 
[] Patient left in no apparent distress in bed 
[x] Call bell left within reach [x] Nursing notified 
[x] Caregiver present [x] Bed/Chair alarm activated COMMUNICATION/EDUCATION:  
The patients plan of care was discussed with: Physical Therapist, Registered Nurse and patient and family. [x] Home safety education was provided and the patient/caregiver indicated understanding. [x] Patient/family have participated as able in goal setting and plan of care. [x] Patient/family agree to work toward stated goals and plan of care. [] Patient understands intent and goals of therapy, but is neutral about his/her participation. [] Patient is unable to participate in goal setting and plan of care. This patients plan of care is appropriate for delegation to [facility]. Thank you for this referral. 
Thomas Armstrong, OTR Time Calculation: 32 mins

## 2019-02-25 NOTE — PROGRESS NOTES
Spiritual Care Assessment/Progress Note [address] 
 
 
NAME: Nadja Griffin. MRN: [id number] AGE: 80 y.o. SEX: male Zoroastrian Affiliation: Pentecostalism  
Language: English  
 
2/25/2019     Total Time (in minutes): 26 Spiritual Assessment begun in SF 4M POST SURG ORT 1 through conversation with: 
  
    [x]Patient        [x] Family    [] Friend(s) Reason for Consult: Palliative Care, Initial/Spiritual Assessment Spiritual beliefs: (Please include comment if needed) [x] Identifies with a mary tradition: Pentecostalism   
   [] Supported by a mary community:        
   [] Claims no spiritual orientation:       
   [] Seeking spiritual identity:            
   [] Adheres to an individual form of spirituality:       
   [] Not able to assess:                   
 
    
Identified resources for coping:  
   [] Prayer                           
   [] Music                  [] Guided Imagery [x] Family/friends                 [] Pet visits [] Devotional reading                         [] Unknown 
   [] Other:                                         
 
 
Interventions offered during this visit: (See comments for more details) Patient Interventions: Affirmation of emotions/emotional suffering, Coping skills reviewed/reinforced, Iconic (affirming the presence of God/Higher Power) Family/Friend(s): Affirmation of emotions/emotional suffering, Affirmation of mary, Catharsis/review of pertinent events in supportive environment, Coping skills reviewed/reinforced, Iconic (affirming the presence of God/Higher Power), Normalization of emotional/spiritual concerns([facility])) Plan of Care: 
 
 [x] Support spiritual and/or cultural needs  
 [] Support AMD and/or advance care planning process    
 [] Support grieving process 
 [] Coordinate Rites and/or Rituals  
 [] Coordination with community clergy [] No spiritual needs identified at this time [] Detailed Plan of Care below (See Comments)  [] Make referral to Music Therapy 
[] Make referral to Pet Therapy    
[] Make referral to Addiction services 
[] Make referral to [facility] 
[] Make referral to Spiritual Care Partner 
[] No future visits requested       
[x] Follow up visits as needed Comments:  visit for initial spiritual assessment, palliative care consult. Patient lying in bed resting, Good eye contact, smiling, good natured. Says he is feeling fine and thanked this  for visiting. Wife is present at bedside. Provided spiritual presence and listening as she spoke about her present thoughts, feelings, and concerns. Expresses feelings of sadness and grief. Says that her  appears to be giving up on life and has recently told her that he is weary and no longer wants to fight to live, but is ready to go. Providing some family history, she says that his mother behaved the same at the end of her life and it worries her. Her brother is also a patient in this hospital, which, she says, increases her level of stress and feelings of grief. Mr. Africa Washington enjoys his family and finds a great deal of bryon and comfort being with his children and grandchildren. He sleeps much of the time, according to his wife, and his only activity is to listen to the radio in the afternoon around lunchtime. I spoke words of comfort and hope. Will continue to follow up as needed and upon request as able. Both the patient and his wife appeared comforted as a result of this visit and expressed gratitude for this visit. Visited by Rev. Felisha Nash, [facility]  paging service: [phone])

## 2019-02-25 NOTE — PROGRESS NOTES
Reason for Admission:   Fatigue RRAT Score:   57 Resources/supports as identified by patient/family:   Wife and extended family Top Challenges facing patient (as identified by patient/family and CM): Finances/Medication cost?   Has Rx drug coverage. Pharmacy is [facility] on [address] Transportation? Pt does not drive; relies on wife/family Support system or lack thereof? See above Living arrangements? Pt lives with his wife in a one story house with a ramp. Self-care/ADLs/Cognition? Pt has dementia. Wife provides assistance with medications. Wife reported that pt is able to bathe and dress independently. Pt typically ambulates with use of walker or cane. He also has a shower chair. Current Advanced Directive/Advance Care Plan: On file Plan for utilizing home health:    CM met with pt's wife to discuss PT/OT recommendations for [facility]. Pt has had HH in the past with [facility]. Wife is skeptical about using HH at d/c stating her  \"feels like it is an invasion\". Likelihood of readmission:  High 
              
Transition of Care Plan:   Home with home health (if wife is agreeable) CM met with pt's wife for d/c planning. Pt's wife reported that she is with pt 24/7 with the exception of running errands for short periods of time or going to [employer]. Pt's daughter and grandson are able to help if needed. Pt had a private duty aide but pt's wife no longer pays for an aide because the last aide \"spent 6 hours playing on her phone\". CM provided pt's wife with information on [facility]. CM will f/u with pt's wife regarding HH. Pt's wife was informed that pt is on observation status and was provided the Medicare notification letters. No questions or concerns noted. Agata García LCSW Care Management Interventions PCP Verified by CM: Yes(Dr. Babs Cano at the [place]; no nurse navigator) Palliative Care Criteria Met (RRAT>21 & CHF Dx)?: No 
Discharge Durable Medical Equipment: No 
Physical Therapy Consult: Yes Occupational Therapy Consult: Yes Speech Therapy Consult: Yes Current Support Network: Lives with Spouse Confirm Follow Up Transport: Family Plan discussed with Pt/Family/Caregiver: Yes Discharge Location Discharge Placement: Home with home health

## 2019-02-25 NOTE — ED NOTES
Bedside and Verbal shift change report given to Yadiel Mckeon (oncoming nurse) by Javier Julien (offgoing nurse). Report included the following information SBAR, Kardex and ED Summary.

## 2019-02-25 NOTE — PROGRESS NOTES
Palliative Medicine Code Status: DNR Advance Care Planning: 
Advance Care Planning 2/25/2019 Patient's Healthcare Decision Maker is: Named in scanned ACP document Primary Decision Maker Name Carey (Lakkia) Primary Decision Maker Phone Number [phone] Primary Decision Maker Relationship to Patient Wife Confirm Advance Directive Yes, on file Does the patient have other document types Durable Do Not Resuscitate Patient / Family Encounter Documentation Participants (names):  Pt, Palliative Medicine (Dr. Ania Delgado, [address]) Narrative:  Pt was up in chair, no family currently present. Pt was very pleasant, engaged in conversation, some confusion noted. Pt identified location as \"[place],\" reports he lives at home with his wife, stated he has 5 children and 17 grandchildren. Pt complained of weakness, reports he has had several falls at home, denied injury. Per chart, pt had a paid caregiver in the past but wife discontinued services due to dissatisfaction with the care. Pt has AMD in place which appoints Carey (Lakkia) (wife), Uma Márquez, and Alexa Burnham as respective Medical POAs. Pt also has DDNR in place. Psychosocial Issues Identified/ Resilience Factors:  Caregiver stress, wife's brother is also reportedly hospitalized (wife had possibly stepped out to visit her brother at time of this visit). Family and mary are sources of support. Goals of Care / Plan: Dr. Ania Delgado attempted to reach wife to discuss pt's care, left message. Pt will likely be discharged home when stable, uncertain if wife will allow home health (reportedly stated to  that pt sees services as \"an invasion\"). SW will follow for support. Thank you for including Palliative Medicine in the care of Mr. Elver Gipson. Victor Hugo Zelaya LCSW, [facility]- 
[phone])

## 2019-02-25 NOTE — PROGRESS NOTES
Speech Pathology Orders received. Chart reviewed and spoke with RN. Patient's wife currently meeting with a physician in patient's room. Will return shortly for evaluation.

## 2019-02-25 NOTE — CONSULTS
Palliative Medicine Consult    Patient Name: Nguyễn Garcia. YOB: 1935    Date of Initial Consult: 2/25/19  Reason for Consult: care decisions  Requesting Provider: Dr. Gianluca Nixon   Primary Care Physician: Chance Ornelas MD      SUMMARY:   Nguyễn Del Valle is a 80 y.o. with a past history of Dementia, debility, hypercalcemia, CAD, CKD, prostate cancer, , who was admitted on 2/24/2019 from home with a diagnosis of weakness/falls. Current medical issues leading to Palliative Medicine involvement include: care decisions. Chart reviewed/HPI-patient known to our team from multiple prior admissions. Once again admitted for FTT/weakness-all nonspecific type complaints. Hypercalcemia has been worked up in the past as well. In addition, been followed by Dr. Millie Guaman and cancer had been stable. SH-, wife not at bedside when I saw him       PALLIATIVE DIAGNOSES:   1. GOC discussion  2. Debility  3. Recurrent admissions  4.    Finis Show know he has had issues with his cognition but this is not end stage dementia by Hospice criteria-The LCD for Hospice for the admitted diagnosis of Alzheimer's Dementia includes: this is end stage dementia criteria for hospice    Patients with dementia should show all the following characteristics:    [] Stage seven or beyond according to the Functional Assessment Staging Scale  -Unable to ambulate without assistance  -Unable to dress without assistance  -Unable to bathe without assistance  -Urinary and fecal incontinence, intermittent or constant  -No consistently meaningful verbal communication: stereotypical phrases only or the ability to speak is limited to six or fewer intelligible words     Patients should have had one of the following within the past 12 months:  [] Aspiration pneumonia  [] Pyelonephritis or other upper urinary tract infection  [] Septicemia  [] Decubitus ulcers, multiple, stage 3-4  [] Fever, recurrent after antibiotics  [] Inability to maintain sufficient fluid and calorie intake with 10% weight loss during the previous six months or serum albumin <2.5 gm/dl    Note: This section is specific for Alzheimers Disease and related disorders, and is not appropriate for other types of dementia, such as multi-infarct dementia. PLAN:   1. Selina(LCSW) and I met with patient. No family in the room. Patient able to answer very basic questions-I have been falling and weak. He denies any pain. 2. GOC-continue current therapy. Have called his wife to discuss what has been occurring at home. Does appear that things have been stressful and I know different providers have discussed potential Hospice care but at this time unclear what the Hospice dx would be. His wife had not been interested in Hospice even if that was an option during the last visit. 3. AMD-in the chart and unchanged  4. Code status-DDNR signed during one of his prior visits  5. Symptom management-no acute symptoms to manage  6. Psychosocial-his decline has been stressful on family but wife has declined some help to include [facility] and/or paid caregivers  7. Discussed with bedside nurse and will talk with Dr. Kaylyn Bertrand  8. Initial consult note routed to primary continuity provider  9. Communicated plan of care with: Palliative IDT       GOALS OF CARE / TREATMENT PREFERENCES:   [====Goals of Care====]  GOALS OF CARE:  Patient/Health Care Proxy Stated Goals: Other (comment)(full restorative measures)      TREATMENT PREFERENCES:   Code Status: DNR    Advance Care Planning:  Advance Care Planning 2/25/2019   Patient's Healthcare Decision Maker is: Named in scanned ACP document   Primary Decision Maker Name -   Primary Decision Maker Phone Number -   Primary Decision Maker Relationship to Patient -   Confirm Advance Directive Yes, on file   Does the patient have other document types Do Not Resuscitate       Medical Interventions: Limited additional interventions  Other Instructions:           The palliative care team has discussed with patient / health care proxy about goals of care / treatment preferences for patient.  [====Goals of Care====]         HISTORY:     History obtained from: chart, patient    CHIEF COMPLAINT: weakness    HPI/SUBJECTIVE:    The patient is:   [x] Verbal and participatory  [] Non-participatory due to:     Patient is awake, sitting in the chair. Denies any pain. Clinical Pain Assessment (nonverbal scale for severity on nonverbal patients):   Clinical Pain Assessment  Severity: 0            FUNCTIONAL ASSESSMENT:     Palliative Performance Scale (PPS):  PPS: 50       PSYCHOSOCIAL/SPIRITUAL SCREENING:     Advance Care Planning:  Advance Care Planning 2/25/2019   Patient's Healthcare Decision Maker is: Named in scanned ACP document   Primary Decision Maker Name -   Primary Decision Maker Phone Number -   Primary Decision Maker Relationship to Patient -   Confirm Advance Directive Yes, on file   Does the patient have other document types Do Not Resuscitate        Any spiritual / Church concerns:  [] Yes /  [x] No    Caregiver Burnout:  [] Yes /  [] No /  [x] No Caregiver Present      Anticipatory grief assessment:   [x] Normal  / [] Maladaptive       ESAS Anxiety: Anxiety: 0    ESAS Depression: Depression: 0        REVIEW OF SYSTEMS:     Positive and pertinent negative findings in ROS are noted above in HPI. The following systems were [x] reviewed / [] unable to be reviewed as noted in HPI  Other findings are noted below. Systems: constitutional, ears/nose/mouth/throat, respiratory, gastrointestinal, genitourinary, musculoskeletal, integumentary, neurologic, psychiatric, endocrine. Positive findings noted below.   Modified ESAS Completed by: provider   Fatigue: 2 Drowsiness: 0   Depression: 0 Pain: 0   Anxiety: 0 Nausea: 0   Anorexia: 0 Dyspnea: 0     Constipation: No              PHYSICAL EXAM:     From RN flowsheet:  Wt Readings from Last 3 Encounters:   02/24/19 178 lb (80.7 kg)   02/09/19 178 lb (80.7 kg)   12/02/18 178 lb (80.7 kg)     Blood pressure 149/68, pulse 62, temperature 97.5 °F (36.4 °C), resp. rate 18, weight 178 lb (80.7 kg), SpO2 97 %. Pain Scale 1: Numeric (0 - 10)  Pain Intensity 1: 0                 Last bowel movement, if known:     Constitutional: alert and oriented to person, place  Eyes: pupils equal, anicteric  ENMT: no nasal discharge, moist mucous membranes  Cardiovascular: regular rhythm, distal pulses intact  Respiratory: breathing not labored, symmetric  Gastrointestinal: soft non-tender, +bowel sounds  Musculoskeletal: no deformity, no tenderness to palpation  Skin: warm, dry  Neurologic: following commands, moving all extremities  Psychiatric: full affect, no hallucinations  Other:       HISTORY:     Principal Problem:    Fatigue (9/9/2018)    Active Problems:    CAD (coronary artery disease) ()      Overview: Angina Jan 2011, PCI unknown vessel at [facility], no MI      HTN (hypertension) ()      Paroxysmal atrial fibrillation ([address] (5/6/2013)      CKD (chronic kidney disease) stage 3, GFR 30-59 ml/min (HCC) (12/10/2013)      Renal cell cancer (HCC) ()      Overview: Stage 4 Renal Cell Cancer.     Partial omentectomy 11/2/2015 with Dr. Shruti Singleton: omental mass-metastatic renal cell carcinoma      Arthritis ()      Dementia ()      DM type 2 causing renal disease ([address] ()      DM type 2 causing vascular disease (HCC) ()      GERD (gastroesophageal reflux disease) ()      Hypothyroidism ()      SAMUEL (obstructive sleep apnea) ()      Overview: not using CPAP      Prostate cancer ([address] ()      Acute encephalopathy (9/9/2018)      Debilitated patient (9/9/2018)      Hypercalcemia (11/21/2018)      Past Medical History:   Diagnosis Date    Arthritis     CAD (coronary artery disease)     Angina Jan 2011, PCI LAD with multilink stent 4.0x12mm;  also cath at [facility] 7/13 -- no stents at that time    CKD (chronic kidney disease), stage III ([address]     Cochlear implant in place 2/3/2017    Dementia     DM type 2 causing [facility] disease [facility]     DM type 2 causing vascular disease (HCC)     GERD (gastroesophageal reflux disease)     History of vascular access device 12/05/2018    4 Fr PICC L basilic, 42 cm long term abx    HTN (hypertension)     Hypothyroidism     SAMUEL (obstructive sleep apnea)     not using CPAP    PAF (paroxysmal atrial fibrillation) (HCC)     [id number] score = 7    Prostate cancer (HCC)     Renal cell cancer (HCC)     Stage 4 Renal Cell Cancer. Partial omentectomy 11/2/2015 with Dr. Tom Europe: omental mass-metastatic renal cell carcinoma    Renal mass, left     DAVINCI LEFT PARTIAL NEPHRECTOMY 12/19/13;       Past Surgical History:   Procedure Laterality Date    CARDIAC SURG PROCEDURE UNLIST  1/2011    stent to LAD    HX CHOLECYSTECTOMY  7/13/2012    HX CORONARY STENT PLACEMENT  2011    HX HEART CATHETERIZATION  2011, 2013    HX HEENT  1989    stapedectomy    [address]    left inguinal    HX LITHOTRIPSY  8/2015    HX ORTHOPAEDIC  1994    rotator cuff repair    HX ORTHOPAEDIC  1994    cervical disc removed    HX PROSTATECTOMY  2000    surgery intervention    HX TONSILLECTOMY  1987    HX UROLOGICAL  2001    valvular implant prevent incontinence    HX UROLOGICAL  2003    insert gel to help with incontinence    HX UROLOGICAL  12/2013    left partial nephrectomy    HX UROLOGICAL  9/2015    CT guided biopsy of abdominal lymph nodes      Family History   Problem Relation Age of Onset    Stroke Father     Cancer Father         prostate    Diabetes Sister         2 sisters    Hypertension Sister         2 sisters    Diabetes Brother     Stroke Brother     Diabetes Brother         all brothers    Stroke Brother     Kidney Disease Brother     Heart Attack Brother     Hypertension Other       History reviewed, no pertinent family history.   Social History     Tobacco Use    Smoking status: Former Smoker     Packs/day: 0.10 Years: 23.00     Pack years: 2.30     Last attempt to quit: 12/3/1975     Years since quittin.2    Smokeless tobacco: Never Used   Substance Use Topics    Alcohol use: No     Alcohol/week: 0.0 oz     Allergies   Allergen Reactions    Latex Rash, Swelling and Contact Dermatitis    Atorvastatin Unknown (comments)     Per patient's PCP allergy list- No reaction specified    Ezetimibe Myalgia    Lisinopril Cough    Metoprolol Other (comments)     Bradycardia    Morphine Other (comments)     Hallucinations and Nausea    Nitrofurantoin Rash     \"Blood blisters/rash\" per patient's family    Rosuvastatin Myalgia    Simvastatin Myalgia      Current Facility-Administered Medications   Medication Dose Route Frequency    0.9% sodium chloride infusion  100 mL/hr IntraVENous CONTINUOUS    amLODIPine (NORVASC) tablet 5 mg  5 mg Oral DAILY    apixaban (ELIQUIS) tablet 2.5 mg  2.5 mg Oral BID    aspirin delayed-release tablet 81 mg  81 mg Oral QHS    isosorbide mononitrate ER (IMDUR) tablet 120 mg  120 mg Oral 7am    losartan (COZAAR) tablet 25 mg  25 mg Oral QHS    pantoprazole (PROTONIX) tablet 40 mg  40 mg Oral ACB    polyethylene glycol (MIRALAX) packet 17 g  17 g Oral DAILY PRN    sodium chloride (NS) flush 5-40 mL  5-40 mL IntraVENous Q8H    sodium chloride (NS) flush 5-40 mL  5-40 mL IntraVENous PRN    acetaminophen (TYLENOL) tablet 650 mg  650 mg Oral Q4H PRN    diphenhydrAMINE (BENADRYL) capsule 25 mg  25 mg Oral Q4H PRN          LAB AND IMAGING FINDINGS:     Lab Results   Component Value Date/Time    WBC 3.7 (L) 2019 03:24 AM    HGB 12.6 2019 03:24 AM    PLATELET 94 (L)  03:24 AM     Lab Results   Component Value Date/Time    Sodium 138 2019 03:24 AM    Potassium 4.2 2019 03:24 AM    Chloride 105 2019 03:24 AM    CO2 26 2019 03:24 AM    BUN 16 2019 03:24 AM    Creatinine 1.56 (H) 2019 03:24 AM    Calcium 10.9 (H) 2019 09:43 AM Magnesium 1.9 02/25/2019 03:24 AM    Phosphorus 2.5 (L) 02/10/2019 04:22 AM      Lab Results   Component Value Date/Time    AST (SGOT) 15 02/24/2019 11:58 AM    Alk. phosphatase 124 (H) 02/24/2019 11:58 AM    Protein, total 6.4 02/24/2019 11:58 AM    Albumin 3.2 (L) 02/24/2019 11:58 AM    Globulin 3.2 02/24/2019 11:58 AM     Lab Results   Component Value Date/Time    INR 1.1 02/02/2017 04:20 PM    Prothrombin time 10.9 02/02/2017 04:20 PM    aPTT 27.4 02/02/2017 04:20 PM      Lab Results   Component Value Date/Time    Iron 8 (L) 12/03/2018 03:02 PM    TIBC 187 (L) 12/03/2018 03:02 PM    Iron % saturation 4 (L) 12/03/2018 03:02 PM    Ferritin 246 12/03/2018 03:02 PM      No results found for: PH, PCO2, PO2  No components found for: [place]   Lab Results   Component Value Date/Time     07/14/2012 03:00 AM    CK - MB 2.8 07/14/2012 03:00 AM                Total time: 30  Counseling / coordination time, spent as noted above: 30  > 50% counseling / coordination?: yes    Prolonged service was provided for  []30 min   []75 min in face to face time in the presence of the patient, spent as noted above. Time Start:   Time End:   Note: this can only be billed with 46355 (initial) or 31542 (follow up). If multiple start / stop times, list each separately.

## 2019-02-25 NOTE — CONSULTS
[facility]: [address] Neurology  [place] [address]  [phone]        Name:   Zunilda Mcmahon. Medical record #: [id number]  Admission Date: 2/24/2019   Who Consulted: Dr. Savage Marcial  Reason for Consult:  AMS    HISTORY OF PRESENT ILLNESS:   This is a 80 y.o. 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 He also has no past medical history of Difficult intubation, Malignant hyperthermia due to anesthesia, Pseudocholinesterase deficiency, or Unspecified adverse effect of anesthesia. who is admitted for confusion. The Neurology Service is asked to evaluate for AMS. Mr. Kathya Sheffield presented to the ED with chief complaint of falls. Spouse reports that patient has been lethargic and fatigued for ~ 8 days, stating that \"all he wants to do is sleep\". She also note that the patient has had three episodes of \"crumpling down to the ground\" in the past 5 days, where the patient ambulates \"but starts shaking and collapses\". Spouse reports that she has heard these falls, but has not seen them. She states that the patient was able to get up on his own on Monday and Tuesday of this week, but was found on Friday and needed help to get up. He was seen by Dr. Jcarlos Farias for dysarthria and TIA in 2/2/2017. Clinical Data:    Imaging review:     CT head:  No acute process    Current rhythm:  SR with 1 degree block    Assessment/Plan:   1.   Altered Mental Status:    · Neurochecks:  Every 4 hours  · Continue Eliquis  · CBC, CMP, urine, TSH unremarkable or at baseline this admission, Folate and B12 normal in 12/2018. · cEEG to rule out subclinical seizures   · Cannot have MRI due to implant in ear    2. Mobility:   · Has not been OOB. · PT/OT to eval for rehab    4. Diet:    · Did not receive STAND, wife reports that he pockets his food and has a thick tongue with frequent chocking. 5.  VTE Prophylaxes:   · Per primary team    Thank you for allowing the Neurology Service the pleasure of participating in the care of your patient. This patient will be discussed with my collaborating care team physician Dr. Dariusz Myers and she may have further recommendations regarding this patient's care. Attending Attestation:   ==============================================================    Attending Addendum    I have reviewed the documentation provided by the nurse practitioner, Richelle Duggan, discussed her findings, clinical impression, and the proposed management plans with regards to this patient's encounter. I have personally evaluated the patient, verify the history and confirm physical findings. Below are my additional findings:    HPI  This is an 72-year-old gentleman with a past medical history of dementia who presented to the emergency room with complaints of worsening confusion and falls. According to patient's wife he has had several episodes of complaints the ground. She does not think he had loss of consciousness. He is also had some episodes of shaking. He seems to be less responsive than he typically would be. He denies any focal numbness or weakness. CT of the head was negative for stroke. He is unable to have an MRI due to an implant. He does have epilepsy risk factors of dementia  He has never had a seizure that they are aware of.     CLINICAL DATA REVIEW  IMAGING: as above (I personally reviewed these images in PACS and this is my impression)      PHYSICAL EXAM (additional findings)  Patient Vitals for the past 8 hrs:   Temp Pulse Resp BP SpO2   19 1448 97.5 °F (36.4 °C) 62 18 149/68 97 %   19 1119 97.7 °F (36.5 °C) 63 18 133/63 97 %   19 1109  70  151/68 96 %   19 1052  75  153/63 95 %   19 1049  68  150/65 96 %   19 1044  77  139/65 96 %   Patient examined with nurse practitioner and my exam as documented below    ADDITIONAL ASSESSMENT AND RECOMMENDATIONS:  This is an 29-year-old gentleman who presented to the emergency room with complaints of fall/syncope. Encephalopathy labs are negative. CT head is negative. Exam is nonfocal.  There is concern for possible subclinical seizure so we will do EEG to evaluate for this. 1.  CT head negative. Patient unable to get MRI of the brain  2. Continuous EEG  3. Continue Eliquis and aspirin for stroke prevention  4. Will discontinue Ambien and hold off on Remeron as this could contribute to patient's confusion  5. Patient currently not on any memory medications so will not add this at this time  6. Encephalopathy labs essentially negative    Will follow. Thank you for this consultation    Zoe Nicholas MD  2019       Review of Systems: 10 point ROS was performed. Pertinent positives listed in HPI. Negative ROS is as follows. Pt denies: angina, palpitations, paresthesias, weakness, vision loss, slurred speech, aphasia, confusion, fever, chills, falls, headache, diplopia, back pain, neck pain, prior episodes of vertigo, hallucinations, new medications or dosage changes.     PHYSICAL EXAM:     Visit Vitals  /76 (BP 1 Location: Left arm, BP Patient Position: At rest)   Pulse 63   Temp 97.8 °F (36.6 °C)   Resp 18   Wt 80.7 kg (178 lb)   SpO2 97%   BMI 27.88 kg/m²      O2 Device: Room air    Temp (24hrs), Av.8 °F (36.6 °C), Min:97.4 °F (36.3 °C), Max:98.5 °F (36.9 °C)    701 - 1900  In: 100 [P.O.:100]  Out: -     0700  In: -   Out: 590 [TZRIK:541] General:  Alert, cooperative, no acute distress. Lungs:   Clear to auscultation bilaterally. No crackles/wheezes. Heart:  Abdominal:  Regular rate and rhythm, No murmur, click, rub or gallop. Soft and nondistended   Skin: Skin color, texture, turgor normal.      NEUROLOGICAL EXAM:    Appearance:  Well developed, well nourished,  and is in no acute distress. Mental Status: Oriented to place and person. Fully attentive. No aphasia. Full fund of knowledge. Able to remember wife's name but not 3 words (blue, pen, car). Cranial Nerves:   Intact visual fields. PERRL, EOM's full, no nystagmus, no ptosis. Facial sensation is normal. Facial movement is symmetric. Palate is midline. Normal sternocleidomastoid strength. Tongue is midline. Reflexes:   Deep tendon reflexes 2+/4 and symmetrical.   Sensory:   Normal to temperature and vibration. Gait:  Not tested. Tremor:   No tremor noted. Cerebellar:  No cerebellar signs present. Neurovascular:  Normal heart sounds and regular rhythm. No carotid bruits. Motor: No pronator drift of either outstretched arm.          Deltoid Biceps Triceps Wrist Extension Finger Abduction   L 5 5 5 5 5   R 5 5 5 5 5      Hip Flexion Hip Extension Knee Flexion Knee Extension Ankle Dorsiflexion Ankle Plantarflexion   L 5 5 5 5 5 5   R 5 5 5 5 5 5        Reflexes:     Biceps Triceps Plantar Patellar Achilles   L 2 2 2 2 2   R 2 2 2 2 2     Past Medical History:   Diagnosis Date    Arthritis     CAD (coronary artery disease)     Angina Jan 2011, PCI LAD with multilink stent 4.0x12mm;  also [facility] 7/13 -- no stents at that time    CKD (chronic kidney disease), stage III ([address]     Cochlear implant in place 2/3/2017    Dementia     DM type 2 causing renal disease ([address]     DM type 2 causing vascular disease ([address]     GERD (gastroesophageal reflux disease)     History of vascular access device 12/05/2018    4 Fr PICC L basilic, 42 cm long term abx    HTN (hypertension)     Hypothyroidism     SAMUEL (obstructive sleep apnea)     not using CPAP    PAF (paroxysmal atrial fibrillation) (HCC)     [id number] score = 7    Prostate cancer (HCC)     Renal cell cancer (HCC)     Stage 4 Renal Cell Cancer.     Partial omentectomy 11/2/2015 with Dr. Shawanda Keller: omental mass-metastatic renal cell carcinoma    Renal mass, left     DAVINCI LEFT PARTIAL NEPHRECTOMY 12/19/13;      Past Surgical History:   Procedure Laterality Date    CARDIAC SURG PROCEDURE UNLIST  1/2011    stent to LAD    HX CHOLECYSTECTOMY  7/13/2012    HX CORONARY STENT PLACEMENT  2011    HX HEART CATHETERIZATION  2011, 2013    HX [address]    staped[address]    [address]    left inguinal    HX LITHOTRIPSY  8/2015    HX ORTHOPAEDIC  1994    rotator cuff repair    HX ORTHOPAEDIC  1994    cervical disc removed    HX PROSTATECTOMY  2000    surgery intervention    HX TONSILLECTOMY  1987    HX UROLOGICAL  2001    valvular implant prevent incontinence    HX UROLOGICAL  2003    insert gel to help with incontinence    HX UROLOGICAL  12/2013    left partial nephrectomy    HX UROLOGICAL  9/2015    CT guided biopsy of abdominal lymph nodes     Family History   Problem Relation Age of Onset    Stroke Father     Cancer Father         prostate    Diabetes Sister         2 sisters    Hypertension Sister         2 sisters    Diabetes Brother     Stroke Brother     Diabetes Brother         all brothers    Stroke Brother     Kidney Disease Brother     Heart Attack Brother     Hypertension Other      Social History     Socioeconomic History    Marital status:      Spouse name: Not on file    Number of children: Not on file    Years of education: Not on file    Highest education level: Not on file   Social Needs    Financial resource strain: Not on file    Food insecurity - worry: Not on file    Food insecurity - inability: Not on file    Transportation needs - medical: Not on file  Transportation needs - non-medical: Not on file   Occupational History    Not on file   Tobacco Use    Smoking status: Former Smoker     Packs/day: 0.10     Years: 23.00     Pack years: 2.30     Last attempt to quit: 12/3/1975     Years since quittin.2    Smokeless tobacco: Never Used   Substance and Sexual Activity    Alcohol use: No     Alcohol/week: 0.0 oz    Drug use: No    Sexual activity: No     Birth control/protection: None   Other Topics Concern     Service Not Asked    Blood Transfusions Not Asked    Caffeine Concern Not Asked    Occupational Exposure Not Asked    Hobby Hazards Not Asked    Sleep Concern Not Asked    Stress Concern Not Asked    Weight Concern Not Asked    Special Diet Not Asked    Back Care Not Asked    Exercise Not Asked    Bike Helmet Not Asked    [address] Not Asked    Self-Exams Not Asked   Social History Narrative    Not on file       Allergies: Allergies   Allergen Reactions    Latex Rash, Swelling and Contact Dermatitis    Atorvastatin Unknown (comments)     Per patient's PCP allergy list- No reaction specified    Ezetimibe Myalgia    Lisinopril Cough    Metoprolol Other (comments)     Bradycardia    Morphine Other (comments)     Hallucinations and Nausea    Nitrofurantoin Rash     \"Blood blisters/rash\" per patient's family    Rosuvastatin Myalgia    Simvastatin Myalgia       Outpatient Meds  No current facility-administered medications on file prior to encounter. Current Outpatient Medications on File Prior to Encounter   Medication Sig Dispense Refill    acetaminophen (TYLENOL) 325 mg tablet Take 650 mg by mouth every four (4) hours as needed for Pain or Fever.  aspirin delayed-release 81 mg tablet Take 81 mg by mouth nightly.  amLODIPine (NORVASC) 10 mg tablet Take 5 mg by mouth daily.  losartan (COZAAR) 25 mg tablet Take 25 mg by mouth nightly.       nystatin (MYCOSTATIN) powder Apply  to affected area daily as needed for Other (Itching/Irritation- Groin area). To groin after showering and drying       docusate sodium (COLACE) 100 mg capsule Take 100 mg by mouth daily as needed.  senna (SENNA) 8.6 mg tablet Take 1 Tab by mouth daily as needed for Constipation.  isosorbide mononitrate ER (IMDUR) 120 mg CR tablet Take 1 Tab by mouth every morning. 30 Tab 0    melatonin 3 mg tablet Take 3 mg by mouth nightly.  polyethylene glycol (MIRALAX) 17 gram packet Take 17 g by mouth daily as needed (Constipation).  mirtazapine (REMERON) 15 mg tablet Take 7.5 mg by mouth nightly.  apixaban (ELIQUIS) 2.5 mg tablet Take 2.5 mg by mouth two (2) times a day.  omeprazole (PRILOSEC) 20 mg capsule Take 20 mg by mouth Daily (before breakfast).          Inpatient Meds    Current Facility-Administered Medications:     0.9% sodium chloride infusion, 100 mL/hr, IntraVENous, CONTINUOUS, Moe Rodríguez Slight V, DO, Last Rate: 100 mL/hr at 02/25/19 0805, 100 mL/hr at 02/25/19 0805    amLODIPine (NORVASC) tablet 5 mg, 5 mg, Oral, DAILY, Curt Caba MD, 5 mg at 02/25/19 0800    apixaban (ELIQUIS) tablet 2.5 mg, 2.5 mg, Oral, BID, Curt Caba MD, 2.5 mg at 02/25/19 0800    aspirin delayed-release tablet 81 mg, 81 mg, Oral, QHS, Curt Caba MD, 81 mg at 02/24/19 2110    isosorbide mononitrate ER (IMDUR) tablet 120 mg, 120 mg, Oral, 7am, Curt Caba MD, 120 mg at 02/25/19 5408    losartan (COZAAR) tablet 25 mg, 25 mg, Oral, QHS, Curt Caba MD, 25 mg at 02/24/19 2110    pantoprazole (PROTONIX) tablet 40 mg, 40 mg, Oral, ACB, Curt Caba MD, 40 mg at 02/25/19 3443    polyethylene glycol (MIRALAX) packet 17 g, 17 g, Oral, DAILY PRN, Curt Caba MD    sodium chloride (NS) flush 5-40 mL, 5-40 mL, IntraVENous, Q8H, Curt Caba MD, 10 mL at 02/25/19 0539    sodium chloride (NS) flush 5-40 mL, 5-40 mL, IntraVENous, PRN, MD Coral Fofana) tablet 5 mg, 5 mg, Oral, QHS PRN, Colten Adams MD    acetaminophen (TYLENOL) tablet 650 mg, 650 mg, Oral, Q4H PRN, Colten Adams MD    diphenhydrAMINE (BENADRYL) capsule 25 mg, 25 mg, Oral, Q4H PRN, Colten Adams MD    Recent Results (from the past 24 hour(s))   EKG, 12 LEAD, INITIAL    Collection Time: 02/24/19 11:47 AM   Result Value Ref Range    Ventricular Rate 66 BPM    Atrial Rate 66 BPM    P-R Interval 260 ms    QRS Duration 148 ms    Q-T Interval 430 ms    QTC Calculation (Bezet) 450 ms    Calculated P Axis 62 degrees    Calculated R Axis -43 degrees    Calculated T Axis 108 degrees    Diagnosis       Sinus rhythm with 1st degree AV block  Left axis deviation  Left bundle branch block  Abnormal ECG  When compared with ECG of 02-DEC-2018 21:07,  No significant change was found     CBC WITH AUTOMATED DIFF    Collection Time: 02/24/19 11:58 AM   Result Value Ref Range    WBC 4.4 4.1 - 11.1 K/uL    RBC 4.50 4. 10 - 5.70 M/uL    HGB 12.5 12.1 - 17.0 g/dL    HCT 39.4 36.6 - 50.3 %    MCV 87.6 80.0 - 99.0 FL    MCH 27.8 26.0 - 34.0 PG    MCHC 31.7 30.0 - 36.5 g/dL    RDW 14.3 11.5 - 14.5 %    PLATELET 695 (L) 138 - 400 K/uL    MPV 10.8 8.9 - 12.9 FL    NRBC 0.0 0  WBC    ABSOLUTE NRBC 0.00 0.00 - 0.01 K/uL    NEUTROPHILS 65 32 - 75 %    LYMPHOCYTES 17 12 - 49 %    MONOCYTES 8 5 - 13 %    EOSINOPHILS 8 (H) 0 - 7 %    BASOPHILS 1 0 - 1 %    IMMATURE GRANULOCYTES 1 (H) 0.0 - 0.5 %    ABS. NEUTROPHILS 2.9 1.8 - 8.0 K/UL    ABS. LYMPHOCYTES 0.7 (L) 0.8 - 3.5 K/UL    ABS. MONOCYTES 0.4 0.0 - 1.0 K/UL    ABS. EOSINOPHILS 0.4 0.0 - 0.4 K/UL    ABS. BASOPHILS 0.0 0.0 - 0.1 K/UL    ABS. IMM.  GRANS. 0.0 0.00 - 0.04 K/UL    DF SMEAR SCANNED      RBC COMMENTS NORMOCYTIC, NORMOCHROMIC     METABOLIC PANEL, COMPREHENSIVE    Collection Time: 02/24/19 11:58 AM   Result Value Ref Range    Sodium 139 136 - 145 mmol/L    Potassium 4.4 3.5 - 5.1 mmol/L    Chloride 105 97 - 108 mmol/L    CO2 29 21 - 32 mmol/L Anion gap 5 5 - 15 mmol/L    Glucose 153 (H) 65 - 100 mg/dL    BUN 14 6 - 20 MG/DL    Creatinine 1.61 (H) 0.70 - 1.30 MG/DL    BUN/Creatinine ratio 9 (L) 12 - 20      GFR est AA 50 (L) >60 ml/min/1.73m2    GFR est non-AA 41 (L) >60 ml/min/1.73m2    Calcium 11.8 (H) 8.5 - 10.1 MG/DL    Bilirubin, total 0.5 0.2 - 1.0 MG/DL    ALT (SGPT) 20 12 - 78 U/L    AST (SGOT) 15 15 - 37 U/L    Alk. phosphatase 124 (H) 45 - 117 U/L    Protein, total 6.4 6.4 - 8.2 g/dL    Albumin 3.2 (L) 3.5 - 5.0 g/dL    Globulin 3.2 2.0 - 4.0 g/dL    A-G Ratio 1.0 (L) 1.1 - 2.2     TROPONIN I    Collection Time: 02/24/19 11:58 AM   Result Value Ref Range    Troponin-I, Qt. <0.05 <0.05 ng/mL   NT-PRO BNP    Collection Time: 02/24/19 11:58 AM   Result Value Ref Range    NT pro- (H) 0 - 450 PG/ML   AMMONIA    Collection Time: 02/24/19 12:40 PM   Result Value Ref Range    Ammonia <10 <32 UMOL/L   URINALYSIS W/MICROSCOPIC    Collection Time: 02/24/19  4:38 PM   Result Value Ref Range    Color YELLOW/STRAW      Appearance CLEAR CLEAR      Specific gravity 1.014 1.003 - 1.030      pH (UA) 6.0 5.0 - 8.0      Protein NEGATIVE  NEG mg/dL    Glucose NEGATIVE  NEG mg/dL    Ketone NEGATIVE  NEG mg/dL    Bilirubin NEGATIVE  NEG      Blood NEGATIVE  NEG      Urobilinogen 0.2 0.2 - 1.0 EU/dL    Nitrites NEGATIVE  NEG      Leukocyte Esterase NEGATIVE  NEG      WBC 0-4 0 - 4 /hpf    RBC 0-5 0 - 5 /hpf    Epithelial cells FEW FEW /lpf    Bacteria NEGATIVE  NEG /hpf    Hyaline cast 0-2 0 - 5 /lpf   URINE CULTURE HOLD SAMPLE    Collection Time: 02/24/19  4:39 PM   Result Value Ref Range    Urine culture hold        URINE ON HOLD IN MICROBIOLOGY DEPT FOR 3 DAYS. IF UNPRESERVED URINE IS SUBMITTED, IT CANNOT BE USED FOR ADDITIONAL TESTING AFTER 24 HRS, RECOLLECTION WILL BE REQUIRED.    CBC W/O DIFF    Collection Time: 02/25/19  3:24 AM   Result Value Ref Range    WBC 3.7 (L) 4.1 - 11.1 K/uL    RBC 4.46 4.10 - 5.70 M/uL    HGB 12.6 12.1 - 17.0 g/dL    HCT 39.0 36.6 - 50.3 %    MCV 87.4 80.0 - 99.0 FL    MCH 28.3 26.0 - 34.0 PG    MCHC 32.3 30.0 - 36.5 g/dL    RDW 14.1 11.5 - 14.5 %    PLATELET 94 (L) 617 - 400 K/uL    MPV 10.2 8.9 - 12.9 FL    NRBC 0.0 0  WBC    ABSOLUTE NRBC 0.00 0.00 - 9.97 K/uL   METABOLIC PANEL, BASIC    Collection Time: 02/25/19  3:24 AM   Result Value Ref Range    Sodium 138 136 - 145 mmol/L    Potassium 4.2 3.5 - 5.1 mmol/L    Chloride 105 97 - 108 mmol/L    CO2 26 21 - 32 mmol/L    Anion gap 7 5 - 15 mmol/L    Glucose 132 (H) 65 - 100 mg/dL    BUN 16 6 - 20 MG/DL    Creatinine 1.56 (H) 0.70 - 1.30 MG/DL    BUN/Creatinine ratio 10 (L) 12 - 20      GFR est AA 52 (L) >60 ml/min/1.73m2    GFR est non-AA 43 (L) >60 ml/min/1.73m2    Calcium 11.6 (H) 8.5 - 10.1 MG/DL   MAGNESIUM    Collection Time: 02/25/19  3:24 AM   Result Value Ref Range    Magnesium 1.9 1.6 - 2.4 mg/dL         Care Plan discussed with:  Patient x   Family x   RN    Care Manager    Consultant/Specialist:         Liza Friedman, [facility]-BC

## 2019-02-25 NOTE — PROGRESS NOTES
Bedside and Verbal shift change report given to RN Rocky Hartley (oncoming nurse) by Flaco Nowak (offgoing nurse). Report included the following information SBAR, Kardex, ED Summary, Intake/Output, MAR and Recent Results.

## 2019-02-25 NOTE — PROGRESS NOTES
Occupational Therapy Note:  
 
Orders received and acknowledged, chart reviewed, spoke with RN, attempted to see patient for OT eval. Patient and wife in discussion with staff at time of attempt. Will re-attempt later. Thank you.  
Frandy Ashton, OTR

## 2019-02-25 NOTE — PROGRESS NOTES
Problem: Falls - Risk of 
Goal: *Absence of Falls Document Ebb Khmer Fall Risk and appropriate interventions in the flowsheet. Outcome: Progressing Towards Goal 
Fall Risk Interventions: 
Mobility Interventions: Communicate number of staff needed for ambulation/transfer Mentation Interventions: Door open when patient unattended Medication Interventions: Evaluate medications/consider consulting pharmacy Elimination Interventions: Call light in reach History of Falls Interventions: Consult care management for discharge planning

## 2019-02-25 NOTE — ROUTINE PROCESS
TRANSFER - OUT REPORT: 
 
Verbal report given to Erlinda(name) on [id number] [facility].  being transferred to 4th floor(unit) for routine progression of care Report consisted of patients Situation, Background, Assessment and  
Recommendations(SBAR). Information from the following report(s) SBAR, Kardex and ED Summary was reviewed with the receiving nurse. Lines:  
Peripheral IV 02/24/19 Right Antecubital (Active) Site Assessment Clean, dry, & intact 2/24/2019 11:58 AM  
Phlebitis Assessment 0 2/24/2019 11:58 AM  
Infiltration Assessment 0 2/24/2019 11:58 AM  
Dressing Status Clean, dry, & intact 2/24/2019 11:58 AM  
Dressing Type Transparent;Tape 2/24/2019 11:58 AM  
Hub Color/Line Status Pink;Flushed;Patent 2/24/2019 11:58 AM  
Action Taken Blood drawn 2/24/2019 11:58 AM  
Alcohol Cap Used Yes 2/24/2019 11:58 AM  
  
 
Opportunity for questions and clarification was provided. Patient transported with: 
 [employer]

## 2019-02-25 NOTE — PROGRESS NOTES
Problem: Dysphagia (Adult) Goal: *Acute Goals and Plan of Care (Insert Text) Speech Pathology Goals Initiated 2/25/19: 
1. Patient will tolerate regular diet with thin liquids without s/s of asp/pen Speech LAnguage Pathology bedside swallow evaluation Patient: Micheline Avendaño. (80 y.o. male) Date: 2/25/2019 Primary Diagnosis: Fatigue [R53.83] Precautions: Aspiration,  Fall ASSESSMENT : 
Based on the objective data described below, the patient presents with mild-moderate oral and suspected mild pharyngeal dysphagia. He presents with prolonged mastication. Wife reports that he takes large bites, has pocketing at times and at times will cough on thin liquids if he has solids in oral cavity. None of this was seen during today's evaluation but is not surprising given deficits seen and his dementia diagnosis. He tolerated regular diet textures from his tray. Supervision is needed to ensure diet tolerance. Discussed recommendations with wife, who agreed and seemed very knowledgeable. She also reported that he refuses food if put on an altered diet. Last seen here in November, he was on a pureed diet in the hospital. Wife reported Aleyda Fonseca was the end of him! \" Patient will benefit from skilled intervention to address the above impairments. Patients rehabilitation potential is considered to be Guarded Factors which may influence rehabilitation potential include:  
[]            None noted [x]            Mental ability/status [x]            Medical condition []            Home/family situation and support systems 
[]            Safety awareness 
[]            Pain tolerance/management []            Other: PLAN : 
Recommendations and Planned Interventions: 
Regular texture diet with thin liquids Supervision with PO Sit upright 90 degrees Slow rate Small bites Straw ok 
meds as tolerated Frequency/Duration: Patient will be followed by speech-language pathology 2 times a week to address goals. Discharge Recommendations: None SUBJECTIVE:  
Patient stated thank you. OBJECTIVE:  
 
Past Medical History:  
Diagnosis Date  Arthritis  CAD (coronary artery disease) Angina Jan 2011, PCI LAD with multilink stent 4.0x12mm;  also cath at [facility] 7/13 -- no stents at that time  CKD (chronic kidney disease), stage III ([facility]  Cochlear implant in place 2/3/2017  Dementia  DM type 2 causing renal disease ([facility]  DM type 2 causing vascular disease ([facility]  GERD (gastroesophageal reflux disease)  History of vascular access device 12/05/2018  
 4 Fr PICC L basilic, 42 cm long term abx  
 HTN (hypertension)  Hypothyroidism  SAMUEL (obstructive sleep apnea)   
 not using CPAP  
 PAF (paroxysmal atrial fibrillation) ([facility] [id number] score = 7  
 Prostate cancer ([facility]  Renal cell cancer ([facility] Stage 4 Renal Cell Cancer. Partial omentectomy 11/2/2015 with Dr. Cuenca Sink: omental mass-metastatic renal cell carcinoma  Renal mass, left [address] LEFT PARTIAL NEPHRECTOMY 12/19/13;   
 
Past Surgical History:  
Procedure Laterality Date  CARDIAC SURG PROCEDURE UNLIST  1/2011  
 stent to LAD  HX CHOLECYSTECTOMY  7/13/2012  HX CORONARY STENT PLACEMENT  2011 [address]  2011, 2013 [address]  
 stapedectomy  HX HERNIA REPAIR  1968  
 left inguinal  
 HX LITHOTRIPSY  8/2015  HX ORTHOPAEDIC  1994  
 rotator cuff repair  HX ORTHOPAEDIC  1994  
 cervical disc removed  HX PROSTATECTOMY  2000  
 surgery intervention [address] [facility]  HX UROLOGICAL  2001  
 valvular implant prevent incontinence  HX UROLOGICAL  2003  
 insert gel to help with incontinence  HX UROLOGICAL  12/2013  
 left partial nephrectomy  HX UROLOGICAL  9/2015 CT guided biopsy of abdominal lymph nodes Prior Level of Function/Home Situation:  
Home Situation Home Environment: Private residence # Steps to Enter: 0 Wheelchair Ramp: Yes 
 One/Two Story Residence: One story Living Alone: No 
Support Systems: Other (comments)(spouse) Patient Expects to be Discharged to[de-identified] Private residence Current DME Used/Available at Home: Walker, rolling Diet prior to admission: regular/thin Current Diet:  Regular/thinCognitive and Communication Status: 
Neurologic State: Alert, Confused Orientation Level: Oriented to person Cognition: Decreased command following, Decreased attention/concentration Perception: Appears intact Perseveration: No perseveration noted Safety/Judgement: Decreased awareness of environment, Decreased awareness of need for safety, Lack of insight into deficits, Decreased awareness of need for assistance Oral Assessment: 
Oral Assessment Labial: (unable to follow OM commands) Dentition: Intact Oral Hygiene: moist oral mucosa Lingual: (unable to follow OM commands) Velum: Unable to visualize Mandible: No impairment P.O. Trials: 
Patient Position: upright in chair Vocal quality prior to P.O.: No impairment Consistency Presented: Thin liquid; Solid How Presented: Self-fed/presented; Successive swallows;Cup/gulp;Straw;Cup/sip Bolus Acceptance: No impairment Bolus Formation/Control: Impaired Type of Impairment: Mastication;Delayed Propulsion: Delayed (# of seconds) Oral Residue: Less than 10% of bolus Initiation of Swallow: Delayed (# of seconds) Laryngeal Elevation: Functional 
Aspiration Signs/Symptoms: None Pharyngeal Phase Characteristics: Other (comment)(suspected delay) Effective Modifications: Small sips and bites; Other (comment)(slow rate) Cues for Modifications: Maximal 
  
Oral Phase Severity: Mild-moderate Pharyngeal Phase Severity : Mild NOMS:  
The NOMS functional outcome measure was used to quantify this patient's level of swallowing impairment. Based on the NOMS, the patient was determined to be at level 5 for swallow function NOMS Swallowing Levels: 
Level 1 (CN): NPO 
 Level 2 (CM): NPO but takes consistency in therapy Level 3 (CL): Takes less than 50% of nutrition p.o. and continues with nonoral feedings; and/or safe with mod cues; and/or max diet restriction Level 4 (CK): Safe swallow but needs mod cues; and/or mod diet restriction; and/or still requires some nonoral feeding/supplements Level 5 (CJ): Safe swallow with min diet restriction; and/or needs min cues Level 6 (CI): Independent with p.o.; rare cues; usually self cues; may need to avoid some foods or needs extra time Level 7 (CH): Independent for all p.o. MATTHIEU. (2003). National Outcomes Measurement System (NOMS): Adult Speech-Language Pathology User's Guide. Pain: 
Pain Scale 1: Numeric (0 - 10) Pain Intensity 1: 0 After treatment:  
[x]            Patient left in no apparent distress sitting up in chair 
[]            Patient left in no apparent distress in bed 
[x]            Call bell left within reach [x]            Nursing notified 
[x]            Caregiver present 
[]            Bed alarm activated COMMUNICATION/EDUCATION:  
The patients plan of care including recommendations, planned interventions, and recommended diet changes were discussed with: Registered Nurse. Patient's WIFE was educated regarding His deficit(s) of dysphagia as this relates to His diagnosis of dementia. She demonstrated Good understanding as evidenced by verbalization. Patient was unable to participate in education due to dementia. [x]            Posted safety precautions in patient's room. [x]            Patient/family have participated as able in goal setting and plan of care. [x]            Patient/family agree to work toward stated goals and plan of care. []            Patient understands intent and goals of therapy, but is neutral about his/her participation. []            Patient is unable to participate in goal setting and plan of care.  
 
Thank you for this referral. 
Phyllis Evans, SLP 
 Time Calculation: 19 mins

## 2019-02-25 NOTE — PROGRESS NOTES
[facility] Medical Progress Note NAME: Lisa Jara :  1935 MRM:  [id number] Date/Time: 2019  1:56 PM 
 
  
Assessment and Plan:  
 
Fatigue / Debilitated patient - POA, chronic. Likely just a chronic issue, deconditioning, depression, dementia. Fall precautions. PT/OT/speech. Neurology and psych consult to contribute suggestions. Palliative consult. Informed wife that as things are excluded, it is becoming increasingly likely that his dementia is getting severe/end-stage. His mobility is poor but at baseline 
  
Acute encephalopathy / Dementia - POA, likely not far from baseline. Worsened by melatonin and remeron. Not on specific dementia or depression meds. Await psych and neuro input. 
  
CAD (coronary artery disease) / HTN (hypertension) - Stable. Continue ASA, norvasc, losartan, IMDUR. 
  
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 SSI per protocol. Not on home meds. His blood sugar should be liberalized regardless 
  
Paroxysmal atrial fibrillation - Stable. Not on anticoagulation due to prior bleed, not on rate control 
  
CKD (chronic kidney disease) stage 3 / Hx Renal cell cancer / Hx Prostate cancer - Stable. Continue ARB Hypercalcemia. Noted on prior admissions. Will hydrate and normalize to exclude this as cause for decreased cognition. Check PTH, PTHrP, urine calcium 
  
Arthritis - tylenol prn. No narcotics 
  
GERD (gastroesophageal reflux disease) - PPI 
  
Hypothyroidism - not listing meds. Recent normal TSH 
  
SAMUEL (obstructive sleep apnea) - Not using CPAP. Advised compliance. This contributes to his fatigue and weakness Subjective: Chief Complaint:  Patient seen and examined. Chart reviewed. Patient said hello and promptly fell asleep. Spoke at length with wife re: his steady cognitive and physical decline and the toll it has taken on their family.  
 
ROS: 
 Unable to answer due to current medical condition Objective:  
 
Last 24hrs VS reviewed since prior progress note. Most recent are: 
 
Visit Vitals /63 (BP 1 Location: Left arm, BP Patient Position: Sitting) Pulse 63 Temp 97.7 °F (36.5 °C) Resp 18 Wt 80.7 kg (178 lb) SpO2 97% BMI 27.88 kg/m² SpO2 Readings from Last 6 Encounters:  
02/25/19 97% 02/12/19 99% 12/05/18 94% 12/01/18 96% 11/27/18 95% 09/13/18 97% Intake/Output Summary (Last 24 hours) at 2/25/2019 1356 Last data filed at 2/25/2019 1596 Gross per 24 hour Intake 100 ml Output 900 ml Net -800 ml Physical Exam: 
 
Gen:  Elderly, frail, in no acute distress HEENT:  Pink conjunctivae, PERRL, hearing intact to voice, dry mucous membranes Neck:  Supple, without masses Resp:  No accessory muscle use, clear breath sounds without wheezes rales or rhonchi 
Card:  No murmurs, normal S1, S2 without thrills, bruits or peripheral edema Abd:  Soft, non-tender, non-distended, normoactive bowel sounds are present Lymph:  No cervical or inguinal adenopathy Musc:  No cyanosis or clubbing Skin:  No rashes or ulcers, skin turgor is good Neuro:  Global motor weakness, follows commands inconsistently Psych:  Little to no insight, oriented to person, lethargic Telemetry reviewed:   normal sinus rhythm 
__________________________________________________________________ Medications Reviewed: (see below) Medications:  
 
Current Facility-Administered Medications Medication Dose Route Frequency  0.9% sodium chloride infusion  100 mL/hr IntraVENous CONTINUOUS  
 amLODIPine (NORVASC) tablet 5 mg  5 mg Oral DAILY  apixaban (ELIQUIS) tablet 2.5 mg  2.5 mg Oral BID  aspirin delayed-release tablet 81 mg  81 mg Oral QHS  isosorbide mononitrate ER (IMDUR) tablet 120 mg  120 mg Oral 7am  
 losartan (COZAAR) tablet 25 mg  25 mg Oral QHS  pantoprazole (PROTONIX) tablet 40 mg  40 mg Oral ACB  polyethylene glycol (MIRALAX) packet 17 g  17 g Oral DAILY PRN  
 sodium chloride (NS) flush 5-40 mL  5-40 mL IntraVENous Q8H  
 sodium chloride (NS) flush 5-40 mL  5-40 mL IntraVENous PRN  
 zolpidem (AMBIEN) tablet 5 mg  5 mg Oral QHS PRN  
 acetaminophen (TYLENOL) tablet 650 mg  650 mg Oral Q4H PRN  
 diphenhydrAMINE (BENADRYL) capsule 25 mg  25 mg Oral Q4H PRN Lab Data Reviewed: (see below) Lab Review:  
 
Recent Labs  
  02/25/19 
0324 02/24/19 
1158 WBC 3.7* 4.4 HGB 12.6 12.5 HCT 39.0 39.4 PLT 94* 114* Recent Labs  
  02/25/19 
1464 02/25/19 
0324 02/24/19 
1158 NA  --  138 139 K  --  4.2 4.4  
CL  --  105 105 CO2  --  26 29 GLU  --  132* 153* BUN  --  16 14 CREA  --  1.56* 1.61* CA 10.9* 11.6* 11.8* MG  --  1.9  --   
ALB  --   --  3.2* TBILI  --   --  0.5 SGOT  --   --  15 ALT  --   --  20 Lab Results Component Value Date/Time Glucose (POC) 161 (H) 02/12/2019 12:13 PM  
 Glucose (POC) 114 (H) 02/12/2019 07:08 AM  
 Glucose (POC) 179 (H) 02/11/2019 09:16 PM  
 Glucose (POC) 172 (H) 02/11/2019 04:03 PM  
 Glucose (POC) 159 (H) 02/11/2019 10:59 AM  
 
No results for input(s): PH, PCO2, PO2, HCO3, FIO2 in the last 72 hours. No results for input(s): INR in the last 72 hours. No lab exists for component: INREXT All Micro Results Procedure Component Value Units Date/Time URINE CULTURE HOLD SAMPLE [[id number]] Collected:  02/24/19 6186 Order Status:  Completed Specimen:  Urine from Serum Updated:  02/24/19 6798 Urine culture hold URINE ON HOLD IN MICROBIOLOGY DEPT FOR 3 DAYS. IF UNPRESERVED URINE IS SUBMITTED, IT CANNOT BE USED FOR ADDITIONAL TESTING AFTER 24 HRS, RECOLLECTION WILL BE REQUIRED. URINE CULTURE HOLD SAMPLE [[id number]] Order Status:  Canceled Specimen:  Urine I have reviewed notes of prior 24hr. Other pertinent lab: None Total time spent with patient: 40 min Care Plan discussed with: Patient, Family, Care Manager, Nursing Staff and Consultant/Specialist 
 
Discussed:  Care Plan and D/C Planning Prophylaxis:  Lovenox Disposition:   PT, OT, RN 
        
___________________________________________________ Attending Physician: Lance Salgado DO

## 2019-02-25 NOTE — ACP (ADVANCE CARE PLANNING)
Advance Care Planning 2/25/2019   Patient's Healthcare Decision Maker is: Named in scanned ACP document   Primary Decision Maker Name Arleen Perea   Primary Decision Maker Phone Number [phone]   Primary Decision Maker Relationship to Patient Wife   Confirm Advance Directive Yes, on file   Does the patient have other document types Durable Do Not Resuscitate     *Pt has AMD and DDNR on file. AMD appoints wife Arleen Perea as primary Medical POA; Uma Márquez and Alexa Burnham are listed as subsequent POAs, respectively.

## 2019-02-26 PROBLEM — R53.81 DEBILITY: Status: ACTIVE | Noted: 2019-02-26

## 2019-02-26 LAB
ANION GAP SERPL CALC-SCNC: 9 MMOL/L (ref 5–15)
BUN SERPL-MCNC: 14 MG/DL (ref 6–20)
BUN/CREAT SERPL: 10 (ref 12–20)
CALCIUM SERPL-MCNC: 10.9 MG/DL (ref 8.5–10.1)
CHLORIDE SERPL-SCNC: 107 MMOL/L (ref 97–108)
CO2 SERPL-SCNC: 25 MMOL/L (ref 21–32)
CREAT SERPL-MCNC: 1.47 MG/DL (ref 0.7–1.3)
GLUCOSE SERPL-MCNC: 132 MG/DL (ref 65–100)
POTASSIUM SERPL-SCNC: 4.1 MMOL/L (ref 3.5–5.1)
SODIUM SERPL-SCNC: 141 MMOL/L (ref 136–145)

## 2019-02-26 PROCEDURE — 99218 HC RM OBSERVATION: CPT

## 2019-02-26 PROCEDURE — 97530 THERAPEUTIC ACTIVITIES: CPT

## 2019-02-26 PROCEDURE — 97116 GAIT TRAINING THERAPY: CPT

## 2019-02-26 PROCEDURE — C1758 CATHETER, URETERAL: HCPCS

## 2019-02-26 PROCEDURE — 74011250637 HC RX REV CODE- 250/637: Performed by: PSYCHIATRY & NEUROLOGY

## 2019-02-26 PROCEDURE — 80048 BASIC METABOLIC PNL TOTAL CA: CPT

## 2019-02-26 PROCEDURE — 36415 COLL VENOUS BLD VENIPUNCTURE: CPT

## 2019-02-26 PROCEDURE — 74011250636 HC RX REV CODE- 250/636: Performed by: INTERNAL MEDICINE

## 2019-02-26 PROCEDURE — 92526 ORAL FUNCTION THERAPY: CPT | Performed by: SPEECH-LANGUAGE PATHOLOGIST

## 2019-02-26 PROCEDURE — 74011250637 HC RX REV CODE- 250/637: Performed by: INTERNAL MEDICINE

## 2019-02-26 PROCEDURE — 65270000029 HC RM PRIVATE

## 2019-02-26 PROCEDURE — 94760 N-INVAS EAR/PLS OXIMETRY 1: CPT

## 2019-02-26 RX ADMIN — SERTRALINE HYDROCHLORIDE 25 MG: 25 TABLET ORAL at 08:07

## 2019-02-26 RX ADMIN — APIXABAN 2.5 MG: 2.5 TABLET, FILM COATED ORAL at 08:07

## 2019-02-26 RX ADMIN — SODIUM CHLORIDE 100 ML/HR: 900 INJECTION, SOLUTION INTRAVENOUS at 06:38

## 2019-02-26 RX ADMIN — ISOSORBIDE MONONITRATE 120 MG: 60 TABLET ORAL at 06:38

## 2019-02-26 RX ADMIN — PANTOPRAZOLE SODIUM 40 MG: 40 TABLET, DELAYED RELEASE ORAL at 06:38

## 2019-02-26 RX ADMIN — SODIUM CHLORIDE 100 ML/HR: 900 INJECTION, SOLUTION INTRAVENOUS at 17:38

## 2019-02-26 RX ADMIN — APIXABAN 2.5 MG: 2.5 TABLET, FILM COATED ORAL at 17:39

## 2019-02-26 RX ADMIN — ASPIRIN 81 MG: 81 TABLET ORAL at 21:35

## 2019-02-26 RX ADMIN — LOSARTAN POTASSIUM 25 MG: 50 TABLET, FILM COATED ORAL at 21:35

## 2019-02-26 RX ADMIN — AMLODIPINE BESYLATE 5 MG: 5 TABLET ORAL at 08:07

## 2019-02-26 NOTE — PROGRESS NOTES
visit for routine follow up. Patient sitting up in chair, good eye contact, good natured. Says he thinks he is feeling better today. Provided spiritual presence and listening as he spoke about his present thoughts, feelings, and concerns. At first, Mr. Efrain Granger said that he is feeling fine and feels as though he is doing better. When I specifically asked him how he is feeling, he became tearful saying he has been feeling discouraged lately, describing feelings of loss of self and of not feeling useful. Says he has always been active, working two jobs for over [de-identified] years, and does not feel he is accomplishing anything. He described feeling tired much of the time and sleeping more and more. I asked him to define or describe his thoughts concerning accomplishment and he spoke about how much he wants to be available for his wife, children, and grandchildren and, eventually, spoke about the blessing of simply being present for them. His concern is also for his wife and he worries about the stress and burden that his health places upon her. He said he was becoming tired of sitting in his chair and the staff was available to help him get back into bed. He appeared comforted and encouraged as a result of this visit and expressed gratitude for this visit. Visited by Rev. Ken Cook, [address]  paging service: [phone])

## 2019-02-26 NOTE — PROGRESS NOTES
[facility] Medical Progress Note NAME: Melo Gandhi. :  1935 MRM:  [id number] Date/Time: 2019  1:56 PM 
 
  
Assessment and Plan:  
 
Fatigue / Debilitated patient - POA, chronic. Likely just a chronic issue, deconditioning, depression, dementia. Fall precautions. PT/OT/speech. Neurology and psych consults appreciated, no subclinical seizures, imaging and encephalopathy labs normal. Psych has stopped some sedating meds and started low dose zoloft to be titrated. Overall prognosis is poor, does not meet hospice criteria for dementia at this time (though I suspect with his food eversion and severe oropharyngeal dysphagia, we are not far from this). 
  
Acute encephalopathy / Dementia - POA, likely baseline Remeron stopped, started zoloft, avoid sedating meds. 
  
CAD (coronary artery disease) / HTN (hypertension) - Stable. Continue ASA, norvasc, losartan, IMDUR. 
  
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 SSI per protocol. Not on home meds. His blood sugar should be liberalized regardless 
  
Paroxysmal atrial fibrillation - Stable. Not on anticoagulation due to prior bleed, not on rate control 
  
CKD (chronic kidney disease) stage 3 / Hx Renal cell cancer / Hx Prostate cancer - Stable. Continue ARB Hypercalcemia. Noted on prior admissions. Will hydrate and normalize to exclude this as cause for decreased cognition. PTH normal. Pending PTHrP, urine calcium 
  
Arthritis - tylenol prn. No narcotics 
  
GERD (gastroesophageal reflux disease) - PPI 
  
Hypothyroidism - not listing meds. Recent normal TSH 
  
SAMUEL (obstructive sleep apnea) - Not using CPAP. Advised compliance. This contributes to his fatigue and weakness Subjective: Chief Complaint:  Patient seen and examined. Chart reviewed. Patient napping, woke up to ask if it was okay to continue napping.  This provider has encouraged patient that we have no specific restrictions on napping. ROS: 
Unable to answer due to current medical condition Objective:  
 
Last 24hrs VS reviewed since prior progress note. Most recent are: 
 
Visit Vitals /69 (BP 1 Location: Left arm, BP Patient Position: At rest) Pulse 64 Temp 97.6 °F (36.4 °C) Resp 16 Wt 80.7 kg (178 lb) SpO2 96% BMI 27.88 kg/m² SpO2 Readings from Last 6 Encounters:  
02/26/19 96% 02/12/19 99% 12/05/18 94% 12/01/18 96% 11/27/18 95% 09/13/18 97% Intake/Output Summary (Last 24 hours) at 2/26/2019 1116 Last data filed at 2/26/2019 3161 Gross per 24 hour Intake 2318.33 ml Output 430 ml Net 1888.33 ml Physical Exam: 
 
Gen:  Elderly, frail, in no acute distress HEENT:  Pink conjunctivae, PERRL, hearing intact to voice, dry mucous membranes Neck:  Supple, without masses Resp:  No accessory muscle use, clear breath sounds without wheezes rales or rhonchi 
Card:  No murmurs, normal S1, S2 without thrills, bruits or peripheral edema Abd:  Soft, non-tender, non-distended, normoactive bowel sounds are present Lymph:  No cervical or inguinal adenopathy Musc:  No cyanosis or clubbing Skin:  No rashes or ulcers, skin turgor is good Neuro:  Global motor weakness, follows commands inconsistently Psych:  Little to no insight, oriented to person, lethargic Telemetry reviewed:   normal sinus rhythm 
__________________________________________________________________ Medications Reviewed: (see below) Medications:  
 
Current Facility-Administered Medications Medication Dose Route Frequency  0.9% sodium chloride infusion  100 mL/hr IntraVENous CONTINUOUS  
 sertraline (ZOLOFT) tablet 25 mg  25 mg Oral DAILY  amLODIPine (NORVASC) tablet 5 mg  5 mg Oral DAILY  apixaban (ELIQUIS) tablet 2.5 mg  2.5 mg Oral BID  aspirin delayed-release tablet 81 mg  81 mg Oral QHS  isosorbide mononitrate ER (IMDUR) tablet 120 mg  120 mg Oral 7am  
 losartan (COZAAR) tablet 25 mg  25 mg Oral QHS  pantoprazole (PROTONIX) tablet 40 mg  40 mg Oral ACB  polyethylene glycol (MIRALAX) packet 17 g  17 g Oral DAILY PRN  
 sodium chloride (NS) flush 5-40 mL  5-40 mL IntraVENous Q8H  
 sodium chloride (NS) flush 5-40 mL  5-40 mL IntraVENous PRN  
 acetaminophen (TYLENOL) tablet 650 mg  650 mg Oral Q4H PRN  
 diphenhydrAMINE (BENADRYL) capsule 25 mg  25 mg Oral Q4H PRN Lab Data Reviewed: (see below) Lab Review:  
 
Recent Labs  
  02/25/19 
0324 02/24/19 
1158 WBC 3.7* 4.4 HGB 12.6 12.5 HCT 39.0 39.4 PLT 94* 114* Recent Labs  
  02/26/19 
0454 02/25/19 
0943 02/25/19 
0324 02/24/19 
1158   --  138 139  
K 4.1  --  4.2 4.4  
  --  105 105 CO2 25  --  26 29 *  --  132* 153* BUN 14  --  16 14 CREA 1.47*  --  1.56* 1.61* CA 10.9* 10.9* 11.6* 11.8* MG  --   --  1.9  --   
ALB  --   --   --  3.2* TBILI  --   --   --  0.5 SGOT  --   --   --  15 ALT  --   --   --  20 Lab Results Component Value Date/Time Glucose (POC) 161 (H) 02/12/2019 12:13 PM  
 Glucose (POC) 114 (H) 02/12/2019 07:08 AM  
 Glucose (POC) 179 (H) 02/11/2019 09:16 PM  
 Glucose (POC) 172 (H) 02/11/2019 04:03 PM  
 Glucose (POC) 159 (H) 02/11/2019 10:59 AM  
 
No results for input(s): PH, PCO2, PO2, HCO3, FIO2 in the last 72 hours. No results for input(s): INR in the last 72 hours. No lab exists for component: INREXT, INREXT All Micro Results Procedure Component Value Units Date/Time URINE CULTURE HOLD SAMPLE [[id number]] Collected:  02/24/19 1632 Order Status:  Completed Specimen:  Urine from Serum Updated:  02/24/19 1650 Urine culture hold URINE ON HOLD IN MICROBIOLOGY DEPT FOR 3 DAYS. IF UNPRESERVED URINE IS SUBMITTED, IT CANNOT BE USED FOR ADDITIONAL TESTING AFTER 24 HRS, RECOLLECTION WILL BE REQUIRED. URINE CULTURE HOLD SAMPLE [[id number]] Order Status:  Canceled Specimen:  Urine I have reviewed notes of prior 24hr. Other pertinent lab: None Total time spent with patient: 40 min Care Plan discussed with: Patient, Family, Care Manager, Nursing Staff and Consultant/Specialist 
 
Discussed:  Care Plan and D/C Planning Prophylaxis:  Lovenox Disposition:   PT, OT, RN 
        
___________________________________________________ Attending Physician: Ruel Irene DO

## 2019-02-26 NOTE — ROUTINE PROCESS
Bedside and Verbal shift change report given to [id number] (oncoming nurse) by [facility] (offgoing nurse). Report included the following information SBAR, Kardex, Intake/Output, MAR and Recent Results.

## 2019-02-26 NOTE — PROGRESS NOTES
Problem: Mobility Impaired (Adult and Pediatric) Goal: *Acute Goals and Plan of Care (Insert Text) Physical Therapy Goals Initiated 2/25/2019 1. Patient will move from supine to sit and sit to supine  in bed with supervision/set-up within 7 day(s). 2.  Patient will transfer from bed to chair and chair to bed with supervision/set-up using the least restrictive device within 7 day(s). 3.  Patient will perform sit to stand with supervision/set-up within 7 day(s). 4.  Patient will ambulate with minimal/CGA for 100 feet with the least restrictive device within 7 day(s). physical Therapy TREATMENT Patient: Micheline Hernandez (80 y.o. male) Date: 2/26/2019 Diagnosis: Fatigue [R53.83] Debility [R53.81] Fatigue Precautions: Fall Chart, physical therapy assessment, plan of care and goals were reviewed. ASSESSMENT: 
Pt received in bed, condom catheter not in place, pt incontinent of urine. Min A for gown change. Min A for bed mobility, functional transfers and gait training using RW x 30' occasional verbal cues to keep walker close. Min A for BR tranfers +BM. Mod A for hygiene. denies dizziness, lightheadedness with mobility VSS. Pt assisted BTB. Wife present throughout session pt Would benefit from HHPT with assistance at all time for safety. Progression toward goals: 
[]    Improving appropriately and progressing toward goals [x]    Improving slowly and progressing toward goals 
[]    Not making progress toward goals and plan of care will be adjusted PLAN: 
Patient continues to benefit from skilled intervention to address the above impairments. Continue treatment per established plan of care. Discharge Recommendations:  Home Health Further Equipment Recommendations for Discharge:  none SUBJECTIVE:  
Patient stated . OBJECTIVE DATA SUMMARY:  
Critical Behavior: 
Neurologic State: Alert, Confused Orientation Level: Disoriented to place, Disoriented to situation, Disoriented to time, Oriented to place Cognition: Decreased command following, Impaired decision making, Memory loss, Poor safety awareness Safety/Judgement: Decreased awareness of environment, Decreased awareness of need for safety, Lack of insight into deficits, Decreased awareness of need for assistance Functional Mobility Training: 
Bed Mobility: 
  
Supine to Sit: Minimum assistance Sit to Supine: Stand-by assistance Transfers: 
Sit to Stand: Minimum assistance Stand to Sit: Contact guard assistance Balance: 
Sitting: Intact Standing: With support Standing - Static: Fair Standing - Dynamic : FairAmbulation/Gait Training: 
Distance (ft): 30 Feet (ft) Assistive Device: Walker, rolling;Gait belt Ambulation - Level of Assistance: Minimal assistance Gait Abnormalities: Decreased step clearance Base of Support: Narrowed Speed/Sabrina: Pace decreased (<100 feet/min) Stairs: 
  
  
   
 
Neuro Re-Education: 
 
Therapeutic Exercises:  
 
Pain: 
Pain Scale 1: Numeric (0 - 10) Pain Intensity 1: 0 Activity Tolerance:  
good Please refer to the flowsheet for vital signs taken during this treatment. After treatment:  
[]    Patient left in no apparent distress sitting up in chair 
[x]    Patient left in no apparent distress in bed 
[x]    Call bell left within reach [x]    Nursing notified 
[x]    Caregiver present [x]    Bed alarm activated COMMUNICATION/COLLABORATION:  
The patients plan of care was discussed with: Registered Nurse Jeanie Dennis Time Calculation: 27 mins

## 2019-02-26 NOTE — PROGRESS NOTES
Music Therapy Assessment Noelle Little [id number]  [id number]   
1935  80 y.o.  male Patient Telephone Number: [phone] (home) Scientologist Affiliation: [employer]  
Language: [place] Extended Emergency Contact Information Primary Emergency Contact: Najma Hernandez Address: [place] [facility], [address] [place] Home Phone: [phone] Relation: Spouse Secondary Emergency Contact: Najma BRUNNER Home Phone: [phone] Relation: Spouse Patient Active Problem List  
 Diagnosis Date Noted  Debility 02/26/2019  Hypercalcemia 11/21/2018  Acute encephalopathy 09/09/2018  Fatigue 09/09/2018  Debilitated patient 09/09/2018  Arthritis  Dementia  DM type 2 causing renal disease ([facility]  DM type 2 causing vascular disease ([facility]  GERD (gastroesophageal reflux disease)  Hypothyroidism  SAMUEL (obstructive sleep apnea)  [facility]  Renal cell cancer ([facility]  CKD (chronic kidney disease) stage 3, GFR 30-59 ml/min ([facility]) 12/10/2013  Paroxysmal atrial fibrillation ([facility] 05/06/2013  CAD (coronary artery disease)  HTN (hypertension) Date: 2/26/2019 Mental Status:   [x] Alert [x] Forgetful [x]  Confused  [  ] Minimally responsive Communication Status: [  ] Impaired Speech [  ] Nonverbal -N/A Physical Status:   [  ] Oxygen in use  [  ] Hard of Hearing [  ] Vision Impaired [  ] Ambulatory  [  ] Ambulatory with assistance [  ] Non-ambulatory -N/A Music Preferences, Background: Country. 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 He had a banjo at one point but pt's spouse said the pt gave this to his father because the pt was too busy to play it at the time. Clinical Problem addressed: Support healthy pt/family coping. Goal(s) met in session: 
Physical/Pain management (Scale of 1-10): Pre-session rating: Pt denied pain. Post-session rating: Pt denied pain. [  ] Increased relaxation   [  ] Regulated breathing patterns [  ] Decreased muscle tension   [  ] Minimized physical distress Emotional/Psychological: 
[  ] Increased self-expression   [  ] Decreased aggressive behavior [  ] Decreased sadness   [  ] Discussed healthy coping skills [x] Improved mood    [  ] Decreased withdrawn behavior Social: 
[  ] Decreased feelings of isolation/loneliness [x] Positive social interaction  
[x] Provided support and/or comfort for family/friends Spiritual: 
[  ] Spiritual support    [  ] Expressed peace [  ] Expressed mary    [  ] Discussed beliefs Techniques Utilized (Check all that apply):  
[  ] Procedural support MT [  ] Music for relaxation [x] Patient preferred music 
[  ] Rosalie analysis  [x] Song choice  [  ] Music for validation [  ] Entrainment  [  ] Progressive muscle relax. [  ] Guided visualization [  ] Kit Danette  [  ] Patient instrument playing [  ] Zaida Little writing 
[x] Sing along   [  ] Improvisation  [x] Sensory stimulation 
[x] Active Listening  [  ] Music for spiritual support [  ] Making of CDs as gifts Session Observations:  Referral from [facility], Palliative . Patient (pt) was sitting up in bed eating from his lunch tray. His spouse Myron Razo was at bedside. This music therapist (MT) asked about pt's music preferences and music background and pt and his spouse shared these. MT sat at bedside and sang and played the [employer] Your Cheatin' Heart with guitar. Pt's affect significantly brightened when this song began and he exclaimed, \"Yea! \" He ate his lunch with a bright affect as he listened. Pt's spouse shared about seeing a musical about Saba Whitlock since she also recorded that song. MT sang and played the [id number] to provide validation through music. Pt and his spouse expressed enjoyment in the music.  Pt chose to hear a [employer] song next. MT sang and played I Walk the Line. Pt's spouse said she liked the pt having music while he ate because it made him eat well. Pt requested a song the [employer] sang and MT sang and played You Are My Sunshine. Again, pt's affect significantly brightened when this song began. Pt's spouse sang along and pt cheered for her singing at one point. Pt and his spouse thanked MT for the session. Will follow as able. RENETTA ZhaoBC (Music Therapist-Board Certified) Spiritual Care Department Referral-based service

## 2019-02-26 NOTE — PROGRESS NOTES
NUTRITION 
 
RECOMMENDATIONS:  
 
1. Consider BG checks and SSI protocol with DM 2. Diet per SLP and Supervision with PO per SLP 3. Sit upright 90 degrees per SLP 4. Encourage intake ASSESSMENT:  
2/26: MST referral due to unsure of weight loss. Admitted with weakness, currently with fatigue, acute encephalopathy/Dementia. Seen by SLP, pt needs supervision at meals due to pocketing, prolonged mastication/coughing on thin liquids. Pt was on an altered diet in past admissions and lost weight due to refusal to eat the food/drink. Wife is very supportive and watches him at meals at home. She checks his BG daily- usually 140's, 130's. He drinks one Ensure Clear daily at home. Reports fairly stable weight recently, lost some in December from [facility] admissions. Wife was agreeable for him to try strawberry Glucerna for a change, writer concerned Ensure Clear may elevated BG in hospital.  HgbA1C: 7.4% 2/10/19. Suggest BG checks and SSI protocol. PO intake here is good. Past Medical History:  
Diagnosis Date  Arthritis  CAD (coronary artery disease) Angina Jan 2011, PCI LAD with multilink stent 4.0x12mm;  also cath at [place] 7/13 -- no stents at that time  CKD (chronic kidney disease), stage III ([address]  Cochlear implant in place 2/3/2017  Dementia  DM type 2 causing renal disease ([address]  DM type 2 causing vascular disease ([address]  GERD (gastroesophageal reflux disease)  History of vascular access device 12/05/2018  
 4 Fr PICC L basilic, 42 cm long term abx  
 HTN (hypertension)  Hypothyroidism  SAMUEL (obstructive sleep apnea)   
 not using CPAP  
 PAF (paroxysmal atrial fibrillation) ([address] [id number] score = 7  
 Prostate cancer ([address]  Renal cell cancer ([address] Stage 4 Renal Cell Cancer. Partial omentectomy 11/2/2015 with Dr. Steph Murillo: omental mass-metastatic renal cell carcinoma  Renal mass, left  DAVINCI LEFT PARTIAL NEPHRECTOMY 12/19/13;   
 
 
 Diet: Cardiac Patient Vitals for the past 100 hrs: 
 % Diet Eaten 02/25/19 1418 100 % Abd:    Active bs        BM: 2/25 Skin Integrity: [x]Intact  []Other Edema: [x]None []Other Nutritionally Significant Medications: [x] Reviewed Labs:   
Lab Results Component Value Date/Time Sodium 141 02/26/2019 04:54 AM  
 Potassium 4.1 02/26/2019 04:54 AM  
 Chloride 107 02/26/2019 04:54 AM  
 CO2 25 02/26/2019 04:54 AM  
 Anion gap 9 02/26/2019 04:54 AM  
 Glucose 132 (H) 02/26/2019 04:54 AM  
 BUN 14 02/26/2019 04:54 AM  
 Creatinine 1.47 (H) 02/26/2019 04:54 AM  
 Calcium 10.9 (H) 02/26/2019 04:54 AM  
 Magnesium 1.9 02/25/2019 03:24 AM  
 Phosphorus 2.5 (L) 02/10/2019 04:22 AM  
 Albumin 3.2 (L) 02/24/2019 11:58 AM  
 
 
Anthropometrics:  
Weight Source: Bed(wife stated) Height: 5' 7\" (170.2 cm), Body mass index is 26.94 kg/m². IBW : 67.1 kg (148 lb), % IBW (Calculated): 116.22 %, Usual Body Weight: 78 kg (172 lb), Wt Readings from Last 5 Encounters:  
02/26/19 78 kg (172 lb) 02/09/19 80.7 kg (178 lb) 12/02/18 80.7 kg (178 lb)  
11/14/18 80.7 kg (178 lb)  
09/12/18 81.6 kg (180 lb) Estimated Daily Nutrition Requirements:  
Weight Used: UBW(172 lbs/78 kgs) Kcals: 1875 Kcals/day Based on:[address](REE x 1.3) Protein: 78 g(to 85 gms, 1.0-1.1 gms/kg) Fluid: 1950 ml(25 ml/kg ) Carbohydrate: at least 130 gms/day Education & Discharge Needs: 
 [] Pt discussed in ID rounds Nutrition related discharge needs addressed:  
  [] Supplements (on d/c instruction &/or coupons provided) 
  [] Tube Feedings  
  [] Education  
 [x]No nutrition related discharge needs at this time Cultural, Episcopal and ethnic food preferences identified  
 [x] None   [] Yes NUTRITION DIAGNOSIS:  
 
Swallowing difficulty related to dementia  as evidenced by mild-moderate oral with suspected pharyngeal dysphagia per SLP Pt is at Nutrition Risk:  [x] No Nutrition Risk Identified:  [] 
 
RD INTERVENTION / PT GOALS:  
 
Food/Nutrient Delivery:   , Supplements: Commercial supplement(Glucerna At breakfast),  ,  ,   
Nutrition Education: ,   
Nutrition Counseling:   
Coordination of Care:   
 
Goal: Pt will safely consume current regular diet with intakes 65% or more at meals and with ONS - Glucerna- Prior to discharge MONITORING & EVALUATION:  
Food/Nutrient Intake Outcomes: Total energy intake, Liquid meal replacement Physical Signs/Symptoms Outcomes: Glucose profile Previous Nutrition Goals: 
Previous Goal Met: N/A Previous Recommendations:     
Previous Recommendations Implemented: N/A 
 
   Herminia Long RD

## 2019-02-26 NOTE — ROUTINE PROCESS
The patient's family advised nursing the patient has enlarged bone in his jaw and thickened tongue which makes it difficult to have much food in the oral cavity. He pockets the food in a cheek and chews bits of it at a time and has been known to [employer]" on it and starts coughing but eventually clears his throat. The patient's family also noted that the prior hospitalizations the patient refused to eat anything when he was put on puree'd diet. The patient denied difficulty chewing and swallowing.

## 2019-02-26 NOTE — PROGRESS NOTES
Problem: Dysphagia (Adult) Goal: *Acute Goals and Plan of Care (Insert Text) Speech Pathology Goals Initiated 2/25/19: 
1. Patient will tolerate regular diet with thin liquids without s/s of asp/pen. MET Speech language pathology dysphagia treatment/discharge Patient: Loi Alberto. (80 y.o. male) Date: 2/26/2019 Diagnosis: Fatigue [R53.83] Debility [R53.81] Fatigue Precautions: Aspiration, Fall ASSESSMENT: 
Patient tolerating PO per wife. Patient eating lunch in bed with poor positioning. PCT assisted SLP in repositioning so that he can achieve upright position. He continues to present with dysphagia consistent with dementia, large bites, prolonged mastication even with pureed items and inability to follow cues for strategies/precautions. Wife is not ready for patient to be on a modified texture diet due to his decreased quality of life when on a modified diet in the past. He appears at baseline per wife and she is with patient for all meals for supervision, when not in the hospital, and tries to be here for meals while in the hospital. He is at a high risk of aspiration if not sitting upright, however if sitting upright at 90 degrees, he is not presenting with any s/s of asp/pen. It would be best for patient to eat meals sitting up in the chair. Progression toward goals: 
[]           Improving appropriately and progressing toward goals [x]           Improving slowly and progressing toward goals 
[]           Not making progress toward goals and plan of care will be adjusted PLAN: 
Continue regular texture diet with thin liquids *Upright 90 degree seating with all PO*, in the chair is best 
Small bites/sips Straw ok Check for pocketing Patient will be discharged from acute skilled speech therapy at this time. Rationale for discharge: 
[x]      Goals Achieved 
[]      [address] 
[]      Patient not participating in therapy 
[]      Other: 
Discharge Recommendations:  None SUBJECTIVE:  
Patient stated It's a party!  OBJECTIVE:  
Cognitive and Communication Status: 
Neurologic State: Alert, Confused Orientation Level: Disoriented to place, Disoriented to situation, Disoriented to time, Oriented to place Cognition: Decreased command following, Impaired decision making, Memory loss, Poor safety awareness Perception: Appears intact Perseveration: No perseveration noted Safety/Judgement: Decreased awareness of environment, Decreased awareness of need for safety, Lack of insight into deficits, Decreased awareness of need for assistance Dysphagia Treatment: 
Oral Assessment: P.O. Trials: 
Patient Position: upright in bed; patient had to be repositioned due to poor positioning while eating when SLP entered room Vocal quality prior to P.O.: No impairment Consistency Presented: Thin liquid; Solid How Presented: Self-fed/presented;Cup/sip;Straw;Cup/gulp Bolus Acceptance: No impairment Bolus Formation/Control: Impaired Type of Impairment: Delayed;Mastication Propulsion: Delayed (# of seconds) Oral Residue: Less than 10% of bolus Initiation of Swallow: Delayed (# of seconds) Laryngeal Elevation: Functional 
Aspiration Signs/Symptoms: None Pharyngeal Phase Characteristics: (suspected delay) Effective Modifications: Small sips and bites(slow rate) Cues for Modifications: Maximal 
 Oral Phase Severity: Mild-moderate Pharyngeal Phase Severity : Mild Exercises: 
Laryngeal Exercises: 
  
  
  
  
  
  
  
  
  
  
  
  
  
  
  
  
  
  
  
  
  
  
  
  
  
  
  
  
  
  
  
  
  
  
  
  
  
  
  
  
  
  
  
 
NOMS:  
The NOMS functional outcome measure was used to quantify this patient's level of swallowing impairment. Based on the NOMS, the patient was determined to be at level 5 for swallow function NOMS Swallowing Levels: 
Level 1 (CN): NPO Level 2 (CM): NPO but takes consistency in therapy Level 3 (CL): Takes less than 50% of nutrition p.o. and continues with nonoral feedings; and/or safe with mod cues; and/or max diet restriction Level 4 (CK): Safe swallow but needs mod cues; and/or mod diet restriction; and/or still requires some nonoral feeding/supplements Level 5 (CJ): Safe swallow with min diet restriction; and/or needs min cues Level 6 (CI): Independent with p.o.; rare cues; usually self cues; may need to avoid some foods or needs extra time Level 7 (CH): Independent for all p.o. MATTHIEU. (2003). National Outcomes Measurement System (NOMS): Adult Speech-Language Pathology User's Guide. Pain: 
Pain Scale 1: Numeric (0 - 10) Pain Intensity 1: 0 After treatment:  
[]                Patient left in no apparent distress sitting up in chair 
[x]                Patient left in no apparent distress in bed 
[x]                Call bell left within reach [x]                Nursing notified 
[]                Caregiver present 
[]                Bed alarm activated COMMUNICATION/EDUCATION:  
Patient was educated regarding His deficit(s) of dysphagia as this relates to His diagnosis of dementia. He demonstrated Poor understanding as evidenced by confusion level. Wife verbalized understanding. The patients plan of care including recommendations, planned interventions, and recommended diet changes were discussed with: Registered Nurse. [x]                Posted safety precautions in patient's room. DESIRAE Vergara Time Calculation: 16 mins

## 2019-02-26 NOTE — CONSULTS
[address] [facility] Silvina CERDA MD  [phone]                         IDENTIFICATION:    Patient Name  Lewis Seth Date of Birth 1935   [facility] [id number]   Medical Record Number  [id number]      Age  80 y.o. PCP Other, MD Chance   Admit date:  2/24/2019    Room Number  401/01  @ [facility]   Date of Service  2/25/2019            HISTORY         REASON FOR CONSULTATION:   \"despondent, depressed\". HISTORY OF PRESENT ILLNESS:    The patient Lewis Seth is a 80 y.o.  male with a past psychiatric history of dementia and depression, who presented for the principle diagnosis of Fatigue. Patient himself is a poor historian. Wife on bedside reports that he has following emotional symptoms: memory loss (at base line he does not remember his kids names), anhedonia, decreased appetite, anxiety and depression. The patient's condition has been precipitated by dementia and loss of driving privileges 2 years ago. There is no  history of illicit drug use. There is no history of alcohol abuse. There is some history of insomnia which is now turned into excessive sleeping over last few weeks per wife. Pt has been on Remeron 7.5 per PCP. Per wife , at baseline pt is  able to walk using a Walker  at home. Patient does not drive. Is  independent with ADL's. Does not cook. He is . Then served as  for 40 years. No PTSd    There is no history of suicidal ideation and no suicide attempts. no history of psychosis. no history of manic symptoms. no previous psych hospitalizations. Per wife his mother had depression later in life.     ALLERGIES:  Allergies   Allergen Reactions    Latex Rash, Swelling and Contact Dermatitis    Atorvastatin Unknown (comments)     Per patient's PCP allergy list- No reaction specified    Ezetimibe Myalgia    Lisinopril Cough    Metoprolol Other (comments)     Bradycardia    Morphine Other (comments)     Hallucinations and Nausea  Nitrofurantoin Rash     \"Blood blisters/rash\" per patient's family    Rosuvastatin Myalgia    Simvastatin Myalgia      MEDICATIONS PRIOR TO ADMISSION:  Medications Prior to Admission   Medication Sig    acetaminophen (TYLENOL) 325 mg tablet Take 650 mg by mouth every four (4) hours as needed for Pain or Fever.  aspirin delayed-release 81 mg tablet Take 81 mg by mouth nightly.  amLODIPine (NORVASC) 10 mg tablet Take 5 mg by mouth daily.  losartan (COZAAR) 25 mg tablet Take 25 mg by mouth nightly.  nystatin (MYCOSTATIN) powder Apply  to affected area daily as needed for Other (Itching/Irritation- Groin area). To groin after showering and drying     docusate sodium (COLACE) 100 mg capsule Take 100 mg by mouth daily as needed.  senna (SENNA) 8.6 mg tablet Take 1 Tab by mouth daily as needed for Constipation.  isosorbide mononitrate ER (IMDUR) 120 mg CR tablet Take 1 Tab by mouth every morning.  melatonin 3 mg tablet Take 3 mg by mouth nightly.  polyethylene glycol (MIRALAX) 17 gram packet Take 17 g by mouth daily as needed (Constipation).  mirtazapine (REMERON) 15 mg tablet Take 7.5 mg by mouth nightly.  apixaban (ELIQUIS) 2.5 mg tablet Take 2.5 mg by mouth two (2) times a day.  omeprazole (PRILOSEC) 20 mg capsule Take 20 mg by mouth Daily (before breakfast).       PAST MEDICAL HISTORY:  Active Ambulatory Problems     Diagnosis Date Noted    CAD (coronary artery disease)     HTN (hypertension)     Paroxysmal atrial fibrillation ([facility] 05/06/2013    CKD (chronic kidney disease) stage 3, GFR 30-59 ml/min ([facility]) 12/10/2013    Renal cell cancer ([address]     Arthritis     Dementia     DM type 2 causing renal disease ([address]     DM type 2 causing vascular disease ([address]     GERD (gastroesophageal reflux disease)     Hypothyroidism     SAMUEL (obstructive sleep apnea)     Prostate cancer ([address]     Acute encephalopathy 09/09/2018    Fatigue 09/09/2018    Debilitated patient 09/09/2018    Hypercalcemia 11/21/2018     Resolved Ambulatory Problems     Diagnosis Date Noted    Abdominal pain, acute, right upper quadrant 97/33/5693    Biliary colic 02/83/8038    Cholelithiasis 07/12/2012    Nausea & vomiting 07/12/2012    Diabetes mellitus ([address] 07/12/2012    Acute renal failure (ARF) ([address] 07/12/2012    CKD (chronic kidney disease)     Thrombocytopenia, unspecified ([address] 07/14/2012    Atrial fibrillation or flutter 07/15/2012    Leg weakness 12/10/2013    Left renal mass 12/11/2013    LBBB (left bundle branch block) 12/11/2013    Renal cell carcinoma ([address] 09/30/2015    Kidney cancer, primary, 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 [facility]) 11/02/2015    Cochlear implant in place 02/03/2017    Expressive aphasia 02/03/2017    UTI (urinary tract infection) 09/09/2018    CKD (chronic kidney disease), stage III (HCC)     PAF (paroxysmal atrial fibrillation) ([address]     Renal mass, left     Jaundice 11/21/2018    Fever 12/02/2018    Gram-negative bacteremia 12/02/2018    Acute metabolic encephalopathy 65/48/5257     Past Medical History:   Diagnosis Date    Arthritis     CAD (coronary artery disease)     CKD (chronic kidney disease), stage III ([address]     Cochlear implant in place 2/3/2017    Dementia     DM type 2 causing renal disease ([address]     DM type 2 causing vascular disease ([address]     GERD (gastroesophageal reflux disease)     History of vascular access device 12/05/2018    HTN (hypertension)     Hypothyroidism     SAMUEL (obstructive sleep apnea)     PAF (paroxysmal atrial fibrillation) (HCC)     Prostate cancer ([address]     Renal cell cancer ([address]     Renal mass, left       Past Medical History:   Diagnosis Date    Arthritis     CAD (coronary artery disease)     Angina Jan 2011, PCI LAD with multilink stent 4.0x12mm;  also cath at [address] [facility] 7/13 -- no stents at that time    CKD (chronic kidney disease), stage III ([address]     Cochlear implant in place 2/3/2017  Dementia     DM type 2 causing renal disease [facility]     DM type 2 causing vascular disease (HCC)     GERD (gastroesophageal reflux disease)     History of vascular access device 2018    4 Fr PICC L basilic, 42 cm long term abx    HTN (hypertension)     Hypothyroidism     SAMUEL (obstructive sleep apnea)     not using CPAP    PAF (paroxysmal atrial fibrillation) (HCC)     [id number] score = 7    Prostate cancer (HCC)     Renal cell cancer (HCC)     Stage 4 Renal Cell Cancer. Partial omentectomy 2015 with Dr. Krish Benoit: omental mass-metastatic renal cell carcinoma    Renal mass, left     DAVINCI LEFT PARTIAL NEPHRECTOMY 13;      Past Surgical History:   Procedure Laterality Date    CARDIAC SURG PROCEDURE UNLIST  2011    stent to LAD    HX CHOLECYSTECTOMY  2012    HX CORONARY STENT PLACEMENT      HX HEART CATHETERIZATION  ,     HX HEENT      stapedectomy    [address]    left inguinal    HX LITHOTRIPSY  2015    HX ORTHOPAEDIC      rotator cuff repair    HX ORTHOPAEDIC      cervical disc removed    HX PROSTATECTOMY      surgery intervention    HX TONSILLECTOMY      HX UROLOGICAL      valvular implant prevent incontinence    HX UROLOGICAL      insert gel to help with incontinence    HX UROLOGICAL  2013    left partial nephrectomy    HX UROLOGICAL  2015    CT guided biopsy of abdominal lymph nodes      SOCIAL HISTORY:    Social History     Social History Narrative    Not on file     Social History     Tobacco Use    Smoking status: Former Smoker     Packs/day: 0.10     Years: 23.00     Pack years: 2.30     Last attempt to quit: 12/3/1975     Years since quittin.2    Smokeless tobacco: Never Used   Substance Use Topics    Alcohol use: No     Alcohol/week: 0.0 oz    Drug use: No      FAMILY HISTORY:  History reviewed.    Family History   Problem Relation Age of Onset    Stroke Father     Cancer Father prostate    Diabetes Sister         2 sisters    Hypertension Sister         2 sisters    Diabetes Brother     Stroke Brother     Diabetes Brother         all brothers    Stroke Brother     Kidney Disease Brother     Heart Attack Brother     Hypertension Other        REVIEW of SYSTEMS:   As noted in history of present illness           MENTAL STATUS EXAM & VITALS     Oriented to person. Can't tell day, date, month, Year. Can't do serial sevens. Poor Registration and  Recall, Mood: depressed. Affect: Constricted. Normal speech. Denies SI/HI. no delusions. no hallucinations. Thought process logical and goal directed. Concentration limited. Insight/judgement Limited.              VITALS:     Patient Vitals for the past 24 hrs:   Temp Pulse Resp BP SpO2   02/25/19 2327 98.3 °F (36.8 °C) 61 16 166/81 97 %   02/25/19 1840 97.7 °F (36.5 °C) 60 16 171/69 97 %   02/25/19 1448 97.5 °F (36.4 °C) 62 18 149/68 97 %   02/25/19 1119 97.7 °F (36.5 °C) 63 18 133/63 97 %   02/25/19 1109  70  151/68 96 %   02/25/19 1052  75  153/63 95 %   02/25/19 1049  68  150/65 96 %   02/25/19 1044  66  139/65 96 %   02/25/19 0724 97.8 °F (36.6 °C) 63 18 175/76 97 %   02/25/19 0703  63  174/69 96 %   02/25/19 0305 97.4 °F (36.3 °C) 63 18 153/68 96 %   02/24/19 2351 97.7 °F (36.5 °C) 70 18 167/90 98 %              DATA     Labs reviewed    MEDICATIONS       ALL MEDICATIONS  Current Facility-Administered Medications   Medication Dose Route Frequency    0.9% sodium chloride infusion  100 mL/hr IntraVENous CONTINUOUS    [START ON 2/26/2019] sertraline (ZOLOFT) tablet 25 mg  25 mg Oral DAILY    amLODIPine (NORVASC) tablet 5 mg  5 mg Oral DAILY    apixaban (ELIQUIS) tablet 2.5 mg  2.5 mg Oral BID    aspirin delayed-release tablet 81 mg  81 mg Oral QHS    isosorbide mononitrate ER (IMDUR) tablet 120 mg  120 mg Oral 7am    losartan (COZAAR) tablet 25 mg  25 mg Oral QHS    pantoprazole (PROTONIX) tablet 40 mg  40 mg Oral ACB  polyethylene glycol (MIRALAX) packet 17 g  17 g Oral DAILY PRN    sodium chloride (NS) flush 5-40 mL  5-40 mL IntraVENous Q8H    sodium chloride (NS) flush 5-40 mL  5-40 mL IntraVENous PRN    acetaminophen (TYLENOL) tablet 650 mg  650 mg Oral Q4H PRN    diphenhydrAMINE (BENADRYL) capsule 25 mg  25 mg Oral Q4H PRN      SCHEDULED MEDICATIONS  Current Facility-Administered Medications   Medication Dose Route Frequency    0.9% sodium chloride infusion  100 mL/hr IntraVENous CONTINUOUS    [START ON 2/26/2019] sertraline (ZOLOFT) tablet 25 mg  25 mg Oral DAILY    amLODIPine (NORVASC) tablet 5 mg  5 mg Oral DAILY    apixaban (ELIQUIS) tablet 2.5 mg  2.5 mg Oral BID    aspirin delayed-release tablet 81 mg  81 mg Oral QHS    isosorbide mononitrate ER (IMDUR) tablet 120 mg  120 mg Oral 7am    losartan (COZAAR) tablet 25 mg  25 mg Oral QHS    pantoprazole (PROTONIX) tablet 40 mg  40 mg Oral ACB    sodium chloride (NS) flush 5-40 mL  5-40 mL IntraVENous Q8H                ASSESSMENT & PLAN            Case d/w nursing staff and hx obtained/plan reviewed with who gave verbal informed consent for treatment with medications as noted below. The patient Mitchell Phillips. is a 80 y.o.  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 D/C Remeron due to concerns about oversedation. It is partly renally excreted and would avoid due to CKD. 2. Add Low dose zoloft starting at 25 mg and increase to 50 mg if tolerated. Can be discharged home from psych stand point and can follow up as outpatient with a psychiatrist.      Will follow patient's course along with you as necessary. Thank you for the opportunity to participate in the care of your patient.                         SIGNED:    Carlos Eli MD  2/25/2019

## 2019-02-26 NOTE — PROGRESS NOTES
[facility]: [facility] [address] [phone] Name:   David Ritchie. Medical record #: [id number] Admission Date: 2/24/2019 Reason for Consult:   
 
Subjective:  
Overnight events: None Objective: EEG: 
 
 
Assessment/Plan: 1. Altered Mental Status: · Neurochecks:  Every 4 hours · Continue Eliquis · cEEG unremarkable · Cannot have MRI due to implant in ear 
  
2. Mobility:  
· OOB in chair with nursing  
· PT/OT to eval for rehab 
  
4. Diet:   
· Eating 
  
5. VTE Prophylaxes: · Per primary team 
  Thank you for allowing the Neurology Service the pleasure of participating in the care of your patient. This patient will be discussed with my collaborating care team physician Dr. Kirit An and she may have further recommendations regarding this patient's care. Attending Attestation:  
 
Attending Addendum 
  
I have reviewed the documentation provided by the nurse practitioner, Zbigniew Jarrell, discussed her findings, clinical impression, and the proposed management plans with regards to this patient's encounter. I have personally evaluated the patient, verify the history and confirm physical findings.  Below are my additional findings: 
  
Subjective: No new issues overnight. Patient is alone at the bedside. He is partially oriented which is his baseline. 
  
CLINICAL DATA REVIEW IMAGING: CT head: Negative (I personally reviewed these images in PACS and this is my impression) EEG: Negative 
  
PHYSICAL EXAM (additional findings) Patient Vitals for the past 8 hrs: 
  Temp Pulse Resp BP SpO2  
02/25/19 1448 97.5 °F (36.4 °C) 62 18 149/68 97 % 02/25/19 1119 97.7 °F (36.5 °C) 63 18 133/63 97 % 02/25/19 1109  70  151/68 96 % 02/25/19 1052  75  153/63 95 % 02/25/19 1049  68  150/65 96 % 02/25/19 1044  66  139/65 96 % Patient examined with nurse practitioner and my exam as documented below 
  
 ADDITIONAL ASSESSMENT AND RECOMMENDATIONS: 
This is an 14-year-old gentleman who presented to the emergency room with complaints of fall/syncope. Encephalopathy labs are negative. CT head is negative. Exam is nonfocal.    Continues EEG was negative for subclinical seizure. Patient did have his Remeron stopped and Zoloft started by psychiatry. He appears to be at his baseline today. 
  
1.  CT head negative. Patient unable to get MRI of the brain 2. Continuous EEG was negative 3. Continue Eliquis and aspirin for stroke prevention 4. Will discontinue Ambien and hold off on Remeron as this could contribute to patient's confusion. Zoloft added by psychiatry 5. Patient currently not on any memory medications so will not add this at this time 6. Encephalopathy labs essentially negative 
  
No further neuro workup at this time. Patient developed in the neurology clinic in 2-3 weeks. Will sign off please call with further questions. 
  
Megan Ritchie MD 
2/26/2019 
 
   
Physical Exam: 
 
General:  Alert, cooperative, no acute distress. Lungs:   Clear to auscultation bilaterally. No crackles/wheezes. Heart: 
Abdominal:  Regular rate and rhythm, No murmur, click, rub or gallop. Soft and nondistended Skin: Skin color, texture, turgor normal.   
NEUROLOGICAL EXAM: 
 
Appearance:  Well developed, well nourished,  and is in no acute distress. Mental Status: Oriented to hospital, person, and partial date, unaware of president Cranial Nerves:   Intact visual fields. PERRL, EOM's full, no nystagmus, no ptosis. Facial sensation is normal. Facial movement is symmetric. Palate is midline. Normal sternocleidomastoid strength. Tongue is midline. Motor:  5/5 strength in upper and lower proximal and distal muscles. Normal bulk and tone. Reflexes:   Deep tendon reflexes 2+/4 and symmetrical.  
Sensory:   Normal to temperature and vibration. Gait:  Not tested. Tremor:   No tremor noted. Cerebellar:  No cerebellar signs present. Current Facility-Administered Medications Medication Dose Route Frequency Provider Last Rate Last Dose  
 0.9% sodium chloride infusion  100 mL/hr IntraVENous CONTINUOUS Agata Garland  mL/hr at 02/26/19 0638 100 mL/hr at 02/26/19 0439  sertraline (ZOLOFT) tablet 25 mg  25 mg Oral DAILY Alen Costa MD   25 mg at 02/26/19 0636  amLODIPine (NORVASC) tablet 5 mg  5 mg Oral DAILY Valarie Chapa MD   5 mg at 02/26/19 5050  apixaban (ELIQUIS) tablet 2.5 mg  2.5 mg Oral BID Valarie Chapa MD   2.5 mg at 02/26/19 6203  aspirin delayed-release tablet 81 mg  81 mg Oral QHS Valarie Chapa MD   81 mg at 02/25/19 2125  isosorbide mononitrate ER (IMDUR) tablet 120 mg  120 mg Oral 7am Valarie Chapa MD   120 mg at 02/26/19 2815  losartan (COZAAR) tablet 25 mg  25 mg Oral QHS Valarie Chapa MD   25 mg at 02/25/19 2125  pantoprazole (PROTONIX) tablet 40 mg  40 mg Oral ACB Valarie Chapa MD   40 mg at 02/26/19 2587  polyethylene glycol (MIRALAX) packet 17 g  17 g Oral DAILY PRN Valarie Chapa MD      
 sodium chloride (NS) flush 5-40 mL  5-40 mL IntraVENous Q8H Valarie Chapa MD   10 mL at 02/25/19 1418  sodium chloride (NS) flush 5-40 mL  5-40 mL IntraVENous PRN Valarie Chapa MD      
 acetaminophen (TYLENOL) tablet 650 mg  650 mg Oral Q4H PRN Valarie Chapa MD      
 diphenhydrAMINE (BENADRYL) capsule 25 mg  25 mg Oral Q4H PRN Valarie Chapa MD      
 
 
Recent Results (from the past 24 hour(s)) METABOLIC PANEL, BASIC Collection Time: 02/26/19  4:54 AM  
Result Value Ref Range Sodium 141 136 - 145 mmol/L Potassium 4.1 3.5 - 5.1 mmol/L Chloride 107 97 - 108 mmol/L  
 CO2 25 21 - 32 mmol/L Anion gap 9 5 - 15 mmol/L Glucose 132 (H) 65 - 100 mg/dL BUN 14 6 - 20 MG/DL  Creatinine 1.47 (H) 0.70 - 1.30 MG/DL  
 BUN/Creatinine ratio 10 (L) 12 - 20    
 GFR est AA 55 (L) >60 ml/min/1.73m2 GFR est non-AA 46 (L) >60 ml/min/1.73m2 Calcium 10.9 (H) 8.5 - 10.1 MG/DL Visit Vitals /69 (BP 1 Location: Left arm, BP Patient Position: At rest) Pulse 64 Temp 97.6 °F (36.4 °C) Resp 16 Wt 80.7 kg (178 lb) SpO2 96% BMI 27.88 kg/m² O2 Device: Room air Temp (24hrs), Av.7 °F (36.5 °C), Min:97.4 °F (36.3 °C), Max:98.3 °F (36.8 °C) No intake/output data recorded. 1 -  0700 In: 2418.3 [P.O.:250; I.V.:2168.3] Out: 1330 [MYIRK:5846] Care Plan discussed with: 
Patient x Family RN Care Manager Consultant/Specialist:    
 
 
Liza Culp, [facility]

## 2019-02-26 NOTE — PHYSICIAN ADVISORY
Letter of Status Determination:  
Recommend hospitalization status upgraded from OBSERVATION  to INPATIENT  Status Pt Name:  Mitchell Phillips. MR#  
MARCO # [id number] / 
[id number] Payor: Marito Garcia / Plan: [address] / Product Type: Medicare /   
LACEY#  [id number] Room and Hospital  401/01  @ [facility] Hospitalization date  2/24/2019 11:39 AM  
Current Attending Physician  Kenyatta Flanagan DO  
Principal diagnosis  Fatigue [R53.83] Clinicals  80 y.o. y.o  male hospitalized with above diagnosis The pt remains in acute care setting for worsened mental status in a patient with known dementia. EEG and other workup along with supportive care are in progress. Due to appropriate and necessary medical care, this pt's hospitalization has now exceeded two midnights . [place] ([facility]) criteria Does  NOT apply STATUS DETERMINATION  This patient is at above high risk of deterioration based on documented presenting clinical data, comorbid conditions, high risk of adverse events and current acute care course. Mr. Mitchell Phillips. now meets Inpatient Admission status criteria in accordance with CMS regulation Section 43 .3. Specifically, due to medical necessity the patient's stay now exceeds Two Midnights. It is our recommendation that this patient's hospitalization status should be upgraded from  OBSERVATION to INPATIENT status. The final decision of the patient's hospitalization status depends on the attending physician's judgment Additional comments Payor: Marito Garcia / Plan: [address] / Product Type: Medicare /   
  
 
Sagar Orona MD MPH FACP Cell: [phone] Physician Advisor [address]  
 President Medical Staff, [address] [place] [address]  [phone]   
 
 
[phone] Graham Mckeon

## 2019-02-26 NOTE — PROCEDURES
Name: Devika Alvarez. : 1935  Age: 80 y.o. Admit Date: 2019  MRN: [id number]  Date of EE2019  Patient Location: [facility]       CONTINUOUS ELECTROENCEPHALOGRAM REPORT    PROCEDURE: 24 HR Continuous telemetry EEG monitoring with simultaneous video    EEG PROCEDURE NUMBER: [id number]    RECORDING START TIME: 19 @ 1932    R/ECORDING END TIME: 19 @ 7117    TECHNICAL NOTES:  This recording was performed on equipment with 32 channel capability using scalp electrodes. EEG and video-audio apparatus specifications in accordance with ASCN guidelines. Additional EKG monitoring was utilized. Scalp electrodes were placed in accordance with the International 10-20 system. Additional inferior temporal electrodes were placed at F9, F10, T9, T10, P9, and P10. DESCRIPTION OF THE RECORDING: Continuous telemetry EEG monitoring with simultaneous video was requested in this 80 y.o. male   to appropriately capture and characterize the episodes of clinical interest for accurate diagnosis. INDICATION FOR cEEG:   subclinical seizures    ANTIEPILEPTIC DRUGS (AEDS): None    SEDATIVES:  None    INTUBATION:  No    PUSH BUTTON EVENTS: No    FINDINGS:     BACKGROUND:     Hemispheric Symmetric:  Alpha, not posterior dominant rhythm   Posterior dominant alpha at 8 Hz    Hemispheric Asymmetric: none    FOCAL SLOWING:  some occasional left temporal slowing    AMPLITUDE:   Left: Normal  Right: Normal    VARIABILITY: Yes    CONTINUITY:  continuous    REACTIVITY: Yes    BREACH EFFECT: No    STAGE II SLEEP (K complex and spindles):  Present    EEG IMPRESSION:  Abnormal    CLINICAL CORRELATION:    24hr video EEG with simulatnous video monitoring was abnormal. There was some occasional left temporal slowing. No seizures. Clinical correlation recommended.         Jerome Mckeon MD  2019  8:49 AM

## 2019-02-26 NOTE — PROGRESS NOTES
..Bedside shift change report given to Mikayla Napier RN (oncoming nurse) by Rocky Hartley RN (offgoing nurse). Report included the following information SBAR, Kardex, Intake/Output, MAR, Accordion, Procedure Verification and Quality Measures.

## 2019-02-27 PROCEDURE — 74011250637 HC RX REV CODE- 250/637: Performed by: PSYCHIATRY & NEUROLOGY

## 2019-02-27 PROCEDURE — 74011250636 HC RX REV CODE- 250/636: Performed by: INTERNAL MEDICINE

## 2019-02-27 PROCEDURE — 74011250637 HC RX REV CODE- 250/637: Performed by: INTERNAL MEDICINE

## 2019-02-27 PROCEDURE — 94760 N-INVAS EAR/PLS OXIMETRY 1: CPT

## 2019-02-27 RX ORDER — SERTRALINE HYDROCHLORIDE 25 MG/1
25 TABLET, FILM COATED ORAL DAILY
Qty: 30 TAB | Refills: 0 | Status: SHIPPED | OUTPATIENT
Start: 2019-02-27

## 2019-02-27 RX ADMIN — PANTOPRAZOLE SODIUM 40 MG: 40 TABLET, DELAYED RELEASE ORAL at 06:50

## 2019-02-27 RX ADMIN — APIXABAN 2.5 MG: 2.5 TABLET, FILM COATED ORAL at 08:09

## 2019-02-27 RX ADMIN — SODIUM CHLORIDE 100 ML/HR: 900 INJECTION, SOLUTION INTRAVENOUS at 02:57

## 2019-02-27 RX ADMIN — AMLODIPINE BESYLATE 5 MG: 5 TABLET ORAL at 08:07

## 2019-02-27 RX ADMIN — SERTRALINE HYDROCHLORIDE 25 MG: 25 TABLET ORAL at 08:09

## 2019-02-27 RX ADMIN — ISOSORBIDE MONONITRATE 120 MG: 60 TABLET ORAL at 06:50

## 2019-02-27 NOTE — PROGRESS NOTES
TONA spoke with pt's wife regarding home health and offered for her to speak with [facility] nurse liaison. Pt's wife is declining home health at this time stating her , \"is not going to be receptive to that,\" and \"he just didn't like it. \"  Pt's wife is aware that if she changes her mind, pt[facility]. Pt's wife said she has two grandchildren that will be helping. She has information on [facility]. Shari Spain LCSW

## 2019-02-27 NOTE — CDMP QUERY
Dr. Mika Camarena: 
 
Patient admitted with fatigue/dementia, noted to have encephalopathy. Please further specify type of Encephalopathy in the medical record: 
 
=> Metabolic encephalopathy 
=> Toxic encephalopathy 
=> Toxic metabolic encephalopathy  
=> Other explanation of clinical findings  
=> Clinically Undetermined (no explanation for clinical findings) The medical record reflects the followin yo male presenting with fatigue and  multiple falls. H/P documents acute encephalopathy, likely not far from baseline. Worsened by melatonin and remeron perhaps. Neuro and psych were consulted. CT head negative. Encephalopathy labs negative. 24 hour EEG negative for subclinical seizure. Ambien and Remeron were discontinued.  Melatonin not continued in hospital.   
 
Brady Villasenor, [facility], [email or web address]

## 2019-02-27 NOTE — ROUTINE PROCESS
Bedside and Verbal shift change report given to Carol Sun (oncoming nurse) by Mona Alberto (offgoing nurse). Report included the following information SBAR, Kardex, Intake/Output, MAR and Recent Results.

## 2019-02-27 NOTE — PROGRESS NOTES
Psychiatric Follow Up Note Amparo Joshi MD 
[phone]: 2/26/2019 Account Number:  [de-identified] Name: Arnoldo Blanton[address] [place] [address] PROGRESS NOTE: To include the following:  
Coordinated treatment team rounds conducted with patient. Discussions held with nursing staff. Chart reviewed in full including consultant notes, ancillary staff notes, vitals and labs in [facility] EMR reviewed in full. SUBJECTIVE:  
Patient reports the following:  \"I am good, how are you\". Appears much more alert and pleasant today. No complaints. Smiles appropriately. Side Effects: 
None reported or admitted to. OBJECTIVE: 
 
  
         
 
Mental Status exam:  
Oriented X person and place. Mood: good. Affect: Full range. Normal speech. Denies SI/HI. no delusions. no hallucinations. Thought process logical and goal directed. Concentration limited. Insight/judgement Limited Pertinent data: 
Patient Vitals for the past 8 hrs: 
 BP Temp Pulse Resp SpO2  
02/26/19 2316 142/67 97.9 °F (36.6 °C) 65 17 97 % 02/26/19 2007 165/69 98 °F (36.7 °C) 63 16 98 % Recent Results (from the past 24 hour(s)) METABOLIC PANEL, BASIC Collection Time: 02/26/19  4:54 AM  
Result Value Ref Range Sodium 141 136 - 145 mmol/L Potassium 4.1 3.5 - 5.1 mmol/L Chloride 107 97 - 108 mmol/L  
 CO2 25 21 - 32 mmol/L Anion gap 9 5 - 15 mmol/L Glucose 132 (H) 65 - 100 mg/dL BUN 14 6 - 20 MG/DL Creatinine 1.47 (H) 0.70 - 1.30 MG/DL  
 BUN/Creatinine ratio 10 (L) 12 - 20 GFR est AA 55 (L) >60 ml/min/1.73m2 GFR est non-AA 46 (L) >60 ml/min/1.73m2 Calcium 10.9 (H) 8.5 - 10.1 MG/DL Medications: 
Current Facility-Administered Medications Medication Dose Route Frequency  0.9% sodium chloride infusion  100 mL/hr IntraVENous CONTINUOUS  
 sertraline (ZOLOFT) tablet 25 mg  25 mg Oral DAILY  amLODIPine (NORVASC) tablet 5 mg  5 mg Oral DAILY  apixaban (ELIQUIS) tablet 2.5 mg  2.5 mg Oral BID  
  aspirin delayed-release tablet 81 mg  81 mg Oral QHS  isosorbide mononitrate ER (IMDUR) tablet 120 mg  120 mg Oral 7am  
 losartan (COZAAR) tablet 25 mg  25 mg Oral QHS  pantoprazole (PROTONIX) tablet 40 mg  40 mg Oral ACB  polyethylene glycol (MIRALAX) packet 17 g  17 g Oral DAILY PRN  
 sodium chloride (NS) flush 5-40 mL  5-40 mL IntraVENous Q8H  
 sodium chloride (NS) flush 5-40 mL  5-40 mL IntraVENous PRN  
 acetaminophen (TYLENOL) tablet 650 mg  650 mg Oral Q4H PRN  
 diphenhydrAMINE (BENADRYL) capsule 25 mg  25 mg Oral Q4H PRN Scheduled Medications: 
Current Facility-Administered Medications Medication Dose Route Frequency  0.9% sodium chloride infusion  100 mL/hr IntraVENous CONTINUOUS  
 sertraline (ZOLOFT) tablet 25 mg  25 mg Oral DAILY  amLODIPine (NORVASC) tablet 5 mg  5 mg Oral DAILY  apixaban (ELIQUIS) tablet 2.5 mg  2.5 mg Oral BID  aspirin delayed-release tablet 81 mg  81 mg Oral QHS  isosorbide mononitrate ER (IMDUR) tablet 120 mg  120 mg Oral 7am  
 losartan (COZAAR) tablet 25 mg  25 mg Oral QHS  pantoprazole (PROTONIX) tablet 40 mg  40 mg Oral ACB  sodium chloride (NS) flush 5-40 mL  5-40 mL IntraVENous Q8H  
 
 
 
ASSESSMENT/PLAN:  
 
 
Diagnoses: 
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 
  
  
Plan: 1. Continue  zoloft starting  25 mg and increase to 50 mg as outpt if tolerated.  
  
Can be discharged home from psych stand point and can follow up as outpatient with a psychiatrist. 
  
  
Will sign off. Please call  as necessary.  
  
Thank you for the opportunity to participate in the care of your patient Signed By: Esme Cabrera MD

## 2019-02-27 NOTE — PROGRESS NOTES
Bedside and Verbal shift change report given to [facility] (oncoming nurse) by Brandi Umaña RN (offgoing nurse). Report included the following information SBAR, Kardex, ED Summary, Procedure Summary, Intake/Output, MAR and Recent Results.

## 2019-02-27 NOTE — DISCHARGE INSTRUCTIONS
Patient Discharge Instructions    Nelia Sherman / [id number] : 1935    Admitted 2019 Discharged: 2019     Primary Diagnoses  Problem List as of 2019 Date Reviewed: 2019          Codes Class Noted - Resolved    Debility ICD-10-CM: R53.81  ICD-9-CM: 799.3  2019 - Present        Hypercalcemia ICD-10-CM: G95.50  ICD-9-CM: 275.42  2018 - Present        Arthritis ICD-10-CM: M19.90  ICD-9-CM: 716.90  Unknown - Present        Dementia (Chronic) ICD-10-CM: F03.90  ICD-9-CM: 294.20  Unknown - Present        DM type 2 causing renal disease ([email or web address] (Chronic) ICD-10-CM: E11.29  ICD-9-CM: 250.40  Unknown - Present        DM type 2 causing vascular disease ([email or web address] ICD-10-CM: E11.59  ICD-9-CM: 250.70, 443.81  Unknown - Present        GERD (gastroesophageal reflux disease) ICD-10-CM: K21.9  ICD-9-CM: 530.81  Unknown - Present        Hypothyroidism ICD-10-CM: E03.9  ICD-9-CM: 244.9  Unknown - Present        SAMUEL (obstructive sleep apnea) ICD-10-CM: G47.33  ICD-9-CM: 327.23  Unknown - Present    Overview Signed 2018  9:34 PM by Devika Goldstein MD     not using CPAP             Prostate cancer ([email or web address] ICD-10-CM: C61  ICD-9-CM: 185  Unknown - Present        Acute encephalopathy ICD-10-CM: G93.40  ICD-9-CM: 348.30  2018 - Present        * (Principal) Fatigue ICD-10-CM: R53.83  ICD-9-CM: 780.79  2018 - Present        Debilitated patient ICD-10-CM: R53.81  ICD-9-CM: 799.3  2018 - Present        Renal cell cancer ([email or web address] ICD-10-CM: C64.9  ICD-9-CM: 189.0  Unknown - Present    Overview Signed 2016 11:50 AM by Michelle Werner MD     Stage 4 Renal Cell Cancer.     Partial omentectomy 2015 with Dr. Tonya Forbes: omental mass-metastatic renal cell carcinoma             CKD (chronic kidney disease) stage 3, GFR 30-59 ml/min (HCC) (Chronic) ICD-10-CM: N18.3  ICD-9-CM: 585.3  12/10/2013 - Present        Paroxysmal atrial fibrillation ([facility] ICD-10-CM: I48.0  ICD-9-CM: 427.31  2013 - Present CAD (coronary artery disease) (Chronic) ICD-10-CM: I25.10  ICD-9-CM: 414.00  Unknown - Present    Overview Signed 7/13/2012  9:24 AM by Regis Muse MD     Angina Jan 2011, PCI unknown vessel at [place], no MI             HTN (hypertension) ICD-10-CM: I10  ICD-9-CM: 401.9  Unknown - Present        RESOLVED: Acute metabolic encephalopathy Q-00-LM: G93.41  ICD-9-CM: 348.31  2/9/2019 - 2/12/2019        RESOLVED: Fever ICD-10-CM: R50.9  ICD-9-CM: 780.60  12/2/2018 - 2/24/2019        RESOLVED: Gram-negative bacteremia ICD-10-CM: R78.81  ICD-9-CM: 790.7, 041.85  12/2/2018 - 2/24/2019        RESOLVED: Jaundice ICD-10-CM: R17  ICD-9-CM: 782.4  11/21/2018 - 12/3/2018        RESOLVED: UTI (urinary tract infection) ICD-10-CM: N39.0  ICD-9-CM: 599.0  9/9/2018 - 2/24/2019        RESOLVED: CKD (chronic kidney disease), stage III ([facility] ICD-10-CM: N18.3  ICD-9-CM: 193. 3  Unknown - 12/3/2018        RESOLVED: PAF (paroxysmal atrial fibrillation) ([facility] ICD-10-CM: I48.0  ICD-9-CM: 427.31  Unknown - 9/9/2018    Overview Signed 9/9/2018  9:34 PM by Roque Knox MD     [id number] score = 7             RESOLVED: Renal mass, left ICD-10-CM: N28.89  ICD-9-CM: 593.9  Unknown - 9/9/2018    Overview Signed 9/9/2018  9:34 PM by Roque Knox MD     DAVINCI LEFT PARTIAL NEPHRECTOMY 12/19/13;              RESOLVED: Cochlear implant in place ICD-10-CM: Z96.21  ICD-9-CM: V45.89  2/3/2017 - 2/24/2019    Overview Signed 2/3/2017  9:25 AM by Jhonatan DE LEON             RESOLVED: Expressive aphasia ICD-10-CM: R47.01  ICD-9-CM: 784.3  2/3/2017 - 9/9/2018        RESOLVED: Kidney cancer, primary, 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 [facility]) ICD-10-CM: C64.9  ICD-9-CM: 189.0  11/2/2015 - 2/24/2019        RESOLVED: Renal cell carcinoma ([facility] ICD-10-CM: C64.9  ICD-9-CM: 189.0  9/30/2015 - 9/9/2018        RESOLVED: Left renal mass ICD-10-CM: H50.53  ICD-9-CM: 593.9  12/11/2013 - 9/9/2018        RESOLVED: LBBB (left bundle branch block) ICD-10-CM: I44.7  ICD-9-CM: 426.3  12/11/2013 - 9/9/2018        RESOLVED: Leg weakness ICD-10-CM: R29.898  ICD-9-CM: 729.89  12/10/2013 - 9/9/2018        RESOLVED: Atrial fibrillation or flutter ICD-9-CM: Pat Pals  7/15/2012 - 8/3/2012    Overview Signed 7/15/2012 10:02 AM by Fady Camarena MD     7/14-15             RESOLVED: Thrombocytopenia, unspecified ([facility].) ICD-10-CM: D69.6  ICD-9-CM: 287.5  7/14/2012 - 9/9/2018        RESOLVED: CKD (chronic kidney disease) ICD-10-CM: N18.9  ICD-9-CM: 688. 9  Unknown - 9/9/2018        RESOLVED: Abdominal pain, acute, right upper quadrant ICD-10-CM: R10.11  ICD-9-CM: 789.01, 338.19  7/12/2012 - 7/14/2012        RESOLVED: Biliary colic EPU-05-TW: A55.67  ICD-9-CM: 574.20  7/12/2012 - 5/6/2013        RESOLVED: Cholelithiasis ICD-10-CM: K80.20  ICD-9-CM: 574.20  7/12/2012 - 7/14/2012        RESOLVED: Nausea & vomiting ICD-10-CM: R11.2  ICD-9-CM: 787.01  7/12/2012 - 7/14/2012        RESOLVED: Diabetes mellitus ([facility].) (Chronic) ICD-10-CM: E11.9  ICD-9-CM: 250.00  7/12/2012 - 9/9/2018        RESOLVED: Acute renal failure (ARF) ([facility].) ICD-10-CM: N17.9  ICD-9-CM: 584.9  7/12/2012 - 8/3/2012              Take Home Medications       · It is important that you take the medication exactly as they are prescribed. · Keep your medication in the bottles provided by the pharmacist and keep a list of the medication names, dosages, and times to be taken in your wallet. · Do not take other medications without consulting your doctor. What to do at Home    Recommended diet: Resume previous diet    Recommended activity: Activity as tolerated    If you experience worsening symptoms, please follow up with nearest ER. Follow-up with your PCP in 1 week        Information obtained by :  I understand that if any problems occur once I am at home I am to contact my physician. I understand and acknowledge receipt of the instructions indicated above. Physician's or R.N.'s Signature                                                                  Date/Time                                                                                                                                              Patient or Representative Signature                                                          Date/Time      Patient Education        Advance Directives: Care Instructions  Your Care Instructions  An advance directive is a legal way to state your wishes at the end of your life. It tells your family and your doctor what to do if you can no longer say what you want. There are two main types of advance directives. You can change them any time that your wishes change. · A living will tells your family and your doctor your wishes about life support and other treatment. · A durable power of  for health care lets you name a person to make treatment decisions for you when you can't speak for yourself. This person is called a health care agent. If you do not have an advance directive, decisions about your medical care may be made by a doctor or a  who doesn't know you. It may help to think of an advance directive as a gift to the people who care for you. If you have one, they won't have to make tough decisions by themselves. Follow-up care is a key part of your treatment and safety. Be sure to make and go to all appointments, and call your doctor if you are having problems. It's also a good idea to know your test results and keep a list of the medicines you take. How can you care for yourself at home? · Discuss your wishes with your loved ones and your doctor. This way, there are no surprises. · Many states have a unique form. Or you might use a universal form that has been approved by many states.  This kind of form can sometimes be completed and stored online. Your electronic copy will then be available wherever you have a connection to the Internet. In most cases, doctors will respect your wishes even if you have a form from a different state. · You don't need a  to do an advance directive. But you may want to get legal advice. · Think about these questions when you prepare an advance directive:  ? Who do you want to make decisions about your medical care if you are not able to? Many people choose a family member or close friend. ? Do you know enough about life support methods that might be used? If not, talk to your doctor so you understand. ? What are you most afraid of that might happen? You might be afraid of having pain, losing your independence, or being kept alive by machines. ? Where would you prefer to die? Choices include your home, a hospital, or a nursing home. ? Would you like to have information about hospice care to support you and your family? ? Do you want to donate organs when you die? ? Do you want certain Alevism practices performed before you die? If so, put your wishes in the advance directive. · Read your advance directive every year, and make changes as needed. When should you call for help? Be sure to contact your doctor if you have any questions. Where can you learn more? Go to http://inez-bart.info/. Enter R264 in the search box to learn more about \"Advance Directives: Care Instructions. \"  Current as of: April 18, 2018  Content Version: 11.9  © 8281-0627 [employer]. Care instructions adapted under license by [employer] (which disclaims liability or warranty for this information). If you have questions about a medical condition or this instruction, always ask your healthcare professional. [place] [address] any warranty or liability for your use of this information.

## 2019-02-27 NOTE — PALLIATIVE CARE DISCHARGE
Goals of Care/Treatment Preferences The Palliative Medicine team was consulted as part of your/your loved one's care in the hospital. Our team is a supportive service; we strive to relieve suffering and improve quality of life. You met with Dr. Anne Ortega and  Garrison Mujica. We reviewed advance care planning information, which includes the following: 
Patient's Healthcare Decision Maker is[de-identified] Named in scanned ACP document Confirm Advance Directive: Yes, on file Does the patient have other document types: Do Not Resuscitate Patient/Health Care Proxy Stated Goals: Other (comment)(full restorative measures) We reviewed / discussed your code status as: DNR 
   Full Code means perform CPR in the event of cardiac arrest. 
    DNR means do NOT perform CPR in the event of cardiac arrest. 
    Partial Code means you have specific preferences, please discuss with your healthcare team. 
    Weston Mercer means this issue was not addressed / resolved during your stay Medical Interventions: Limited additional interventions Other Instructions: You have a Durable Do Not Resuscitate form in place, which should travel with you. When you are in a facility, this form should be placed on your chart. Once you are home, it is recommended that the [facility] form be placed in a visible location such as on the refrigerator or bedroom door. Because of the importance of this information, we are providing you with a printed copy to share with other healthcare providers after this hospitalization is complete.

## 2019-02-27 NOTE — DISCHARGE SUMMARY
Physician Discharge Summary     Patient ID:  Price Aragon  [id number]  80 y.o.  1935    Admit date: 2/24/2019    Discharge date and time: 2/27/2019    Admission Diagnoses: Fatigue [R53.83]  Debility [R53.81]    Discharge Diagnoses:    Principal Diagnosis   Fatigue                                             Other Diagnoses  Principal Problem:    Fatigue (9/9/2018)    Active Problems:    CAD (coronary artery disease) ()      Overview: Angina Jan 2011, PCI unknown vessel at [place], no MI      HTN (hypertension) ()      Paroxysmal atrial fibrillation ([address] (5/6/2013)      CKD (chronic kidney disease) stage 3, GFR 30-59 ml/min (HCC) (12/10/2013)      Renal cell cancer ([facility] ()      Overview: Stage 4 Renal Cell Cancer. Partial omentectomy 11/2/2015 with Dr. Ailin Lema: omental mass-metastatic renal cell carcinoma      Arthritis ()      Dementia ()      DM type 2 causing renal disease ([facility] ()      DM type 2 causing vascular disease (HCC) ()      GERD (gastroesophageal reflux disease) ()      Hypothyroidism ()      SAMUEL (obstructive sleep apnea) ()      Overview: not using CPAP      Prostate cancer ([facility] ()      Acute encephalopathy (9/9/2018)      Debilitated patient (9/9/2018)      Hypercalcemia (11/21/2018)      Debility (2/26/2019)         Hospital Course:     Fatigue / Debilitated patient - POA, chronic. Likely just a chronic issue, deconditioning, depression, dementia. Fall precautions. PT/OT/speech. Neurology and psych consults appreciated, no subclinical seizures, imaging and encephalopathy labs normal. Psych has stopped some sedating meds and started low dose zoloft to be titrated. Overall prognosis is poor, does not meet hospice criteria for dementia at this time (though I suspect with his food eversion and severe oropharyngeal dysphagia, we are not far from this).  Overall, improved but I suspect this waxes and wanes unfortunately.     Acute metabolic encephalopathy / Dementia - POA, likely baseline Remeron stopped, started zoloft, avoid sedating meds. PCP can titrate zoloft after a few weeks.     CAD (coronary artery disease) / HTN (hypertension) - Stable. Continue ASA, norvasc, losartan, IMDUR.     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 SSI per protocol. Not on home meds. His blood sugar should be liberalized regardless     Paroxysmal atrial fibrillation - Stable. Not on anticoagulation due to prior bleed, not on rate control     CKD (chronic kidney disease) stage 3 / Hx Renal cell cancer / Hx Prostate cancer - Stable. Continue ARB    Hypercalcemia. Noted on prior admissions. Improved somewhat with hydration. PTH normal. Pending PTHrP     Arthritis - tylenol prn. No narcotics     GERD (gastroesophageal reflux disease) - PPI     Hypothyroidism - not listing meds. Recent normal TSH     SAMUEL (obstructive sleep apnea) - Not using CPAP. Advised compliance. This contributes to his fatigue and weakness      PCP: Ceci, Chance, MD    Consults: Neurology, Psychiatry and Palliative Care    Significant Diagnostic Studies: See Hospital Course    Discharged home in improved condition.     Discharge Exam:    Visit Vitals  /67   Pulse 65   Temp 97.7 °F (36.5 °C)   Resp 16   Ht 5' 7\" (1.702 m)   Wt 78 kg (172 lb)   SpO2 96%   BMI 26.94 kg/m²     Physical Exam:     Gen:  Elderly, frail, in no acute distress  HEENT:  Pink conjunctivae, PERRL, hearing intact to voice, dry mucous membranes  Neck:  Supple, without masses  Resp:  No accessory muscle use, clear breath sounds without wheezes rales or rhonchi  Card:  No murmurs, normal S1, S2 without thrills, bruits or peripheral edema  Abd:  Soft, non-tender, non-distended, normoactive bowel sounds are present  Lymph:  No cervical or inguinal adenopathy  Musc:  No cyanosis or clubbing  Skin:  No rashes or ulcers, skin turgor is good  Neuro:  Global motor weakness, follows commands inconsistently  Psych:  Little to no insight, oriented to person, alert      Disposition: home    Patient Instructions:   Current Discharge Medication List      START taking these medications    Details   sertraline (ZOLOFT) 25 mg tablet Take 1 Tab by mouth daily. Qty: 30 Tab, Refills: 0         CONTINUE these medications which have NOT CHANGED    Details   acetaminophen (TYLENOL) 325 mg tablet Take 650 mg by mouth every four (4) hours as needed for Pain or Fever. aspirin delayed-release 81 mg tablet Take 81 mg by mouth nightly. amLODIPine (NORVASC) 10 mg tablet Take 5 mg by mouth daily. losartan (COZAAR) 25 mg tablet Take 25 mg by mouth nightly. nystatin (MYCOSTATIN) powder Apply  to affected area daily as needed for Other (Itching/Irritation- Groin area). To groin after showering and drying       docusate sodium (COLACE) 100 mg capsule Take 100 mg by mouth daily as needed. senna (SENNA) 8.6 mg tablet Take 1 Tab by mouth daily as needed for Constipation. isosorbide mononitrate ER (IMDUR) 120 mg CR tablet Take 1 Tab by mouth every morning. Qty: 30 Tab, Refills: 0      polyethylene glycol (MIRALAX) 17 gram packet Take 17 g by mouth daily as needed (Constipation). apixaban (ELIQUIS) 2.5 mg tablet Take 2.5 mg by mouth two (2) times a day. omeprazole (PRILOSEC) 20 mg capsule Take 20 mg by mouth Daily (before breakfast).          STOP taking these medications       melatonin 3 mg tablet Comments:   Reason for Stopping:         mirtazapine (REMERON) 15 mg tablet Comments:   Reason for Stopping:             Activity: Activity as tolerated  Diet: Resume previous diet  Wound Care: None needed    Follow-up with PCP in 1-2 weeks    Signed:  Charles Vazquez DO  2/27/2019  12:00 PM    Greater than 30 mins was spent in coordination, counseling, and execution of this patient's discharge

## 2019-02-27 NOTE — PROGRESS NOTES
Nurse handed wife 1 script and a copy of discharge instructions which have all been read and explained to them. Wife has POA. Verbalized understanding

## 2019-03-01 LAB — PTH RELATED PROT SERPL-SCNC: <2 PMOL/L

## 2019-03-19 VITALS
OXYGEN SATURATION: 96 % | RESPIRATION RATE: 16 BRPM | HEIGHT: 67 IN | BODY MASS INDEX: 27 KG/M2 | DIASTOLIC BLOOD PRESSURE: 67 MMHG | TEMPERATURE: 97.7 F | HEART RATE: 65 BPM | SYSTOLIC BLOOD PRESSURE: 145 MMHG | WEIGHT: 172 LBS

## 2019-11-19 NOTE — ED NOTES
Here is the plan from today's visit    1. Benign essential hypertension  - BP follow up in 2 weeks  - Keep track of your blood pressure at home (write it down and bring list in to clinic with you!)    2. [facility]  - [facility]    Thank you for coming to [facility] today.  Medication Refills:  If you need any refills please call your pharmacy and they will contact us.   If you need to  your refill at a new pharmacy, please contact the new pharmacy directly. The new pharmacy will help you get your medications transferred faster.   Scheduling:  If you have any concerns about today's visit or wish to schedule another appointment please call our office during normal business hours [phone] (8-5:00 M-F)  Medical Concerns:  If you have urgent medical concerns please call [phone] at any time of the day.    Julianne Hyatt MD     Verbal report given to Lois Merrill who will assume care of pt at this time

## 2022-06-17 NOTE — PROGRESS NOTES
Caller states that there fax machine is getting fixed right now. Caller ask that forms be faxed to [phone] when completed. Caller also states to return call when forms are being faxed to notify.     Thank you [facility] Medical Progress Note NAME: Chi Miller :  1935 MRM:  [id number] Date/Time: 2018  10:32 AM 
 
  
Assessment and Plan:  
 
Gram-negative bacteremia / Fever - POA, no other SIRS criteria beyond fever. Unclear source. No wound. Repeat Cx NGTD so far. Unremarkable ECHO. No focal back pain. UA unremarkable. CT with 4cm renal mass. Occult abscess? Consulted ID. Continue urology. For now empiric ceftriaxone. 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 SSI per protocol. Not on home meds. A1c unreliable in setting of anemia. Dementia - POA, stable. Continue mirtazapine and melatonin. Palliative consult. Nearing end stage Debilitated patient / Arthritis / Fatigue -  PT/OT eval and may need SNF again, vs [facility] Malnutrition / Weight loss / FTT in adult / Low albumin / hypomagnesemia - Supplements and replete lytes. Likely related to dementia. 20+ lb weight loss in over 1 year. Nutrition consult. Anemia - Mild. Worsened with hydration. Unremarkable labs. SAMUEL (obstructive sleep apnea) - not using CPAP. Oxygen if needed. CAD (coronary artery disease) / HTN (hypertension) / Paroxysmal atrial fibrillation - Rate stable. BP controlled on norvasc, IMDUR. Stop eliquis and ASA today, anticipating he may need renal mass biopsy. 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 Follow. Urology to review imaging. Cochlear implant in place - Cannot have MRI 
 
GERD (gastroesophageal reflux disease) - PPI Hypothyroidism - Not on synthroid. Check TSH Subjective: Chief Complaint:  Feels a bit better, still weak. ROS: 
(bold if positive, if negative) Tolerating some PT  Tolerating some Diet Objective:  
 
Last 24hrs VS reviewed since prior progress note. Most recent are: 
 
Visit Vitals /66 (BP 1 Location: Left arm, BP Patient Position: At rest) Pulse 73 Temp 98.6 °F (37 °C) Resp 18 Wt 80.7 kg (178 lb) SpO2 96% BMI 27.88 kg/m² SpO2 Readings from Last 6 Encounters:  
12/04/18 96% 12/01/18 96% 11/27/18 95% 09/13/18 97% 08/26/17 92% 07/16/17 94% Intake/Output Summary (Last 24 hours) at 12/4/2018 1032 Last data filed at 12/4/2018 1000 Gross per 24 hour Intake 700 ml Output 230 ml Net 470 ml Physical Exam: 
 
Gen:  Well-developed, well-nourished, in no acute distress HEENT:  Pink conjunctivae, PERRL, hearing intact to voice, moist mucous membranes Neck:  Supple, without masses, thyroid non-tender Resp:  No accessory muscle use, clear breath sounds without wheezes rales or rhonchi 
Card:  No murmurs, normal S1, S2 without thrills, bruits or peripheral edema Abd:  Soft, non-tender, non-distended, normoactive bowel sounds are present, no mass Lymph:  No cervical or inguinal adenopathy Musc:  No cyanosis or clubbing Skin:  No rashes or ulcers, skin turgor is good Neuro:  Cranial nerves are grossly intact, general motor weakness, follows commands vaguely Psych:  Poor insight, oriented to person, place Telemetry reviewed:   normal sinus rhythm 
__________________________________________________________________ Medications Reviewed: (see below) Medications:  
 
Current Facility-Administered Medications Medication Dose Route Frequency  cefTRIAXone (ROCEPHIN) 2 g in 0.9% sodium chloride (MBP/ADV) 50 mL  2 g IntraVENous Q24H  
 dextrose 5% - 0.45% NaCl with KCl 20 mEq/L infusion  25 mL/hr IntraVENous CONTINUOUS  
 sodium chloride (NS) flush 5-10 mL  5-10 mL IntraVENous Q8H  
 sodium chloride (NS) flush 5-10 mL  5-10 mL IntraVENous PRN  
 naloxone (NARCAN) injection 0.4 mg  0.4 mg IntraVENous PRN  
 acetaminophen (TYLENOL) tablet 650 mg  650 mg Oral Q4H PRN  
 ondansetron (ZOFRAN) injection 4 mg  4 mg IntraVENous Q4H PRN  
  bisacodyl (DULCOLAX) tablet 5 mg  5 mg Oral DAILY PRN  
 amLODIPine (NORVASC) tablet 5 mg  5 mg Oral DAILY  isosorbide mononitrate ER (IMDUR) tablet 120 mg  120 mg Oral 7am  
 melatonin tablet 3 mg  3 mg Oral QHS  mirtazapine (REMERON) tablet 7.5 mg  7.5 mg Oral QHS  pantoprazole (PROTONIX) tablet 40 mg  40 mg Oral ACB  glucose chewable tablet 16 g  4 Tab Oral PRN  
 dextrose (D50W) injection syrg 12.5-25 g  25-50 mL IntraVENous PRN  
 glucagon (GLUCAGEN) injection 1 mg  1 mg IntraMUSCular PRN  
 insulin lispro (HUMALOG) injection   SubCUTAneous AC&HS Lab Data Reviewed: (see below) Lab Review:  
 
Recent Labs 12/04/18 0259 12/03/18 0536 12/02/18 2118 WBC 4.8 6.0 4.0* HGB 9.5* 11.0* 11.0*  
HCT 30.3* 34.5* 34.1*  
 144* 152 Recent Labs 12/04/18 0259 12/03/18 0536 12/02/18 2118  139 135* K 3.6 3.8 3.7  106 101 CO2 20* 20* 21 * 184* 219* BUN 16 14 14 CREA 1.50* 1.54* 1.62* CA 8.3* 8.7 9.0 MG 1.5* 1.5*  --   
PHOS 1.8* 2.5*  --   
ALB 1.9* 2.1* 2.4* TBILI 0.6 1.1* 0.9 SGOT 10* 11* 11* ALT 14 20 23 Lab Results Component Value Date/Time Glucose (POC) 125 (H) 12/04/2018 08:36 AM  
 Glucose (POC) 146 (H) 12/04/2018 07:30 AM  
 Glucose (POC) 204 (H) 12/03/2018 09:50 PM  
 Glucose (POC) 142 (H) 12/03/2018 05:03 PM  
 Glucose (POC) 156 (H) 12/03/2018 01:37 PM  
 
No results for input(s): PH, PCO2, PO2, HCO3, FIO2 in the last 72 hours. No results for input(s): INR in the last 72 hours. No lab exists for component: INREXT, INREXT All Micro Results Procedure Component Value Units Date/Time CULTURE, BLOOD, PAIRED [[id number]] Collected:  12/02/18 2118 Order Status:  Completed Specimen:  Blood Updated:  12/04/18 8865 Special Requests: NO SPECIAL REQUESTS Culture result: NO GROWTH 2 DAYS     
 CULTURE, URINE [[id number]] Order Status:  Sent Specimen:  Urine from Clean catch I have reviewed notes of prior 24hr. Other pertinent lab: none Total time spent with patient: 45 Minutes Care Plan discussed with: Patient, Family, Nursing Staff, Consultant/Specialist and >50% of time spent in counseling and coordination of care Discussed:  Care Plan and D/C Planning Prophylaxis:  H2B/PPI Disposition:  SNF/LTC and HH PT, OT, RN 
        
___________________________________________________ Attending Physician: Marianela Ambrosio MD

## 2025-06-17 NOTE — PROGRESS NOTES
Problem: Mobility Impaired (Adult and Pediatric) Goal: *Acute Goals and Plan of Care (Insert Text) Physical Therapy Goals Initiated 2/11/2019 1. Patient will move from supine to sit and sit to supine  in bed with contact guard assist within 7 day(s). 2.  Patient will transfer from bed to chair and chair to bed with supervision/set-up using the least restrictive device within 7 day(s). 3.  Patient will perform sit to stand with supervision/set-up within 7 day(s). 4.  Patient will ambulate with supervision/set-up for 150 feet with the least restrictive device within 7 day(s). 5.  Patient will ascend/descend 4 stairs with handrail(s) with minimal assistance/contact guard assist within 7 day(s). physical Therapy TREATMENT Patient: Nelia Sherman (80 y.o. male) Date: 2/12/2019 Diagnosis: Acute metabolic encephalopathy [X73.53] Acute metabolic encephalopathy Precautions:   
Chart, physical therapy assessment, plan of care and goals were reviewed. ASSESSMENT: 
Pt comes to sit with min to mod assist.Pt to stand with CGA. Pt ambulated 120ft with RW CGA. Pts balance was fair to good on level surface. Pt left sitting in chair with family present. Pt progressing with mobility. Continue goals. Progression toward goals: 
[]    Improving appropriately and progressing toward goals 
[]    Improving slowly and progressing toward goals 
[]    Not making progress toward goals and plan of care will be adjusted PLAN: 
Patient continues to benefit from skilled intervention to address the above impairments. Continue treatment per established plan of care. Discharge Recommendations:  Rehab Further Equipment Recommendations for Discharge:  rolling walker SUBJECTIVE:  
 
 
OBJECTIVE DATA SUMMARY:  
Critical Behavior: 
Neurologic State: Alert, Confused Orientation Level: Oriented to person, Oriented to place Cognition: Follows commands, Impaired decision making Functional Mobility Training: Bed Mobility: 
  
 Pt min to mod assist supine to sit. Transfers: 
  
 Pt to stand with CGA. Balance: 
Sitting: Intact Standing: IntactAmbulation/Gait Training: 
  
 Pt ambulated 120ft with RW CGA. Pain: 
Pain Scale 1: Numeric (0 - 10) Pain Intensity 1: 0 Activity Tolerance:  
Pt tolerated treatment well. Please refer to the flowsheet for vital signs taken during this treatment. After treatment:  
[x]    Patient left in no apparent distress sitting up in chair 
[]    Patient left in no apparent distress in bed 
[]    Call bell left within reach 
[]    Nursing notified 
[]    Caregiver present 
[]    Bed alarm activated COMMUNICATION/COLLABORATION:  
The patients plan of care was discussed with: Physical Therapist 
 
Michael Vinson PTA Time Calculation: 23 mins Quality 47: Advance Care Plan: Advance Care Planning discussed and documented in the medical record; patient did not wish or was not able to name a surrogate decision maker or provide an advance care plan. Quality 226: Preventive Care And Screening: Tobacco Use: Screening And Cessation Intervention: Patient screened for tobacco use and is an ex/non-smoker Quality 130: Documentation Of Current Medications In The Medical Record: Current Medications Documented Detail Level: Detailed Quality 137: Melanoma: Continuity Of Care - Recall System: Patient information entered into a recall system that includes: target date for the next exam specified AND a process to follow up with patients regarding missed or unscheduled appointments Quality 431: Preventive Care And Screening: Unhealthy Alcohol Use - Screening: Patient not identified as an unhealthy alcohol user when screened for unhealthy alcohol use using a systematic screening method